# Patient Record
Sex: FEMALE | Race: WHITE | NOT HISPANIC OR LATINO
[De-identification: names, ages, dates, MRNs, and addresses within clinical notes are randomized per-mention and may not be internally consistent; named-entity substitution may affect disease eponyms.]

---

## 2017-08-16 ENCOUNTER — APPOINTMENT (OUTPATIENT)
Dept: GASTROENTEROLOGY | Facility: CLINIC | Age: 67
End: 2017-08-16
Payer: COMMERCIAL

## 2017-08-16 VITALS
BODY MASS INDEX: 37.92 KG/M2 | WEIGHT: 214 LBS | SYSTOLIC BLOOD PRESSURE: 120 MMHG | HEIGHT: 63 IN | TEMPERATURE: 98.2 F | DIASTOLIC BLOOD PRESSURE: 80 MMHG

## 2017-08-16 PROCEDURE — 99214 OFFICE O/P EST MOD 30 MIN: CPT

## 2017-10-03 ENCOUNTER — APPOINTMENT (OUTPATIENT)
Dept: GASTROENTEROLOGY | Facility: AMBULATORY MEDICAL SERVICES | Age: 67
End: 2017-10-03
Payer: COMMERCIAL

## 2017-10-03 PROCEDURE — G0121 COLON CA SCRN NOT HI RSK IND: CPT

## 2018-11-21 ENCOUNTER — RX RENEWAL (OUTPATIENT)
Age: 68
End: 2018-11-21

## 2019-08-14 ENCOUNTER — APPOINTMENT (OUTPATIENT)
Dept: GASTROENTEROLOGY | Facility: CLINIC | Age: 69
End: 2019-08-14
Payer: COMMERCIAL

## 2019-08-14 VITALS
TEMPERATURE: 97.2 F | HEIGHT: 63 IN | HEART RATE: 64 BPM | OXYGEN SATURATION: 99 % | SYSTOLIC BLOOD PRESSURE: 110 MMHG | BODY MASS INDEX: 32.96 KG/M2 | DIASTOLIC BLOOD PRESSURE: 80 MMHG | WEIGHT: 186 LBS

## 2019-08-14 PROCEDURE — 99214 OFFICE O/P EST MOD 30 MIN: CPT

## 2019-08-14 RX ORDER — L. ACIDOPHILUS/BIFID. ANIMALIS 31B CELL
CAPSULE ORAL
Qty: 60 | Refills: 4 | Status: COMPLETED | COMMUNITY
Start: 2018-11-21 | End: 2019-08-14

## 2019-08-14 RX ORDER — ELUXADOLINE 75 MG/1
75 TABLET, FILM COATED ORAL
Qty: 60 | Refills: 3 | Status: COMPLETED | COMMUNITY
Start: 2017-08-16 | End: 2019-08-14

## 2019-08-14 NOTE — PHYSICAL EXAM
[General Appearance - Alert] : alert [General Appearance - In No Acute Distress] : in no acute distress [Sclera] : the sclera and conjunctiva were normal [PERRL With Normal Accommodation] : pupils were equal in size, round, and reactive to light [Extraocular Movements] : extraocular movements were intact [Outer Ear] : the ears and nose were normal in appearance [Oropharynx] : the oropharynx was normal [Neck Appearance] : the appearance of the neck was normal [Neck Cervical Mass (___cm)] : no neck mass was observed [Jugular Venous Distention Increased] : there was no jugular-venous distention [Thyroid Diffuse Enlargement] : the thyroid was not enlarged [Thyroid Nodule] : there were no palpable thyroid nodules [Auscultation Breath Sounds / Voice Sounds] : lungs were clear to auscultation bilaterally [Heart Rate And Rhythm] : heart rate was normal and rhythm regular [Heart Sounds] : normal S1 and S2 [Heart Sounds Gallop] : no gallops [Murmurs] : no murmurs [Heart Sounds Pericardial Friction Rub] : no pericardial rub [Normal] : normal [Soft, Nontender] : the abdomen was soft and nontender [Epigastric] : in the epigastric area [No Mass] : no masses were palpated [No HSM] : no hepatosplenomegaly noted [Occult Blood Positive] : stool was negative for occult blood [No CVA Tenderness] : no ~M costovertebral angle tenderness [No Spinal Tenderness] : no spinal tenderness [Abnormal Walk] : normal gait [Nail Clubbing] : no clubbing  or cyanosis of the fingernails [Musculoskeletal - Swelling] : no joint swelling seen [Motor Tone] : muscle strength and tone were normal [Skin Color & Pigmentation] : normal skin color and pigmentation [Skin Turgor] : normal skin turgor [] : no rash [Oriented To Time, Place, And Person] : oriented to person, place, and time [Affect] : the affect was normal [Impaired Insight] : insight and judgment were intact

## 2019-08-14 NOTE — HISTORY OF PRESENT ILLNESS
[de-identified] : IBS - Colon 2017 \par \par  A low FODMAP diet was discussed with the patient at length. The patient had multiple questions all of which were answered. I recommended a nutritionist. Also recommended that the patient keep a food diary. We discussed  options such as Vegetables. Fresh fruits. Dairy that is lactose-free, and hard cheeses, or ripened/matured cheeses including... Beef, pork, chicken, fish, eggs. Avoid breadcrumbs, marinades, and sauces/gravies that may be high in FODMAPs. Soy products including tofu, tempeh. Grains.\par \par A low acid / reflux diet was discussed in great detail including  not smoking, not drinking alcohol, and not consuming foods that irritate the esophagus. It is helpful to eat small meals throughout the day instead of large meals. You should avoid eating before bedtime or lying down after you eat. It can be helpful to raise the head of your bed six inches. Additionally, you should maintain a healthy weight and good posture.. The patient was given written material to take home and review.\par

## 2019-11-13 ENCOUNTER — APPOINTMENT (OUTPATIENT)
Dept: SURGICAL ONCOLOGY | Facility: CLINIC | Age: 69
End: 2019-11-13
Payer: COMMERCIAL

## 2019-11-13 VITALS
WEIGHT: 182 LBS | HEIGHT: 63 IN | BODY MASS INDEX: 32.25 KG/M2 | DIASTOLIC BLOOD PRESSURE: 78 MMHG | HEART RATE: 71 BPM | SYSTOLIC BLOOD PRESSURE: 114 MMHG

## 2019-11-13 DIAGNOSIS — Z86.59 PERSONAL HISTORY OF OTHER MENTAL AND BEHAVIORAL DISORDERS: ICD-10-CM

## 2019-11-13 DIAGNOSIS — Z80.0 FAMILY HISTORY OF MALIGNANT NEOPLASM OF DIGESTIVE ORGANS: ICD-10-CM

## 2019-11-13 PROCEDURE — 99245 OFF/OP CONSLTJ NEW/EST HI 55: CPT

## 2019-11-13 RX ORDER — ESCITALOPRAM OXALATE 5 MG/1
5 TABLET, FILM COATED ORAL
Refills: 0 | Status: ACTIVE | COMMUNITY

## 2019-11-13 RX ORDER — PANCRELIPASE 36000; 180000; 114000 [USP'U]/1; [USP'U]/1; [USP'U]/1
36000-114000 CAPSULE, DELAYED RELEASE PELLETS ORAL
Qty: 270 | Refills: 2 | Status: DISCONTINUED | COMMUNITY
Start: 2019-09-12 | End: 2019-11-13

## 2019-11-13 RX ORDER — ALPRAZOLAM 0.25 MG/1
0.25 TABLET ORAL
Refills: 0 | Status: ACTIVE | COMMUNITY

## 2019-11-13 NOTE — CONSULT LETTER
[Dear  ___] : Dear  [unfilled], [Consult Letter:] : I had the pleasure of evaluating your patient, [unfilled]. [Consult Closing:] : Thank you very much for allowing me to participate in the care of this patient.  If you have any questions, please do not hesitate to contact me. [Sincerely,] : Sincerely, [DrChau  ___] : Dr. SIDHU [FreeTextEntry2] : Alexandria Diop MD [FreeTextEntry1] : 69 year-old female presents for an initial consultation.  She had seen Dr. Brunner for GI symptoms (felt secondary to be possibly related to IBS versus anxiety).  Apparently he asked her to see her PCP who performed bloodwork and was noted to have elevated Alk phos, AST and ALT in October 2019.  Total bilirubin was within normal range.\par \par She was referred for a non contrast CT of the abdomen and pelvis which demonstrated enlargement of the head of the pancreas with an approximately 3.3 cm hypodense area posteriorly within the pancreatic head which is suspicious for neoplasm.  There are enlarged retroperitoneal lymph nodes posterior to the head of the pancreas which are suspicious appearing as well.  There are few small nodular soft tissue densities lying deep to the right anterior abdominal wall suspicious for peritoneal implants, and soft tissue density material within the gallbladder which may reflect sludge. \par \par Subsequent MRCP revealed an irregularly enhancing  5.8 cm mass at the level of the confluence of the right and left hepatic biliary ducts.  This mass obstructs the common hepatic duct and main left intrahepatic bile duct causing dilation of the intrahepatic biliary ducts within the left hepatic lobe.  The mid to distal portion of the CBD are normal in caliber and appear uninvolved by the mass.  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  There are enlarged retroperitoneal lymph nodes, some of which demonstrate central necrosis lying posterior to the head of the pancreas and within the aortocaval region, suspicious for metastatic disease.  There is no evidence of primary pancreatic neoplasm.  There are small nodular densities within the right anterior abdomen suspicious for peritoneal metastatic foci ranging from 3-10 mm in size. \par \par HCV antibody was reactive, however, HCV RNA is not detected.  Patient has a history of HCV infection treated with Harvoni.\par \par Her past medical history includes IBS, GERD, anxiety + Past surgical history includes cataract surgery and tubal ligation.  She has a family history of gastric or colon cancer involving a maternal cousin.  She is a former smoker but quit 13 years ago and drinks alcohol on occasion.  She gets her colonoscopies with Dr. Brunner. \par \par She has been losing weight, however, she has been on Weight Watchers.  She reports abdominal discomfort, cramping and bloating for many years and this has been worked up by GI in the past with no significant findings. 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  Mass causes narrowing of the common hepatic duct and proximal CBD down to the level of CBD and cystic duct junction.  The distal CBD appears uninvolved.  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  Would initially recommend CT guided biopsy of peritoneal disease to establish diagnosis. I have reviewed the pertinent imaging, bloodwork and pathology. \par \par PCP: Dr. Alexandria Diop\par GI: Dr. Robert Brunner [FreeTextEntry3] : Aleksandr Adams MD, FACS, FASCRS\par , Department of Surgery\par Director of the [facility] [place]\par , Minimally Invasive/Robotic Cancer Surgery, Central & Eastern Divisions\par Division of Surgical Oncology

## 2019-11-13 NOTE — HISTORY OF PRESENT ILLNESS
[de-identified] : 69 year-old female presents for an initial consultation.  She had seen Dr. Brunner for GI symptoms (felt secondary to be possibly related to IBS versus anxiety).  Apparently he asked her to see her PCP who performed bloodwork and was noted to have elevated Alk phos, AST and ALT in October 2019.  Total bilirubin was within normal range.\par \par She was referred for a non contrast CT of the abdomen and pelvis which demonstrated enlargement of the head of the pancreas with an approximately 3.3 cm hypodense area posteriorly within the pancreatic head which is suspicious for neoplasm.  There are enlarged retroperitoneal lymph nodes posterior to the head of the pancreas which are suspicious appearing as well.  There are few small nodular soft tissue densities lying deep to the right anterior abdominal wall suspicious for peritoneal implants, and soft tissue density material within the gallbladder which may reflect sludge. \par \par Subsequent MRCP revealed an irregularly enhancing  5.8 cm mass at the level of the confluence of the right and left hepatic biliary ducts.  This mass obstructs the common hepatic duct and main left intrahepatic bile duct causing dilation of the intrahepatic biliary ducts within the left hepatic lobe.  The mid to distal portion of the CBD are normal in caliber and appear uninvolved by the mass.  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  There are enlarged retroperitoneal lymph nodes, some of which demonstrate central necrosis lying posterior to the head of the pancreas and within the aortocaval region, suspicious for metastatic disease.  There is no evidence of primary pancreatic neoplasm.  There are small nodular densities within the right anterior abdomen suspicious for peritoneal metastatic foci ranging from 3-10 mm in size. \par \par HCV antibody was reactive, however, HCV RNA is not detected.  Patient has a history of HCV infection treated with Harvoni.\par \par Her past medical history includes IBS, GERD, anxiety + Past surgical history includes cataract surgery and tubal ligation.  She has a family history of gastric or colon cancer involving a maternal cousin.  She is a former smoker but quit 13 years ago and drinks alcohol on occasion.  She gets her colonoscopies with Dr. Brunner. \par \par She has been losing weight, however, she has been on Weight Watchers.  She reports abdominal discomfort, cramping and bloating for many years and this has been worked up by GI in the past with no significant findings. \par \par (I also operated on her  Jose Brown in the past).\par \par PCP: Dr. Alexandria Diop\par GI: Dr. Robert Brunner

## 2019-11-13 NOTE — ASSESSMENT
[FreeTextEntry1] : 69 year-old female presents for an initial consultation.  She had seen Dr. Brunner for GI symptoms (felt secondary to be possibly related to IBS versus anxiety).  Apparently he asked her to see her PCP who performed bloodwork and was noted to have elevated Alk phos, AST and ALT in October 2019.  Total bilirubin was within normal range.\par \par She was referred for a non contrast CT of the abdomen and pelvis which demonstrated enlargement of the head of the pancreas with an approximately 3.3 cm hypodense area posteriorly within the pancreatic head which is suspicious for neoplasm.  There are enlarged retroperitoneal lymph nodes posterior to the head of the pancreas which are suspicious appearing as well.  There are few small nodular soft tissue densities lying deep to the right anterior abdominal wall suspicious for peritoneal implants, and soft tissue density material within the gallbladder which may reflect sludge. \par \par Subsequent MRCP revealed an irregularly enhancing  5.8 cm mass at the level of the confluence of the right and left hepatic biliary ducts.  This mass obstructs the common hepatic duct and main left intrahepatic bile duct causing dilation of the intrahepatic biliary ducts within the left hepatic lobe.  The mid to distal portion of the CBD are normal in caliber and appear uninvolved by the mass.  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  There are enlarged retroperitoneal lymph nodes, some of which demonstrate central necrosis lying posterior to the head of the pancreas and within the aortocaval region, suspicious for metastatic disease.  There is no evidence of primary pancreatic neoplasm.  There are small nodular densities within the right anterior abdomen suspicious for peritoneal metastatic foci ranging from 3-10 mm in size. \par \par HCV antibody was reactive, however, HCV RNA is not detected.  Patient has a history of HCV infection treated with Harvoni.\par \par Her past medical history includes IBS, GERD, anxiety + Past surgical history includes cataract surgery and tubal ligation.  She has a family history of gastric or colon cancer involving a maternal cousin.  She is a former smoker but quit 13 years ago and drinks alcohol on occasion.  She gets her colonoscopies with Dr. Brunner. \par \par She has been losing weight, however, she has been on Weight Watchers.  She reports abdominal discomfort, cramping and bloating for many years and this has been worked up by GI in the past with no significant findings. 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  Mass causes narrowing of the common hepatic duct and proximal CBD down to the level of CBD and cystic duct junction.  The distal CBD appears uninvolved.  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  Would initially recommend CT guided biopsy of peritoneal disease to establish diagnosis. I have reviewed the pertinent imaging, bloodwork and pathology.

## 2019-11-18 ENCOUNTER — OUTPATIENT (OUTPATIENT)
Dept: OUTPATIENT SERVICES | Facility: HOSPITAL | Age: 69
LOS: 1 days | Discharge: HOME | End: 2019-11-18
Payer: COMMERCIAL

## 2019-11-18 DIAGNOSIS — C23 MALIGNANT NEOPLASM OF GALLBLADDER: ICD-10-CM

## 2019-11-20 LAB
APTT BLD: 29.8 SEC
BASOPHILS # BLD AUTO: 0.03 K/UL
BASOPHILS NFR BLD AUTO: 0.5 %
EOSINOPHIL # BLD AUTO: 0.08 K/UL
EOSINOPHIL NFR BLD AUTO: 1.4 %
HCT VFR BLD CALC: 42.1 %
HGB BLD-MCNC: 13.5 G/DL
IMM GRANULOCYTES NFR BLD AUTO: 0.3 %
INR PPP: 1.05 RATIO
LYMPHOCYTES # BLD AUTO: 1.73 K/UL
LYMPHOCYTES NFR BLD AUTO: 29.6 %
MAN DIFF?: NORMAL
MCHC RBC-ENTMCNC: 31 PG
MCHC RBC-ENTMCNC: 32.1 GM/DL
MCV RBC AUTO: 96.8 FL
MONOCYTES # BLD AUTO: 0.4 K/UL
MONOCYTES NFR BLD AUTO: 6.8 %
NEUTROPHILS # BLD AUTO: 3.59 K/UL
NEUTROPHILS NFR BLD AUTO: 61.4 %
PLATELET # BLD AUTO: 226 K/UL
PT BLD: 12 SEC
RBC # BLD: 4.35 M/UL
RBC # FLD: 11.9 %
WBC # FLD AUTO: 5.85 K/UL

## 2019-11-22 ENCOUNTER — APPOINTMENT (OUTPATIENT)
Dept: HEMATOLOGY ONCOLOGY | Facility: CLINIC | Age: 69
End: 2019-11-22
Payer: COMMERCIAL

## 2019-11-22 ENCOUNTER — RESULT REVIEW (OUTPATIENT)
Age: 69
End: 2019-11-22

## 2019-11-22 VITALS
OXYGEN SATURATION: 100 % | HEART RATE: 78 BPM | DIASTOLIC BLOOD PRESSURE: 72 MMHG | SYSTOLIC BLOOD PRESSURE: 112 MMHG | RESPIRATION RATE: 18 BRPM | TEMPERATURE: 98.8 F | WEIGHT: 182.1 LBS | BODY MASS INDEX: 32.67 KG/M2 | HEIGHT: 62.76 IN

## 2019-11-22 LAB
BASOPHILS # BLD AUTO: 0.1 K/UL — SIGNIFICANT CHANGE UP (ref 0–0.2)
BASOPHILS NFR BLD AUTO: 0.9 % — SIGNIFICANT CHANGE UP (ref 0–2)
EOSINOPHIL # BLD AUTO: 0.1 K/UL — SIGNIFICANT CHANGE UP (ref 0–0.5)
EOSINOPHIL NFR BLD AUTO: 1.7 % — SIGNIFICANT CHANGE UP (ref 0–6)
HCT VFR BLD CALC: 43.6 % — SIGNIFICANT CHANGE UP (ref 34.5–45)
HGB BLD-MCNC: 14.6 G/DL — SIGNIFICANT CHANGE UP (ref 11.5–15.5)
LYMPHOCYTES # BLD AUTO: 2.1 K/UL — SIGNIFICANT CHANGE UP (ref 1–3.3)
LYMPHOCYTES # BLD AUTO: 29.5 % — SIGNIFICANT CHANGE UP (ref 13–44)
MCHC RBC-ENTMCNC: 32.2 PG — SIGNIFICANT CHANGE UP (ref 27–34)
MCHC RBC-ENTMCNC: 33.6 G/DL — SIGNIFICANT CHANGE UP (ref 32–36)
MCV RBC AUTO: 96.1 FL — SIGNIFICANT CHANGE UP (ref 80–100)
MONOCYTES # BLD AUTO: 0.5 K/UL — SIGNIFICANT CHANGE UP (ref 0–0.9)
MONOCYTES NFR BLD AUTO: 7.2 % — SIGNIFICANT CHANGE UP (ref 2–14)
NEUTROPHILS # BLD AUTO: 4.2 K/UL — SIGNIFICANT CHANGE UP (ref 1.8–7.4)
NEUTROPHILS NFR BLD AUTO: 60.7 % — SIGNIFICANT CHANGE UP (ref 43–77)
PLATELET # BLD AUTO: 257 K/UL — SIGNIFICANT CHANGE UP (ref 150–400)
RBC # BLD: 4.54 M/UL — SIGNIFICANT CHANGE UP (ref 3.8–5.2)
RBC # FLD: 11 % — SIGNIFICANT CHANGE UP (ref 10.3–14.5)
WBC # BLD: 7 K/UL — SIGNIFICANT CHANGE UP (ref 3.8–10.5)
WBC # FLD AUTO: 7 K/UL — SIGNIFICANT CHANGE UP (ref 3.8–10.5)

## 2019-11-22 PROCEDURE — 99205 OFFICE O/P NEW HI 60 MIN: CPT

## 2019-11-22 RX ORDER — PANCRELIPASE LIPASE, PANCRELIPASE PROTEASE, PANCRELIPASE AMYLASE 40000; 126000; 168000 [USP'U]/1; [USP'U]/1; [USP'U]/1
40000-126000 CAPSULE, DELAYED RELEASE ORAL
Qty: 300 | Refills: 5 | Status: DISCONTINUED | COMMUNITY
Start: 2019-08-26 | End: 2019-11-22

## 2019-11-25 LAB
ALBUMIN SERPL ELPH-MCNC: 4.5 G/DL
ALP BLD-CCNC: 175 U/L
ALT SERPL-CCNC: 21 U/L
ANION GAP SERPL CALC-SCNC: 12 MMOL/L
AST SERPL-CCNC: 27 U/L
BILIRUB SERPL-MCNC: 0.2 MG/DL
BUN SERPL-MCNC: 15 MG/DL
CALCIUM SERPL-MCNC: 9.6 MG/DL
CANCER AG19-9 SERPL-ACNC: 14 U/ML
CEA SERPL-MCNC: 2.7 NG/ML
CHLORIDE SERPL-SCNC: 100 MMOL/L
CO2 SERPL-SCNC: 29 MMOL/L
CREAT SERPL-MCNC: 0.79 MG/DL
GLUCOSE SERPL-MCNC: 225 MG/DL
HBV SURFACE AB SER QL: REACTIVE
HBV SURFACE AG SER QL: NONREACTIVE
HCV RNA FLD QL NAA+PROBE: NORMAL
HCV RNA SPEC QL PROBE+SIG AMP: NOT DETECTED
POTASSIUM SERPL-SCNC: 3.5 MMOL/L
PROT SERPL-MCNC: 7 G/DL
SODIUM SERPL-SCNC: 141 MMOL/L

## 2019-11-27 ENCOUNTER — APPOINTMENT (OUTPATIENT)
Dept: CT IMAGING | Facility: HOSPITAL | Age: 69
End: 2019-11-27

## 2019-12-01 NOTE — PHYSICAL EXAM
[Fully active, able to carry on all pre-disease performance without restriction] : Status 0 - Fully active, able to carry on all pre-disease performance without restriction [Normal] : affect appropriate [de-identified] : +mild epigastric tenderness to palpation

## 2019-12-01 NOTE — CONSULT LETTER
[Dear  ___] : Dear  [unfilled], [Consult Letter:] : I had the pleasure of evaluating your patient, [unfilled]. [Please see my note below.] : Please see my note below. [Consult Closing:] : Thank you very much for allowing me to participate in the care of this patient.  If you have any questions, please do not hesitate to contact me. [FreeTextEntry3] : Sridhar Castro MD, FACP \par  of Medicine \par Division of Hematology/Oncology\par [facility]\par [facility] \par [address]\[place] [address]\par Tel: [phone]\par Fax: [phone]\par \par \par  [Sincerely,] : Sincerely,

## 2019-12-01 NOTE — PHYSICAL EXAM
[Fully active, able to carry on all pre-disease performance without restriction] : Status 0 - Fully active, able to carry on all pre-disease performance without restriction [Normal] : affect appropriate [de-identified] : +mild epigastric tenderness to palpation

## 2019-12-01 NOTE — HISTORY OF PRESENT ILLNESS
[Disease: _____________________] : Disease: [unfilled] [de-identified] : 70 Y/O F HCV infection (treated with Harvoni), IBS, Anxiety, GERD who had some GI symptoms/elevated LFTs (wt loss but on wt watchers) October 2019 and consulted Dr Brunner GI and non contrast CT A/P showed enlargement of the head of the pancreas with an approximately 3.3 cm hypodense area posteriorly within the pancreatic head which is suspicious for neoplasm. There are enlarged retroperitoneal lymph nodes posterior to the head of the pancreas which are suspicious appearing as well. There are few small nodular soft tissue densities lying deep to the right anterior abdominal wall suspicious for peritoneal implants, and soft tissue density material within the gallbladder which may reflect sludge.  Patient was referred for MRCP which revealed an irregularly enhancing 5.8 cm mass at the level of the confluence of the right and left hepatic biliary ducts. This mass obstructs the common hepatic duct and main left intrahepatic bile duct causing dilation of the intrahepatic biliary ducts within the left hepatic lobe. The mid to distal portion of the CBD are normal in caliber and appear uninvolved by the mass. 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 There are enlarged retroperitoneal lymph nodes, some of which demonstrate central necrosis lying posterior to the head of the pancreas and within the aortocaval region, suspicious for metastatic disease. There is no evidence of primary pancreatic neoplasm. There are small nodular densities within the right anterior abdomen suspicious for peritoneal metastatic foci ranging from 3-10 mm in size.  She consulted Dr Adams on 11/13/19 and is planned for biopsy planned 11/27/19. \par \par \par PSH:  cataracts, tubal ligation \par Fhx:  gastric or colon cancer involving a maternal cousin at 60\par Social:  former smoker but quit 13 years ago and drinks alcohol on occasion. She gets her colonoscopies with Dr. Brunner. \par \par PCP: Dr. Alexandria Diop\par GI: Dr. Robert Brunner. \par \par Last Mammogram/pap smear - few years ago; last colonoscopy 2017 [de-identified] : Karlie feels anxious about diagnosis, continues to have epigastric abdominal pain, especially in the morning. \romaine Has been having nausea, was prescribed zofran by her PMD.  [FreeTextEntry1] : Newly diagnosed - no tissue diagnosis

## 2019-12-01 NOTE — CONSULT LETTER
[Dear  ___] : Dear  [unfilled], [Consult Letter:] : I had the pleasure of evaluating your patient, [unfilled]. [Please see my note below.] : Please see my note below. [Consult Closing:] : Thank you very much for allowing me to participate in the care of this patient.  If you have any questions, please do not hesitate to contact me. [Sincerely,] : Sincerely, [FreeTextEntry3] : Sridhar Castro MD, FACP \par  of Medicine \par Division of Hematology/Oncology\par [facility]\par [facility] \par [address]\[place] [address]\par Tel: [phone]\par Fax: [phone]\par \par \par

## 2019-12-01 NOTE — REVIEW OF SYSTEMS
[Recent Change In Weight] : ~T recent weight change [Abdominal Pain] : abdominal pain [Diarrhea] : diarrhea [Anxiety] : anxiety [Negative] : Allergic/Immunologic [Fever] : no fever [Chills] : no chills [Night Sweats] : no night sweats [Vomiting] : no vomiting [FreeTextEntry7] : nausea

## 2019-12-01 NOTE — HISTORY OF PRESENT ILLNESS
[Disease: _____________________] : Disease: [unfilled] [de-identified] : 70 Y/O F HCV infection (treated with Harvoni), IBS, Anxiety, GERD who had some GI symptoms/elevated LFTs (wt loss but on wt watchers) October 2019 and consulted Dr Brunner GI and non contrast CT A/P showed enlargement of the head of the pancreas with an approximately 3.3 cm hypodense area posteriorly within the pancreatic head which is suspicious for neoplasm. There are enlarged retroperitoneal lymph nodes posterior to the head of the pancreas which are suspicious appearing as well. There are few small nodular soft tissue densities lying deep to the right anterior abdominal wall suspicious for peritoneal implants, and soft tissue density material within the gallbladder which may reflect sludge.  Patient was referred for MRCP which revealed an irregularly enhancing 5.8 cm mass at the level of the confluence of the right and left hepatic biliary ducts. This mass obstructs the common hepatic duct and main left intrahepatic bile duct causing dilation of the intrahepatic biliary ducts within the left hepatic lobe. The mid to distal portion of the CBD are normal in caliber and appear uninvolved by the mass. 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 There are enlarged retroperitoneal lymph nodes, some of which demonstrate central necrosis lying posterior to the head of the pancreas and within the aortocaval region, suspicious for metastatic disease. There is no evidence of primary pancreatic neoplasm. There are small nodular densities within the right anterior abdomen suspicious for peritoneal metastatic foci ranging from 3-10 mm in size.  She consulted Dr Adams on 11/13/19 and is planned for biopsy planned 11/27/19. \par \par \par PSH:  cataracts, tubal ligation \par Fhx:  gastric or colon cancer involving a maternal cousin at 60\par Social:  former smoker but quit 13 years ago and drinks alcohol on occasion. She gets her colonoscopies with Dr. Brunner. \par \par PCP: Dr. Alexandria Diop\par GI: Dr. Robert Brunner. \par \par Last Mammogram/pap smear - few years ago; last colonoscopy 2017 [FreeTextEntry1] : Newly diagnosed - no tissue diagnosis  [de-identified] : Karlie feels anxious about diagnosis, continues to have epigastric abdominal pain, especially in the morning. \romaine Has been having nausea, was prescribed zofran by her PMD.

## 2019-12-01 NOTE — REVIEW OF SYSTEMS
[Recent Change In Weight] : ~T recent weight change [Abdominal Pain] : abdominal pain [Diarrhea] : diarrhea [Anxiety] : anxiety [Negative] : Allergic/Immunologic [Fever] : no fever [Night Sweats] : no night sweats [Chills] : no chills [FreeTextEntry7] : nausea  [Vomiting] : no vomiting

## 2019-12-05 ENCOUNTER — FORM ENCOUNTER (OUTPATIENT)
Age: 69
End: 2019-12-05

## 2019-12-06 ENCOUNTER — OUTPATIENT (OUTPATIENT)
Dept: OUTPATIENT SERVICES | Facility: HOSPITAL | Age: 69
LOS: 1 days | End: 2019-12-06
Payer: COMMERCIAL

## 2019-12-06 ENCOUNTER — APPOINTMENT (OUTPATIENT)
Dept: CT IMAGING | Facility: HOSPITAL | Age: 69
End: 2019-12-06

## 2019-12-06 ENCOUNTER — RESULT REVIEW (OUTPATIENT)
Age: 69
End: 2019-12-06

## 2019-12-06 DIAGNOSIS — Z00.00 ENCOUNTER FOR GENERAL ADULT MEDICAL EXAMINATION WITHOUT ABNORMAL FINDINGS: ICD-10-CM

## 2019-12-06 DIAGNOSIS — K83.9 DISEASE OF BILIARY TRACT, UNSPECIFIED: ICD-10-CM

## 2019-12-06 PROCEDURE — 77012 CT SCAN FOR NEEDLE BIOPSY: CPT | Mod: 26

## 2019-12-06 PROCEDURE — 49180 BIOPSY ABDOMINAL MASS: CPT

## 2019-12-06 PROCEDURE — 77012 CT SCAN FOR NEEDLE BIOPSY: CPT

## 2019-12-11 LAB — NON-GYNECOLOGICAL CYTOLOGY STUDY: SIGNIFICANT CHANGE UP

## 2019-12-13 ENCOUNTER — RESULT REVIEW (OUTPATIENT)
Age: 69
End: 2019-12-13

## 2019-12-13 ENCOUNTER — APPOINTMENT (OUTPATIENT)
Dept: HEMATOLOGY ONCOLOGY | Facility: CLINIC | Age: 69
End: 2019-12-13
Payer: COMMERCIAL

## 2019-12-13 VITALS
OXYGEN SATURATION: 99 % | BODY MASS INDEX: 33.06 KG/M2 | TEMPERATURE: 98.1 F | HEART RATE: 79 BPM | RESPIRATION RATE: 18 BRPM | HEIGHT: 63 IN | SYSTOLIC BLOOD PRESSURE: 120 MMHG | WEIGHT: 185.19 LBS | DIASTOLIC BLOOD PRESSURE: 74 MMHG

## 2019-12-13 LAB
BASOPHILS # BLD AUTO: 0 K/UL — SIGNIFICANT CHANGE UP (ref 0–0.2)
BASOPHILS NFR BLD AUTO: 0.8 % — SIGNIFICANT CHANGE UP (ref 0–2)
EOSINOPHIL # BLD AUTO: 0.1 K/UL — SIGNIFICANT CHANGE UP (ref 0–0.5)
EOSINOPHIL NFR BLD AUTO: 1.5 % — SIGNIFICANT CHANGE UP (ref 0–6)
HCT VFR BLD CALC: 41.7 % — SIGNIFICANT CHANGE UP (ref 34.5–45)
HGB BLD-MCNC: 14.1 G/DL — SIGNIFICANT CHANGE UP (ref 11.5–15.5)
LYMPHOCYTES # BLD AUTO: 1.8 K/UL — SIGNIFICANT CHANGE UP (ref 1–3.3)
LYMPHOCYTES # BLD AUTO: 27.4 % — SIGNIFICANT CHANGE UP (ref 13–44)
MCHC RBC-ENTMCNC: 32.4 PG — SIGNIFICANT CHANGE UP (ref 27–34)
MCHC RBC-ENTMCNC: 33.8 G/DL — SIGNIFICANT CHANGE UP (ref 32–36)
MCV RBC AUTO: 96 FL — SIGNIFICANT CHANGE UP (ref 80–100)
MONOCYTES # BLD AUTO: 0.5 K/UL — SIGNIFICANT CHANGE UP (ref 0–0.9)
MONOCYTES NFR BLD AUTO: 7.1 % — SIGNIFICANT CHANGE UP (ref 2–14)
NEUTROPHILS # BLD AUTO: 4.2 K/UL — SIGNIFICANT CHANGE UP (ref 1.8–7.4)
NEUTROPHILS NFR BLD AUTO: 63.2 % — SIGNIFICANT CHANGE UP (ref 43–77)
PLATELET # BLD AUTO: 187 K/UL — SIGNIFICANT CHANGE UP (ref 150–400)
RBC # BLD: 4.34 M/UL — SIGNIFICANT CHANGE UP (ref 3.8–5.2)
RBC # FLD: 11.2 % — SIGNIFICANT CHANGE UP (ref 10.3–14.5)
WBC # BLD: 6.6 K/UL — SIGNIFICANT CHANGE UP (ref 3.8–10.5)
WBC # FLD AUTO: 6.6 K/UL — SIGNIFICANT CHANGE UP (ref 3.8–10.5)

## 2019-12-13 PROCEDURE — 93010 ELECTROCARDIOGRAM REPORT: CPT

## 2019-12-13 PROCEDURE — 99215 OFFICE O/P EST HI 40 MIN: CPT

## 2019-12-13 RX ORDER — ONDANSETRON 8 MG/1
8 TABLET ORAL
Refills: 0 | Status: DISCONTINUED | COMMUNITY
End: 2019-12-13

## 2019-12-16 ENCOUNTER — MESSAGE (OUTPATIENT)
Age: 69
End: 2019-12-16

## 2019-12-16 ENCOUNTER — OUTPATIENT (OUTPATIENT)
Dept: OUTPATIENT SERVICES | Facility: HOSPITAL | Age: 69
LOS: 1 days | Discharge: HOME | End: 2019-12-16

## 2019-12-16 DIAGNOSIS — C23 MALIGNANT NEOPLASM OF GALLBLADDER: ICD-10-CM

## 2019-12-17 NOTE — HISTORY OF PRESENT ILLNESS
[Disease: _____________________] : Disease: [unfilled] [AJCC Stage: ____] : AJCC Stage: [unfilled] [de-identified] : 70 Y/O F HCV infection (treated with Harvoni), IBS, Anxiety, GERD who had some GI symptoms/elevated LFTs (wt loss but on wt watchers) October 2019 and consulted Dr Brunner GI and non contrast CT A/P showed enlargement of the head of the pancreas with an approximately 3.3 cm hypodense area posteriorly within the pancreatic head which is suspicious for neoplasm. There are enlarged retroperitoneal lymph nodes posterior to the head of the pancreas which are suspicious appearing as well. There are few small nodular soft tissue densities lying deep to the right anterior abdominal wall suspicious for peritoneal implants, and soft tissue density material within the gallbladder which may reflect sludge.  Patient was referred for MRCP which revealed an irregularly enhancing 5.8 cm mass at the level of the confluence of the right and left hepatic biliary ducts. This mass obstructs the common hepatic duct and main left intrahepatic bile duct causing dilation of the intrahepatic biliary ducts within the left hepatic lobe. The mid to distal portion of the CBD are normal in caliber and appear uninvolved by the mass. 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 There are enlarged retroperitoneal lymph nodes, some of which demonstrate central necrosis lying posterior to the head of the pancreas and within the aortocaval region, suspicious for metastatic disease. There is no evidence of primary pancreatic neoplasm. There are small nodular densities within the right anterior abdomen suspicious for peritoneal metastatic foci ranging from 3-10 mm in size.  She consulted Dr Adams on 11/13/19 and is planned for biopsy planned 11/27/19. \par \par \par PSH:  cataracts, tubal ligation \par Fhx:  gastric or colon cancer involving a maternal cousin at 60\par Social:  former smoker but quit 13 years ago and drinks alcohol on occasion. She gets her colonoscopies with Dr. Brunner. \par \par PCP: Dr. Alexandria Diop\par GI: Dr. Robert Brunner. \par \par Last Mammogram/pap smear - few years ago; last colonoscopy 2017 [de-identified] : Karlie comes in follow up and had biopsy of RUQ peritoneal nodule which confirmed metastatic cholangiocarcinoma.  She feels anxious related to diagnosis and results of biopsy. We discussed clinical trial (phase 2 study with Xermelo + [place]/Cis) \par \par \par \par

## 2019-12-17 NOTE — REASON FOR VISIT
[Spouse] : spouse [Family Member] : family member [Follow-Up Visit] : a follow-up [FreeTextEntry2] : Cholangiocarcinoma

## 2019-12-17 NOTE — PHYSICAL EXAM
[Fully active, able to carry on all pre-disease performance without restriction] : Status 0 - Fully active, able to carry on all pre-disease performance without restriction [Normal] : grossly intact [de-identified] : +mild epigastric tenderness to palpation

## 2019-12-17 NOTE — REVIEW OF SYSTEMS
[Abdominal Pain] : abdominal pain [Recent Change In Weight] : ~T recent weight change [Anxiety] : anxiety [Negative] : Allergic/Immunologic [Constipation] : constipation [Chills] : no chills [Fever] : no fever [Night Sweats] : no night sweats [Vomiting] : no vomiting [FreeTextEntry7] : nausea

## 2019-12-20 ENCOUNTER — RX RENEWAL (OUTPATIENT)
Age: 69
End: 2019-12-20

## 2019-12-20 ENCOUNTER — RESULT REVIEW (OUTPATIENT)
Age: 69
End: 2019-12-20

## 2019-12-20 ENCOUNTER — LABORATORY RESULT (OUTPATIENT)
Age: 69
End: 2019-12-20

## 2019-12-20 ENCOUNTER — APPOINTMENT (OUTPATIENT)
Dept: INFUSION THERAPY | Facility: HOSPITAL | Age: 69
End: 2019-12-20

## 2019-12-20 LAB
BASOPHILS # BLD AUTO: 0.1 K/UL — SIGNIFICANT CHANGE UP (ref 0–0.2)
BASOPHILS NFR BLD AUTO: 0.7 % — SIGNIFICANT CHANGE UP (ref 0–2)
EOSINOPHIL # BLD AUTO: 0.2 K/UL — SIGNIFICANT CHANGE UP (ref 0–0.5)
EOSINOPHIL NFR BLD AUTO: 2.7 % — SIGNIFICANT CHANGE UP (ref 0–6)
HCT VFR BLD CALC: 44.5 % — SIGNIFICANT CHANGE UP (ref 34.5–45)
HGB BLD-MCNC: 15 G/DL — SIGNIFICANT CHANGE UP (ref 11.5–15.5)
LYMPHOCYTES # BLD AUTO: 2.1 K/UL — SIGNIFICANT CHANGE UP (ref 1–3.3)
LYMPHOCYTES # BLD AUTO: 24.1 % — SIGNIFICANT CHANGE UP (ref 13–44)
MCHC RBC-ENTMCNC: 31.9 PG — SIGNIFICANT CHANGE UP (ref 27–34)
MCHC RBC-ENTMCNC: 33.7 G/DL — SIGNIFICANT CHANGE UP (ref 32–36)
MCV RBC AUTO: 94.5 FL — SIGNIFICANT CHANGE UP (ref 80–100)
MONOCYTES # BLD AUTO: 0.6 K/UL — SIGNIFICANT CHANGE UP (ref 0–0.9)
MONOCYTES NFR BLD AUTO: 6.7 % — SIGNIFICANT CHANGE UP (ref 2–14)
NEUTROPHILS # BLD AUTO: 5.7 K/UL — SIGNIFICANT CHANGE UP (ref 1.8–7.4)
NEUTROPHILS NFR BLD AUTO: 65.9 % — SIGNIFICANT CHANGE UP (ref 43–77)
PLATELET # BLD AUTO: 216 K/UL — SIGNIFICANT CHANGE UP (ref 150–400)
RBC # BLD: 4.71 M/UL — SIGNIFICANT CHANGE UP (ref 3.8–5.2)
RBC # FLD: 11.3 % — SIGNIFICANT CHANGE UP (ref 10.3–14.5)
WBC # BLD: 8.6 K/UL — SIGNIFICANT CHANGE UP (ref 3.8–10.5)
WBC # FLD AUTO: 8.6 K/UL — SIGNIFICANT CHANGE UP (ref 3.8–10.5)

## 2019-12-23 DIAGNOSIS — R11.2 NAUSEA WITH VOMITING, UNSPECIFIED: ICD-10-CM

## 2019-12-23 DIAGNOSIS — Z51.11 ENCOUNTER FOR ANTINEOPLASTIC CHEMOTHERAPY: ICD-10-CM

## 2019-12-27 ENCOUNTER — LABORATORY RESULT (OUTPATIENT)
Age: 69
End: 2019-12-27

## 2019-12-27 ENCOUNTER — RESULT REVIEW (OUTPATIENT)
Age: 69
End: 2019-12-27

## 2019-12-27 ENCOUNTER — APPOINTMENT (OUTPATIENT)
Dept: INFUSION THERAPY | Facility: HOSPITAL | Age: 69
End: 2019-12-27

## 2019-12-27 ENCOUNTER — APPOINTMENT (OUTPATIENT)
Dept: HEMATOLOGY ONCOLOGY | Facility: CLINIC | Age: 69
End: 2019-12-27
Payer: COMMERCIAL

## 2019-12-27 VITALS
OXYGEN SATURATION: 99 % | HEART RATE: 83 BPM | TEMPERATURE: 98.3 F | BODY MASS INDEX: 32.1 KG/M2 | SYSTOLIC BLOOD PRESSURE: 120 MMHG | DIASTOLIC BLOOD PRESSURE: 72 MMHG | WEIGHT: 181.22 LBS | RESPIRATION RATE: 18 BRPM

## 2019-12-27 LAB
BASOPHILS # BLD AUTO: 0 K/UL — SIGNIFICANT CHANGE UP (ref 0–0.2)
BASOPHILS NFR BLD AUTO: 0.8 % — SIGNIFICANT CHANGE UP (ref 0–2)
BUN SERPL-MCNC: 23 MG/DL — SIGNIFICANT CHANGE UP (ref 7–23)
CA-I BLDA-SCNC: 1.2 MMOL/L — SIGNIFICANT CHANGE UP (ref 1.12–1.3)
CHLORIDE SERPL-SCNC: 100 MMOL/L — SIGNIFICANT CHANGE UP (ref 96–108)
CO2 SERPL-SCNC: 30 MMOL/L — SIGNIFICANT CHANGE UP (ref 22–31)
CREAT SERPL-MCNC: 0.8 MG/DL — SIGNIFICANT CHANGE UP (ref 0.5–1.3)
EOSINOPHIL # BLD AUTO: 0.2 K/UL — SIGNIFICANT CHANGE UP (ref 0–0.5)
EOSINOPHIL NFR BLD AUTO: 4 % — SIGNIFICANT CHANGE UP (ref 0–6)
GLUCOSE SERPL-MCNC: 111 MG/DL — HIGH (ref 70–99)
HCT VFR BLD CALC: 41.4 % — SIGNIFICANT CHANGE UP (ref 34.5–45)
HGB BLD-MCNC: 13.9 G/DL — SIGNIFICANT CHANGE UP (ref 11.5–15.5)
LYMPHOCYTES # BLD AUTO: 1.8 K/UL — SIGNIFICANT CHANGE UP (ref 1–3.3)
LYMPHOCYTES # BLD AUTO: 30.2 % — SIGNIFICANT CHANGE UP (ref 13–44)
MCHC RBC-ENTMCNC: 31.8 PG — SIGNIFICANT CHANGE UP (ref 27–34)
MCHC RBC-ENTMCNC: 33.5 G/DL — SIGNIFICANT CHANGE UP (ref 32–36)
MCV RBC AUTO: 95 FL — SIGNIFICANT CHANGE UP (ref 80–100)
MONOCYTES # BLD AUTO: 0.5 K/UL — SIGNIFICANT CHANGE UP (ref 0–0.9)
MONOCYTES NFR BLD AUTO: 8.9 % — SIGNIFICANT CHANGE UP (ref 2–14)
NEUTROPHILS # BLD AUTO: 3.4 K/UL — SIGNIFICANT CHANGE UP (ref 1.8–7.4)
NEUTROPHILS NFR BLD AUTO: 56.1 % — SIGNIFICANT CHANGE UP (ref 43–77)
PLATELET # BLD AUTO: 198 K/UL — SIGNIFICANT CHANGE UP (ref 150–400)
POTASSIUM SERPL-MCNC: 4.2 MMOL/L — SIGNIFICANT CHANGE UP (ref 3.5–5.3)
POTASSIUM SERPL-SCNC: 4.2 MMOL/L — SIGNIFICANT CHANGE UP (ref 3.5–5.3)
RBC # BLD: 4.36 M/UL — SIGNIFICANT CHANGE UP (ref 3.8–5.2)
RBC # FLD: 10.9 % — SIGNIFICANT CHANGE UP (ref 10.3–14.5)
SODIUM SERPL-SCNC: 139 MMOL/L — SIGNIFICANT CHANGE UP (ref 135–145)
WBC # BLD: 6 K/UL — SIGNIFICANT CHANGE UP (ref 3.8–10.5)
WBC # FLD AUTO: 6 K/UL — SIGNIFICANT CHANGE UP (ref 3.8–10.5)

## 2019-12-27 PROCEDURE — 99214 OFFICE O/P EST MOD 30 MIN: CPT

## 2019-12-27 NOTE — REVIEW OF SYSTEMS
[Recent Change In Weight] : ~T recent weight change [Abdominal Pain] : abdominal pain [Anxiety] : anxiety [Negative] : Allergic/Immunologic [Fever] : no fever [Chills] : no chills [Night Sweats] : no night sweats [Fatigue] : no fatigue [Vomiting] : no vomiting [FreeTextEntry7] : nausea  [FreeTextEntry9] : back pain

## 2019-12-27 NOTE — HISTORY OF PRESENT ILLNESS
[Disease: _____________________] : Disease: [unfilled] [AJCC Stage: ____] : AJCC Stage: [unfilled] [M: ___] : M[unfilled] [de-identified] : 70 Y/O F HCV infection (treated with Harvoni), IBS, Anxiety, GERD who had some GI symptoms/elevated LFTs (wt loss but on wt watchers) October 2019 and consulted Dr Brunner GI and non contrast CT A/P showed enlargement of the head of the pancreas with an approximately 3.3 cm hypodense area posteriorly within the pancreatic head which is suspicious for neoplasm. There are enlarged retroperitoneal lymph nodes posterior to the head of the pancreas which are suspicious appearing as well. There are few small nodular soft tissue densities lying deep to the right anterior abdominal wall suspicious for peritoneal implants, and soft tissue density material within the gallbladder which may reflect sludge.  Patient was referred for MRCP which revealed an irregularly enhancing 5.8 cm mass at the level of the confluence of the right and left hepatic biliary ducts. This mass obstructs the common hepatic duct and main left intrahepatic bile duct causing dilation of the intrahepatic biliary ducts within the left hepatic lobe. The mid to distal portion of the CBD are normal in caliber and appear uninvolved by the mass. 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 There are enlarged retroperitoneal lymph nodes, some of which demonstrate central necrosis lying posterior to the head of the pancreas and within the aortocaval region, suspicious for metastatic disease. There is no evidence of primary pancreatic neoplasm. There are small nodular densities within the right anterior abdomen suspicious for peritoneal metastatic foci ranging from 3-10 mm in size.  She consulted Dr Adams on 11/13/19 and is planned for biopsy planned 11/27/19. \par \par \par PSH:  cataracts, tubal ligation \par Fhx:  gastric or colon cancer involving a maternal cousin at 60\par Social:  former smoker but quit 13 years ago and drinks alcohol on occasion. She gets her colonoscopies with Dr. Brunner. \par \par PCP: Dr. Alexandria Diop\par GI: Dr. Robert Brunner. \par \par Last Mammogram/pap smear - few years ago; last colonoscopy 2017\par \par She had biopsy of RUQ peritoneal nodule which confirmed metastatic cholangiocarcinoma.  \par She was started on Gemzar/Cisplatin 12/20/19  [FreeTextEntry1] : Gemcitabine/Cisplatin s/p W1 Day 1 [de-identified] : Karlie comes in follow up after her first dose of Gemcitabine/Cisplatin and states her back pain has been acting up and used to go to chiropractor (last went 1 month ago) and has been taking Tylenol/Motrin which don’t always help.  She also c/o a lot of gassy abdominal pain but denies any more constipation.  Reglan helped for her nausea.  She has an new itch rash under breasts and in groin.  She states she is using an OTC CBD oil.  \par \par \par

## 2019-12-27 NOTE — PHYSICAL EXAM
[Fully active, able to carry on all pre-disease performance without restriction] : Status 0 - Fully active, able to carry on all pre-disease performance without restriction [Normal] : grossly intact [de-identified] : +mild epigastric tenderness to palpation  [de-identified] : erythematous rash along bra line anterior and groin ,  [de-identified] : anxious

## 2019-12-30 DIAGNOSIS — C22.1 INTRAHEPATIC BILE DUCT CARCINOMA: ICD-10-CM

## 2020-01-10 ENCOUNTER — RESULT REVIEW (OUTPATIENT)
Age: 70
End: 2020-01-10

## 2020-01-10 ENCOUNTER — APPOINTMENT (OUTPATIENT)
Dept: INFUSION THERAPY | Facility: HOSPITAL | Age: 70
End: 2020-01-10

## 2020-01-10 ENCOUNTER — LABORATORY RESULT (OUTPATIENT)
Age: 70
End: 2020-01-10

## 2020-01-10 ENCOUNTER — APPOINTMENT (OUTPATIENT)
Dept: HEMATOLOGY ONCOLOGY | Facility: CLINIC | Age: 70
End: 2020-01-10
Payer: COMMERCIAL

## 2020-01-10 VITALS
WEIGHT: 182.98 LBS | BODY MASS INDEX: 32.41 KG/M2 | OXYGEN SATURATION: 99 % | SYSTOLIC BLOOD PRESSURE: 120 MMHG | TEMPERATURE: 98.2 F | DIASTOLIC BLOOD PRESSURE: 70 MMHG | RESPIRATION RATE: 18 BRPM | HEART RATE: 84 BPM

## 2020-01-10 LAB
BASOPHILS # BLD AUTO: 0 K/UL — SIGNIFICANT CHANGE UP (ref 0–0.2)
BASOPHILS NFR BLD AUTO: 0.7 % — SIGNIFICANT CHANGE UP (ref 0–2)
EOSINOPHIL # BLD AUTO: 0.1 K/UL — SIGNIFICANT CHANGE UP (ref 0–0.5)
EOSINOPHIL NFR BLD AUTO: 1.6 % — SIGNIFICANT CHANGE UP (ref 0–6)
HCT VFR BLD CALC: 39.6 % — SIGNIFICANT CHANGE UP (ref 34.5–45)
HGB BLD-MCNC: 13.6 G/DL — SIGNIFICANT CHANGE UP (ref 11.5–15.5)
LYMPHOCYTES # BLD AUTO: 1.6 K/UL — SIGNIFICANT CHANGE UP (ref 1–3.3)
LYMPHOCYTES # BLD AUTO: 26.1 % — SIGNIFICANT CHANGE UP (ref 13–44)
MCHC RBC-ENTMCNC: 32.4 PG — SIGNIFICANT CHANGE UP (ref 27–34)
MCHC RBC-ENTMCNC: 34.3 G/DL — SIGNIFICANT CHANGE UP (ref 32–36)
MCV RBC AUTO: 94.4 FL — SIGNIFICANT CHANGE UP (ref 80–100)
MONOCYTES # BLD AUTO: 0.4 K/UL — SIGNIFICANT CHANGE UP (ref 0–0.9)
MONOCYTES NFR BLD AUTO: 7.3 % — SIGNIFICANT CHANGE UP (ref 2–14)
NEUTROPHILS # BLD AUTO: 3.9 K/UL — SIGNIFICANT CHANGE UP (ref 1.8–7.4)
NEUTROPHILS NFR BLD AUTO: 64.2 % — SIGNIFICANT CHANGE UP (ref 43–77)
PLATELET # BLD AUTO: 271 K/UL — SIGNIFICANT CHANGE UP (ref 150–400)
RBC # BLD: 4.2 M/UL — SIGNIFICANT CHANGE UP (ref 3.8–5.2)
RBC # FLD: 11.6 % — SIGNIFICANT CHANGE UP (ref 10.3–14.5)
WBC # BLD: 6.1 K/UL — SIGNIFICANT CHANGE UP (ref 3.8–10.5)
WBC # FLD AUTO: 6.1 K/UL — SIGNIFICANT CHANGE UP (ref 3.8–10.5)

## 2020-01-10 PROCEDURE — 99214 OFFICE O/P EST MOD 30 MIN: CPT

## 2020-01-13 ENCOUNTER — APPOINTMENT (OUTPATIENT)
Dept: ENDOVASCULAR SURGERY | Facility: CLINIC | Age: 70
End: 2020-01-13
Payer: COMMERCIAL

## 2020-01-13 ENCOUNTER — OUTPATIENT (OUTPATIENT)
Dept: OUTPATIENT SERVICES | Facility: HOSPITAL | Age: 70
LOS: 1 days | Discharge: HOME | End: 2020-01-13

## 2020-01-13 VITALS
OXYGEN SATURATION: 98 % | HEART RATE: 76 BPM | RESPIRATION RATE: 15 BRPM | HEIGHT: 63 IN | BODY MASS INDEX: 32.25 KG/M2 | WEIGHT: 182 LBS | SYSTOLIC BLOOD PRESSURE: 149 MMHG | DIASTOLIC BLOOD PRESSURE: 82 MMHG | TEMPERATURE: 98.1 F

## 2020-01-13 DIAGNOSIS — C23 MALIGNANT NEOPLASM OF GALLBLADDER: ICD-10-CM

## 2020-01-13 DIAGNOSIS — E86.0 DEHYDRATION: ICD-10-CM

## 2020-01-13 PROCEDURE — 77001 FLUOROGUIDE FOR VEIN DEVICE: CPT

## 2020-01-13 PROCEDURE — 36561 INSERT TUNNELED CV CATH: CPT

## 2020-01-13 PROCEDURE — 76937 US GUIDE VASCULAR ACCESS: CPT

## 2020-01-13 NOTE — PHYSICAL EXAM
[Restricted in physically strenuous activity but ambulatory and able to carry out work of a light or sedentary nature] : Status 1- Restricted in physically strenuous activity but ambulatory and able to carry out work of a light or sedentary nature, e.g., light house work, office work [de-identified] : +mild epigastric tenderness to palpation  [de-identified] : rash resolved  [de-identified] : anxious

## 2020-01-13 NOTE — REVIEW OF SYSTEMS
[Fatigue] : fatigue [Fever] : no fever [Chills] : no chills [Night Sweats] : no night sweats [Vomiting] : no vomiting [FreeTextEntry7] : nausea  [FreeTextEntry9] : back pain

## 2020-01-13 NOTE — HISTORY OF PRESENT ILLNESS
[de-identified] : 68 Y/O F HCV infection (treated with Harvoni), IBS, Anxiety, GERD who had some GI symptoms/elevated LFTs (wt loss but on wt watchers) October 2019 and consulted Dr Brunner GI and non contrast CT A/P showed enlargement of the head of the pancreas with an approximately 3.3 cm hypodense area posteriorly within the pancreatic head which is suspicious for neoplasm. There are enlarged retroperitoneal lymph nodes posterior to the head of the pancreas which are suspicious appearing as well. There are few small nodular soft tissue densities lying deep to the right anterior abdominal wall suspicious for peritoneal implants, and soft tissue density material within the gallbladder which may reflect sludge.  Patient was referred for MRCP which revealed an irregularly enhancing 5.8 cm mass at the level of the confluence of the right and left hepatic biliary ducts. This mass obstructs the common hepatic duct and main left intrahepatic bile duct causing dilation of the intrahepatic biliary ducts within the left hepatic lobe. The mid to distal portion of the CBD are normal in caliber and appear uninvolved by the mass. 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 There are enlarged retroperitoneal lymph nodes, some of which demonstrate central necrosis lying posterior to the head of the pancreas and within the aortocaval region, suspicious for metastatic disease. There is no evidence of primary pancreatic neoplasm. There are small nodular densities within the right anterior abdomen suspicious for peritoneal metastatic foci ranging from 3-10 mm in size.  She consulted Dr Adams on 11/13/19 and is planned for biopsy planned 11/27/19. \par \par \par PSH:  cataracts, tubal ligation \par Fhx:  gastric or colon cancer involving a maternal cousin at 60\par Social:  former smoker but quit 13 years ago and drinks alcohol on occasion. She gets her colonoscopies with Dr. Brunner. \par \par PCP: Dr. Alexandria Diop\par GI: Dr. Robert Brunner. \par \par Last Mammogram/pap smear - few years ago; last colonoscopy 2017\par \par She had biopsy of RUQ peritoneal nodule which confirmed metastatic cholangiocarcinoma.  \par She was started on Gemzar/Cisplatin 12/20/19  [FreeTextEntry1] : Gemcitabine/Cisplatin D1/8 Q3 weeks s/p cycle 1 [de-identified] : Karlie comes in follow up after her first cycle of Gemcitabine/Cisplatin and feels very fatigued.  She has been having nausea/dry heaves and insomnia.  She states she takes Meclizine not Compazine or Reglan when is is nauseous.  She also notes post prandial diarrhea but admits to eating things like lamb chops.  She takes Motrin prn back pain and has not seen Chiropractor yet due to her fatigue.  Her rash (from under the breasts/groin has resolved).  \par \par \par \par

## 2020-01-17 ENCOUNTER — LABORATORY RESULT (OUTPATIENT)
Age: 70
End: 2020-01-17

## 2020-01-17 ENCOUNTER — RESULT REVIEW (OUTPATIENT)
Age: 70
End: 2020-01-17

## 2020-01-17 ENCOUNTER — APPOINTMENT (OUTPATIENT)
Dept: INFUSION THERAPY | Facility: HOSPITAL | Age: 70
End: 2020-01-17

## 2020-01-17 LAB
BASOPHILS # BLD AUTO: 0.1 K/UL — SIGNIFICANT CHANGE UP (ref 0–0.2)
BASOPHILS NFR BLD AUTO: 1 % — SIGNIFICANT CHANGE UP (ref 0–2)
EOSINOPHIL # BLD AUTO: 0 K/UL — SIGNIFICANT CHANGE UP (ref 0–0.5)
EOSINOPHIL NFR BLD AUTO: 0 % — SIGNIFICANT CHANGE UP (ref 0–6)
HCT VFR BLD CALC: 38 % — SIGNIFICANT CHANGE UP (ref 34.5–45)
HGB BLD-MCNC: 13.2 G/DL — SIGNIFICANT CHANGE UP (ref 11.5–15.5)
LYMPHOCYTES # BLD AUTO: 3.1 K/UL — SIGNIFICANT CHANGE UP (ref 1–3.3)
LYMPHOCYTES # BLD AUTO: 42.1 % — SIGNIFICANT CHANGE UP (ref 13–44)
MCHC RBC-ENTMCNC: 32.7 PG — SIGNIFICANT CHANGE UP (ref 27–34)
MCHC RBC-ENTMCNC: 34.8 G/DL — SIGNIFICANT CHANGE UP (ref 32–36)
MCV RBC AUTO: 94.2 FL — SIGNIFICANT CHANGE UP (ref 80–100)
MONOCYTES # BLD AUTO: 0.8 K/UL — SIGNIFICANT CHANGE UP (ref 0–0.9)
MONOCYTES NFR BLD AUTO: 11.4 % — SIGNIFICANT CHANGE UP (ref 2–14)
NEUTROPHILS # BLD AUTO: 3.3 K/UL — SIGNIFICANT CHANGE UP (ref 1.8–7.4)
NEUTROPHILS NFR BLD AUTO: 45.5 % — SIGNIFICANT CHANGE UP (ref 43–77)
PLATELET # BLD AUTO: 253 K/UL — SIGNIFICANT CHANGE UP (ref 150–400)
RBC # BLD: 4.03 M/UL — SIGNIFICANT CHANGE UP (ref 3.8–5.2)
RBC # FLD: 11.7 % — SIGNIFICANT CHANGE UP (ref 10.3–14.5)
WBC # BLD: 7.3 K/UL — SIGNIFICANT CHANGE UP (ref 3.8–10.5)
WBC # FLD AUTO: 7.3 K/UL — SIGNIFICANT CHANGE UP (ref 3.8–10.5)

## 2020-01-20 ENCOUNTER — INPATIENT (INPATIENT)
Facility: HOSPITAL | Age: 70
LOS: 1 days | Discharge: HOME | End: 2020-01-22
Attending: HOSPITALIST | Admitting: HOSPITALIST
Payer: COMMERCIAL

## 2020-01-20 VITALS
HEART RATE: 91 BPM | OXYGEN SATURATION: 99 % | TEMPERATURE: 98 F | RESPIRATION RATE: 18 BRPM | SYSTOLIC BLOOD PRESSURE: 152 MMHG | DIASTOLIC BLOOD PRESSURE: 82 MMHG

## 2020-01-20 DIAGNOSIS — I80.8 PHLEBITIS AND THROMBOPHLEBITIS OF OTHER SITES: ICD-10-CM

## 2020-01-20 DIAGNOSIS — L03.90 CELLULITIS, UNSPECIFIED: ICD-10-CM

## 2020-01-20 DIAGNOSIS — C22.1 INTRAHEPATIC BILE DUCT CARCINOMA: ICD-10-CM

## 2020-01-20 DIAGNOSIS — R09.89 OTHER SPECIFIED SYMPTOMS AND SIGNS INVOLVING THE CIRCULATORY AND RESPIRATORY SYSTEMS: ICD-10-CM

## 2020-01-20 DIAGNOSIS — Z02.9 ENCOUNTER FOR ADMINISTRATIVE EXAMINATIONS, UNSPECIFIED: ICD-10-CM

## 2020-01-20 DIAGNOSIS — L03.119 CELLULITIS OF UNSPECIFIED PART OF LIMB: ICD-10-CM

## 2020-01-20 DIAGNOSIS — F41.9 ANXIETY DISORDER, UNSPECIFIED: ICD-10-CM

## 2020-01-20 DIAGNOSIS — K58.9 IRRITABLE BOWEL SYNDROME WITHOUT DIARRHEA: ICD-10-CM

## 2020-01-20 LAB
ALBUMIN SERPL ELPH-MCNC: 4.1 G/DL — SIGNIFICANT CHANGE UP (ref 3.3–5)
ALP SERPL-CCNC: 92 U/L — SIGNIFICANT CHANGE UP (ref 40–120)
ALT FLD-CCNC: 52 U/L — HIGH (ref 4–33)
ANION GAP SERPL CALC-SCNC: 14 MMO/L — SIGNIFICANT CHANGE UP (ref 7–14)
APTT BLD: 27.2 SEC — LOW (ref 27.5–36.3)
AST SERPL-CCNC: 37 U/L — HIGH (ref 4–32)
BASOPHILS # BLD AUTO: 0.02 K/UL — SIGNIFICANT CHANGE UP (ref 0–0.2)
BASOPHILS NFR BLD AUTO: 0.2 % — SIGNIFICANT CHANGE UP (ref 0–2)
BILIRUB SERPL-MCNC: 0.6 MG/DL — SIGNIFICANT CHANGE UP (ref 0.2–1.2)
BUN SERPL-MCNC: 22 MG/DL — SIGNIFICANT CHANGE UP (ref 7–23)
CALCIUM SERPL-MCNC: 9.2 MG/DL — SIGNIFICANT CHANGE UP (ref 8.4–10.5)
CHLORIDE SERPL-SCNC: 96 MMOL/L — LOW (ref 98–107)
CO2 SERPL-SCNC: 25 MMOL/L — SIGNIFICANT CHANGE UP (ref 22–31)
CREAT SERPL-MCNC: 0.71 MG/DL — SIGNIFICANT CHANGE UP (ref 0.5–1.3)
EOSINOPHIL # BLD AUTO: 0.05 K/UL — SIGNIFICANT CHANGE UP (ref 0–0.5)
EOSINOPHIL NFR BLD AUTO: 0.6 % — SIGNIFICANT CHANGE UP (ref 0–6)
GLUCOSE SERPL-MCNC: 120 MG/DL — HIGH (ref 70–99)
HCT VFR BLD CALC: 36 % — SIGNIFICANT CHANGE UP (ref 34.5–45)
HGB BLD-MCNC: 12.1 G/DL — SIGNIFICANT CHANGE UP (ref 11.5–15.5)
IMM GRANULOCYTES NFR BLD AUTO: 0.7 % — SIGNIFICANT CHANGE UP (ref 0–1.5)
INR BLD: 1.08 — SIGNIFICANT CHANGE UP (ref 0.88–1.17)
LYMPHOCYTES # BLD AUTO: 2.46 K/UL — SIGNIFICANT CHANGE UP (ref 1–3.3)
LYMPHOCYTES # BLD AUTO: 28.9 % — SIGNIFICANT CHANGE UP (ref 13–44)
MCHC RBC-ENTMCNC: 31.4 PG — SIGNIFICANT CHANGE UP (ref 27–34)
MCHC RBC-ENTMCNC: 33.6 % — SIGNIFICANT CHANGE UP (ref 32–36)
MCV RBC AUTO: 93.5 FL — SIGNIFICANT CHANGE UP (ref 80–100)
MONOCYTES # BLD AUTO: 0.22 K/UL — SIGNIFICANT CHANGE UP (ref 0–0.9)
MONOCYTES NFR BLD AUTO: 2.6 % — SIGNIFICANT CHANGE UP (ref 2–14)
NEUTROPHILS # BLD AUTO: 5.7 K/UL — SIGNIFICANT CHANGE UP (ref 1.8–7.4)
NEUTROPHILS NFR BLD AUTO: 67 % — SIGNIFICANT CHANGE UP (ref 43–77)
NRBC # FLD: 0 K/UL — SIGNIFICANT CHANGE UP (ref 0–0)
PLATELET # BLD AUTO: 187 K/UL — SIGNIFICANT CHANGE UP (ref 150–400)
PMV BLD: 10.4 FL — SIGNIFICANT CHANGE UP (ref 7–13)
POTASSIUM SERPL-MCNC: 4.1 MMOL/L — SIGNIFICANT CHANGE UP (ref 3.5–5.3)
POTASSIUM SERPL-SCNC: 4.1 MMOL/L — SIGNIFICANT CHANGE UP (ref 3.5–5.3)
PROT SERPL-MCNC: 7.2 G/DL — SIGNIFICANT CHANGE UP (ref 6–8.3)
PROTHROM AB SERPL-ACNC: 12 SEC — SIGNIFICANT CHANGE UP (ref 9.8–13.1)
RBC # BLD: 3.85 M/UL — SIGNIFICANT CHANGE UP (ref 3.8–5.2)
RBC # FLD: 12.9 % — SIGNIFICANT CHANGE UP (ref 10.3–14.5)
SODIUM SERPL-SCNC: 135 MMOL/L — SIGNIFICANT CHANGE UP (ref 135–145)
WBC # BLD: 8.51 K/UL — SIGNIFICANT CHANGE UP (ref 3.8–10.5)
WBC # FLD AUTO: 8.51 K/UL — SIGNIFICANT CHANGE UP (ref 3.8–10.5)

## 2020-01-20 PROCEDURE — 93970 EXTREMITY STUDY: CPT | Mod: 26

## 2020-01-20 PROCEDURE — 99223 1ST HOSP IP/OBS HIGH 75: CPT | Mod: AI

## 2020-01-20 RX ORDER — ONDANSETRON 8 MG/1
4 TABLET, FILM COATED ORAL EVERY 6 HOURS
Refills: 0 | Status: DISCONTINUED | OUTPATIENT
Start: 2020-01-20 | End: 2020-01-22

## 2020-01-20 RX ORDER — ONDANSETRON 8 MG/1
4 TABLET, FILM COATED ORAL ONCE
Refills: 0 | Status: COMPLETED | OUTPATIENT
Start: 2020-01-20 | End: 2020-01-20

## 2020-01-20 RX ORDER — SODIUM CHLORIDE 9 MG/ML
1000 INJECTION INTRAMUSCULAR; INTRAVENOUS; SUBCUTANEOUS
Refills: 0 | Status: DISCONTINUED | OUTPATIENT
Start: 2020-01-20 | End: 2020-01-22

## 2020-01-20 RX ORDER — ESCITALOPRAM OXALATE 10 MG/1
5 TABLET, FILM COATED ORAL DAILY
Refills: 0 | Status: DISCONTINUED | OUTPATIENT
Start: 2020-01-20 | End: 2020-01-22

## 2020-01-20 RX ORDER — PANTOPRAZOLE SODIUM 20 MG/1
40 TABLET, DELAYED RELEASE ORAL
Refills: 0 | Status: DISCONTINUED | OUTPATIENT
Start: 2020-01-20 | End: 2020-01-22

## 2020-01-20 RX ORDER — KETOROLAC TROMETHAMINE 30 MG/ML
15 SYRINGE (ML) INJECTION EVERY 8 HOURS
Refills: 0 | Status: COMPLETED | OUTPATIENT
Start: 2020-01-20 | End: 2020-01-22

## 2020-01-20 RX ORDER — ALPRAZOLAM 0.25 MG
0.25 TABLET ORAL THREE TIMES A DAY
Refills: 0 | Status: DISCONTINUED | OUTPATIENT
Start: 2020-01-20 | End: 2020-01-22

## 2020-01-20 RX ADMIN — Medication 15 MILLIGRAM(S): at 21:55

## 2020-01-20 RX ADMIN — Medication 110 MILLIGRAM(S): at 16:16

## 2020-01-20 RX ADMIN — SODIUM CHLORIDE 75 MILLILITER(S): 9 INJECTION INTRAMUSCULAR; INTRAVENOUS; SUBCUTANEOUS at 22:30

## 2020-01-20 RX ADMIN — Medication 0.25 MILLIGRAM(S): at 21:13

## 2020-01-20 RX ADMIN — ONDANSETRON 4 MILLIGRAM(S): 8 TABLET, FILM COATED ORAL at 16:16

## 2020-01-20 RX ADMIN — Medication 15 MILLIGRAM(S): at 21:39

## 2020-01-20 RX ADMIN — Medication 100 MILLIGRAM(S): at 21:39

## 2020-01-20 RX ADMIN — ONDANSETRON 4 MILLIGRAM(S): 8 TABLET, FILM COATED ORAL at 14:25

## 2020-01-20 NOTE — H&P ADULT - NSHPPHYSICALEXAM_GEN_ALL_CORE
Vital Signs Last 24 Hrs  T(C): 37.1 (20 Jan 2020 17:45), Max: 37.2 (20 Jan 2020 15:02)  T(F): 98.7 (20 Jan 2020 17:45), Max: 98.9 (20 Jan 2020 15:02)  HR: 88 (20 Jan 2020 17:45) (88 - 96)  BP: 148/97 (20 Jan 2020 17:45) (132/87 - 173/94)  BP(mean): --  RR: 18 (20 Jan 2020 17:45) (16 - 18)  SpO2: 100% (20 Jan 2020 17:45) (98% - 100%)

## 2020-01-20 NOTE — H&P ADULT - GASTROINTESTINAL DETAILS
no masses palpable/bowel sounds normal/no distention/soft/nontender/no rigidity/no guarding/no bruit/no rebound tenderness

## 2020-01-20 NOTE — H&P ADULT - PROBLEM SELECTOR PLAN 3
currently on gemzar/cisplatin s/p 4 th dose 1 week ago  Mireille placed 1/13/20  Follows with Cole burgos

## 2020-01-20 NOTE — ED PROVIDER NOTE - CLINICAL SUMMARY MEDICAL DECISION MAKING FREE TEXT BOX
69F w/ RUE erythema/swelling s/p IV / chemo, concern for cellulitis vs dvt, thrombophlebitis; will check labs, US, reassess 69F w/ RUE erythema/swelling s/p IV / chemo, concern for cellulitis vs dvt, thrombophlebitis; will check labs, US, reassess.

## 2020-01-20 NOTE — H&P ADULT - PROBLEM SELECTOR PLAN 6
Transitions of Care Status:  1.  Name of PCP:  2.  PCP Contacted on Admission: [ ] Y    [ ] N    3.  PCP contacted at Discharge: [ ] Y    [ ] N    [ ] N/A  4.  Post-Discharge Appointment Date and Location:  5.  Summary of Handoff given to PCP: Transitions of Care Status:  1.  Name of PCP: [facility] loretta  2.  PCP Contacted on Admission: [ ] Y    [ x] N  office closed  3.  PCP contacted at Discharge: [ ] Y    [ ] N    [ ] N/A  4.  Post-Discharge Appointment Date and Location:  5.  Summary of Handoff given to PCP:

## 2020-01-20 NOTE — ED PROVIDER NOTE - ATTENDING CONTRIBUTION TO CARE
I performed a history and physical exam of the patient and discussed their management with the Advanced Care Practitioner. I reviewed the ACP's note and agree with the documented findings and plan of care.       Patient is a 68 yo F with history of cholangiocarcinoma/Klatskin tumor on chemo (gemzar/ cisplatin), GERD, hx of DVT many years ago while on OCP, here for evaluation of RUE erythema and LUE erythema. She reports she had an IV placed in area of her right forearm which is now swollen, painful and red. She also has an area of erythema on her left forearm which red, painful and warm but denies any prior IV placement. No fevers, chills. Denies chest pain, shortness of breath. No lower extremity swelling or calf pain. She has a chest port in right chest. No nausea, vomiting.    VS noted  Gen. no acute distress, Non toxic   HEENT: EOMI, mmm  Lungs: CTAB/L no C/ W /R   CVS: RRR   Abd; Soft non tender, non distended   Ext: right forearm: erythema, ttp, swelling, left forearm to elbow: redness, erythema, ttp in area that is ~ 3 inches  Skin: no rash  Neuro AAOx3 non focal clear speech  a/p: right forearm swelling - thrombophlebitis vs DVT vs cellulitis, left forearm swelling - concern for cellulitis. Plan for UE doppler, labs and reassess.   - Janeth DANIEL

## 2020-01-20 NOTE — ED PROVIDER NOTE - CAPILLARY REFILL
erythema noted to right proximal forearm, no streaking, no crepitus, compartments soft, non-tender. left mid forearm with similar erythema

## 2020-01-20 NOTE — ED ADULT TRIAGE NOTE - CHIEF COMPLAINT QUOTE
Pt complaining of swelling and redness to R arm. pt states she had an IV there on 1/10 and she received chemo. Pts last chemo was Friday.

## 2020-01-20 NOTE — ED ADULT NURSE REASSESSMENT NOTE - NS ED NURSE REASSESS COMMENT FT1
Pt resting comfortably at this time. Denies any c/o pain. IV antibiotics infusing well at this time via chest wall port. Report given off to RN on unit. no acute distress. Awaiting transport.

## 2020-01-20 NOTE — ED PROVIDER NOTE - OBJECTIVE STATEMENT
69F w/ pmh IBS, anxiety, GERD, cholangiocarcinoma / klatskin tumor (on chemo, gemzar/cisplatin 4th dose 3 days ago), distant h/o DVT (was on OCP) - p/w RUE erythema and swelling x 1 wk, persistent since got RUE IV 10 days ago for chemo infusion. Has new chemo port R chest. +myalgias. No fever, n/v/d/c, chest / abd pain, cough, sob, dizziness, dysuria/hematuria. No recent travel, medication change, illness, or hospitalization.     Prior smoker.    Onc: Sridhar Velez 69F w/ pmh IBS, anxiety, GERD, cholangiocarcinoma / klatskin tumor (on chemo, gemzar/cisplatin 4th dose 3 days ago via chemoport), distant h/o DVT (was on OCP) - p/w RUE erythema and swelling x 1 wk, persistent since got RUE IV 10 days ago for chemo infusion. Has new chemo port R chest. +myalgias. No fever, chills, n/v/d/c, chest / abd pain, cough, sob, dizziness, dysuria/hematuria, numbness/tingling, weakness. No recent travel, medication change, illness, or hospitalization.     Prior smoker.    Onc: Sridhar Velez

## 2020-01-20 NOTE — ED PROVIDER NOTE - PROGRESS NOTE DETAILS
Spoke w/ Dr. Obed Keys will read vascular US report As per Dr. Obed Keys Cardiologist, IT issues w/ results crossing. US negative for dvt or thrombophlebitis. pt admitted to medicine for IV abx for cellulitis. MAR paged SITA Parker- received call back from Dr. Keys stating pt does have a superficial venous thrombophlebitis in right arm where IV was previously placed. however, pt with left arm redness without iv is negative, will c/w plan for IV abx, pt would like to be admitted.

## 2020-01-20 NOTE — H&P ADULT - EXTREMITIES COMMENTS
Right midforearm erythema, tenderness, and swelling extending just past right elbow  3 inch left midforearm localized erythema , tenderness, and swelling

## 2020-01-20 NOTE — ED ADULT NURSE NOTE - OBJECTIVE STATEMENT
pt presents to ED with redness/swelling noted to right forearm as well as left upper forearm. Pt AxOx3, ambulatory. pt states she had chemo on 1/10 through an IV in the right arm and a few days after started having swelling, redness and pain at that site. pt states she also is noticing some redness on her left arm as well. pt denies all other complaints at this time. pt denies chest pain, lightheadedness and dizziness. pt denies shortness of breath. pt breathing even and unlabored. pt states her last chemo was on Friday. where they administered it through her right chest wall chemo port. R chest wall port accessed with a 20G 1in needle. Will continue to monitor.

## 2020-01-20 NOTE — ED PROVIDER NOTE - PHYSICAL EXAMINATION
*GEN:   comfortable, in no acute distress, AOx3  *EYES:   pupils equally round and reactive to light, extra-occular movements intact  *HEENT:   airway patent, moist mucosal membranes, full ROM neck  *CV:   regular rate and rhythm  *RESP:   clear to auscultation bilaterally, non-labored  *ABD:   soft, non-tender  *:   no cva/flank tenderness  *EXTREM:   RUE erythema / swelling, LUE non-swollen, non-erythematous  *SKIN:   dry, intact  *NEURO:   AOx3, no focal weakness or loss of sensation

## 2020-01-20 NOTE — H&P ADULT - ASSESSMENT
69 y.o. 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 Now patient p/w B/L arm thrombophlebitis c/b evolving cellulitis.

## 2020-01-20 NOTE — H&P ADULT - PROBLEM SELECTOR PLAN 1
Start Clindamycin since erythema and swelling has propagated over the course of a week in a patient who is immunosuppressed.   check Blood Cx

## 2020-01-20 NOTE — H&P ADULT - PROBLEM SELECTOR PLAN 2
check B/L Upper extremity US r/o DVT  Start Toradol 15mg IV q8hr x 2 days  Start Protonix for GI ppx  Start NS @ 75 ml/hr for kidney ppx  apply cold compresses to arms

## 2020-01-20 NOTE — H&P ADULT - HISTORY OF PRESENT ILLNESS
69 y.o. 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 During her chemo treatment nursing had difficulty getting an IV in both her right and left arms. She noticed erythema and swelling a day after treatment on her right forearm. She placed cold compresses on the arm but throughout the week the erythema and swelling spread up her right arm just past her elbow. Yesterday she noticed erythema and swelling now on her left forearm where another IV was placed 1 week ago so came to ED. She denies fever or chills but says her skin on her forearms burn. Patient also c/o Nausea, dry heaves, and chronic RUQ pain from her cancer which is has an appointment to see a pain management doctor for. She denies cp, SOB, chills, cough/dysuria/V/D.

## 2020-01-21 DIAGNOSIS — Z51.11 ENCOUNTER FOR ANTINEOPLASTIC CHEMOTHERAPY: ICD-10-CM

## 2020-01-21 DIAGNOSIS — R11.2 NAUSEA WITH VOMITING, UNSPECIFIED: ICD-10-CM

## 2020-01-21 LAB
ANION GAP SERPL CALC-SCNC: 11 MMO/L — SIGNIFICANT CHANGE UP (ref 7–14)
BASOPHILS # BLD AUTO: 0.04 K/UL — SIGNIFICANT CHANGE UP (ref 0–0.2)
BASOPHILS NFR BLD AUTO: 0.7 % — SIGNIFICANT CHANGE UP (ref 0–2)
BUN SERPL-MCNC: 27 MG/DL — HIGH (ref 7–23)
CALCIUM SERPL-MCNC: 9 MG/DL — SIGNIFICANT CHANGE UP (ref 8.4–10.5)
CHLORIDE SERPL-SCNC: 99 MMOL/L — SIGNIFICANT CHANGE UP (ref 98–107)
CO2 SERPL-SCNC: 23 MMOL/L — SIGNIFICANT CHANGE UP (ref 22–31)
CREAT SERPL-MCNC: 0.81 MG/DL — SIGNIFICANT CHANGE UP (ref 0.5–1.3)
EOSINOPHIL # BLD AUTO: 0.07 K/UL — SIGNIFICANT CHANGE UP (ref 0–0.5)
EOSINOPHIL NFR BLD AUTO: 1.3 % — SIGNIFICANT CHANGE UP (ref 0–6)
GLUCOSE SERPL-MCNC: 118 MG/DL — HIGH (ref 70–99)
HCT VFR BLD CALC: 33.5 % — LOW (ref 34.5–45)
HCV AB S/CO SERPL IA: 14.05 S/CO — HIGH (ref 0–0.99)
HCV AB SERPL-IMP: REACTIVE — SIGNIFICANT CHANGE UP
HGB BLD-MCNC: 11.3 G/DL — LOW (ref 11.5–15.5)
IMM GRANULOCYTES NFR BLD AUTO: 0.4 % — SIGNIFICANT CHANGE UP (ref 0–1.5)
LYMPHOCYTES # BLD AUTO: 1.9 K/UL — SIGNIFICANT CHANGE UP (ref 1–3.3)
LYMPHOCYTES # BLD AUTO: 35.4 % — SIGNIFICANT CHANGE UP (ref 13–44)
MCHC RBC-ENTMCNC: 32.2 PG — SIGNIFICANT CHANGE UP (ref 27–34)
MCHC RBC-ENTMCNC: 33.7 % — SIGNIFICANT CHANGE UP (ref 32–36)
MCV RBC AUTO: 95.4 FL — SIGNIFICANT CHANGE UP (ref 80–100)
MONOCYTES # BLD AUTO: 0.14 K/UL — SIGNIFICANT CHANGE UP (ref 0–0.9)
MONOCYTES NFR BLD AUTO: 2.6 % — SIGNIFICANT CHANGE UP (ref 2–14)
NEUTROPHILS # BLD AUTO: 3.19 K/UL — SIGNIFICANT CHANGE UP (ref 1.8–7.4)
NEUTROPHILS NFR BLD AUTO: 59.6 % — SIGNIFICANT CHANGE UP (ref 43–77)
NRBC # FLD: 0 K/UL — SIGNIFICANT CHANGE UP (ref 0–0)
PLATELET # BLD AUTO: 176 K/UL — SIGNIFICANT CHANGE UP (ref 150–400)
PMV BLD: 11 FL — SIGNIFICANT CHANGE UP (ref 7–13)
POTASSIUM SERPL-MCNC: 4.4 MMOL/L — SIGNIFICANT CHANGE UP (ref 3.5–5.3)
POTASSIUM SERPL-SCNC: 4.4 MMOL/L — SIGNIFICANT CHANGE UP (ref 3.5–5.3)
RBC # BLD: 3.51 M/UL — LOW (ref 3.8–5.2)
RBC # FLD: 12.7 % — SIGNIFICANT CHANGE UP (ref 10.3–14.5)
SODIUM SERPL-SCNC: 133 MMOL/L — LOW (ref 135–145)
SPECIMEN SOURCE: SIGNIFICANT CHANGE UP
SPECIMEN SOURCE: SIGNIFICANT CHANGE UP
WBC # BLD: 5.36 K/UL — SIGNIFICANT CHANGE UP (ref 3.8–10.5)
WBC # FLD AUTO: 5.36 K/UL — SIGNIFICANT CHANGE UP (ref 3.8–10.5)

## 2020-01-21 PROCEDURE — 99233 SBSQ HOSP IP/OBS HIGH 50: CPT

## 2020-01-21 RX ORDER — LACTOBACILLUS ACIDOPHILUS 100MM CELL
1 CAPSULE ORAL THREE TIMES A DAY
Refills: 0 | Status: DISCONTINUED | OUTPATIENT
Start: 2020-01-21 | End: 2020-01-22

## 2020-01-21 RX ORDER — MAGNESIUM HYDROXIDE 400 MG/1
30 TABLET, CHEWABLE ORAL DAILY
Refills: 0 | Status: DISCONTINUED | OUTPATIENT
Start: 2020-01-21 | End: 2020-01-22

## 2020-01-21 RX ORDER — CHLORHEXIDINE GLUCONATE 213 G/1000ML
1 SOLUTION TOPICAL
Refills: 0 | Status: DISCONTINUED | OUTPATIENT
Start: 2020-01-21 | End: 2020-01-22

## 2020-01-21 RX ORDER — POLYETHYLENE GLYCOL 3350 17 G/17G
17 POWDER, FOR SOLUTION ORAL DAILY
Refills: 0 | Status: DISCONTINUED | OUTPATIENT
Start: 2020-01-21 | End: 2020-01-22

## 2020-01-21 RX ORDER — SENNA PLUS 8.6 MG/1
2 TABLET ORAL AT BEDTIME
Refills: 0 | Status: DISCONTINUED | OUTPATIENT
Start: 2020-01-21 | End: 2020-01-22

## 2020-01-21 RX ADMIN — Medication 15 MILLIGRAM(S): at 13:09

## 2020-01-21 RX ADMIN — Medication 0.25 MILLIGRAM(S): at 21:32

## 2020-01-21 RX ADMIN — Medication 0.25 MILLIGRAM(S): at 05:38

## 2020-01-21 RX ADMIN — Medication 15 MILLIGRAM(S): at 13:04

## 2020-01-21 RX ADMIN — CHLORHEXIDINE GLUCONATE 1 APPLICATION(S): 213 SOLUTION TOPICAL at 10:24

## 2020-01-21 RX ADMIN — Medication 15 MILLIGRAM(S): at 05:38

## 2020-01-21 RX ADMIN — SENNA PLUS 2 TABLET(S): 8.6 TABLET ORAL at 21:32

## 2020-01-21 RX ADMIN — ONDANSETRON 4 MILLIGRAM(S): 8 TABLET, FILM COATED ORAL at 16:13

## 2020-01-21 RX ADMIN — Medication 100 MILLIGRAM(S): at 21:34

## 2020-01-21 RX ADMIN — SODIUM CHLORIDE 75 MILLILITER(S): 9 INJECTION INTRAMUSCULAR; INTRAVENOUS; SUBCUTANEOUS at 10:24

## 2020-01-21 RX ADMIN — Medication 15 MILLIGRAM(S): at 22:30

## 2020-01-21 RX ADMIN — Medication 0.25 MILLIGRAM(S): at 13:04

## 2020-01-21 RX ADMIN — ESCITALOPRAM OXALATE 5 MILLIGRAM(S): 10 TABLET, FILM COATED ORAL at 10:23

## 2020-01-21 RX ADMIN — POLYETHYLENE GLYCOL 3350 17 GRAM(S): 17 POWDER, FOR SOLUTION ORAL at 13:04

## 2020-01-21 RX ADMIN — PANTOPRAZOLE SODIUM 40 MILLIGRAM(S): 20 TABLET, DELAYED RELEASE ORAL at 05:38

## 2020-01-21 RX ADMIN — Medication 15 MILLIGRAM(S): at 21:33

## 2020-01-21 RX ADMIN — Medication 100 MILLIGRAM(S): at 05:38

## 2020-01-21 RX ADMIN — Medication 15 MILLIGRAM(S): at 05:53

## 2020-01-21 RX ADMIN — Medication 1 TABLET(S): at 21:33

## 2020-01-21 RX ADMIN — Medication 100 MILLIGRAM(S): at 13:03

## 2020-01-21 RX ADMIN — MAGNESIUM HYDROXIDE 30 MILLILITER(S): 400 TABLET, CHEWABLE ORAL at 14:57

## 2020-01-21 NOTE — PROGRESS NOTE ADULT - SUBJECTIVE AND OBJECTIVE BOX
Patient is a 69y old  Female who presents with a chief complaint of RUE and LUE erythema and swelling (20 Jan 2020 17:54)      SUBJECTIVE / OVERNIGHT EVENTS:  Patient seen and examined.  No acute events overnight.  No current complaints. Feels erythema is improving    MEDICATIONS  (STANDING):  ALPRAZolam 0.25 milliGRAM(s) Oral three times a day  chlorhexidine 4% Liquid 1 Application(s) Topical <User Schedule>  clindamycin IVPB 600 milliGRAM(s) IV Intermittent every 8 hours  escitalopram 5 milliGRAM(s) Oral daily  ketorolac   Injectable 15 milliGRAM(s) IV Push every 8 hours  lactobacillus acidophilus 1 Tablet(s) Oral three times a day  pantoprazole    Tablet 40 milliGRAM(s) Oral before breakfast  senna 2 Tablet(s) Oral at bedtime  sodium chloride 0.9%. 1000 milliLiter(s) (75 mL/Hr) IV Continuous <Continuous>    MEDICATIONS  (PRN):  magnesium hydroxide Suspension 30 milliLiter(s) Oral daily PRN Constipation  ondansetron Injectable 4 milliGRAM(s) IV Push every 6 hours PRN Nausea  polyethylene glycol 3350 17 Gram(s) Oral daily PRN Constipation      Vital Signs Last 24 Hrs  T(C): 36.7 (21 Jan 2020 14:43), Max: 37.2 (20 Jan 2020 21:39)  T(F): 98 (21 Jan 2020 14:43), Max: 99 (20 Jan 2020 21:39)  HR: 75 (21 Jan 2020 14:43) (75 - 88)  BP: 140/85 (21 Jan 2020 14:43) (140/83 - 148/97)  BP(mean): --  RR: 18 (21 Jan 2020 14:43) (17 - 18)  SpO2: 100% (21 Jan 2020 14:43) (100% - 100%)      PHYSICAL EXAM:  GENERAL: NAD, well-developed  HEAD:  Atraumatic, Normocephalic  EYES: EOMI, PERRLA, conjunctiva and sclera clear  NECK: Supple, No JVD  CHEST/LUNG: Clear to auscultation bilaterally; No wheeze  HEART: Regular rate and rhythm; No murmurs, rubs, or gallops  ABDOMEN: Soft, Nontender, Nondistended; Bowel sounds present  EXTREMITIES:  2+ Peripheral Pulses, No clubbing, cyanosis, or edema  PSYCH: AAOx3  NEUROLOGY: non-focal  SKIN: Rash on both arms, traced in marking pen. Improved on left. Similar on right    LABS:                        11.3   5.36  )-----------( 176      ( 21 Jan 2020 06:30 )             33.5     01-21    133<L>  |  99  |  27<H>  ----------------------------<  118<H>  4.4   |  23  |  0.81    Ca    9.0      21 Jan 2020 06:30    TPro  7.2  /  Alb  4.1  /  TBili  0.6  /  DBili  x   /  AST  37<H>  /  ALT  52<H>  /  AlkPhos  92  01-20    PT/INR - ( 20 Jan 2020 10:56 )   PT: 12.0 SEC;   INR: 1.08          PTT - ( 20 Jan 2020 10:56 )  PTT:27.2 SEC

## 2020-01-21 NOTE — PROGRESS NOTE ADULT - PROBLEM SELECTOR PLAN 6
Transitions of Care Status:  1.  Name of PCP: Coletheresa burgos  2.  PCP Contacted on Admission: [ ] Y    [ x] N  office closed  3.  PCP contacted at Discharge: [ ] Y    [ ] N    [ ] N/A  4.  Post-Discharge Appointment Date and Location:  5.  Summary of Handoff given to PCP:  Plan discussed with patient and family.   d/w ACP  Likely d/c in 1-2 days

## 2020-01-22 ENCOUNTER — TRANSCRIPTION ENCOUNTER (OUTPATIENT)
Age: 70
End: 2020-01-22

## 2020-01-22 VITALS
DIASTOLIC BLOOD PRESSURE: 82 MMHG | TEMPERATURE: 99 F | OXYGEN SATURATION: 100 % | RESPIRATION RATE: 17 BRPM | SYSTOLIC BLOOD PRESSURE: 144 MMHG | HEART RATE: 81 BPM

## 2020-01-22 LAB
ALBUMIN SERPL ELPH-MCNC: 3.5 G/DL — SIGNIFICANT CHANGE UP (ref 3.3–5)
ALP SERPL-CCNC: 88 U/L — SIGNIFICANT CHANGE UP (ref 40–120)
ALT FLD-CCNC: 52 U/L — HIGH (ref 4–33)
ANION GAP SERPL CALC-SCNC: 7 MMO/L — SIGNIFICANT CHANGE UP (ref 7–14)
ANION GAP SERPL CALC-SCNC: 9 MMO/L — SIGNIFICANT CHANGE UP (ref 7–14)
AST SERPL-CCNC: 40 U/L — HIGH (ref 4–32)
BILIRUB SERPL-MCNC: 0.2 MG/DL — SIGNIFICANT CHANGE UP (ref 0.2–1.2)
BLD GP AB SCN SERPL QL: NEGATIVE — SIGNIFICANT CHANGE UP
BUN SERPL-MCNC: 28 MG/DL — HIGH (ref 7–23)
BUN SERPL-MCNC: 32 MG/DL — HIGH (ref 7–23)
CALCIUM SERPL-MCNC: 8.8 MG/DL — SIGNIFICANT CHANGE UP (ref 8.4–10.5)
CALCIUM SERPL-MCNC: 8.9 MG/DL — SIGNIFICANT CHANGE UP (ref 8.4–10.5)
CHLORIDE SERPL-SCNC: 103 MMOL/L — SIGNIFICANT CHANGE UP (ref 98–107)
CHLORIDE SERPL-SCNC: 105 MMOL/L — SIGNIFICANT CHANGE UP (ref 98–107)
CO2 SERPL-SCNC: 23 MMOL/L — SIGNIFICANT CHANGE UP (ref 22–31)
CO2 SERPL-SCNC: 27 MMOL/L — SIGNIFICANT CHANGE UP (ref 22–31)
CREAT SERPL-MCNC: 0.91 MG/DL — SIGNIFICANT CHANGE UP (ref 0.5–1.3)
CREAT SERPL-MCNC: 0.92 MG/DL — SIGNIFICANT CHANGE UP (ref 0.5–1.3)
GLUCOSE SERPL-MCNC: 104 MG/DL — HIGH (ref 70–99)
GLUCOSE SERPL-MCNC: 99 MG/DL — SIGNIFICANT CHANGE UP (ref 70–99)
HCT VFR BLD CALC: 31.9 % — LOW (ref 34.5–45)
HCT VFR BLD CALC: 33.7 % — LOW (ref 34.5–45)
HGB BLD-MCNC: 10.5 G/DL — LOW (ref 11.5–15.5)
HGB BLD-MCNC: 11.1 G/DL — LOW (ref 11.5–15.5)
MAGNESIUM SERPL-MCNC: 2.2 MG/DL — SIGNIFICANT CHANGE UP (ref 1.6–2.6)
MAGNESIUM SERPL-MCNC: 2.3 MG/DL — SIGNIFICANT CHANGE UP (ref 1.6–2.6)
MCHC RBC-ENTMCNC: 31.5 PG — SIGNIFICANT CHANGE UP (ref 27–34)
MCHC RBC-ENTMCNC: 31.7 PG — SIGNIFICANT CHANGE UP (ref 27–34)
MCHC RBC-ENTMCNC: 32.9 % — SIGNIFICANT CHANGE UP (ref 32–36)
MCHC RBC-ENTMCNC: 32.9 % — SIGNIFICANT CHANGE UP (ref 32–36)
MCV RBC AUTO: 95.7 FL — SIGNIFICANT CHANGE UP (ref 80–100)
MCV RBC AUTO: 96.4 FL — SIGNIFICANT CHANGE UP (ref 80–100)
NRBC # FLD: 0 K/UL — SIGNIFICANT CHANGE UP (ref 0–0)
NRBC # FLD: 0 K/UL — SIGNIFICANT CHANGE UP (ref 0–0)
PHOSPHATE SERPL-MCNC: 3.3 MG/DL — SIGNIFICANT CHANGE UP (ref 2.5–4.5)
PHOSPHATE SERPL-MCNC: 3.9 MG/DL — SIGNIFICANT CHANGE UP (ref 2.5–4.5)
PLATELET # BLD AUTO: 101 K/UL — LOW (ref 150–400)
PLATELET # BLD AUTO: 166 K/UL — SIGNIFICANT CHANGE UP (ref 150–400)
PMV BLD: 10.8 FL — SIGNIFICANT CHANGE UP (ref 7–13)
PMV BLD: 11.1 FL — SIGNIFICANT CHANGE UP (ref 7–13)
POTASSIUM SERPL-MCNC: 5.1 MMOL/L — SIGNIFICANT CHANGE UP (ref 3.5–5.3)
POTASSIUM SERPL-MCNC: 5.7 MMOL/L — HIGH (ref 3.5–5.3)
POTASSIUM SERPL-SCNC: 5.1 MMOL/L — SIGNIFICANT CHANGE UP (ref 3.5–5.3)
POTASSIUM SERPL-SCNC: 5.7 MMOL/L — HIGH (ref 3.5–5.3)
PROT SERPL-MCNC: 6.4 G/DL — SIGNIFICANT CHANGE UP (ref 6–8.3)
RBC # BLD: 3.31 M/UL — LOW (ref 3.8–5.2)
RBC # BLD: 3.52 M/UL — LOW (ref 3.8–5.2)
RBC # FLD: 12.7 % — SIGNIFICANT CHANGE UP (ref 10.3–14.5)
RBC # FLD: 12.8 % — SIGNIFICANT CHANGE UP (ref 10.3–14.5)
RH IG SCN BLD-IMP: POSITIVE — SIGNIFICANT CHANGE UP
SODIUM SERPL-SCNC: 137 MMOL/L — SIGNIFICANT CHANGE UP (ref 135–145)
SODIUM SERPL-SCNC: 137 MMOL/L — SIGNIFICANT CHANGE UP (ref 135–145)
WBC # BLD: 5.61 K/UL — SIGNIFICANT CHANGE UP (ref 3.8–10.5)
WBC # BLD: 7.18 K/UL — SIGNIFICANT CHANGE UP (ref 3.8–10.5)
WBC # FLD AUTO: 5.61 K/UL — SIGNIFICANT CHANGE UP (ref 3.8–10.5)
WBC # FLD AUTO: 7.18 K/UL — SIGNIFICANT CHANGE UP (ref 3.8–10.5)

## 2020-01-22 PROCEDURE — 99238 HOSP IP/OBS DSCHRG MGMT 30/<: CPT

## 2020-01-22 PROCEDURE — 99223 1ST HOSP IP/OBS HIGH 75: CPT

## 2020-01-22 RX ORDER — LACTOBACILLUS ACIDOPHILUS 100MM CELL
1 CAPSULE ORAL
Qty: 18 | Refills: 0
Start: 2020-01-22 | End: 2020-01-27

## 2020-01-22 RX ORDER — ESCITALOPRAM OXALATE 10 MG/1
5 TABLET, FILM COATED ORAL
Qty: 0 | Refills: 0 | DISCHARGE

## 2020-01-22 RX ADMIN — Medication 0.25 MILLIGRAM(S): at 05:49

## 2020-01-22 RX ADMIN — Medication 100 MILLIGRAM(S): at 05:50

## 2020-01-22 RX ADMIN — Medication 1 TABLET(S): at 05:49

## 2020-01-22 RX ADMIN — CHLORHEXIDINE GLUCONATE 1 APPLICATION(S): 213 SOLUTION TOPICAL at 10:26

## 2020-01-22 RX ADMIN — PANTOPRAZOLE SODIUM 40 MILLIGRAM(S): 20 TABLET, DELAYED RELEASE ORAL at 05:49

## 2020-01-22 RX ADMIN — ESCITALOPRAM OXALATE 5 MILLIGRAM(S): 10 TABLET, FILM COATED ORAL at 11:14

## 2020-01-22 NOTE — PROGRESS NOTE ADULT - SUBJECTIVE AND OBJECTIVE BOX
Patient is a 69y old  Female who presents with a chief complaint of RUE and LUE erythema and swelling (22 Jan 2020 12:41)      SUBJECTIVE / OVERNIGHT EVENTS:  Patient seen and examined.  No acute events overnight.  No current complaints.  Feels that rash is much improved.      MEDICATIONS  (STANDING):  ALPRAZolam 0.25 milliGRAM(s) Oral three times a day  chlorhexidine 4% Liquid 1 Application(s) Topical <User Schedule>  clindamycin IVPB 600 milliGRAM(s) IV Intermittent every 8 hours  escitalopram 5 milliGRAM(s) Oral daily  ketorolac   Injectable 15 milliGRAM(s) IV Push every 8 hours  lactobacillus acidophilus 1 Tablet(s) Oral three times a day  pantoprazole    Tablet 40 milliGRAM(s) Oral before breakfast  senna 2 Tablet(s) Oral at bedtime  sodium chloride 0.9%. 1000 milliLiter(s) (75 mL/Hr) IV Continuous <Continuous>    MEDICATIONS  (PRN):  magnesium hydroxide Suspension 30 milliLiter(s) Oral daily PRN Constipation  ondansetron Injectable 4 milliGRAM(s) IV Push every 6 hours PRN Nausea  polyethylene glycol 3350 17 Gram(s) Oral daily PRN Constipation      Vital Signs Last 24 Hrs  T(C): 37 (22 Jan 2020 13:04), Max: 37 (22 Jan 2020 13:04)  T(F): 98.6 (22 Jan 2020 13:04), Max: 98.6 (22 Jan 2020 13:04)  HR: 81 (22 Jan 2020 13:04) (71 - 81)  BP: 144/82 (22 Jan 2020 13:04) (137/75 - 148/88)  BP(mean): --  RR: 17 (22 Jan 2020 13:04) (16 - 18)  SpO2: 100% (22 Jan 2020 13:04) (98% - 100%)  CAPILLARY BLOOD GLUCOSE        I&O's Summary    21 Jan 2020 07:01  -  22 Jan 2020 07:00  --------------------------------------------------------  IN: 750 mL / OUT: 0 mL / NET: 750 mL    22 Jan 2020 07:01  -  22 Jan 2020 15:48  --------------------------------------------------------  IN: 900 mL / OUT: 0 mL / NET: 900 mL        PHYSICAL EXAM:  GENERAL: NAD, well-developed  HEAD:  Atraumatic, Normocephalic  EYES: EOMI, PERRLA, conjunctiva and sclera clear  NECK: Supple, No JVD  CHEST/LUNG: Clear to auscultation bilaterally; No wheeze  HEART: Regular rate and rhythm; No murmurs, rubs, or gallops  ABDOMEN: Soft, Nontender, Nondistended; Bowel sounds present  EXTREMITIES:  2+ Peripheral Pulses, No clubbing, cyanosis, or edema  PSYCH: AAOx3  NEUROLOGY: non-focal  SKIN: Rash on both arms are smaller and less warm.     LABS:                        11.1   7.18  )-----------( 166      ( 22 Jan 2020 10:50 )             33.7     01-22    137  |  103  |  28<H>  ----------------------------<  99  5.1   |  27  |  0.91    Ca    8.8      22 Jan 2020 10:50  Phos  3.3     01-22  Mg     2.2     01-22    TPro  6.4  /  Alb  3.5  /  TBili  0.2  /  DBili  x   /  AST  40<H>  /  ALT  52<H>  /  AlkPhos  88  01-22

## 2020-01-22 NOTE — DISCHARGE NOTE NURSING/CASE MANAGEMENT/SOCIAL WORK - PATIENT PORTAL LINK FT
You can access the FollowMyHealth Patient Portal offered by [facility] by registering at the following website: [email or web address]. By joining [facility]’s FollowMyHealth portal, you will also be able to view your health information using other applications (apps) compatible with our system.

## 2020-01-22 NOTE — CONSULT NOTE ADULT - ASSESSMENT
69f with stage IV cholangiocarcinoma, recently started on chemotherapy with gemcitabine/cisplatin, s/p Right chest wall mediport 1/13/20, Cis was held, had gemcitabine 1 week ago when she had multiple IV placed in right and left arms. Now patient p/w B/L arm thrombophlebitis c/b evolving cellulitis.     No DVTs.     -Abx for cellulitis  -OK to discharge from oncology perspective, to complete abx course outpt  -  -Supportive care, pain control, Nutrition, PT, DVT ppx  -Outpatient oncology f/u    Will follow. Please do not hesitate to call back with questions.     Paty Beverly MD  Medical Oncology Attending  C: [phone] 69f with stage IV cholangiocarcinoma, recently started on chemotherapy with gemcitabine/cisplatin, s/p Right chest wall mediport 1/13/20, Cis was held, had gemcitabine 1 week ago when she had multiple IV placed in right and left arms. Now patient p/w B/L arm thrombophlebitis c/b evolving cellulitis.     No DVTs.     -Abx for cellulitis  -OK to discharge from oncology perspective, to complete abx course outpt  -Supportive care, pain control, Nutrition, PT, DVT ppx  -Outpatient oncology f/u    Will follow. Please do not hesitate to call back with questions.     Paty Beverly MD  Medical Oncology Attending  C: [phone]

## 2020-01-22 NOTE — PROGRESS NOTE ADULT - PROBLEM SELECTOR PLAN 6
Transitions of Care Status:  1.  Name of PCP: [facility] - d/w partner  2.  PCP Contacted on Admission: [ ] Y    [ x] N  office closed  3.  PCP contacted at Discharge: [ X] Y    [ ] N    [ ] N/A  4.  Post-Discharge Appointment Date and Location: F/u in place  5.  Summary of Handoff given to PCP: d/w partner  Plan discussed with patient and family.   d/w ACP  D/C time: 40 minutes

## 2020-01-22 NOTE — DISCHARGE NOTE PROVIDER - NSDCCPCAREPLAN_GEN_ALL_CORE_FT
PRINCIPAL DISCHARGE DIAGNOSIS  Diagnosis: Cellulitis and abscess of forearm  Assessment and Plan of Treatment: Continue with antibiotics as prescribed. Please take probiotics while taking antibiotics. Follow up with PCP within 1 week of discharge. Follow up with oncologist per routine.      SECONDARY DISCHARGE DIAGNOSES  Diagnosis: Irritable bowel syndrome, unspecified type  Assessment and Plan of Treatment: Continue with bowel regimen per routine.    Diagnosis: Cholangiocarcinoma  Assessment and Plan of Treatment: Follow up with oncologist per routine.    Diagnosis: Thrombophlebitis arm  Assessment and Plan of Treatment: Supportive care with warm compresses.    Diagnosis: Anxiety  Assessment and Plan of Treatment: Continue with medications as previously prescribed.

## 2020-01-22 NOTE — DISCHARGE NOTE PROVIDER - NSFOLLOWUPCLINICS_GEN_ALL_ED_FT
[facility]  Hematology/Oncology  [address] [id number]  Phone: [phone]  Fax:   Follow Up Time:

## 2020-01-22 NOTE — DISCHARGE NOTE PROVIDER - HOSPITAL COURSE
69 y.o. 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 Now patient p/w B/L arm thrombophlebitis c/b evolving cellulitis.         Hospital course:         Cellulitis and abscess of forearm.      -Started on IV Clindamycin with transition to PO upon discharge     -Probiotic while on antibiotics         Thrombophlebitis arm.      -Duplex with superficial DVTs, no deep venous thrombosis     -Supportive care        Cholangiocarcinoma.      -Currently on gemzar/cisplatin s/p 4 th dose 1 week ago    -Mediport placed 1/13/20    -Out patient follow up at [facility]         Anxiety.      -Continued with xanax and lexapro.         Irritable bowel syndrome, unspecified type.     -C/o constipation --> bowel regimen implemented          Pt medically stable for discharge as per discussion with medicine attg.         Dispo: home

## 2020-01-22 NOTE — CONSULT NOTE ADULT - SUBJECTIVE AND OBJECTIVE BOX
Patient is a 69y old  Female who presents with a chief complaint of RUE and LUE erythema and swelling (21 Jan 2020 17:32)      HPI:  69 y.o. 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 During her chemo treatment nursing had difficulty getting an IV in both her right and left arms. She noticed erythema and swelling a day after treatment on her right forearm. She placed cold compresses on the arm but throughout the week the erythema and swelling spread up her right arm just past her elbow. Yesterday she noticed erythema and swelling now on her left forearm where another IV was placed 1 week ago so came to ED. She denies fever or chills but says her skin on her forearms burn. Patient also c/o Nausea, dry heaves, and chronic RUQ pain from her cancer which is has an appointment to see a pain management doctor for. She denies cp, SOB, chills, cough/dysuria/V/D. (20 Jan 2020 17:54)      ROS: as above     PAST MEDICAL & SURGICAL HISTORY:  Cholangiocarcinoma  No significant past surgical history      SOCIAL HISTORY:    FAMILY HISTORY:  No pertinent family history in first degree relatives      MEDICATIONS  (STANDING):  ALPRAZolam 0.25 milliGRAM(s) Oral three times a day  chlorhexidine 4% Liquid 1 Application(s) Topical <User Schedule>  clindamycin IVPB 600 milliGRAM(s) IV Intermittent every 8 hours  escitalopram 5 milliGRAM(s) Oral daily  ketorolac   Injectable 15 milliGRAM(s) IV Push every 8 hours  lactobacillus acidophilus 1 Tablet(s) Oral three times a day  pantoprazole    Tablet 40 milliGRAM(s) Oral before breakfast  senna 2 Tablet(s) Oral at bedtime  sodium chloride 0.9%. 1000 milliLiter(s) (75 mL/Hr) IV Continuous <Continuous>    MEDICATIONS  (PRN):  magnesium hydroxide Suspension 30 milliLiter(s) Oral daily PRN Constipation  ondansetron Injectable 4 milliGRAM(s) IV Push every 6 hours PRN Nausea  polyethylene glycol 3350 17 Gram(s) Oral daily PRN Constipation      Allergies    codeine (Short breath)  contrast media (iodine-based) (Unknown)    Intolerances        Vital Signs Last 24 Hrs  T(C): 36.3 (22 Jan 2020 05:47), Max: 36.7 (21 Jan 2020 14:43)  T(F): 97.4 (22 Jan 2020 05:47), Max: 98.1 (21 Jan 2020 22:09)  HR: 73 (22 Jan 2020 05:47) (71 - 75)  BP: 148/88 (22 Jan 2020 05:47) (137/75 - 148/88)  BP(mean): --  RR: 16 (22 Jan 2020 05:47) (16 - 18)  SpO2: 100% (22 Jan 2020 05:47) (98% - 100%)    PHYSICAL EXAM  General: adult in NAD  HEENT: clear oropharynx, anicteric sclera, pink conjunctiva  Neck: supple  CV: normal S1/S2 with no murmur rubs or gallops  Lungs: positive air movement b/l ant lungs, clear to auscultation, no wheezes, no rales  Abdomen: soft non-tender non-distended, no hepatosplenomegaly  Ext: no clubbing cyanosis or edema  Skin: no rashes and no petechiae  Neuro: alert and oriented X 3, none focal    LABS:                          11.1   7.18  )-----------( 166      ( 22 Jan 2020 10:50 )             33.7         Mean Cell Volume : 95.7 fL  Mean Cell Hemoglobin : 31.5 pg  Mean Cell Hemoglobin Concentration : 32.9 %  Auto Neutrophil # : x  Auto Lymphocyte # : x  Auto Monocyte # : x  Auto Eosinophil # : x  Auto Basophil # : x  Auto Neutrophil % : x  Auto Lymphocyte % : x  Auto Monocyte % : x  Auto Eosinophil % : x  Auto Basophil % : x      Serial CBC's  01-22 @ 10:50  Hct-33.7 / Hgb-11.1 / Plat-166 / RBC-3.52 / WBC-7.18  Serial CBC's  01-22 @ 05:10  Hct-31.9 / Hgb-10.5 / Plat-101 / RBC-3.31 / WBC-5.61  Serial CBC's  01-21 @ 06:30  Hct-33.5 / Hgb-11.3 / Plat-176 / RBC-3.51 / WBC-5.36  Serial CBC's  01-20 @ 10:56  Hct-36.0 / Hgb-12.1 / Plat-187 / RBC-3.85 / WBC-8.51      01-22    137  |  103  |  28<H>  ----------------------------<  99  5.1   |  27  |  0.91    Ca    8.8      22 Jan 2020 10:50  Phos  3.3     01-22  Mg     2.2     01-22    TPro  6.4  /  Alb  3.5  /  TBili  0.2  /  DBili  x   /  AST  40<H>  /  ALT  52<H>  /  AlkPhos  88  01-22                      RADIOLOGY & ADDITIONAL STUDIES:

## 2020-01-22 NOTE — DISCHARGE NOTE PROVIDER - NSDCFUSCHEDAPPT_GEN_ALL_CORE_FT
ARDOLINO, STEFAN ; 01/31/2020 ; [facility]  ARDOLINO, STEFAN ; 02/07/2020 ; [facility]  ARDOLINO, STEFAN ; 02/10/2020 ; [facility]  ARDOLINO, STEFAN ; 02/21/2020 ; [facility]  ARDOLINO, STEFAN ; 02/28/2020 ; [facility]  ARDOLINO, STEFAN ; 03/13/2020 ; [facility]  ARDOLINO, STEFAN ; 03/20/2020 ; [facility]  ARDOLINO, STEFAN ; 04/03/2020 ; [facility]  ARDOLINO, STEFAN ; 04/10/2020 ; [facility]

## 2020-01-22 NOTE — DISCHARGE NOTE PROVIDER - NSDCMRMEDTOKEN_GEN_ALL_CORE_FT
clindamycin 300 mg oral capsule: 1 cap(s) orally 4 times a day   lactobacillus acidophilus oral capsule: 1 cap(s) orally 3 times a day   Lexapro: 5 milligram(s) orally once a day  metoclopramide 5 mg oral tablet: 1 tab(s) orally 4 times a day (before meals and at bedtime)  Motrin 300 mg oral tablet: 1 tab(s) orally 4 times a day, As Needed pain  Xanax 0.25 mg oral tablet: 1 tab(s) orally 3 times a day

## 2020-01-23 LAB
HCV RNA FLD QL NAA+PROBE: SIGNIFICANT CHANGE UP
HCV RNA SPEC QL PROBE+SIG AMP: NOT DETECTED — SIGNIFICANT CHANGE UP

## 2020-01-24 NOTE — PAST MEDICAL HISTORY
[Increasing age ( >40 years old)] : Increasing age ( >40 years old) [No therapy indicated for cases scheduled for less than one hour] : No therapy indicated for cases scheduled for less than one hour. [FreeTextEntry1] : Malignant Hyperthermia Screening Tool and Risk of Bleeding Assessment\par \par Ms. STEFAN TYLER denies family history of unexpected death following Anesthesia or Exercise.\par Denies Family history of Malignant Hyperthermia, Muscle or Neuromuscular disorder and High Temperature following exercise.\par \par Ms. STEFAN TYLER denies history of Muscle Spasm, Dark or Chocolate - Colored urine and Unanticipated fever immediately following anesthesia or serious exercise. \par Ms. TYLER also denies bleeding tendencies/ Risks of Bleeding.\par

## 2020-01-24 NOTE — HISTORY OF PRESENT ILLNESS
[FreeTextEntry1] : alert and oriented x 3 \par accompanied by  Jose [phone]\par no reported falls \par no medications this morning  [FreeTextEntry5] : yesterday at 6 pm [FreeTextEntry6] : Dr Zavala

## 2020-01-24 NOTE — PROCEDURE
[Resume diet] : resume diet [D/C IV on discharge] : D/C IV on discharge [Vital signs on admission the q 15 mins x2] : Vital signs on admission the q 15 mins x2 [Site check for bleeding/hematoma] : Site check for bleeding/hematoma [FreeTextEntry1] : right mediport insertion

## 2020-01-24 NOTE — ASSESSMENT
[FreeTextEntry1] : Cholangioca\par Needs port\par Informed consent on chart\par Plts 271\par All-contrast

## 2020-01-25 ENCOUNTER — INPATIENT (INPATIENT)
Facility: HOSPITAL | Age: 70
LOS: 1 days | Discharge: HOME | End: 2020-01-27
Attending: INTERNAL MEDICINE | Admitting: INTERNAL MEDICINE
Payer: COMMERCIAL

## 2020-01-25 VITALS
HEIGHT: 63 IN | OXYGEN SATURATION: 99 % | SYSTOLIC BLOOD PRESSURE: 151 MMHG | TEMPERATURE: 100 F | HEART RATE: 110 BPM | DIASTOLIC BLOOD PRESSURE: 90 MMHG | RESPIRATION RATE: 20 BRPM

## 2020-01-25 DIAGNOSIS — Z29.9 ENCOUNTER FOR PROPHYLACTIC MEASURES, UNSPECIFIED: ICD-10-CM

## 2020-01-25 DIAGNOSIS — R11.14 BILIOUS VOMITING: ICD-10-CM

## 2020-01-25 DIAGNOSIS — J10.1 INFLUENZA DUE TO OTHER IDENTIFIED INFLUENZA VIRUS WITH OTHER RESPIRATORY MANIFESTATIONS: ICD-10-CM

## 2020-01-25 DIAGNOSIS — C22.1 INTRAHEPATIC BILE DUCT CARCINOMA: ICD-10-CM

## 2020-01-25 DIAGNOSIS — R00.0 TACHYCARDIA, UNSPECIFIED: ICD-10-CM

## 2020-01-25 DIAGNOSIS — L03.113 CELLULITIS OF RIGHT UPPER LIMB: ICD-10-CM

## 2020-01-25 DIAGNOSIS — J06.9 ACUTE UPPER RESPIRATORY INFECTION, UNSPECIFIED: ICD-10-CM

## 2020-01-25 LAB
ALBUMIN SERPL ELPH-MCNC: 3.7 G/DL — SIGNIFICANT CHANGE UP (ref 3.3–5)
ALP SERPL-CCNC: 87 U/L — SIGNIFICANT CHANGE UP (ref 40–120)
ALT FLD-CCNC: 41 U/L — HIGH (ref 4–33)
ANION GAP SERPL CALC-SCNC: 13 MMO/L — SIGNIFICANT CHANGE UP (ref 7–14)
APPEARANCE UR: CLEAR — SIGNIFICANT CHANGE UP
APTT BLD: 30.1 SEC — SIGNIFICANT CHANGE UP (ref 27.5–36.3)
AST SERPL-CCNC: 39 U/L — HIGH (ref 4–32)
B PERT DNA SPEC QL NAA+PROBE: NOT DETECTED — SIGNIFICANT CHANGE UP
BACTERIA # UR AUTO: NEGATIVE — SIGNIFICANT CHANGE UP
BACTERIA BLD CULT: SIGNIFICANT CHANGE UP
BACTERIA BLD CULT: SIGNIFICANT CHANGE UP
BASE EXCESS BLDV CALC-SCNC: 2.7 MMOL/L — SIGNIFICANT CHANGE UP
BASOPHILS # BLD AUTO: 0.01 K/UL — SIGNIFICANT CHANGE UP (ref 0–0.2)
BASOPHILS NFR BLD AUTO: 0.2 % — SIGNIFICANT CHANGE UP (ref 0–2)
BILIRUB SERPL-MCNC: 0.2 MG/DL — SIGNIFICANT CHANGE UP (ref 0.2–1.2)
BILIRUB UR-MCNC: NEGATIVE — SIGNIFICANT CHANGE UP
BLOOD GAS VENOUS - CREATININE: 0.75 MG/DL — SIGNIFICANT CHANGE UP (ref 0.5–1.3)
BLOOD UR QL VISUAL: HIGH
BUN SERPL-MCNC: 15 MG/DL — SIGNIFICANT CHANGE UP (ref 7–23)
C PNEUM DNA SPEC QL NAA+PROBE: NOT DETECTED — SIGNIFICANT CHANGE UP
CALCIUM SERPL-MCNC: 9 MG/DL — SIGNIFICANT CHANGE UP (ref 8.4–10.5)
CHLORIDE BLDV-SCNC: 99 MMOL/L — SIGNIFICANT CHANGE UP (ref 96–108)
CHLORIDE SERPL-SCNC: 99 MMOL/L — SIGNIFICANT CHANGE UP (ref 98–107)
CO2 SERPL-SCNC: 21 MMOL/L — LOW (ref 22–31)
COLOR SPEC: YELLOW — SIGNIFICANT CHANGE UP
CREAT SERPL-MCNC: 0.65 MG/DL — SIGNIFICANT CHANGE UP (ref 0.5–1.3)
EOSINOPHIL # BLD AUTO: 0 K/UL — SIGNIFICANT CHANGE UP (ref 0–0.5)
EOSINOPHIL NFR BLD AUTO: 0 % — SIGNIFICANT CHANGE UP (ref 0–6)
FLUAV H1 2009 PAND RNA SPEC QL NAA+PROBE: NOT DETECTED — SIGNIFICANT CHANGE UP
FLUAV H1 RNA SPEC QL NAA+PROBE: NOT DETECTED — SIGNIFICANT CHANGE UP
FLUAV H3 RNA SPEC QL NAA+PROBE: DETECTED — HIGH
FLUBV RNA SPEC QL NAA+PROBE: NOT DETECTED — SIGNIFICANT CHANGE UP
GAS PNL BLDV: 132 MMOL/L — LOW (ref 136–146)
GLUCOSE BLDV-MCNC: 128 MG/DL — HIGH (ref 70–99)
GLUCOSE SERPL-MCNC: 131 MG/DL — HIGH (ref 70–99)
GLUCOSE UR-MCNC: NEGATIVE — SIGNIFICANT CHANGE UP
HADV DNA SPEC QL NAA+PROBE: NOT DETECTED — SIGNIFICANT CHANGE UP
HCO3 BLDV-SCNC: 25 MMOL/L — SIGNIFICANT CHANGE UP (ref 20–27)
HCOV PNL SPEC NAA+PROBE: SIGNIFICANT CHANGE UP
HCT VFR BLD CALC: 37.2 % — SIGNIFICANT CHANGE UP (ref 34.5–45)
HCT VFR BLDV CALC: 35.9 % — SIGNIFICANT CHANGE UP (ref 34.5–45)
HGB BLD-MCNC: 13.1 G/DL — SIGNIFICANT CHANGE UP (ref 11.5–15.5)
HGB BLDV-MCNC: 11.7 G/DL — SIGNIFICANT CHANGE UP (ref 11.5–15.5)
HMPV RNA SPEC QL NAA+PROBE: NOT DETECTED — SIGNIFICANT CHANGE UP
HPIV1 RNA SPEC QL NAA+PROBE: NOT DETECTED — SIGNIFICANT CHANGE UP
HPIV2 RNA SPEC QL NAA+PROBE: NOT DETECTED — SIGNIFICANT CHANGE UP
HPIV3 RNA SPEC QL NAA+PROBE: NOT DETECTED — SIGNIFICANT CHANGE UP
HPIV4 RNA SPEC QL NAA+PROBE: NOT DETECTED — SIGNIFICANT CHANGE UP
HYALINE CASTS # UR AUTO: HIGH
IMM GRANULOCYTES NFR BLD AUTO: 0.6 % — SIGNIFICANT CHANGE UP (ref 0–1.5)
INR BLD: 1.1 — SIGNIFICANT CHANGE UP (ref 0.88–1.17)
KETONES UR-MCNC: NEGATIVE — SIGNIFICANT CHANGE UP
LACTATE BLDV-MCNC: 1.6 MMOL/L — SIGNIFICANT CHANGE UP (ref 0.5–2)
LACTATE BLDV-MCNC: 2.1 MMOL/L — HIGH (ref 0.5–2)
LEUKOCYTE ESTERASE UR-ACNC: NEGATIVE — SIGNIFICANT CHANGE UP
LYMPHOCYTES # BLD AUTO: 0.5 K/UL — LOW (ref 1–3.3)
LYMPHOCYTES # BLD AUTO: 7.9 % — LOW (ref 13–44)
MCHC RBC-ENTMCNC: 32.3 PG — SIGNIFICANT CHANGE UP (ref 27–34)
MCHC RBC-ENTMCNC: 35.2 % — SIGNIFICANT CHANGE UP (ref 32–36)
MCV RBC AUTO: 91.9 FL — SIGNIFICANT CHANGE UP (ref 80–100)
MONOCYTES # BLD AUTO: 0.89 K/UL — SIGNIFICANT CHANGE UP (ref 0–0.9)
MONOCYTES NFR BLD AUTO: 14 % — SIGNIFICANT CHANGE UP (ref 2–14)
NEUTROPHILS # BLD AUTO: 4.92 K/UL — SIGNIFICANT CHANGE UP (ref 1.8–7.4)
NEUTROPHILS NFR BLD AUTO: 77.3 % — HIGH (ref 43–77)
NITRITE UR-MCNC: NEGATIVE — SIGNIFICANT CHANGE UP
NRBC # FLD: 0 K/UL — SIGNIFICANT CHANGE UP (ref 0–0)
PCO2 BLDV: 55 MMHG — HIGH (ref 41–51)
PH BLDV: 7.33 PH — SIGNIFICANT CHANGE UP (ref 7.32–7.43)
PH UR: 6 — SIGNIFICANT CHANGE UP (ref 5–8)
PLATELET # BLD AUTO: 119 K/UL — LOW (ref 150–400)
PMV BLD: 10.3 FL — SIGNIFICANT CHANGE UP (ref 7–13)
PO2 BLDV: 35 MMHG — SIGNIFICANT CHANGE UP (ref 35–40)
POTASSIUM BLDV-SCNC: 4.1 MMOL/L — SIGNIFICANT CHANGE UP (ref 3.4–4.5)
POTASSIUM SERPL-MCNC: 4.5 MMOL/L — SIGNIFICANT CHANGE UP (ref 3.5–5.3)
POTASSIUM SERPL-SCNC: 4.5 MMOL/L — SIGNIFICANT CHANGE UP (ref 3.5–5.3)
PROT SERPL-MCNC: 6.7 G/DL — SIGNIFICANT CHANGE UP (ref 6–8.3)
PROT UR-MCNC: 70 — SIGNIFICANT CHANGE UP
PROTHROM AB SERPL-ACNC: 12.2 SEC — SIGNIFICANT CHANGE UP (ref 9.8–13.1)
RBC # BLD: 4.05 M/UL — SIGNIFICANT CHANGE UP (ref 3.8–5.2)
RBC # FLD: 13.1 % — SIGNIFICANT CHANGE UP (ref 10.3–14.5)
RBC CASTS # UR COMP ASSIST: SIGNIFICANT CHANGE UP (ref 0–?)
RSV RNA SPEC QL NAA+PROBE: NOT DETECTED — SIGNIFICANT CHANGE UP
RV+EV RNA SPEC QL NAA+PROBE: NOT DETECTED — SIGNIFICANT CHANGE UP
SAO2 % BLDV: 58 % — LOW (ref 60–85)
SODIUM SERPL-SCNC: 133 MMOL/L — LOW (ref 135–145)
SP GR SPEC: 1.03 — SIGNIFICANT CHANGE UP (ref 1–1.04)
SQUAMOUS # UR AUTO: SIGNIFICANT CHANGE UP
UROBILINOGEN FLD QL: NORMAL — SIGNIFICANT CHANGE UP
WBC # BLD: 6.36 K/UL — SIGNIFICANT CHANGE UP (ref 3.8–10.5)
WBC # FLD AUTO: 6.36 K/UL — SIGNIFICANT CHANGE UP (ref 3.8–10.5)
WBC UR QL: SIGNIFICANT CHANGE UP (ref 0–?)

## 2020-01-25 PROCEDURE — 99223 1ST HOSP IP/OBS HIGH 75: CPT | Mod: GC

## 2020-01-25 PROCEDURE — 74176 CT ABD & PELVIS W/O CONTRAST: CPT | Mod: 26

## 2020-01-25 PROCEDURE — 71045 X-RAY EXAM CHEST 1 VIEW: CPT | Mod: 26

## 2020-01-25 RX ORDER — ACETAMINOPHEN 500 MG
650 TABLET ORAL ONCE
Refills: 0 | Status: DISCONTINUED | OUTPATIENT
Start: 2020-01-25 | End: 2020-01-25

## 2020-01-25 RX ORDER — ACETAMINOPHEN 500 MG
1000 TABLET ORAL ONCE
Refills: 0 | Status: COMPLETED | OUTPATIENT
Start: 2020-01-25 | End: 2020-01-25

## 2020-01-25 RX ORDER — ONDANSETRON 8 MG/1
4 TABLET, FILM COATED ORAL ONCE
Refills: 0 | Status: COMPLETED | OUTPATIENT
Start: 2020-01-25 | End: 2020-01-25

## 2020-01-25 RX ORDER — ALPRAZOLAM 0.25 MG
0.25 TABLET ORAL ONCE
Refills: 0 | Status: DISCONTINUED | OUTPATIENT
Start: 2020-01-25 | End: 2020-01-25

## 2020-01-25 RX ORDER — ALPRAZOLAM 0.25 MG
0.25 TABLET ORAL THREE TIMES A DAY
Refills: 0 | Status: DISCONTINUED | OUTPATIENT
Start: 2020-01-25 | End: 2020-01-27

## 2020-01-25 RX ORDER — ESCITALOPRAM OXALATE 10 MG/1
5 TABLET, FILM COATED ORAL DAILY
Refills: 0 | Status: DISCONTINUED | OUTPATIENT
Start: 2020-01-25 | End: 2020-01-27

## 2020-01-25 RX ORDER — ESCITALOPRAM OXALATE 10 MG/1
5 TABLET, FILM COATED ORAL
Qty: 0 | Refills: 0 | DISCHARGE

## 2020-01-25 RX ORDER — ACETAMINOPHEN 500 MG
650 TABLET ORAL EVERY 6 HOURS
Refills: 0 | Status: DISCONTINUED | OUTPATIENT
Start: 2020-01-25 | End: 2020-01-27

## 2020-01-25 RX ORDER — SODIUM CHLORIDE 9 MG/ML
2000 INJECTION, SOLUTION INTRAVENOUS ONCE
Refills: 0 | Status: COMPLETED | OUTPATIENT
Start: 2020-01-25 | End: 2020-01-25

## 2020-01-25 RX ORDER — ONDANSETRON 8 MG/1
4 TABLET, FILM COATED ORAL EVERY 6 HOURS
Refills: 0 | Status: DISCONTINUED | OUTPATIENT
Start: 2020-01-25 | End: 2020-01-27

## 2020-01-25 RX ORDER — ENOXAPARIN SODIUM 100 MG/ML
40 INJECTION SUBCUTANEOUS DAILY
Refills: 0 | Status: DISCONTINUED | OUTPATIENT
Start: 2020-01-25 | End: 2020-01-27

## 2020-01-25 RX ORDER — LACTOBACILLUS ACIDOPHILUS 100MM CELL
1 CAPSULE ORAL THREE TIMES A DAY
Refills: 0 | Status: DISCONTINUED | OUTPATIENT
Start: 2020-01-25 | End: 2020-01-27

## 2020-01-25 RX ADMIN — Medication 1 TABLET(S): at 14:17

## 2020-01-25 RX ADMIN — SODIUM CHLORIDE 2000 MILLILITER(S): 9 INJECTION, SOLUTION INTRAVENOUS at 07:45

## 2020-01-25 RX ADMIN — Medication 650 MILLIGRAM(S): at 15:00

## 2020-01-25 RX ADMIN — Medication 100 MILLIGRAM(S): at 18:00

## 2020-01-25 RX ADMIN — Medication 75 MILLIGRAM(S): at 12:47

## 2020-01-25 RX ADMIN — Medication 400 MILLIGRAM(S): at 07:44

## 2020-01-25 RX ADMIN — Medication 650 MILLIGRAM(S): at 14:00

## 2020-01-25 RX ADMIN — ONDANSETRON 4 MILLIGRAM(S): 8 TABLET, FILM COATED ORAL at 10:49

## 2020-01-25 RX ADMIN — Medication 100 MILLIGRAM(S): at 10:20

## 2020-01-25 RX ADMIN — ONDANSETRON 4 MILLIGRAM(S): 8 TABLET, FILM COATED ORAL at 18:00

## 2020-01-25 RX ADMIN — ESCITALOPRAM OXALATE 5 MILLIGRAM(S): 10 TABLET, FILM COATED ORAL at 11:05

## 2020-01-25 RX ADMIN — Medication 0.25 MILLIGRAM(S): at 20:03

## 2020-01-25 RX ADMIN — Medication 0.25 MILLIGRAM(S): at 10:49

## 2020-01-25 RX ADMIN — ONDANSETRON 4 MILLIGRAM(S): 8 TABLET, FILM COATED ORAL at 07:44

## 2020-01-25 RX ADMIN — Medication 1000 MILLIGRAM(S): at 08:44

## 2020-01-25 NOTE — H&P ADULT - ATTENDING COMMENTS
I have personally seen, examined, and participated in the care of this patient, I have reviewed all pertinent clinical information, including history, physical exam, plan, and the above note by Dr. Doran. The case and plan have been discussed with resident, above note edited where appropriate.  updated at bedside, all questions answered.   Yung Mccarty MD

## 2020-01-25 NOTE — H&P ADULT - PROBLEM SELECTOR PLAN 3
Patient sinus tachycardia, otherwise SIRS negative, now resolved  Likely 2/2 viral syndrome   NO lactate, however clinically appears euvolemic at this time  Plan as   F/u blood cx

## 2020-01-25 NOTE — H&P ADULT - PROBLEM SELECTOR PLAN 5
DVT PPX: (SCDS for now, pending weight and CMP would start lovenox)  Diet: Regular  DIspo: Pending assisted patient, currently on chemotherapy, next dosage planned for this Friday   Has R chest wall mediport recently placed, no signs of infection

## 2020-01-25 NOTE — H&P ADULT - PROBLEM SELECTOR PLAN 1
Patient with viral syndrome, RVP +Influenza A  Symptoms began within 48 hours, Tamiflu 75 mg PO BID x 5 days   CXRY no focal consolidation, SIRS negative at this time  Less likely superimposed bacterial pneumonia at this time, will monitor off abx  Plan as below

## 2020-01-25 NOTE — H&P ADULT - NSHPLABSRESULTS_GEN_ALL_CORE
13.1   6.36  )-----------( 119      ( 25 Jan 2020 07:40 )             37.2               Rapid Respiratory Viral Panel (01.25.20 @ 07:55)    Adenovirus (RapRVP): Not Detected    Influenza AH1 2009 (RapRVP): Detected    Influenza AH1 (RapRVP): Not Detected    Influenza AH3 (RapRVP): Not Detected    Influenza B (RapRVP): Not Detected    Parainfluenza 1 (RapRVP): Not Detected    Parainfluenza 2 (RapRVP): Not Detected    Parainfluenza 3 (RapRVP): Not Detected    Parainfluenza 4 (RapRVP): Not Detected    Resp Syncytial Virus (RapRVP): Not Detected    Chlamydia pneumoniae (RapRVP): Not Detected    Mycoplasma pneumoniae (RapRVP): Not Detected    Entero/Rhinovirus (RapRVP): Not Detected    hMPV (RapRVP): Not Detected    Coronavirus (229E,HKU1,NL63,OC43): Not Detected This Respiratory Panel uses polymerase chain reaction (PCR)  to detect for adenovirus; coronavirus (HKU1, NL63, 229E,  OC43); human metapneumovirus (hMPV); human  enterovirus/rhinovirus (Entero/RV); influenza A; influenza  A/H1: influenza A/H3; influenza A/H1-2009; influenza B;  parainfluenza viruses 1,2,3,4; respiratory syncytial virus;  Mycoplasma pneumoniae; and Chlamydophila pneumoniae.    < from: CT Abdomen and Pelvis No Cont (01.25.20 @ 08:45) >    FINDINGS:    LOWER CHEST: Mediport catheter. Coronary artery calcification.    LIVER: Atrophic segment 4 with decreased attenuation. BILE DUCTS: Normal caliber.  GALLBLADDER: Within normal limits.  SPLEEN: Within normal limits.  PANCREAS: Within normal limits.  ADRENALS: Within normal limits.  KIDNEYS/URETERS: Within normal limits.    BLADDER: Within normal limits.  REPRODUCTIVE ORGANS: Uterus and adnexa within normal limits.    BOWEL: No bowel obstruction. Appendix normal.  PERITONEUM: No ascites. Multiple peritoneal nodules measuring upto 15 mm similar to 12/06/2019. New 11 mm left mid abdominal implant (2:56).  VESSELS: Within normal limits.  RETROPERITONEUM/LYMPH NODES: No lymphadenopathy.    ABDOMINAL WALL: Within normal limits.  BONES: Within normal limits.    IMPRESSION:     Extensive peritoneal implants. New small implant left mid abdomen.      < end of copied text >    EKG reviewed: sinus tachycardia, , known RBBB, no HERNAN/D, TWI,

## 2020-01-25 NOTE — ED PROVIDER NOTE - CARE PLAN
Principal Discharge DX:	Fever and chills  Secondary Diagnosis:	Viral upper respiratory tract infection  Secondary Diagnosis:	Dehydration  Secondary Diagnosis:	Vomiting alone Principal Discharge DX:	Fever and chills  Secondary Diagnosis:	Viral upper respiratory tract infection  Secondary Diagnosis:	Dehydration  Secondary Diagnosis:	Vomiting alone  Secondary Diagnosis:	Flu

## 2020-01-25 NOTE — ED PROVIDER NOTE - OBJECTIVE STATEMENT
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 Pt states in hospital she was treated for cellulitis of her arms. She has been on clindamycin. Since last night she she says has had above symptoms. Multiple episodes of non-bloody emesis. Last BM this morning. Also has worsening of her abdominal pain, states generalized crampy. Feels like she needs to use bathroom. Unsure if it is similar to cancer pain. Fevers to 100.8F at home. No diarrhea.

## 2020-01-25 NOTE — ED ADULT NURSE NOTE - OBJECTIVE STATEMENT
Pt a&ox3, restless, c/o of n/v, cough and generalized body ache since yesterday. Pt last chemo 1/10 via right sided chest wall port. pt state to endorse fever and chills. Pt denies chest pain, sob, abdominal discomfort, blood in vomit. Respirations even/unlabored, nad noted, 20g iv to left forearm, labs drawn and sent. provider at bedside to eval. will continue to monitor

## 2020-01-25 NOTE — ED ADULT TRIAGE NOTE - CHIEF COMPLAINT QUOTE
Alert and oriented x 4 complaining of cough, fever nausea and vomiting this evening. Alert and oriented x 4 complaining of cough, fever nausea and vomiting this evening. Pt currently on Chemotherapy for cancer of tumor between bile ducts.

## 2020-01-25 NOTE — ED ADULT NURSE NOTE - CHIEF COMPLAINT QUOTE
Alert and oriented x 4 complaining of cough, fever nausea and vomiting this evening. Pt currently on Chemotherapy for cancer of tumor between bile ducts.

## 2020-01-25 NOTE — H&P ADULT - PROBLEM SELECTOR PLAN 2
Multiple episodes of bilious vomiting, likely 2/2 viral syndrome  EKG no signs for ACS  Abdominal exam benign  CTAP stable given history

## 2020-01-25 NOTE — ED PROVIDER NOTE - PHYSICAL EXAMINATION
General: Well developed, well nourished  HEENT: Normocephalic and atraumatic, Trachea midline.   Cardiac: Normal S1 and S2 w/ tachycardia. No MRG.  Pulmonary: CTA bilaterally. No increased WOB.   Abdominal: Soft, mild diffuse tenderness, worse in LLQ.   Neurologic: No focal sensory or motor deficits.  Musculoskeletal: No limited ROM.  Vascular: Warm and well perfused  Skin: Color appropriate for race.   Psychiatric: Appropriate mood and affect. No apparent risk to self or others.  [place] M.D. PGY-2

## 2020-01-25 NOTE — H&P ADULT - HISTORY OF PRESENT ILLNESS
69 y.o. 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 During her chemo treatment nursing had difficulty getting an IV in both her right and left arms. She noticed erythema and swelling a day after treatment on her right forearm. She placed cold compresses on the arm but throughout the week the erythema and swelling spread up her right arm just past her elbow. Yesterday she noticed erythema and swelling now on her left forearm where another IV was placed 1 week ago so came to ED. She denies fever or chills but says her skin on her forearms burn. Patient also c/o Nausea, dry heaves, and chronic RUQ pain from her cancer which is has an appointment to see a pain management doctor for. She denies cp, SOB, chills, cough/dysuria/V/D.     Cellulitis and abscess of forearm.    -Started on IV Clindamycin with transition to PO upon discharge   -Probiotic while on antibiotics     Thrombophlebitis arm.    -Duplex with superficial DVTs, no deep venous thrombosis   -Supportive care    Cholangiocarcinoma.    -Currently on gemzar/cisplatin s/p 4 th dose 1 week ago  -Mediport placed 1/13/20  -Out patient follow up at [facility]     Anxiety.    -Continued with xanax and lexapro.     Irritable bowel syndrome, unspecified type.   -C/o constipation --> bowel regimen implemented      Pt medically stable for discharge as per discussion with medicine attg.     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 Patient was recently hospitalized for 4 days for right and left arm cellulitis. She was started on clindamycin with lactobacillus for a goal treatment of 7 days. Duplex revealed superficial venous thrombosis, but no DVT. She was discharged and the day after had persistent nausea. Last night had intractable vomiting, bilious nonbloody. Patient also endorses decreased appetite unable to tolerate PO, fevers and chills. She reports nonproductive cough, and malaise. Denies nasal congestion. No known sick contacts or recent travel history. NO chest pain, palpitations. Last BM this AM, normal. Denies diarrhea. Did not receive her flu shot this year. Patient is scheduled for chemotherapy this Friday.       In ED: /90  RR 20 T99.6 (100.8 at home)  Patient received Clindamycin 600 mg IV x 1, Tamiflu 75 mg, LR 2 L IV x 1, Tylenol 1 g IV x 1, Xanax 0.25 mg PO x 1, Zofran 4 mg IV x 2

## 2020-01-25 NOTE — ED PROVIDER NOTE - ATTENDING CONTRIBUTION TO CARE
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 Pt states in hospital she was treated for cellulitis of her arms. She has been on clindamycin. Since last night she she says has had above symptoms. Multiple episodes of non-bloody emesis. Last BM this morning. Also has worsening of her abdominal pain, states generalized crampy. Feels like she needs to use bathroom. Unsure if it is similar to cancer pain. Fevers to 100.8F at home. No diarrhea.  EXAM: NAD, lungs ctab, abd soft non tender, heart Tachy but reg rhythm, arm without acute skin changes but has surgical pen outline, pulses equal and palpable x 4 ext.   MDM: Pt with known cholangiocarcinoma on chemo last was Jan 17 that presents with fever, N/V, supposed to be on PO Abx Clinda for arm cellulitis but unable to tolerate. WIll need to work up for infection in this immune compromised pt. Will need SIRs labs, gentle fluids, Abx and flu swab and most likely admit.

## 2020-01-25 NOTE — ED ADULT NURSE NOTE - NSIMPLEMENTINTERV_GEN_ALL_ED
Implemented All Universal Safety Interventions:  [place] to call system. Call bell, personal items and telephone within reach. Instruct patient to call for assistance. Room bathroom lighting operational. Non-slip footwear when patient is off stretcher. Physically safe environment: no spills, clutter or unnecessary equipment. Stretcher in lowest position, wheels locked, appropriate side rails in place.

## 2020-01-25 NOTE — H&P ADULT - PROBLEM SELECTOR PLAN 4
detention patient, currently on chemotherapy, next dosage planned for this Friday   Has R chest wall mediport recently placed, no signs of infection Patient on day 5/7 for RUE cellulitis  Currently no signs on physical exam  Will complete Clindamycin IV for 2 more days, goal to transition to PO once able to tolerate  Continue lactobacillus

## 2020-01-25 NOTE — H&P ADULT - NSHPPHYSICALEXAM_GEN_ALL_CORE
T(C): 37.6 (01-25-20 @ 05:56), Max: 37.6 (01-25-20 @ 05:56)  T(F): 99.6 (01-25-20 @ 05:56), Max: 99.6 (01-25-20 @ 05:56)  HR: 83 (01-25-20 @ 08:39) (83 - 110)  BP: 113/72 (01-25-20 @ 08:39) (113/72 - 151/90)  RR: 19 (01-25-20 @ 08:39) (19 - 20)  SpO2: 99% (01-25-20 @ 08:39) (99% - 99%)    · Constitutional       elderly female, Lying in bed, weak appearing   · HEENT	               PERRL; EOMI; conjunctiva clear  · Neck	               Supple; no JVD  · Back	                ROM intact  · Respiratory             good air movement; no rales; no rhonchi; no wheezes  · Cardiovascular       S1 & S2 with RRR; no murmurs, rales or JVD  · Gastrointestinal     soft; no distention; bowel sounds normal; no rigidity  · Extremities             no pedal edema  · Vascular	               Radial Pulse: right normal; left normal  · Neurological           alert and oriented x 3; responds to verbal commands; sensation intact; cranial nerves intact; strength normal  · Skin	               warm and dry  · Musculoskeletal    no calf tenderness; diminished strength  · Psychiatric              normal mood and affect T(C): 37.6 (01-25-20 @ 05:56), Max: 37.6 (01-25-20 @ 05:56)  T(F): 99.6 (01-25-20 @ 05:56), Max: 99.6 (01-25-20 @ 05:56)  HR: 83 (01-25-20 @ 08:39) (83 - 110)  BP: 113/72 (01-25-20 @ 08:39) (113/72 - 151/90)  RR: 19 (01-25-20 @ 08:39) (19 - 20)  SpO2: 99% (01-25-20 @ 08:39) (99% - 99%)    · Constitutional       elderly female, Lying in bed, weak appearing   · HEENT	               PERRL; EOMI; conjunctiva clear  · Neck	               Supple  · Respiratory             good air movement; no rales; no rhonchi; no wheezes  · Cardiovascular       S1 & S2 with RRR; no murmurs, rales or JVD  · Gastrointestinal     soft; no distention; bowel sounds normal; no rigidity  · Extremities             no pedal edema  · Vascular	               Radial Pulse: right normal; left normal  · Neurological           alert and oriented x 3, grossly nonfocal   · Skin	               warm and dry  · Musculoskeletal    no calf tenderness; diminished strength  · Psychiatric              normal mood and affect

## 2020-01-25 NOTE — ED PROVIDER NOTE - CLINICAL SUMMARY MEDICAL DECISION MAKING FREE TEXT BOX
69F cholangiocarc p/w abd pain, n/v, cough, runny nose, fevers on chemo. May be URI vs intraabdominal/urinary infection. Will send labs, fluids, tylenol. ABX based on source. Likely TBA.

## 2020-01-25 NOTE — ED PROVIDER NOTE - PROGRESS NOTE DETAILS
MD Arce:  patient supposed to be on clindamycin 300 tid for treatment of arm cellulitis, however, she is unable to tolerate this medication due to vomiting.  The patient needs to continue abx, and will need to be admitted for IV abx, rehydration, otherwise her cellulitis will likely recur.

## 2020-01-25 NOTE — PATIENT PROFILE ADULT - METHOD
telephone, spoke with service who will let on call Hematolgy Fellow know of admit left message with oncall service

## 2020-01-25 NOTE — CHART NOTE - NSCHARTNOTEFT_GEN_A_CORE
Patient Name:	Karlie Brown	YOB: 1950  Address:	[address] [address] [place], [place] [id number]	Sex:	Female  Rx Written	Rx Dispensed	Drug	Quantity	Days Supply	Prescriber Name  12/05/2019	12/12/2019	alprazolam 0.25 mg tablet	90	30	Mike Becerra Jr, MD  11/14/2019	11/18/2019	alprazolam 0.25 mg tablet	90	30	Mike Becerra Jr, MD  10/10/2019	10/11/2019	alprazolam 0.25 mg tablet	90	30	Mike Becerra Jr, MD  09/12/2019	09/13/2019	alprazolam 0.25 mg tablet	90	30	Mike Becerra Jr, MD  08/15/2019	08/21/2019	alprazolam 0.25 mg tablet	90	30	Mike Becerra Jr, MD  07/25/2019	07/26/2019	alprazolam 0.25 mg tablet	90	30	Mike Becerra Jr, MD  06/27/2019	07/01/2019	alprazolam 0.25 mg tablet	90	30	Mike Becerra Jr, MD  05/02/2019	05/03/2019	alprazolam 0.25 mg tablet	90	30	Mike Becerra Jr, MD  04/04/2019	04/05/2019	alprazolam 0.25 mg tablet	90	30	Mike Becerra Jr, MD  03/07/2019	03/07/2019	alprazolam 0.25 mg tablet	90	30	Mike Becerra Jr, MD  02/07/2019	02/07/2019	alprazolam 0.25 mg tablet	90	30	Mike Becerra Jr, MD  Patient Name:	Karlie Bradley	YOB: 1950  Address:	[address] [place], [place] [id number]	Sex:	Female  Rx Written	Rx Dispensed	Drug	Quantity	Days Supply	Prescriber Name  05/23/2019	05/23/2019	alprazolam 0.25 mg tablet	90	30	Mike Becerra Jr, MD, PGY3.

## 2020-01-26 ENCOUNTER — TRANSCRIPTION ENCOUNTER (OUTPATIENT)
Age: 70
End: 2020-01-26

## 2020-01-26 LAB
ALBUMIN SERPL ELPH-MCNC: 4 G/DL — SIGNIFICANT CHANGE UP (ref 3.3–5)
ALP SERPL-CCNC: 94 U/L — SIGNIFICANT CHANGE UP (ref 40–120)
ALT FLD-CCNC: 41 U/L — HIGH (ref 4–33)
ANION GAP SERPL CALC-SCNC: 14 MMO/L — SIGNIFICANT CHANGE UP (ref 7–14)
AST SERPL-CCNC: 35 U/L — HIGH (ref 4–32)
BACTERIA UR CULT: SIGNIFICANT CHANGE UP
BILIRUB SERPL-MCNC: 0.2 MG/DL — SIGNIFICANT CHANGE UP (ref 0.2–1.2)
BUN SERPL-MCNC: 14 MG/DL — SIGNIFICANT CHANGE UP (ref 7–23)
CALCIUM SERPL-MCNC: 9.3 MG/DL — SIGNIFICANT CHANGE UP (ref 8.4–10.5)
CHLORIDE SERPL-SCNC: 98 MMOL/L — SIGNIFICANT CHANGE UP (ref 98–107)
CO2 SERPL-SCNC: 22 MMOL/L — SIGNIFICANT CHANGE UP (ref 22–31)
CREAT SERPL-MCNC: 0.81 MG/DL — SIGNIFICANT CHANGE UP (ref 0.5–1.3)
GLUCOSE SERPL-MCNC: 115 MG/DL — HIGH (ref 70–99)
HCT VFR BLD CALC: 35.3 % — SIGNIFICANT CHANGE UP (ref 34.5–45)
HGB BLD-MCNC: 11.9 G/DL — SIGNIFICANT CHANGE UP (ref 11.5–15.5)
MAGNESIUM SERPL-MCNC: 1.9 MG/DL — SIGNIFICANT CHANGE UP (ref 1.6–2.6)
MCHC RBC-ENTMCNC: 31.7 PG — SIGNIFICANT CHANGE UP (ref 27–34)
MCHC RBC-ENTMCNC: 33.7 % — SIGNIFICANT CHANGE UP (ref 32–36)
MCV RBC AUTO: 94.1 FL — SIGNIFICANT CHANGE UP (ref 80–100)
NRBC # FLD: 0 K/UL — SIGNIFICANT CHANGE UP (ref 0–0)
PHOSPHATE SERPL-MCNC: 3.5 MG/DL — SIGNIFICANT CHANGE UP (ref 2.5–4.5)
PLATELET # BLD AUTO: 119 K/UL — LOW (ref 150–400)
PMV BLD: 10.5 FL — SIGNIFICANT CHANGE UP (ref 7–13)
POTASSIUM SERPL-MCNC: 4.2 MMOL/L — SIGNIFICANT CHANGE UP (ref 3.5–5.3)
POTASSIUM SERPL-SCNC: 4.2 MMOL/L — SIGNIFICANT CHANGE UP (ref 3.5–5.3)
PROT SERPL-MCNC: 7.1 G/DL — SIGNIFICANT CHANGE UP (ref 6–8.3)
RBC # BLD: 3.75 M/UL — LOW (ref 3.8–5.2)
RBC # FLD: 13.2 % — SIGNIFICANT CHANGE UP (ref 10.3–14.5)
SODIUM SERPL-SCNC: 134 MMOL/L — LOW (ref 135–145)
SPECIMEN SOURCE: SIGNIFICANT CHANGE UP
WBC # BLD: 2.65 K/UL — LOW (ref 3.8–10.5)
WBC # FLD AUTO: 2.65 K/UL — LOW (ref 3.8–10.5)

## 2020-01-26 PROCEDURE — 99222 1ST HOSP IP/OBS MODERATE 55: CPT | Mod: GC

## 2020-01-26 PROCEDURE — 99232 SBSQ HOSP IP/OBS MODERATE 35: CPT | Mod: GC

## 2020-01-26 PROCEDURE — 99232 SBSQ HOSP IP/OBS MODERATE 35: CPT

## 2020-01-26 RX ORDER — CHLORHEXIDINE GLUCONATE 213 G/1000ML
1 SOLUTION TOPICAL DAILY
Refills: 0 | Status: DISCONTINUED | OUTPATIENT
Start: 2020-01-26 | End: 2020-01-27

## 2020-01-26 RX ADMIN — Medication 100 MILLIGRAM(S): at 17:15

## 2020-01-26 RX ADMIN — Medication 0.25 MILLIGRAM(S): at 14:56

## 2020-01-26 RX ADMIN — Medication 1 TABLET(S): at 17:16

## 2020-01-26 RX ADMIN — Medication 650 MILLIGRAM(S): at 06:10

## 2020-01-26 RX ADMIN — Medication 200 MILLIGRAM(S): at 05:11

## 2020-01-26 RX ADMIN — ENOXAPARIN SODIUM 40 MILLIGRAM(S): 100 INJECTION SUBCUTANEOUS at 12:17

## 2020-01-26 RX ADMIN — Medication 0.25 MILLIGRAM(S): at 04:37

## 2020-01-26 RX ADMIN — ESCITALOPRAM OXALATE 5 MILLIGRAM(S): 10 TABLET, FILM COATED ORAL at 12:16

## 2020-01-26 RX ADMIN — ONDANSETRON 4 MILLIGRAM(S): 8 TABLET, FILM COATED ORAL at 17:15

## 2020-01-26 RX ADMIN — Medication 75 MILLIGRAM(S): at 05:11

## 2020-01-26 RX ADMIN — Medication 650 MILLIGRAM(S): at 21:46

## 2020-01-26 RX ADMIN — Medication 1 TABLET(S): at 05:11

## 2020-01-26 RX ADMIN — Medication 100 MILLIGRAM(S): at 01:48

## 2020-01-26 RX ADMIN — Medication 75 MILLIGRAM(S): at 17:16

## 2020-01-26 RX ADMIN — Medication 650 MILLIGRAM(S): at 20:46

## 2020-01-26 RX ADMIN — Medication 0.25 MILLIGRAM(S): at 21:51

## 2020-01-26 RX ADMIN — ONDANSETRON 4 MILLIGRAM(S): 8 TABLET, FILM COATED ORAL at 02:09

## 2020-01-26 RX ADMIN — Medication 1 TABLET(S): at 12:21

## 2020-01-26 RX ADMIN — Medication 100 MILLIGRAM(S): at 09:47

## 2020-01-26 RX ADMIN — Medication 650 MILLIGRAM(S): at 05:10

## 2020-01-26 RX ADMIN — CHLORHEXIDINE GLUCONATE 1 APPLICATION(S): 213 SOLUTION TOPICAL at 12:17

## 2020-01-26 NOTE — PROGRESS NOTE ADULT - SUBJECTIVE AND OBJECTIVE BOX
PROGRESS NOTE:     Patient is a 69y old  Female who presents with a chief complaint of nausea, vomiting, weakness, cough (2020 11:53)      SUBJECTIVE / OVERNIGHT EVENTS:  Patient seen and examined this morning. Reports that the fevers have subsided.     ADDITIONAL REVIEW OF SYSTEMS:  No shortness of breath     MEDICATIONS  (STANDING):  chlorhexidine 4% Liquid 1 Application(s) Topical daily  clindamycin IVPB 600 milliGRAM(s) IV Intermittent every 8 hours  enoxaparin Injectable 40 milliGRAM(s) SubCutaneous daily  escitalopram 5 milliGRAM(s) Oral daily  lactobacillus acidophilus 1 Tablet(s) Oral three times a day  oseltamivir 75 milliGRAM(s) Oral two times a day    MEDICATIONS  (PRN):  acetaminophen   Tablet .. 650 milliGRAM(s) Oral every 6 hours PRN Temp greater or equal to 38C (100.4F), Mild Pain (1 - 3)  ALPRAZolam 0.25 milliGRAM(s) Oral three times a day PRN anxiety  guaiFENesin   Syrup  (Sugar-Free) 200 milliGRAM(s) Oral every 6 hours PRN Cough  ondansetron Injectable 4 milliGRAM(s) IV Push every 6 hours PRN Nausea and/or Vomiting      CAPILLARY BLOOD GLUCOSE        I&O's Summary      PHYSICAL EXAM:  Vital Signs Last 24 Hrs  T(C): 36.7 (2020 06:00), Max: 38.3 (2020 14:14)  T(F): 98.1 (2020 06:00), Max: 100.9 (2020 14:14)  HR: 79 (2020 06:00) (74 - 88)  BP: 113/67 (2020 06:00) (113/67 - 137/73)  BP(mean): --  RR: 16 (2020 06:00) (16 - 18)  SpO2: 97% (2020 06:00) (95% - 100%)    CONSTITUTIONAL: NAD, well-developed  RESPIRATORY: Normal respiratory effort; lungs are clear to auscultation bilaterally  CARDIOVASCULAR: Regular rate and rhythm, normal S1 and S2, no murmur/rub/gallop; No lower extremity edema; Peripheral pulses are 2+ bilaterally  ABDOMEN: Nontender to palpation, normoactive bowel sounds, no rebound/guarding; No hepatosplenomegaly  MUSCLOSKELETAL: no clubbing or cyanosis of digits; no joint swelling or tenderness to palpation  PSYCH: A+O to person, place, and time; affect appropriate  SKIN: RUE cellulitis improving    LABS:                        11.9   2.65  )-----------( 119      ( 2020 05:15 )             35.3     -    134<L>  |  98  |  14  ----------------------------<  115<H>  4.2   |  22  |  0.81    Ca    9.3      2020 05:15  Phos  3.5       Mg     1.9         TPro  7.1  /  Alb  4.0  /  TBili  0.2  /  DBili  x   /  AST  35<H>  /  ALT  41<H>  /  AlkPhos  94  26    PT/INR - ( 2020 07:12 )   PT: 12.2 SEC;   INR: 1.10          PTT - ( 2020 07:12 )  PTT:30.1 SEC      Urinalysis Basic - ( 2020 08:34 )    Color: YELLOW / Appearance: CLEAR / S.035 / pH: 6.0  Gluc: NEGATIVE / Ketone: NEGATIVE  / Bili: NEGATIVE / Urobili: NORMAL   Blood: MODERATE / Protein: 70 / Nitrite: NEGATIVE   Leuk Esterase: NEGATIVE / RBC: 0-2 / WBC 3-5   Sq Epi: OCC / Non Sq Epi: x / Bacteria: NEGATIVE        Culture - Blood (collected 2020 07:47)  Source: Peripheral Site 2  Preliminary Report (2020 07:47):    NO ORGANISMS ISOLATED    NO ORGANISMS ISOLATED AT 24 HOURS    Culture - Blood (collected 2020 07:47)  Source: Peripheral Site 1  Preliminary Report (2020 07:47):    NO ORGANISMS ISOLATED    NO ORGANISMS ISOLATED AT 24 HOURS        RADIOLOGY & ADDITIONAL TESTS:  Results Reviewed:   Imaging Personally Reviewed:  Electrocardiogram Personally Reviewed:    COORDINATION OF CARE:  Care Discussed with Consultants/Other Providers [Y/N]:  Prior or Outpatient Records Reviewed [Y/N]: PROGRESS NOTE:     Patient is a 69y old  Female who presents with a chief complaint of nausea, vomiting, weakness, cough (2020 11:53)      SUBJECTIVE / OVERNIGHT EVENTS:  Patient seen and examined this morning. Reports that the fevers have subsided. Would like to be discharged tomorrow before noon so she can beat the traffic back home.     ADDITIONAL REVIEW OF SYSTEMS:  No shortness of breath     MEDICATIONS  (STANDING):  chlorhexidine 4% Liquid 1 Application(s) Topical daily  clindamycin IVPB 600 milliGRAM(s) IV Intermittent every 8 hours  enoxaparin Injectable 40 milliGRAM(s) SubCutaneous daily  escitalopram 5 milliGRAM(s) Oral daily  lactobacillus acidophilus 1 Tablet(s) Oral three times a day  oseltamivir 75 milliGRAM(s) Oral two times a day    MEDICATIONS  (PRN):  acetaminophen   Tablet .. 650 milliGRAM(s) Oral every 6 hours PRN Temp greater or equal to 38C (100.4F), Mild Pain (1 - 3)  ALPRAZolam 0.25 milliGRAM(s) Oral three times a day PRN anxiety  guaiFENesin   Syrup  (Sugar-Free) 200 milliGRAM(s) Oral every 6 hours PRN Cough  ondansetron Injectable 4 milliGRAM(s) IV Push every 6 hours PRN Nausea and/or Vomiting      CAPILLARY BLOOD GLUCOSE        I&O's Summary      PHYSICAL EXAM:  Vital Signs Last 24 Hrs  T(C): 36.7 (2020 06:00), Max: 38.3 (2020 14:14)  T(F): 98.1 (2020 06:00), Max: 100.9 (2020 14:14)  HR: 79 (2020 06:00) (74 - 88)  BP: 113/67 (2020 06:00) (113/67 - 137/73)  BP(mean): --  RR: 16 (2020 06:00) (16 - 18)  SpO2: 97% (2020 06:00) (95% - 100%)    CONSTITUTIONAL: NAD, well-developed  RESPIRATORY: Normal respiratory effort; lungs are clear to auscultation bilaterally  CARDIOVASCULAR: Regular rate and rhythm, normal S1 and S2, no murmur/rub/gallop; No lower extremity edema; Peripheral pulses are 2+ bilaterally  ABDOMEN: Nontender to palpation, normoactive bowel sounds, no rebound/guarding; No hepatosplenomegaly  MUSCLOSKELETAL: no clubbing or cyanosis of digits; no joint swelling or tenderness to palpation  PSYCH: A+O to person, place, and time; affect appropriate  SKIN: RUE cellulitis improving    LABS:                        11.9   2.65  )-----------( 119      ( 2020 05:15 )             35.3         134<L>  |  98  |  14  ----------------------------<  115<H>  4.2   |  22  |  0.81    Ca    9.3      2020 05:15  Phos  3.5       Mg     1.9         TPro  7.1  /  Alb  4.0  /  TBili  0.2  /  DBili  x   /  AST  35<H>  /  ALT  41<H>  /  AlkPhos  94      PT/INR - ( 2020 07:12 )   PT: 12.2 SEC;   INR: 1.10          PTT - ( 2020 07:12 )  PTT:30.1 SEC      Urinalysis Basic - ( 2020 08:34 )    Color: YELLOW / Appearance: CLEAR / S.035 / pH: 6.0  Gluc: NEGATIVE / Ketone: NEGATIVE  / Bili: NEGATIVE / Urobili: NORMAL   Blood: MODERATE / Protein: 70 / Nitrite: NEGATIVE   Leuk Esterase: NEGATIVE / RBC: 0-2 / WBC 3-5   Sq Epi: OCC / Non Sq Epi: x / Bacteria: NEGATIVE        Culture - Blood (collected 2020 07:47)  Source: Peripheral Site 2  Preliminary Report (2020 07:47):    NO ORGANISMS ISOLATED    NO ORGANISMS ISOLATED AT 24 HOURS    Culture - Blood (collected 2020 07:47)  Source: Peripheral Site 1  Preliminary Report (2020 07:47):    NO ORGANISMS ISOLATED    NO ORGANISMS ISOLATED AT 24 HOURS        RADIOLOGY & ADDITIONAL TESTS:  Results Reviewed:   Imaging Personally Reviewed:  Electrocardiogram Personally Reviewed:    COORDINATION OF CARE:  Care Discussed with Consultants/Other Providers [Y/N]:  Prior or Outpatient Records Reviewed [Y/N]:

## 2020-01-26 NOTE — CONSULT NOTE ADULT - SUBJECTIVE AND OBJECTIVE BOX
Hematology/Oncology Consult Note    HPI:  69 year old female with anxiety, recently diagnosed with cholangioCA on chemo (last on 2020) and recent discharge from the hospital for cellulitis 3 days prior presenting with nausea, vomiting, fevers, weakness x 2 days and was admitted for Flu.       PAST MEDICAL & SURGICAL HISTORY:  Cholangiocarcinoma  No significant past surgical history      FAMILY HISTORY:  FH: type 2 diabetes  Family history of early CAD      MEDICATIONS  (STANDING):  chlorhexidine 4% Liquid 1 Application(s) Topical daily  clindamycin IVPB 600 milliGRAM(s) IV Intermittent every 8 hours  enoxaparin Injectable 40 milliGRAM(s) SubCutaneous daily  escitalopram 5 milliGRAM(s) Oral daily  lactobacillus acidophilus 1 Tablet(s) Oral three times a day  oseltamivir 75 milliGRAM(s) Oral two times a day    MEDICATIONS  (PRN):  acetaminophen   Tablet .. 650 milliGRAM(s) Oral every 6 hours PRN Temp greater or equal to 38C (100.4F), Mild Pain (1 - 3)  ALPRAZolam 0.25 milliGRAM(s) Oral three times a day PRN anxiety  guaiFENesin   Syrup  (Sugar-Free) 200 milliGRAM(s) Oral every 6 hours PRN Cough  ondansetron Injectable 4 milliGRAM(s) IV Push every 6 hours PRN Nausea and/or Vomiting      Allergies    codeine (Short breath)  contrast media (iodine-based) (Anaphylaxis)    VITAL SIGNS:  Height (cm): 160 ( @ 15:38)  Weight (kg): 84 ( @ 15:38)  BMI (kg/m2): 32.8 ( @ 15:38)  BSA (m2): 1.87 ( @ 15:38)  T(F): 98.1 (20 @ 06:00), Max: 100.9 (20 @ 14:14)  HR: 79 (20 @ 06:00)  BP: 113/67 (20 @ 06:00)  RR: 16 (20 @ 06:00)  SpO2: 97% (20 @ 06:00)  Wt(kg): --    PHYSICAL EXAMINATION:  Constitutional: NAD   Eyes: PERRLA, EOMI, sclera non-icteric, conjunctiva non-anemia  Neck: supple, no masses, no elevated JVP  Mouth: No mucositis, no exudate, no ulcer.   Respiratory: CTA b/l  Cardiovascular: Normal rate regular rhythm. normal S1S2, no murmur  Gastrointestinal: soft and flat, no tenderness to palpation, no masses palpable, normoactive bowel sound, no HSM  Extremities:  No c/c/e  MSK: No joint swelling, erythema, or tenderness   Neurological: AOx3, Cranial nerves II-XII grossly intact  Skin: No rash  Psych: Normal affect    LABS:                        11.9   2.65  )-----------( 119      ( 2020 05:15 )             35.3         134<L>  |  98  |  14  ----------------------------<  115<H>  4.2   |  22  |  0.81    Ca    9.3      2020 05:15  Phos  3.5       Mg     1.9         TPro  7.1  /  Alb  4.0  /  TBili  0.2  /  DBili  x   /  AST  35<H>  /  ALT  41<H>  /  AlkPhos  94      PT/INR - ( 2020 07:12 )   PT: 12.2 SEC;   INR: 1.10          PTT - ( 2020 07:12 )  PTT:30.1 SEC Phosphorus Level, Serum: 3.5 mg/dL ( @ 05:15)  Magnesium, Serum: 1.9 mg/dL ( @ 05:15)    Urinalysis Basic - ( 2020 08:34 )    Color: YELLOW / Appearance: CLEAR / S.035 / pH: 6.0  Gluc: NEGATIVE / Ketone: NEGATIVE  / Bili: NEGATIVE / Urobili: NORMAL   Blood: MODERATE / Protein: 70 / Nitrite: NEGATIVE   Leuk Esterase: NEGATIVE / RBC: 0-2 / WBC 3-5   Sq Epi: OCC / Non Sq Epi: x / Bacteria: NEGATIVE        RADIOLOGY & ADDITIONAL TESTS: Hematology/Oncology Consult Note    HPI:  69 year old female with anxiety, recently diagnosed with cholangioCA on chemo (last on 2020) and recent discharge from the hospital for cellulitis 3 days prior presenting with nausea, vomiting, fevers, weakness x 2 days and was admitted for Flu.       PAST MEDICAL & SURGICAL HISTORY:  Cholangiocarcinoma  No significant past surgical history      FAMILY HISTORY:  FH: type 2 diabetes  Family history of early CAD      MEDICATIONS  (STANDING):  chlorhexidine 4% Liquid 1 Application(s) Topical daily  clindamycin IVPB 600 milliGRAM(s) IV Intermittent every 8 hours  enoxaparin Injectable 40 milliGRAM(s) SubCutaneous daily  escitalopram 5 milliGRAM(s) Oral daily  lactobacillus acidophilus 1 Tablet(s) Oral three times a day  oseltamivir 75 milliGRAM(s) Oral two times a day    MEDICATIONS  (PRN):  acetaminophen   Tablet .. 650 milliGRAM(s) Oral every 6 hours PRN Temp greater or equal to 38C (100.4F), Mild Pain (1 - 3)  ALPRAZolam 0.25 milliGRAM(s) Oral three times a day PRN anxiety  guaiFENesin   Syrup  (Sugar-Free) 200 milliGRAM(s) Oral every 6 hours PRN Cough  ondansetron Injectable 4 milliGRAM(s) IV Push every 6 hours PRN Nausea and/or Vomiting      Allergies    codeine (Short breath)  contrast media (iodine-based) (Anaphylaxis)    VITAL SIGNS:  Height (cm): 160 ( @ 15:38)  Weight (kg): 84 ( @ 15:38)  BMI (kg/m2): 32.8 ( @ 15:38)  BSA (m2): 1.87 ( @ 15:38)  T(F): 98.1 (20 @ 06:00), Max: 100.9 (20 @ 14:14)  HR: 79 (20 @ 06:00)  BP: 113/67 (20 @ 06:00)  RR: 16 (20 @ 06:00)  SpO2: 97% (20 @ 06:00)  Wt(kg): --    PHYSICAL EXAMINATION:  Constitutional: NAD   Mouth: No mucositis, no exudate, no ulcer.   Respiratory: CTA b/l  Cardiovascular: Normal rate regular rhythm no murmur  Gastrointestinal: soft and flat, no tenderness to palpation  Extremities:  No c/c/e  MSK: No joint swelling, erythema, or tenderness   Neurological: AOx3, Cranial nerves II-XII grossly intact  Skin: No rash  Psych: Normal affect    LABS:                        11.9   2.65  )-----------( 119      ( 2020 05:15 )             35.3         134<L>  |  98  |  14  ----------------------------<  115<H>  4.2   |  22  |  0.81    Ca    9.3      2020 05:15  Phos  3.5       Mg     1.9         TPro  7.1  /  Alb  4.0  /  TBili  0.2  /  DBili  x   /  AST  35<H>  /  ALT  41<H>  /  AlkPhos  94      PT/INR - ( 2020 07:12 )   PT: 12.2 SEC;   INR: 1.10          PTT - ( 2020 07:12 )  PTT:30.1 SEC Phosphorus Level, Serum: 3.5 mg/dL ( @ 05:15)  Magnesium, Serum: 1.9 mg/dL ( @ 05:15)    Urinalysis Basic - ( 2020 08:34 )    Color: YELLOW / Appearance: CLEAR / S.035 / pH: 6.0  Gluc: NEGATIVE / Ketone: NEGATIVE  / Bili: NEGATIVE / Urobili: NORMAL   Blood: MODERATE / Protein: 70 / Nitrite: NEGATIVE   Leuk Esterase: NEGATIVE / RBC: 0-2 / WBC 3-5   Sq Epi: OCC / Non Sq Epi: x / Bacteria: NEGATIVE        RADIOLOGY & ADDITIONAL TESTS:

## 2020-01-26 NOTE — CONSULT NOTE ADULT - ATTENDING COMMENTS
Pt seen examined and d/w fellow. Agree with A/p as above.  H/O recently dxd ,et cholangio ca s/p CDDP/[place] and more recently gem alone secondary to por CDDP tolerance. Recent admit for cellulitis and responded to Rx and was d/cd - admitted with fever, chills, myalgias, gen'd malaise and found to have influenza. Started on Tamiflu and reports feeling improved. PE remarkable for rare and scattered fine crackles. A/P Agree with above. She has influenza that is responding well to treatment. Regarding her met cholagio ca I do not expect she will become neutropenic from the chemo she was given at this time. No further chemo planned as an inpt - due to see Dr Castro this coming Thursday as outpt - should keep that appt if d/c'd

## 2020-01-26 NOTE — DISCHARGE NOTE PROVIDER - NSDCMRMEDTOKEN_GEN_ALL_CORE_FT
clindamycin 300 mg oral capsule: 1 cap(s) orally 4 times a day   lactobacillus acidophilus oral capsule: 1 cap(s) orally 3 times a day   Lexapro 5 mg oral tablet: 1 tab(s) orally once a day  metoclopramide 10 mg oral tablet: 1 tab(s) orally 3 times a day, As Needed  Motrin 300 mg oral tablet: 1 tab(s) orally 4 times a day, As Needed pain  Xanax 0.25 mg oral tablet: 1 tab(s) orally 3 times a day    ISTOP Reference #: [id number]  Quantity of 90 tablets for a 30-day supply dispensed 12/12/2019 clindamycin 300 mg oral capsule: 1 cap(s) orally 4 times a day   clindamycin 300 mg oral capsule: 1 cap(s) orally every 6 hours  Last day of ABX   guaiFENesin 100 mg/5 mL oral liquid: 10 milliliter(s) orally every 6 hours, As needed, Cough  lactobacillus acidophilus oral capsule: 1 cap(s) orally 3 times a day   Lexapro 5 mg oral tablet: 1 tab(s) orally once a day  Motrin 300 mg oral tablet: 1 tab(s) orally 4 times a day, As Needed pain  Xanax 0.25 mg oral tablet: 1 tab(s) orally 3 times a day    ISTOP Reference #: [id number]  Quantity of 90 tablets for a 30-day supply dispensed 12/12/2019 clindamycin 300 mg oral capsule: 1 cap(s) orally 4 times a day   guaiFENesin 100 mg/5 mL oral liquid: 10 milliliter(s) orally every 6 hours, As needed, Cough  lactobacillus acidophilus oral capsule: 1 cap(s) orally 3 times a day   Lexapro 5 mg oral tablet: 1 tab(s) orally once a day  Motrin 300 mg oral tablet: 1 tab(s) orally 4 times a day, As Needed pain  Xanax 0.25 mg oral tablet: 1 tab(s) orally 3 times a day    ISTOP Reference #: [id number]  Quantity of 90 tablets for a 30-day supply dispensed 12/12/2019 guaiFENesin 100 mg/5 mL oral liquid: 10 milliliter(s) orally every 6 hours, As needed, Cough  lactobacillus acidophilus oral capsule: 1 cap(s) orally 3 times a day   Lexapro 5 mg oral tablet: 1 tab(s) orally once a day  Motrin 300 mg oral tablet: 1 tab(s) orally 4 times a day, As Needed pain  oseltamivir 75 mg oral capsule: 1 cap(s) orally 2 times a day  Xanax 0.25 mg oral tablet: 1 tab(s) orally 3 times a day    ISTOP Reference #: [id number]  Quantity of 90 tablets for a 30-day supply dispensed 2019  Zofran ODT 4 mg oral tablet, disintegratin tab(s) orally every 8 hours, As Needed guaiFENesin 100 mg/5 mL oral liquid: 10 milliliter(s) orally every 6 hours, As needed, Cough  lactobacillus acidophilus oral capsule: 1 cap(s) orally 3 times a day   Lexapro 5 mg oral tablet: 1 tab(s) orally once a day  Motrin 300 mg oral tablet: 1 tab(s) orally 4 times a day, As Needed pain  oseltamivir 75 mg oral capsule: 1 cap(s) orally 2 times a day  Xanax 0.25 mg oral tablet: 1 tab(s) orally 3 times a day    ISTOP Reference #: [id number]  Quantity of 90 tablets for a 30-day supply dispensed 2019  Zofran ODT 4 mg oral tablet, disintegratin tab(s) orally every 8 hours, As Needed guaiFENesin 100 mg/5 mL oral liquid: 10 milliliter(s) orally every 6 hours, As needed, Cough  lactobacillus acidophilus oral capsule: 1 cap(s) orally 3 times a day   Lexapro 5 mg oral tablet: 1 tab(s) orally once a day  Motrin 300 mg oral tablet: 1 tab(s) orally 4 times a day, As Needed pain  oseltamivir 75 mg oral capsule: 1 cap(s) orally 2 times a day  Xanax 0.25 mg oral tablet: 1 tab(s) orally 3 times a day    ISTOP Reference #: [id number]  Quantity of 90 tablets for a 30-day supply dispensed 2019  Zofran ODT 4 mg oral tablet, disintegratin tab(s) orally every 8 hours, As Needed

## 2020-01-26 NOTE — CONSULT NOTE ADULT - ASSESSMENT
69 year old female with anxiety, recently diagnosed with stage IV cholangioCA on chemo (last on 1/17/2020) and recent discharge from the hospital for cellulitis 3 days prior presenting with nausea, vomiting, fevers, weakness x 2 days and was admitted for Flu.       # Metastatic cholangiocarcinoma  Originally diagnosed in 11/2019. Metastatic disease was confirmed by omentum biopsy.       She was started on Gemzar/Cisplatin 12/20/19.     Current Treatment Status:. Gemcitabine/Cisplatin D1/8 Q3 weeks s/p cycle 1.     Cis was held and [place] was given    -Hold off chemotherapy   -F/U with Dr. Trinh outpatient after discharge 69 year old female with anxiety, recently diagnosed with stage IV cholangioCA on chemo (last on 1/17/2020) and recent discharge from the hospital for cellulitis 3 days prior presenting with nausea, vomiting, fevers, weakness x 2 days and was admitted for Flu.       # Metastatic cholangiocarcinoma  Originally diagnosed in 11/2019. Metastatic disease was confirmed by omentum biopsy. She was started on Gemzar/Cisplatin 12/20/19. Ciplatin is on hold since cycle 2 due to severe fatigue in cycle 1. Last dose was only [place] on 1/17/2020 (C2D8).   -Hold off chemotherapy   -F/U with Dr. Trinh outpatient after discharge 69 year old female with anxiety, recently diagnosed with stage IV cholangioCA on chemo (last on 1/17/2020) and recent discharge from the hospital for cellulitis 3 days prior presenting with nausea, vomiting, fevers, weakness x 2 days and was admitted for Flu.       # Metastatic cholangiocarcinoma  Originally diagnosed in 11/2019. Metastatic disease was confirmed by omentum biopsy. She was started on Gemzar/Cisplatin 12/20/19. Ciplatin is on hold since cycle 2 due to severe fatigue in cycle 1. Last dose was only [place] on 1/17/2020 (C2D8).   -Hold off chemotherapy   -F/U with Dr. Trinh outpatient after discharge  -[facility] with DIFFs daily [place] [facility]    # Influenza  -Tamiflu by primary    # Recent cellulitis in right arm   Improving   -IV Clinda per jeannine 69 year old female with anxiety, recently diagnosed with stage IV cholangioCA on chemo (last on 1/17/2020) and recent discharge from the hospital for cellulitis 3 days prior presenting with nausea, vomiting, fevers, weakness x 2 days and was admitted for Flu.       # Metastatic cholangiocarcinoma  Originally diagnosed in 11/2019. Metastatic disease was confirmed by omentum biopsy. She was started on Gemzar/Cisplatin 12/20/19. Ciplatin is on hold since cycle 2 due to severe fatigue in cycle 1. Last dose was only [place] on 1/17/2020 (C2D8).   -Hold off chemotherapy   -F/U with Dr. Trinh outpatient after discharge  -CBc with DIFFs daily [facility]    # Influenza  -Tamiflu by primary    # Recent cellulitis in right arm   Improving   -IV Clinda per jeannine    The case was discussed with Dr. Stevenson Braxton MD, MPH  Hematology/Oncology Fellow  Pager: [phone]

## 2020-01-26 NOTE — DISCHARGE NOTE PROVIDER - HOSPITAL COURSE
69F with PMHx of anxiety, recently diagnosed with cholangioCA on chemo (last on 1/17/2020) and recent discharge from the hospital for cellulitis 3 days prior presenting with nausea, vomiting, fevers, weakness x 2 days, found to be Flu A+.        Pt started on Tamiflu for Influenza A. CXR without focal consolidation, urine culture negative and blood cultures negative to date. Supportive care for vomiting (zofran).        Pt continued on Clindamycin for prior diagnosis of cellulitis.         Oncology evaluated patient: [place] [facility] [place]         Pt medically stable for discharge on ____.        Dispo: home 69F with PMHx of anxiety, recently diagnosed with cholangioCA on chemo (last on 1/17/2020) and recent discharge from the hospital for cellulitis 3 days prior presenting with nausea, vomiting, fevers, weakness x 2 days, found to be Flu A+.        Pt started on Tamiflu for Influenza A. CXR without focal consolidation, urine culture negative and blood cultures negative to date. Supportive care for vomiting (zofran).        Pt continued on Clindamycin for prior diagnosis of cellulitis.         Oncology evaluated patient: [place] [facility] [place]         [place] medically stable for discharge on 1/27/19 .        Dispo: home

## 2020-01-26 NOTE — DISCHARGE NOTE PROVIDER - CARE PROVIDER_API CALL
PCP - Dr. Castro,   Phone: (   )    -  Fax: (   )    -  Follow Up Time: PCP - Dr. Castro,   Phone: (   )    -  Fax: (   )    -  Follow Up Time:     Sridhar Castro)  Hematology; Medical Oncology  [address] [place] [address]  Phone: [phone]  Fax: [phone]  Follow Up Time:     Karla Amaro)  [facility]  [address] [place] [address]  Phone: [phone]  Fax: [phone]  Follow Up Time:

## 2020-01-26 NOTE — PROGRESS NOTE ADULT - PROBLEM SELECTOR PLAN 1
Patient with viral syndrome, RVP +Influenza A  Symptoms began within 48 hours, Tamiflu 75 mg PO BID x 5 days   CXRY no focal consolidation, SIRS negative at this time  Less likely superimposed bacterial pneumonia at this time, will monitor off abx  Plan as below Patient with viral syndrome, RVP +Influenza A, viral sepsis on admission  Symptoms began within 48 hours, Tamiflu 75 mg PO BID x 5 days   CXRY no focal consolidation  Less likely superimposed bacterial pneumonia at this time, will monitor off abx  Plan as below

## 2020-01-26 NOTE — PROGRESS NOTE ADULT - PROBLEM SELECTOR PLAN 4
Patient on day 5/7 for RUE cellulitis  Currently no signs on physical exam  Will complete Clindamycin IV for 2 more days, goal to transition to PO once able to tolerate  Continue lactobacillus

## 2020-01-26 NOTE — DISCHARGE NOTE PROVIDER - NSDCCPCAREPLAN_GEN_ALL_CORE_FT
PRINCIPAL DISCHARGE DIAGNOSIS  Diagnosis: Flu  Assessment and Plan of Treatment: Please complete course of tamiflu as prescribed.      SECONDARY DISCHARGE DIAGNOSES  Diagnosis: Vomiting alone  Assessment and Plan of Treatment: Continue with Zofran ODT as prescribed. Remain well hydrated.    Diagnosis: Cholangiocarcinoma  Assessment and Plan of Treatment: Follow up with your oncologist per routine at [facility].    Diagnosis: Cellulitis of right upper extremity  Assessment and Plan of Treatment: Clindamycin through 1/27 as previously prescribed. Continue with supportive care (warm packs). Monitor site for continued resolution/improvement. PRINCIPAL DISCHARGE DIAGNOSIS  Diagnosis: Flu  Assessment and Plan of Treatment: Please complete course of tamiflu as prescribed.      SECONDARY DISCHARGE DIAGNOSES  Diagnosis: Cholangiocarcinoma  Assessment and Plan of Treatment: Follow up with your oncologist per routine at [facility].    Diagnosis: Cellulitis of right upper extremity  Assessment and Plan of Treatment: Clindamycin through 1/27 as previously prescribed. Continue with supportive care (warm packs). Monitor site for continued resolution/improvement.    Diagnosis: Vomiting alone  Assessment and Plan of Treatment: Continue with Zofran ODT as prescribed. Remain well hydrated. PRINCIPAL DISCHARGE DIAGNOSIS  Diagnosis: Flu  Assessment and Plan of Treatment: Please complete course of tamiflu as prescribed.      SECONDARY DISCHARGE DIAGNOSES  Diagnosis: Cholangiocarcinoma  Assessment and Plan of Treatment: Follow up with your oncologist per routine at [facility].    Diagnosis: Cellulitis of right upper extremity  Assessment and Plan of Treatment: Clindamycin through 1/27 as previously prescribed. Continue with supportive care (warm packs). Monitor site for continued resolution/improvement.    Diagnosis: Vomiting alone  Assessment and Plan of Treatment: Continue with Zofran ODT as prescribed. Remain well hydrated. PRINCIPAL DISCHARGE DIAGNOSIS  Diagnosis: Flu  Assessment and Plan of Treatment: Please complete course of tamiflu as prescribed.      SECONDARY DISCHARGE DIAGNOSES  Diagnosis: Cholangiocarcinoma  Assessment and Plan of Treatment: Follow up with your oncologist per routine at [facility].    Diagnosis: Cellulitis of right upper extremity  Assessment and Plan of Treatment: Clindamycin through 1/27 as previously prescribed. Continue with supportive care (warm packs). Monitor site for continued resolution/improvement.    Diagnosis: Vomiting alone  Assessment and Plan of Treatment: Continue with Zofran ODT as prescribed. Remain well hydrated.

## 2020-01-26 NOTE — DISCHARGE NOTE PROVIDER - NSFOLLOWUPCLINICS_GEN_ALL_ED_FT
[facility]  Hematology/Oncology  [address] [id number]  Phone: [phone]  Fax:   Follow Up Time:

## 2020-01-26 NOTE — DISCHARGE NOTE PROVIDER - NSDCFUSCHEDAPPT_GEN_ALL_CORE_FT
ARDOLINO, STEFAN ; 01/31/2020 ; [facility]  ARDOLINO, STEFAN ; 02/07/2020 ; [facility]  ARDOLINO, STEFAN ; 02/10/2020 ; [facility]  ARDOLINO, STEFAN ; 02/21/2020 ; [facility]  ARDOLINO, STEFAN ; 02/28/2020 ; [facility]  ARDOLINO, STEFAN ; 03/13/2020 ; [facility]  ARDOLINO, STEFAN ; 03/20/2020 ; [facility]  ARDOLINO, STEFAN ; 04/03/2020 ; [facility]  ARDOLINO, STEFAN ; 04/10/2020 ; [facility] ARDOLINO, STEFAN ; 01/31/2020 ; [facility]  ARDOLINO, STEFAN ; 02/07/2020 ; [facility]  ARDOLINO, STEFAN ; 02/10/2020 ; [facility]  ARDOLINO, STEFAN ; 02/21/2020 ; [facility]  ARDOLINO, STEFAN ; 02/28/2020 ; [facility]  ARDOLINO, STEFAN ; 03/13/2020 ; [facility]  ARDOLINO, STEFAN ; 03/20/2020 ; [facility]  ARDOLINO, STEFAN ; 04/03/2020 ; [facility]  ARDOLINO, STEFAN ; 04/10/2020 ; [facility]

## 2020-01-26 NOTE — DISCHARGE NOTE PROVIDER - CARE PROVIDERS DIRECT ADDRESSES
,DirectAddress_Unknown ,DirectAddress_Unknown,[email or web address]

## 2020-01-26 NOTE — DISCHARGE NOTE PROVIDER - PROVIDER TOKENS
FREE:[LAST:[PCP - Dr. Castro],PHONE:[(   )    -],FAX:[(   )    -]] FREE:[LAST:[PCP - Dr. Castro],PHONE:[(   )    -],FAX:[(   )    -]],PROVIDER:[TOKEN:[[id number]]],PROVIDER:[TOKEN:[[id number]]]

## 2020-01-26 NOTE — PROGRESS NOTE ADULT - ASSESSMENT
69F with PMHx of anxiety, recently diagnosed with cholangioCA on chemo (last on 1/17/2020) and recent discharge from the hospital for cellulitis 3 days prior presenting with nausea, vomiting, fevers, weakness x 2 days, found to be Flu A+, less likely superimposed bacterial pneumonia at this time

## 2020-01-27 ENCOUNTER — TRANSCRIPTION ENCOUNTER (OUTPATIENT)
Age: 70
End: 2020-01-27

## 2020-01-27 VITALS
RESPIRATION RATE: 20 BRPM | OXYGEN SATURATION: 100 % | HEART RATE: 80 BPM | DIASTOLIC BLOOD PRESSURE: 79 MMHG | TEMPERATURE: 97 F | SYSTOLIC BLOOD PRESSURE: 128 MMHG

## 2020-01-27 LAB
ALBUMIN SERPL ELPH-MCNC: 3.7 G/DL — SIGNIFICANT CHANGE UP (ref 3.3–5)
ALP SERPL-CCNC: 95 U/L — SIGNIFICANT CHANGE UP (ref 40–120)
ALT FLD-CCNC: 35 U/L — HIGH (ref 4–33)
ANION GAP SERPL CALC-SCNC: 13 MMO/L — SIGNIFICANT CHANGE UP (ref 7–14)
ANISOCYTOSIS BLD QL: SLIGHT — SIGNIFICANT CHANGE UP
AST SERPL-CCNC: 33 U/L — HIGH (ref 4–32)
BASOPHILS # BLD AUTO: 0.01 K/UL — SIGNIFICANT CHANGE UP (ref 0–0.2)
BASOPHILS NFR BLD AUTO: 0.4 % — SIGNIFICANT CHANGE UP (ref 0–2)
BASOPHILS NFR SPEC: 0 % — SIGNIFICANT CHANGE UP (ref 0–2)
BILIRUB SERPL-MCNC: 0.2 MG/DL — SIGNIFICANT CHANGE UP (ref 0.2–1.2)
BLASTS # FLD: 0 % — SIGNIFICANT CHANGE UP (ref 0–0)
BUN SERPL-MCNC: 18 MG/DL — SIGNIFICANT CHANGE UP (ref 7–23)
CALCIUM SERPL-MCNC: 9.1 MG/DL — SIGNIFICANT CHANGE UP (ref 8.4–10.5)
CHLORIDE SERPL-SCNC: 98 MMOL/L — SIGNIFICANT CHANGE UP (ref 98–107)
CO2 SERPL-SCNC: 23 MMOL/L — SIGNIFICANT CHANGE UP (ref 22–31)
CREAT SERPL-MCNC: 0.9 MG/DL — SIGNIFICANT CHANGE UP (ref 0.5–1.3)
EOSINOPHIL # BLD AUTO: 0.01 K/UL — SIGNIFICANT CHANGE UP (ref 0–0.5)
EOSINOPHIL NFR BLD AUTO: 0.4 % — SIGNIFICANT CHANGE UP (ref 0–6)
EOSINOPHIL NFR FLD: 0 % — SIGNIFICANT CHANGE UP (ref 0–6)
GLUCOSE SERPL-MCNC: 108 MG/DL — HIGH (ref 70–99)
HCT VFR BLD CALC: 35.6 % — SIGNIFICANT CHANGE UP (ref 34.5–45)
HGB BLD-MCNC: 12.2 G/DL — SIGNIFICANT CHANGE UP (ref 11.5–15.5)
IMM GRANULOCYTES NFR BLD AUTO: 0.7 % — SIGNIFICANT CHANGE UP (ref 0–1.5)
LYMPHOCYTES # BLD AUTO: 1.32 K/UL — SIGNIFICANT CHANGE UP (ref 1–3.3)
LYMPHOCYTES # BLD AUTO: 47.1 % — HIGH (ref 13–44)
LYMPHOCYTES NFR SPEC AUTO: 25 % — SIGNIFICANT CHANGE UP (ref 13–44)
MACROCYTES BLD QL: SIGNIFICANT CHANGE UP
MAGNESIUM SERPL-MCNC: 1.9 MG/DL — SIGNIFICANT CHANGE UP (ref 1.6–2.6)
MCHC RBC-ENTMCNC: 32.3 PG — SIGNIFICANT CHANGE UP (ref 27–34)
MCHC RBC-ENTMCNC: 34.3 % — SIGNIFICANT CHANGE UP (ref 32–36)
MCV RBC AUTO: 94.2 FL — SIGNIFICANT CHANGE UP (ref 80–100)
METAMYELOCYTES # FLD: 0 % — SIGNIFICANT CHANGE UP (ref 0–1)
MICROCYTES BLD QL: SLIGHT — SIGNIFICANT CHANGE UP
MONOCYTES # BLD AUTO: 0.56 K/UL — SIGNIFICANT CHANGE UP (ref 0–0.9)
MONOCYTES NFR BLD AUTO: 20 % — HIGH (ref 2–14)
MONOCYTES NFR BLD: 17 % — HIGH (ref 2–9)
MYELOCYTES NFR BLD: 0 % — SIGNIFICANT CHANGE UP (ref 0–0)
NEUTROPHIL AB SER-ACNC: 33 % — LOW (ref 43–77)
NEUTROPHILS # BLD AUTO: 0.88 K/UL — LOW (ref 1.8–7.4)
NEUTROPHILS NFR BLD AUTO: 31.4 % — LOW (ref 43–77)
NEUTS BAND # BLD: 2.7 % — SIGNIFICANT CHANGE UP (ref 0–6)
NRBC # BLD: 1 /100WBC — SIGNIFICANT CHANGE UP
NRBC # FLD: 0 K/UL — SIGNIFICANT CHANGE UP (ref 0–0)
OTHER - HEMATOLOGY %: 0 — SIGNIFICANT CHANGE UP
OVALOCYTES BLD QL SMEAR: SLIGHT — SIGNIFICANT CHANGE UP
PHOSPHATE SERPL-MCNC: 3.3 MG/DL — SIGNIFICANT CHANGE UP (ref 2.5–4.5)
PLATELET # BLD AUTO: 139 K/UL — LOW (ref 150–400)
PLATELET COUNT - ESTIMATE: SIGNIFICANT CHANGE UP
PMV BLD: 10.5 FL — SIGNIFICANT CHANGE UP (ref 7–13)
POIKILOCYTOSIS BLD QL AUTO: SLIGHT — SIGNIFICANT CHANGE UP
POTASSIUM SERPL-MCNC: 4.3 MMOL/L — SIGNIFICANT CHANGE UP (ref 3.5–5.3)
POTASSIUM SERPL-SCNC: 4.3 MMOL/L — SIGNIFICANT CHANGE UP (ref 3.5–5.3)
PROMYELOCYTES # FLD: 0 % — SIGNIFICANT CHANGE UP (ref 0–0)
PROT SERPL-MCNC: 7 G/DL — SIGNIFICANT CHANGE UP (ref 6–8.3)
RBC # BLD: 3.78 M/UL — LOW (ref 3.8–5.2)
RBC # FLD: 13.3 % — SIGNIFICANT CHANGE UP (ref 10.3–14.5)
SODIUM SERPL-SCNC: 134 MMOL/L — LOW (ref 135–145)
VARIANT LYMPHS # BLD: 22.3 % — SIGNIFICANT CHANGE UP
WBC # BLD: 2.8 K/UL — LOW (ref 3.8–10.5)
WBC # FLD AUTO: 2.8 K/UL — LOW (ref 3.8–10.5)

## 2020-01-27 PROCEDURE — 99239 HOSP IP/OBS DSCHRG MGMT >30: CPT

## 2020-01-27 RX ORDER — ONDANSETRON 8 MG/1
1 TABLET, FILM COATED ORAL
Qty: 21 | Refills: 0
Start: 2020-01-27 | End: 2020-02-02

## 2020-01-27 RX ORDER — METOCLOPRAMIDE HCL 10 MG
1 TABLET ORAL
Qty: 0 | Refills: 0 | DISCHARGE

## 2020-01-27 RX ADMIN — Medication 1 TABLET(S): at 06:02

## 2020-01-27 RX ADMIN — Medication 75 MILLIGRAM(S): at 06:02

## 2020-01-27 RX ADMIN — Medication 100 MILLIGRAM(S): at 02:10

## 2020-01-27 RX ADMIN — Medication 100 MILLIGRAM(S): at 09:05

## 2020-01-27 RX ADMIN — ONDANSETRON 4 MILLIGRAM(S): 8 TABLET, FILM COATED ORAL at 01:47

## 2020-01-27 RX ADMIN — Medication 0.25 MILLIGRAM(S): at 06:03

## 2020-01-27 RX ADMIN — ONDANSETRON 4 MILLIGRAM(S): 8 TABLET, FILM COATED ORAL at 07:51

## 2020-01-27 NOTE — PROGRESS NOTE ADULT - ATTENDING COMMENTS
69F with PMHx of anxiety, recently diagnosed with cholangioCA on chemo (last on 1/17/2020) and recent discharge from the hospital for cellulitis 3 days prior presenting with nausea, vomiting, fevers, weakness x 2 days. Patient was recently hospitalized for 4 days for right and left arm cellulitis. She was started on clindamycin with lactobacillus for a goal treatment of 7 days. Duplex revealed superficial venous thrombosis, but no DVT. She was discharged and the day after had persistent nausea. Last night had intractable vomiting, bilious nonbloody. Patient also endorses decreased appetite unable to tolerate PO, fevers and chills. She reports nonproductive cough, and malaise. Denies nasal congestion. No known sick contacts or recent travel history. NO chest pain, palpitations. Last BM this AM, normal. Denies diarrhea. Did not receive her flu shot this year. Patient is scheduled for chemotherapy this Friday. Patient is anxious to go home.  Seen with  at bedside- notes cellulitis resolved  Tamiflu and Zofran sent to [facility] .  f/u with Dr Velez on Friday 1/31/20- Oncology    35 min to coordinate care

## 2020-01-27 NOTE — DISCHARGE NOTE NURSING/CASE MANAGEMENT/SOCIAL WORK - NSDCPEWEB_GEN_ALL_CORE
[place] website --- [email or web address]/[facility] for Tobacco Control website --- [email or web address]

## 2020-01-27 NOTE — PROGRESS NOTE ADULT - ASSESSMENT
69F with PMHx of anxiety, recently diagnosed with cholangioCA on chemo with metastatic dz to peritoneal  (last on 1/17/2020) and recent discharge from the hospital for cellulitis 3 days prior presenting with nausea, vomiting, fevers, weakness x 2 days, found to be Flu A+.  Patient is on Tamifle  CXR clear  Completed antibiotics for cellulitis  Blood cultures-negatives x 48 hrs    Cytopathology - Non Gyn Report (12.06.19 @ 09:53)    Cytopathology - Non Gyn Report:   ACCESSION No:  [id number]    STEFAN TYLER                   3  Cytopathology Report  Final Diagnosis  PERITONEAL, RIGHT UPPER QUADRANT, CT GUIDED  CORE BIOPSY AND FNA  POSITIVE FOR MALIGNANT CELLS.  Metastatic adenocarcinoma, compatible with cholangiocarcinoma  (see note).  Note: Smears, cell block and biopsy show small aggregates, nests  and single atypical glandular cells with enlarged, hyperchromatic  round nuclei, with some prominent nucleoli and vacuolated  cytoplasm.  Mitotic figures are seen.  Biopsy shows the malignant  cells infiltrating into fibroadipose tissue.    The malignant cells cells strongly express CK7 and CK19.  Stains  for , CK20, CDX2, TTF1, ER, GCDFP, PAX8, WT1, Calretinin,  CK 5/6, Arginase and Hepar1 are all negative.    The clinical impression of cholangiocarcinoma is noted.  If further studies are needed, there is adequate material on core  biopsy.    Dr. Castro was informed of the diagnosis via encrypted email on  12/11/19 11:43. 69F with PMHx of anxiety, recently diagnosed with cholangioCA on chemo (last on 1/17/2020) and recent discharge from the hospital for cellulitis 3 days prior presenting with nausea, vomiting, fevers, weakness x 2 days. Patient was recently hospitalized for 4 days for right and left arm cellulitis. She was started on clindamycin with lactobacillus for a goal treatment of 7 days. Duplex revealed superficial venous thrombosis, but no DVT. She was discharged and the day after had persistent nausea. Last night had intractable vomiting, bilious nonbloody. Patient also endorses decreased appetite unable to tolerate PO, fevers and chills. She reports nonproductive cough, and malaise. Denies nasal congestion. No known sick contacts or recent travel history. NO chest pain, palpitations. Last BM this AM, normal. Denies diarrhea. Did not receive her flu shot this year. Patient is scheduled for chemotherapy this Friday.  h/o anxiety, recently diagnosed with cholangioCA on chemo with metastatic dz to peritoneal  (last on 1/17/2020) and recent discharge from the hospital for cellulitis 3 days prior presenting with nausea, vomiting, fevers, weakness x 2 days, found to be Flu A+.  Patient is on Tamifle  CXR clear  Completed antibiotics for cellulitis  Blood cultures-negatives x 48 hrs    Cytopathology - Non Gyn Report (12.06.19 @ 09:53)    Cytopathology - Non Gyn Report:   ACCESSION No:  [id number]    RADHAMESJOSHUALATRICIA HUMPHREYINE                   3  Cytopathology Report  Final Diagnosis  PERITONEAL, RIGHT UPPER QUADRANT, CT GUIDED  CORE BIOPSY AND FNA  POSITIVE FOR MALIGNANT CELLS.  Metastatic adenocarcinoma, compatible with cholangiocarcinoma  (see note).  Note: Smears, cell block and biopsy show small aggregates, nests  and single atypical glandular cells with enlarged, hyperchromatic  round nuclei, with some prominent nucleoli and vacuolated  cytoplasm.  Mitotic figures are seen.  Biopsy shows the malignant  cells infiltrating into fibroadipose tissue.    The malignant cells cells strongly express CK7 and CK19.  Stains  for , CK20, CDX2, TTF1, ER, GCDFP, PAX8, WT1, Calretinin,  CK 5/6, Arginase and Hepar1 are all negative.    The clinical impression of cholangiocarcinoma is noted.  If further studies are needed, there is adequate material on core  biopsy.    Dr. Castro was informed of the diagnosis via encrypted email on  12/11/19 11:43.

## 2020-01-27 NOTE — PROGRESS NOTE ADULT - PROBLEM SELECTOR PLAN 1
Patient with viral syndrome, RVP +Influenza A, viral sepsis on admission  Symptoms began within 48 hours, Tamiflu 75 mg PO BID x 5 days   CXR-no focal consolidation  Less likely superimposed bacterial pneumonia at this time, will monitor off abx  Plan as below

## 2020-01-27 NOTE — PROGRESS NOTE ADULT - SUBJECTIVE AND OBJECTIVE BOX
Patient is a 69y old  Female who presents with a chief complaint of nausea, vomiting, weakness, cough (26 Jan 2020 13:15)      SUBJECTIVE / OVERNIGHT EVENTS:    MEDICATIONS  (STANDING):  chlorhexidine 4% Liquid 1 Application(s) Topical daily  enoxaparin Injectable 40 milliGRAM(s) SubCutaneous daily  escitalopram 5 milliGRAM(s) Oral daily  lactobacillus acidophilus 1 Tablet(s) Oral three times a day  oseltamivir 75 milliGRAM(s) Oral two times a day    MEDICATIONS  (PRN):  acetaminophen   Tablet .. 650 milliGRAM(s) Oral every 6 hours PRN Temp greater or equal to 38C (100.4F), Mild Pain (1 - 3)  ALPRAZolam 0.25 milliGRAM(s) Oral three times a day PRN anxiety  guaiFENesin   Syrup  (Sugar-Free) 200 milliGRAM(s) Oral every 6 hours PRN Cough  ondansetron Injectable 4 milliGRAM(s) IV Push every 6 hours PRN Nausea and/or Vomiting    Vital Signs Last 24 Hrs  T(C): 36.3 (27 Jan 2020 06:12), Max: 37.1 (26 Jan 2020 21:45)  T(F): 97.3 (27 Jan 2020 06:12), Max: 98.8 (26 Jan 2020 21:45)  HR: 80 (27 Jan 2020 06:12) (73 - 80)  BP: 128/79 (27 Jan 2020 06:12) (128/79 - 146/82)  BP(mean): --  RR: 20 (27 Jan 2020 06:12) (18 - 20)  SpO2: 100% (27 Jan 2020 06:12) (100% - 100%)      PHYSICAL EXAM:  GENERAL: NAD, well-developed  HEAD:  Atraumatic, Normocephalic  EYES: EOMI, PERRLA, conjunctiva and sclera clear  NECK: Supple, No JVD  CHEST/LUNG: Clear to auscultation bilaterally; No wheeze  HEART: Regular rate and rhythm; No murmurs, rubs, or gallops  ABDOMEN: Soft, Nontender, Nondistended; Bowel sounds present  EXTREMITIES:  2+ Peripheral Pulses, No clubbing, cyanosis, or edema  PSYCH: AAOx3  NEUROLOGY: non-focal  SKIN: No rashes or lesions    LABS:                        12.2   2.80  )-----------( 139      ( 27 Jan 2020 06:04 )             35.6     01-27    134<L>  |  98  |  18  ----------------------------<  108<H>  4.3   |  23  |  0.90    Ca    9.1      27 Jan 2020 06:04  Phos  3.3     01-27  Mg     1.9     01-27    TPro  7.0  /  Alb  3.7  /  TBili  0.2  /  DBili  x   /  AST  33<H>  /  ALT  35<H>  /  AlkPhos  95  01-27            Care Discussed with Consultants/Other Providers:  Onc - Dr Agustin Cardoso- Dr Yohana Ribeiro Patient is a 69y old  Female who presents with a chief complaint of nausea, vomiting, weakness, cough (26 Jan 2020 13:15)      SUBJECTIVE / OVERNIGHT EVENTS:  Patient is anxious to go home.  Seen with  at bedside- notes cellulitis resolved    MEDICATIONS  (STANDING):  chlorhexidine 4% Liquid 1 Application(s) Topical daily  enoxaparin Injectable 40 milliGRAM(s) SubCutaneous daily  escitalopram 5 milliGRAM(s) Oral daily  lactobacillus acidophilus 1 Tablet(s) Oral three times a day  oseltamivir 75 milliGRAM(s) Oral two times a day    MEDICATIONS  (PRN):  acetaminophen   Tablet .. 650 milliGRAM(s) Oral every 6 hours PRN Temp greater or equal to 38C (100.4F), Mild Pain (1 - 3)  ALPRAZolam 0.25 milliGRAM(s) Oral three times a day PRN anxiety  guaiFENesin   Syrup  (Sugar-Free) 200 milliGRAM(s) Oral every 6 hours PRN Cough  ondansetron Injectable 4 milliGRAM(s) IV Push every 6 hours PRN Nausea and/or Vomiting    Vital Signs Last 24 Hrs  T(C): 36.3 (27 Jan 2020 06:12), Max: 37.1 (26 Jan 2020 21:45)  T(F): 97.3 (27 Jan 2020 06:12), Max: 98.8 (26 Jan 2020 21:45)  HR: 80 (27 Jan 2020 06:12) (73 - 80)  BP: 128/79 (27 Jan 2020 06:12) (128/79 - 146/82)  BP(mean): --  RR: 20 (27 Jan 2020 06:12) (18 - 20)  SpO2: 100% (27 Jan 2020 06:12) (100% - 100%)      PHYSICAL EXAM:  GENERAL: NAD, well-developed  HEAD:  Atraumatic, Normocephalic  EYES: EOMI, PERRLA, conjunctiva and sclera clear  NECK: Supple, No JVD  CHEST/LUNG: Clear to auscultation bilaterally; No wheeze  HEART: Regular rate and rhythm; No murmurs, rubs, or gallops  ABDOMEN: Soft, Nontender, Nondistended; Bowel sounds present  EXTREMITIES:  2+ Peripheral Pulses, No clubbing, cyanosis, or edema  PSYCH: AAOx3  NEUROLOGY: non-focal  SKIN: No rashes or lesions    LABS:                        12.2   2.80  )-----------( 139      ( 27 Jan 2020 06:04 )             35.6     01-27    134<L>  |  98  |  18  ----------------------------<  108<H>  4.3   |  23  |  0.90    Ca    9.1      27 Jan 2020 06:04  Phos  3.3     01-27  Mg     1.9     01-27    TPro  7.0  /  Alb  3.7  /  TBili  0.2  /  DBili  x   /  AST  33<H>  /  ALT  35<H>  /  AlkPhos  95  01-27            Care Discussed with Consultants/Other Providers:  Onc - Dr Agustin Cardoso- Dr Yohana Ribeiro

## 2020-01-27 NOTE — DISCHARGE NOTE NURSING/CASE MANAGEMENT/SOCIAL WORK - PATIENT PORTAL LINK FT
You can access the FollowMyHealth Patient Portal offered by [facility] by registering at the following website: [email or web address]. By joining [employer] [facility]’s FollowMyHealth portal, you will also be able to view your health information using other applications (apps) compatible with our system.

## 2020-01-27 NOTE — DISCHARGE NOTE NURSING/CASE MANAGEMENT/SOCIAL WORK - NSDCPEEMAIL_GEN_ALL_CORE
[facility] for Tobacco Control email [email or web address]

## 2020-01-30 LAB
BACTERIA BLD CULT: SIGNIFICANT CHANGE UP
BACTERIA BLD CULT: SIGNIFICANT CHANGE UP

## 2020-02-07 ENCOUNTER — LABORATORY RESULT (OUTPATIENT)
Age: 70
End: 2020-02-07

## 2020-02-07 ENCOUNTER — RESULT REVIEW (OUTPATIENT)
Age: 70
End: 2020-02-07

## 2020-02-07 ENCOUNTER — APPOINTMENT (OUTPATIENT)
Dept: INFUSION THERAPY | Facility: HOSPITAL | Age: 70
End: 2020-02-07

## 2020-02-07 ENCOUNTER — APPOINTMENT (OUTPATIENT)
Dept: HEMATOLOGY ONCOLOGY | Facility: CLINIC | Age: 70
End: 2020-02-07
Payer: COMMERCIAL

## 2020-02-07 VITALS
TEMPERATURE: 98.4 F | DIASTOLIC BLOOD PRESSURE: 83 MMHG | BODY MASS INDEX: 32.41 KG/M2 | OXYGEN SATURATION: 99 % | SYSTOLIC BLOOD PRESSURE: 143 MMHG | RESPIRATION RATE: 14 BRPM | HEART RATE: 81 BPM | WEIGHT: 182.98 LBS

## 2020-02-07 LAB
BASOPHILS # BLD AUTO: 0.1 K/UL — SIGNIFICANT CHANGE UP (ref 0–0.2)
BASOPHILS NFR BLD AUTO: 1.2 % — SIGNIFICANT CHANGE UP (ref 0–2)
EOSINOPHIL # BLD AUTO: 0.1 K/UL — SIGNIFICANT CHANGE UP (ref 0–0.5)
EOSINOPHIL NFR BLD AUTO: 1.4 % — SIGNIFICANT CHANGE UP (ref 0–6)
HCT VFR BLD CALC: 37.6 % — SIGNIFICANT CHANGE UP (ref 34.5–45)
HGB BLD-MCNC: 13.2 G/DL — SIGNIFICANT CHANGE UP (ref 11.5–15.5)
LYMPHOCYTES # BLD AUTO: 2.4 K/UL — SIGNIFICANT CHANGE UP (ref 1–3.3)
LYMPHOCYTES # BLD AUTO: 26.6 % — SIGNIFICANT CHANGE UP (ref 13–44)
MCHC RBC-ENTMCNC: 33.6 PG — SIGNIFICANT CHANGE UP (ref 27–34)
MCHC RBC-ENTMCNC: 35.1 G/DL — SIGNIFICANT CHANGE UP (ref 32–36)
MCV RBC AUTO: 95.6 FL — SIGNIFICANT CHANGE UP (ref 80–100)
MONOCYTES # BLD AUTO: 0.7 K/UL — SIGNIFICANT CHANGE UP (ref 0–0.9)
MONOCYTES NFR BLD AUTO: 8 % — SIGNIFICANT CHANGE UP (ref 2–14)
NEUTROPHILS # BLD AUTO: 5.7 K/UL — SIGNIFICANT CHANGE UP (ref 1.8–7.4)
NEUTROPHILS NFR BLD AUTO: 62.7 % — SIGNIFICANT CHANGE UP (ref 43–77)
PLATELET # BLD AUTO: 360 K/UL — SIGNIFICANT CHANGE UP (ref 150–400)
RBC # BLD: 3.94 M/UL — SIGNIFICANT CHANGE UP (ref 3.8–5.2)
RBC # FLD: 13 % — SIGNIFICANT CHANGE UP (ref 10.3–14.5)
WBC # BLD: 9.1 K/UL — SIGNIFICANT CHANGE UP (ref 3.8–10.5)
WBC # FLD AUTO: 9.1 K/UL — SIGNIFICANT CHANGE UP (ref 3.8–10.5)

## 2020-02-07 PROCEDURE — 99214 OFFICE O/P EST MOD 30 MIN: CPT

## 2020-02-11 ENCOUNTER — OUTPATIENT (OUTPATIENT)
Dept: OUTPATIENT SERVICES | Facility: HOSPITAL | Age: 70
LOS: 1 days | Discharge: HOME | End: 2020-02-11

## 2020-02-11 DIAGNOSIS — C23 MALIGNANT NEOPLASM OF GALLBLADDER: ICD-10-CM

## 2020-02-12 ENCOUNTER — APPOINTMENT (OUTPATIENT)
Dept: HEMATOLOGY ONCOLOGY | Facility: CLINIC | Age: 70
End: 2020-02-12
Payer: COMMERCIAL

## 2020-02-12 VITALS
BODY MASS INDEX: 32.41 KG/M2 | WEIGHT: 182.98 LBS | HEART RATE: 80 BPM | RESPIRATION RATE: 16 BRPM | OXYGEN SATURATION: 100 % | DIASTOLIC BLOOD PRESSURE: 82 MMHG | SYSTOLIC BLOOD PRESSURE: 125 MMHG | TEMPERATURE: 98.9 F

## 2020-02-12 PROCEDURE — 99205 OFFICE O/P NEW HI 60 MIN: CPT

## 2020-02-12 NOTE — REVIEW OF SYSTEMS
[Recent Change In Weight] : ~T recent weight change [Anxiety] : anxiety [Negative] : Allergic/Immunologic [Fever] : no fever [Chills] : no chills [Night Sweats] : no night sweats [Vomiting] : no vomiting [FreeTextEntry9] : back pain - controlled

## 2020-02-12 NOTE — HISTORY OF PRESENT ILLNESS
[Disease: _____________________] : Disease: [unfilled] [M: ___] : M[unfilled] [AJCC Stage: ____] : AJCC Stage: [unfilled] [de-identified] : 68 Y/O F HCV infection (treated with Harvoni), IBS, Anxiety, GERD who had some GI symptoms/elevated LFTs (wt loss but on wt watchers) October 2019 and consulted Dr Brunner GI and non contrast CT A/P showed enlargement of the head of the pancreas with an approximately 3.3 cm hypodense area posteriorly within the pancreatic head which is suspicious for neoplasm. There are enlarged retroperitoneal lymph nodes posterior to the head of the pancreas which are suspicious appearing as well. There are few small nodular soft tissue densities lying deep to the right anterior abdominal wall suspicious for peritoneal implants, and soft tissue density material within the gallbladder which may reflect sludge.  Patient was referred for MRCP which revealed an irregularly enhancing 5.8 cm mass at the level of the confluence of the right and left hepatic biliary ducts. This mass obstructs the common hepatic duct and main left intrahepatic bile duct causing dilation of the intrahepatic biliary ducts within the left hepatic lobe. The mid to distal portion of the CBD are normal in caliber and appear uninvolved by the mass. 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 There are enlarged retroperitoneal lymph nodes, some of which demonstrate central necrosis lying posterior to the head of the pancreas and within the aortocaval region, suspicious for metastatic disease. There is no evidence of primary pancreatic neoplasm. There are small nodular densities within the right anterior abdomen suspicious for peritoneal metastatic foci ranging from 3-10 mm in size.  She consulted Dr Adams on 11/13/19 and is planned for biopsy planned 11/27/19. \par \par \par PSH:  cataracts, tubal ligation \par Fhx:  gastric or colon cancer involving a maternal cousin at 60\par Social:  former smoker but quit 13 years ago and drinks alcohol on occasion. She gets her colonoscopies with Dr. Brunner. \par \par PCP: Dr. Alexandria Diop\par GI: Dr. Robert Brunner. \par \par Last Mammogram/pap smear - few years ago; last colonoscopy 2017\par \par She had biopsy of RUQ peritoneal nodule which confirmed metastatic cholangiocarcinoma.  \par She was started on Gemzar/Cisplatin 12/20/19  [FreeTextEntry1] : palliative Gemcitabine D1/8 Q3 weeks s/p cycle 2 [de-identified] : Karlie comes in follow up after her second cycle and in the interim, she had a port placed, was treated for a cellulitis and influenza and comes in for delayed cycle 3.  She states her fatigue and nausea are better and her back pain is controlled.  \par \par

## 2020-02-12 NOTE — PHYSICAL EXAM
[Restricted in physically strenuous activity but ambulatory and able to carry out work of a light or sedentary nature] : Status 1- Restricted in physically strenuous activity but ambulatory and able to carry out work of a light or sedentary nature, e.g., light house work, office work [Normal] : grossly intact [de-identified] : +mild epigastric tenderness to palpation  [de-identified] : rash resolved  [de-identified] : anxious

## 2020-02-14 ENCOUNTER — RESULT REVIEW (OUTPATIENT)
Age: 70
End: 2020-02-14

## 2020-02-14 ENCOUNTER — APPOINTMENT (OUTPATIENT)
Dept: INFUSION THERAPY | Facility: HOSPITAL | Age: 70
End: 2020-02-14

## 2020-02-14 ENCOUNTER — LABORATORY RESULT (OUTPATIENT)
Age: 70
End: 2020-02-14

## 2020-02-14 LAB
BASOPHILS # BLD AUTO: 0.1 K/UL — SIGNIFICANT CHANGE UP (ref 0–0.2)
BASOPHILS NFR BLD AUTO: 1.5 % — SIGNIFICANT CHANGE UP (ref 0–2)
EOSINOPHIL # BLD AUTO: 0 K/UL — SIGNIFICANT CHANGE UP (ref 0–0.5)
EOSINOPHIL NFR BLD AUTO: 0 % — SIGNIFICANT CHANGE UP (ref 0–6)
HCT VFR BLD CALC: 34.7 % — SIGNIFICANT CHANGE UP (ref 34.5–45)
HGB BLD-MCNC: 12 G/DL — SIGNIFICANT CHANGE UP (ref 11.5–15.5)
LYMPHOCYTES # BLD AUTO: 2.3 K/UL — SIGNIFICANT CHANGE UP (ref 1–3.3)
LYMPHOCYTES # BLD AUTO: 51 % — HIGH (ref 13–44)
MCHC RBC-ENTMCNC: 33.3 PG — SIGNIFICANT CHANGE UP (ref 27–34)
MCHC RBC-ENTMCNC: 34.7 G/DL — SIGNIFICANT CHANGE UP (ref 32–36)
MCV RBC AUTO: 96.1 FL — SIGNIFICANT CHANGE UP (ref 80–100)
MONOCYTES # BLD AUTO: 0.4 K/UL — SIGNIFICANT CHANGE UP (ref 0–0.9)
MONOCYTES NFR BLD AUTO: 9.9 % — SIGNIFICANT CHANGE UP (ref 2–14)
NEUTROPHILS # BLD AUTO: 1.7 K/UL — LOW (ref 1.8–7.4)
NEUTROPHILS NFR BLD AUTO: 37.6 % — LOW (ref 43–77)
PLATELET # BLD AUTO: 180 K/UL — SIGNIFICANT CHANGE UP (ref 150–400)
RBC # BLD: 3.61 M/UL — LOW (ref 3.8–5.2)
RBC # FLD: 12.7 % — SIGNIFICANT CHANGE UP (ref 10.3–14.5)
WBC # BLD: 4.4 K/UL — SIGNIFICANT CHANGE UP (ref 3.8–10.5)
WBC # FLD AUTO: 4.4 K/UL — SIGNIFICANT CHANGE UP (ref 3.8–10.5)

## 2020-02-18 DIAGNOSIS — R11.2 NAUSEA WITH VOMITING, UNSPECIFIED: ICD-10-CM

## 2020-02-18 DIAGNOSIS — Z51.11 ENCOUNTER FOR ANTINEOPLASTIC CHEMOTHERAPY: ICD-10-CM

## 2020-02-26 NOTE — DATA REVIEWED
[FreeTextEntry1] : CT A/P (1/2020) - \par LOWER CHEST: Mediport catheter. Coronary artery calcification.\par \par LIVER: Atrophic segment 4 with decreased attenuation. BILE DUCTS: Normal caliber.\par GALLBLADDER: Within normal limits.\par SPLEEN: Within normal limits.\par PANCREAS: Within normal limits.\par ADRENALS: Within normal limits.\par KIDNEYS/URETERS: Within normal limits.\par \par BLADDER: Within normal limits.\par REPRODUCTIVE ORGANS: Uterus and adnexa within normal limits.\par \par BOWEL: No bowel obstruction. Appendix normal.\par PERITONEUM: No ascites. Multiple peritoneal nodules measuring up to 15 mm similar to 12/06/2019. New 11 mm left mid abdominal implant (2:56).\par VESSELS: Within normal limits.\par RETROPERITONEUM/LYMPH NODES: No lymphadenopathy.  \par ABDOMINAL WALL: Within normal limits.\par BONES: Within normal limits.\par \par IMPRESSION: \par Extensive peritoneal implants. New small implant left mid abdomen.

## 2020-02-26 NOTE — HISTORY OF PRESENT ILLNESS
[FreeTextEntry1] : 69yoF with metastatic cholangiocarcinoma presents for initial palliative care visit, referred by Oncology team.  PMH significant for HCV (treated with Harvoni), IBS, anxiety, GERD, thrombophlebitis. \par \par Patient initially who had some GI symptoms/elevated LFTs, weight loss (but was on weight watchers) in October 2019.  She saw Dr. Brunner (GI)  whom ordered a CT A/P which demonstrated enlargement of the head of the pancreas with an approximately 3.3 cm hypodense area posteriorly within the pancreatic head which is suspicious for neoplasm. Patient was referred for MRCP which revealed an irregularly enhancing 5.8 cm mass at the level of the confluence of the right and left hepatic biliary ducts. 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 There was no evidence of primary pancreatic neoplasm. She had biopsy of RUQ peritoneal nodule which confirmed metastatic cholangiocarcinoma. She was started on Gemzar/Cisplatin 12/20/19.  Could not tolerate Cisplatin and this was discontinued, now on single agent Gemzar.  \par \par Main issue for which patient presents is abdominal pain. The pain is localized to the RUQ and lower abdominal, she endorses bloating.  She was previously prescribed Morphine 15mg which she does not use due to concerns about dependence. She took it twice and it made her very constipated.  She is using Tylenol 1000mg four to five times daily, Motrin 200mg PRN (doesn't use regularly). These OTC agents help mildly.  Pain goes as high as 5/10. Currently has no pain. \par \par ROS:\par +weight has been stable\par +appetite is good, eats small portions throughout the day\par +chronic anxiety - on Lexapro 5mg QD and Alprazolam 0.25mg TID. Describes herself as realist, doesn't want to be optimistic and get her hopes up. \par +nausea - uses Ondansetron ODT which usually helps\par +gas/bloating\par Denies constipation presently, has erratic BMs depending on what she eats. Tries to eat a bland diet. \par \par Patient is , lives with her . She has three children (ages 54, 50, 45) from a previous marriage. \par It is important to patient that she does not suffer. \par \par Psychiatrist: Dr. NGOC Becerra\par \par [employer]-Stop Ref# [id number]

## 2020-02-26 NOTE — PHYSICAL EXAM
[General Appearance - In No Acute Distress] : in no acute distress [General Appearance - Alert] : alert [Sclera] : the sclera and conjunctiva were normal [Neck Appearance] : the appearance of the neck was normal [Normal Oral Mucosa] : normal oral mucosa [Auscultation Breath Sounds / Voice Sounds] : lungs were clear to auscultation bilaterally [] : no respiratory distress [Heart Rate And Rhythm] : heart rate was normal and rhythm regular [Heart Sounds] : normal S1 and S2 [Edema] : there was no peripheral edema [Bowel Sounds] : normal bowel sounds [Abdomen Soft] : soft [Abnormal Walk] : normal gait [FreeTextEntry1] : +R chest wall Mediport [No Focal Deficits] : no focal deficits [Oriented To Time, Place, And Person] : oriented to person, place, and time [Affect] : the affect was normal

## 2020-02-26 NOTE — ASSESSMENT
[______] : HCP: [unfilled] [FreeTextEntry1] : 69yoF with:\par \par 1.  Cholangiocarcinoma - On Gemcitabine. \par \par 2. Neoplasm-related pain - Medical cannabis certification completed today. Provided cannabis education, overview of state program, and discussed adverse effects in detail. Counseled that vaporized cannabis is not the preferred route of administration due to the fact that both short-term and long-term risks associated with vaporizing oils are not yet fully understood. Recommend starting with 1:1 THC:CBD formulation at low dose of THC (~2mg/dose).\par \par Counseled patient that Morphine use in this setting is appropriate. Can c/w Tylenol 1000mg PRN but enforced the MDD of 4,000mg.  \par \par 3. Constipation ppx - Senna 2 caps QHS, prune juice, PRN Miralax\par \par 4. Anxiety - C/w regimen per Psychiatrist.\par \par 5. Encounter for palliative care/supportive care- HCP already on file. \par \par RTO 1 month, call sooner with questions or issues.

## 2020-02-28 ENCOUNTER — RESULT REVIEW (OUTPATIENT)
Age: 70
End: 2020-02-28

## 2020-02-28 ENCOUNTER — APPOINTMENT (OUTPATIENT)
Dept: INFUSION THERAPY | Facility: HOSPITAL | Age: 70
End: 2020-02-28

## 2020-02-28 ENCOUNTER — LABORATORY RESULT (OUTPATIENT)
Age: 70
End: 2020-02-28

## 2020-02-28 ENCOUNTER — APPOINTMENT (OUTPATIENT)
Dept: HEMATOLOGY ONCOLOGY | Facility: CLINIC | Age: 70
End: 2020-02-28
Payer: COMMERCIAL

## 2020-02-28 VITALS
SYSTOLIC BLOOD PRESSURE: 148 MMHG | HEART RATE: 75 BPM | DIASTOLIC BLOOD PRESSURE: 82 MMHG | OXYGEN SATURATION: 100 % | RESPIRATION RATE: 18 BRPM | TEMPERATURE: 98.5 F | WEIGHT: 186.73 LBS | BODY MASS INDEX: 33.08 KG/M2

## 2020-02-28 LAB
BASOPHILS # BLD AUTO: 0.1 K/UL — SIGNIFICANT CHANGE UP (ref 0–0.2)
BASOPHILS NFR BLD AUTO: 1.6 % — SIGNIFICANT CHANGE UP (ref 0–2)
EOSINOPHIL # BLD AUTO: 0.1 K/UL — SIGNIFICANT CHANGE UP (ref 0–0.5)
EOSINOPHIL NFR BLD AUTO: 1.9 % — SIGNIFICANT CHANGE UP (ref 0–6)
HCT VFR BLD CALC: 36.5 % — SIGNIFICANT CHANGE UP (ref 34.5–45)
HGB BLD-MCNC: 12.8 G/DL — SIGNIFICANT CHANGE UP (ref 11.5–15.5)
LYMPHOCYTES # BLD AUTO: 2.2 K/UL — SIGNIFICANT CHANGE UP (ref 1–3.3)
LYMPHOCYTES # BLD AUTO: 31.5 % — SIGNIFICANT CHANGE UP (ref 13–44)
MCHC RBC-ENTMCNC: 34.4 PG — HIGH (ref 27–34)
MCHC RBC-ENTMCNC: 35.1 G/DL — SIGNIFICANT CHANGE UP (ref 32–36)
MCV RBC AUTO: 98.1 FL — SIGNIFICANT CHANGE UP (ref 80–100)
MONOCYTES # BLD AUTO: 0.6 K/UL — SIGNIFICANT CHANGE UP (ref 0–0.9)
MONOCYTES NFR BLD AUTO: 7.8 % — SIGNIFICANT CHANGE UP (ref 2–14)
NEUTROPHILS # BLD AUTO: 4 K/UL — SIGNIFICANT CHANGE UP (ref 1.8–7.4)
NEUTROPHILS NFR BLD AUTO: 57.2 % — SIGNIFICANT CHANGE UP (ref 43–77)
PLATELET # BLD AUTO: 269 K/UL — SIGNIFICANT CHANGE UP (ref 150–400)
RBC # BLD: 3.72 M/UL — LOW (ref 3.8–5.2)
RBC # FLD: 14.4 % — SIGNIFICANT CHANGE UP (ref 10.3–14.5)
WBC # BLD: 7.1 K/UL — SIGNIFICANT CHANGE UP (ref 3.8–10.5)
WBC # FLD AUTO: 7.1 K/UL — SIGNIFICANT CHANGE UP (ref 3.8–10.5)

## 2020-02-28 PROCEDURE — 99214 OFFICE O/P EST MOD 30 MIN: CPT

## 2020-03-02 DIAGNOSIS — E86.0 DEHYDRATION: ICD-10-CM

## 2020-03-03 NOTE — ASSESSMENT
[______] : HCP: [unfilled] [FreeTextEntry1] : 69yoF with:\par \par 1.  Cholangiocarcinoma - On Gemcitabine. Follow up with Med Onc.\par \par 2. Neoplasm-related pain - She has obtained high THC:low CBD medical cannabis with hopes to help with nausea and pain.   Recommend continuing with medical cannabis 1:1 formulation ~2mg and slowly increasing frequency to TID as tolerated. Re-counseled patient that Morphine for moderate-severe pain is appropriate in this setting. Can c/w Tylenol 1000mg PRN but enforced the MDD of 4,000mg.  \par \par 3. Nausea - May benefit from medical cannabis as above.  C/w Ondansetron ODT PRN.\par \par 4. Constipation ppx - Senna 2 caps QHS, prune juice, PRN Miralax.\par \par 5. Anxiety - We discussed potential benefits of increasing escitalopram.   She will discuss with her psychiatrist.  \par \par 6. Encounter for palliative care/supportive care- HCP already on file. All questions answered.  Empathetic listening and emotional support provided. \par \par RTO 1-2 months, call sooner with questions or issues.

## 2020-03-03 NOTE — HISTORY OF PRESENT ILLNESS
[FreeTextEntry1] : 69yoF with metastatic cholangiocarcinoma presents for follow-up palliative care visit, referred by Oncology team.  PMH significant for HCV (treated with Harvoni), IBS, anxiety, GERD, thrombophlebitis. \par \par Patient initially who had some GI symptoms/elevated LFTs, weight loss (but was on weight watchers) in October 2019.  She saw Dr. Brunner (GI)  whom ordered a CT A/P which demonstrated enlargement of the head of the pancreas with an approximately 3.3 cm hypodense area posteriorly within the pancreatic head which is suspicious for neoplasm. Patient was referred for MRCP which revealed an irregularly enhancing 5.8 cm mass at the level of the confluence of the right and left hepatic biliary ducts. 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 There was no evidence of primary pancreatic neoplasm. She had biopsy of RUQ peritoneal nodule which confirmed metastatic cholangiocarcinoma. She was started on Gemzar/Cisplatin 12/20/19.  Could not tolerate Cisplatin and this was discontinued, now on single agent Gemzar.  \par \par Main issue for which patient initially presented was abdominal pain. The pain is localized to the RUQ and lower abdominal, she endorses bloating.  She was previously prescribed Morphine 15mg which she does not use due to concerns about dependence. She took it twice and it made her very constipated.  She is using Tylenol 1000mg four to five times daily, Motrin 200mg PRN (doesn't use regularly). These OTC agents help mildly.  Pain goes as high as 5/10. \par \par Interval History: Patient presents for follow-up.   She has only been using medicinal cannabis in 1:1 formulation QHS. Denies any side effects/adverse effects. Has obtained medical cannabis high THC:low CBD in hopes to help with pain and increase appetite.  Reports an increase in fatigue past few days.  Continues to have abdominal cramping; using PRN acetaminophen at times.  Has not been severe requiring opioid.  She does report OIC from prior MS IR use and states that she would be adverse to using MS IR unless pain was unbearable.  Nausea has been an issue but Ondansetron ODT helps.  Mouth sores have resolved for the most part.  Anxiety continues to be prominent symptom.  She sees her therapist regularly and reports a therapeutic relationship.  \par \par ROS:\par +weight has been stable\par +appetite is good, eats small portions throughout the day\par +chronic anxiety - on Lexapro 5mg QD and Alprazolam 0.25mg QID. Describes herself as realist, doesn't want to be optimistic and get her hopes up. \par +nausea - uses Ondansetron ODT which usually helps\par +gas/bloating\par Denies constipation presently, has erratic BMs depending on what she eats. Tries to eat a bland diet. \par \par Patient is , lives with her . Has supportive family.  She has three children (ages 54, 50, 45) from a previous marriage. It is important to patient that she does not suffer. \par \par Psychiatrist: Dr. NGOC Becerra\par \par I-Stop Ref# [id number]

## 2020-03-03 NOTE — PHYSICAL EXAM
[General Appearance - Alert] : alert [General Appearance - In No Acute Distress] : in no acute distress [Sclera] : the sclera and conjunctiva were normal [Normal Oral Mucosa] : normal oral mucosa [Neck Appearance] : the appearance of the neck was normal [] : no respiratory distress [Auscultation Breath Sounds / Voice Sounds] : lungs were clear to auscultation bilaterally [Heart Rate And Rhythm] : heart rate was normal and rhythm regular [Heart Sounds] : normal S1 and S2 [Edema] : there was no peripheral edema [Bowel Sounds] : normal bowel sounds [Abdomen Soft] : soft [Abnormal Walk] : normal gait [No Focal Deficits] : no focal deficits [Oriented To Time, Place, And Person] : oriented to person, place, and time [Affect] : the affect was normal [FreeTextEntry1] : mild TTP RUQ

## 2020-03-06 ENCOUNTER — RESULT REVIEW (OUTPATIENT)
Age: 70
End: 2020-03-06

## 2020-03-06 ENCOUNTER — LABORATORY RESULT (OUTPATIENT)
Age: 70
End: 2020-03-06

## 2020-03-06 ENCOUNTER — APPOINTMENT (OUTPATIENT)
Dept: HEMATOLOGY ONCOLOGY | Facility: CLINIC | Age: 70
End: 2020-03-06
Payer: COMMERCIAL

## 2020-03-06 ENCOUNTER — APPOINTMENT (OUTPATIENT)
Dept: INFUSION THERAPY | Facility: HOSPITAL | Age: 70
End: 2020-03-06

## 2020-03-06 VITALS
OXYGEN SATURATION: 98 % | RESPIRATION RATE: 18 BRPM | SYSTOLIC BLOOD PRESSURE: 132 MMHG | WEIGHT: 186.07 LBS | BODY MASS INDEX: 32.96 KG/M2 | HEART RATE: 71 BPM | TEMPERATURE: 97.5 F | DIASTOLIC BLOOD PRESSURE: 84 MMHG

## 2020-03-06 LAB
BASOPHILS # BLD AUTO: 0.02 K/UL — SIGNIFICANT CHANGE UP (ref 0–0.2)
BASOPHILS NFR BLD AUTO: 0.5 % — SIGNIFICANT CHANGE UP (ref 0–2)
EOSINOPHIL # BLD AUTO: 0.05 K/UL — SIGNIFICANT CHANGE UP (ref 0–0.5)
EOSINOPHIL NFR BLD AUTO: 1.3 % — SIGNIFICANT CHANGE UP (ref 0–6)
HCT VFR BLD CALC: 35 % — SIGNIFICANT CHANGE UP (ref 34.5–45)
HGB BLD-MCNC: 12.1 G/DL — SIGNIFICANT CHANGE UP (ref 11.5–15.5)
IMM GRANULOCYTES NFR BLD AUTO: 1.3 % — SIGNIFICANT CHANGE UP (ref 0–1.5)
LYMPHOCYTES # BLD AUTO: 1.84 K/UL — SIGNIFICANT CHANGE UP (ref 1–3.3)
LYMPHOCYTES # BLD AUTO: 48.4 % — HIGH (ref 13–44)
MCHC RBC-ENTMCNC: 33.6 PG — SIGNIFICANT CHANGE UP (ref 27–34)
MCHC RBC-ENTMCNC: 34.6 GM/DL — SIGNIFICANT CHANGE UP (ref 32–36)
MCV RBC AUTO: 97.2 FL — SIGNIFICANT CHANGE UP (ref 80–100)
MONOCYTES # BLD AUTO: 0.38 K/UL — SIGNIFICANT CHANGE UP (ref 0–0.9)
MONOCYTES NFR BLD AUTO: 10 % — SIGNIFICANT CHANGE UP (ref 2–14)
NEUTROPHILS # BLD AUTO: 1.46 K/UL — LOW (ref 1.8–7.4)
NEUTROPHILS NFR BLD AUTO: 38.5 % — LOW (ref 43–77)
NRBC # BLD: 0 /100 WBCS — SIGNIFICANT CHANGE UP (ref 0–0)
PLATELET # BLD AUTO: 243 K/UL — SIGNIFICANT CHANGE UP (ref 150–400)
RBC # BLD: 3.6 M/UL — LOW (ref 3.8–5.2)
RBC # FLD: 14.7 % — HIGH (ref 10.3–14.5)
WBC # BLD: 3.8 K/UL — SIGNIFICANT CHANGE UP (ref 3.8–10.5)
WBC # FLD AUTO: 3.8 K/UL — SIGNIFICANT CHANGE UP (ref 3.8–10.5)

## 2020-03-06 PROCEDURE — 99214 OFFICE O/P EST MOD 30 MIN: CPT

## 2020-03-09 NOTE — PHYSICAL EXAM
[Restricted in physically strenuous activity but ambulatory and able to carry out work of a light or sedentary nature] : Status 1- Restricted in physically strenuous activity but ambulatory and able to carry out work of a light or sedentary nature, e.g., light house work, office work [Normal] : grossly intact [de-identified] : +mild epigastric tenderness to palpation  [de-identified] : rash resolved  [de-identified] : anxious

## 2020-03-09 NOTE — HISTORY OF PRESENT ILLNESS
[Disease: _____________________] : Disease: [unfilled] [M: ___] : M[unfilled] [AJCC Stage: ____] : AJCC Stage: [unfilled] [de-identified] : 68 Y/O F HCV infection (treated with Harvoni), IBS, Anxiety, GERD who had some GI symptoms/elevated LFTs (wt loss but on wt watchers) October 2019 and consulted Dr Brunner GI and non contrast CT A/P showed enlargement of the head of the pancreas with an approximately 3.3 cm hypodense area posteriorly within the pancreatic head which is suspicious for neoplasm. There are enlarged retroperitoneal lymph nodes posterior to the head of the pancreas which are suspicious appearing as well. There are few small nodular soft tissue densities lying deep to the right anterior abdominal wall suspicious for peritoneal implants, and soft tissue density material within the gallbladder which may reflect sludge.  Patient was referred for MRCP which revealed an irregularly enhancing 5.8 cm mass at the level of the confluence of the right and left hepatic biliary ducts. This mass obstructs the common hepatic duct and main left intrahepatic bile duct causing dilation of the intrahepatic biliary ducts within the left hepatic lobe. The mid to distal portion of the CBD are normal in caliber and appear uninvolved by the mass. 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 There are enlarged retroperitoneal lymph nodes, some of which demonstrate central necrosis lying posterior to the head of the pancreas and within the aortocaval region, suspicious for metastatic disease. There is no evidence of primary pancreatic neoplasm. There are small nodular densities within the right anterior abdomen suspicious for peritoneal metastatic foci ranging from 3-10 mm in size.  She consulted Dr Adams on 11/13/19 and is planned for biopsy planned 11/27/19. \par \par \par PSH:  cataracts, tubal ligation \par Fhx:  gastric or colon cancer involving a maternal cousin at 60\par Social:  former smoker but quit 13 years ago and drinks alcohol on occasion. She gets her colonoscopies with Dr. Brunner. \par \par PCP: Dr. Alexandria Diop\par GI: Dr. Robert Brunner. \par \par Last Mammogram/pap smear - few years ago; last colonoscopy 2017\par \par She had biopsy of RUQ peritoneal nodule which confirmed metastatic cholangiocarcinoma.  \par She was started on Gemzar/Cisplatin 12/20/19 \par \par after her second cycle, she had a port placed, her Cisplatin was held due to SE and was treated for a cellulitis and influenza [FreeTextEntry1] : palliative Gemcitabine D1/8 Q3 weeks s/p cycle 4 day 1 [de-identified] : Karlie comes in follow up today due for cycle 4 Day 8 , she tolerates this fairly well, she has fatigue , continues to be very anxious \par \par \par

## 2020-03-16 ENCOUNTER — OUTPATIENT (OUTPATIENT)
Dept: OUTPATIENT SERVICES | Facility: HOSPITAL | Age: 70
LOS: 1 days | Discharge: HOME | End: 2020-03-16

## 2020-03-16 DIAGNOSIS — C23 MALIGNANT NEOPLASM OF GALLBLADDER: ICD-10-CM

## 2020-03-20 ENCOUNTER — LABORATORY RESULT (OUTPATIENT)
Age: 70
End: 2020-03-20

## 2020-03-20 ENCOUNTER — RESULT REVIEW (OUTPATIENT)
Age: 70
End: 2020-03-20

## 2020-03-20 ENCOUNTER — APPOINTMENT (OUTPATIENT)
Dept: INFUSION THERAPY | Facility: HOSPITAL | Age: 70
End: 2020-03-20

## 2020-03-20 ENCOUNTER — APPOINTMENT (OUTPATIENT)
Dept: HEMATOLOGY ONCOLOGY | Facility: CLINIC | Age: 70
End: 2020-03-20

## 2020-03-20 LAB
BASOPHILS # BLD AUTO: 0.04 K/UL — SIGNIFICANT CHANGE UP (ref 0–0.2)
BASOPHILS NFR BLD AUTO: 0.4 % — SIGNIFICANT CHANGE UP (ref 0–2)
EOSINOPHIL # BLD AUTO: 0.07 K/UL — SIGNIFICANT CHANGE UP (ref 0–0.5)
EOSINOPHIL NFR BLD AUTO: 0.8 % — SIGNIFICANT CHANGE UP (ref 0–6)
HCT VFR BLD CALC: 35.4 % — SIGNIFICANT CHANGE UP (ref 34.5–45)
HGB BLD-MCNC: 12.4 G/DL — SIGNIFICANT CHANGE UP (ref 11.5–15.5)
IMM GRANULOCYTES NFR BLD AUTO: 1.1 % — SIGNIFICANT CHANGE UP (ref 0–1.5)
LYMPHOCYTES # BLD AUTO: 2.39 K/UL — SIGNIFICANT CHANGE UP (ref 1–3.3)
LYMPHOCYTES # BLD AUTO: 26.7 % — SIGNIFICANT CHANGE UP (ref 13–44)
MCHC RBC-ENTMCNC: 34.3 PG — HIGH (ref 27–34)
MCHC RBC-ENTMCNC: 35 GM/DL — SIGNIFICANT CHANGE UP (ref 32–36)
MCV RBC AUTO: 98.1 FL — SIGNIFICANT CHANGE UP (ref 80–100)
MONOCYTES # BLD AUTO: 0.66 K/UL — SIGNIFICANT CHANGE UP (ref 0–0.9)
MONOCYTES NFR BLD AUTO: 7.4 % — SIGNIFICANT CHANGE UP (ref 2–14)
NEUTROPHILS # BLD AUTO: 5.69 K/UL — SIGNIFICANT CHANGE UP (ref 1.8–7.4)
NEUTROPHILS NFR BLD AUTO: 63.6 % — SIGNIFICANT CHANGE UP (ref 43–77)
NRBC # BLD: 0 /100 WBCS — SIGNIFICANT CHANGE UP (ref 0–0)
PLATELET # BLD AUTO: 209 K/UL — SIGNIFICANT CHANGE UP (ref 150–400)
RBC # BLD: 3.61 M/UL — LOW (ref 3.8–5.2)
RBC # FLD: 15.3 % — HIGH (ref 10.3–14.5)
WBC # BLD: 8.95 K/UL — SIGNIFICANT CHANGE UP (ref 3.8–10.5)
WBC # FLD AUTO: 8.95 K/UL — SIGNIFICANT CHANGE UP (ref 3.8–10.5)

## 2020-03-23 DIAGNOSIS — C22.1 INTRAHEPATIC BILE DUCT CARCINOMA: ICD-10-CM

## 2020-03-23 DIAGNOSIS — R11.2 NAUSEA WITH VOMITING, UNSPECIFIED: ICD-10-CM

## 2020-03-23 DIAGNOSIS — Z51.11 ENCOUNTER FOR ANTINEOPLASTIC CHEMOTHERAPY: ICD-10-CM

## 2020-04-03 ENCOUNTER — LABORATORY RESULT (OUTPATIENT)
Age: 70
End: 2020-04-03

## 2020-04-03 ENCOUNTER — APPOINTMENT (OUTPATIENT)
Dept: HEMATOLOGY ONCOLOGY | Facility: CLINIC | Age: 70
End: 2020-04-03
Payer: COMMERCIAL

## 2020-04-03 ENCOUNTER — RESULT REVIEW (OUTPATIENT)
Age: 70
End: 2020-04-03

## 2020-04-03 ENCOUNTER — APPOINTMENT (OUTPATIENT)
Dept: HEMATOLOGY ONCOLOGY | Facility: CLINIC | Age: 70
End: 2020-04-03

## 2020-04-03 ENCOUNTER — APPOINTMENT (OUTPATIENT)
Dept: INFUSION THERAPY | Facility: HOSPITAL | Age: 70
End: 2020-04-03

## 2020-04-03 DIAGNOSIS — Z71.89 OTHER SPECIFIED COUNSELING: ICD-10-CM

## 2020-04-03 LAB
BASOPHILS # BLD AUTO: 0.06 K/UL — SIGNIFICANT CHANGE UP (ref 0–0.2)
BASOPHILS NFR BLD AUTO: 0.8 % — SIGNIFICANT CHANGE UP (ref 0–2)
EOSINOPHIL # BLD AUTO: 0.09 K/UL — SIGNIFICANT CHANGE UP (ref 0–0.5)
EOSINOPHIL NFR BLD AUTO: 1.2 % — SIGNIFICANT CHANGE UP (ref 0–6)
HCT VFR BLD CALC: 38.8 % — SIGNIFICANT CHANGE UP (ref 34.5–45)
HGB BLD-MCNC: 13.3 G/DL — SIGNIFICANT CHANGE UP (ref 11.5–15.5)
IMM GRANULOCYTES NFR BLD AUTO: 0.7 % — SIGNIFICANT CHANGE UP (ref 0–1.5)
LYMPHOCYTES # BLD AUTO: 2.31 K/UL — SIGNIFICANT CHANGE UP (ref 1–3.3)
LYMPHOCYTES # BLD AUTO: 31.7 % — SIGNIFICANT CHANGE UP (ref 13–44)
MCHC RBC-ENTMCNC: 34.3 GM/DL — SIGNIFICANT CHANGE UP (ref 32–36)
MCHC RBC-ENTMCNC: 34.3 PG — HIGH (ref 27–34)
MCV RBC AUTO: 100 FL — SIGNIFICANT CHANGE UP (ref 80–100)
MONOCYTES # BLD AUTO: 0.65 K/UL — SIGNIFICANT CHANGE UP (ref 0–0.9)
MONOCYTES NFR BLD AUTO: 8.9 % — SIGNIFICANT CHANGE UP (ref 2–14)
NEUTROPHILS # BLD AUTO: 4.13 K/UL — SIGNIFICANT CHANGE UP (ref 1.8–7.4)
NEUTROPHILS NFR BLD AUTO: 56.7 % — SIGNIFICANT CHANGE UP (ref 43–77)
NRBC # BLD: 0 /100 WBCS — SIGNIFICANT CHANGE UP (ref 0–0)
PLATELET # BLD AUTO: 185 K/UL — SIGNIFICANT CHANGE UP (ref 150–400)
RBC # BLD: 3.88 M/UL — SIGNIFICANT CHANGE UP (ref 3.8–5.2)
RBC # FLD: 13.9 % — SIGNIFICANT CHANGE UP (ref 10.3–14.5)
WBC # BLD: 7.29 K/UL — SIGNIFICANT CHANGE UP (ref 3.8–10.5)
WBC # FLD AUTO: 7.29 K/UL — SIGNIFICANT CHANGE UP (ref 3.8–10.5)

## 2020-04-03 PROCEDURE — 99497 ADVNCD CARE PLAN 30 MIN: CPT

## 2020-04-03 PROCEDURE — 99215 OFFICE O/P EST HI 40 MIN: CPT

## 2020-04-06 NOTE — ASSESSMENT
[______] : HCP: [unfilled] [Completed Healthcare Proxy] : completed healthcare proxy [Completed Medical Orders for Life-Sustaining Treatment] : completed medical orders for life-sustaining treatment [DNR] : Code Status: DNR [Limited] : Treatment Guidelines: Limited [DNI] : Intubation: DNI [FreeTextEntry1] : 69yoF with:\par \par 1.  Cholangiocarcinoma - On Gemcitabine. Follow up with Med Onc.\par \par 2. Neoplasm-related pain - She has obtained high THC:low CBD medical cannabis with hopes to help with nausea and pain. C/w with current medical cannabis regimen QHS and PRN for nausea. Re-counseled patient that Morphine for moderate-severe pain is appropriate in this setting. Can c/w Tylenol 1000mg PRN but re-enforced the MDD of 4,000mg.  \par \par 3. Nausea - C/w medical cannabis as above and Ondansetron ODT PRN.\par \par 4. Constipation ppx - Re counseled on the importance of maintaining bowel regularity in light of opioid use. C/w senna, prune juice, PRN miralax. \par \par 5. Anxiety - We discussed potential benefits of increasing escitalopram.   She will discuss with her psychiatrist.  C/w talk therapy.\par \par 6. Encounter for Palliative Care/Advance Care Planning - New HCP updated today.   Patient expressed that she would like only her  to be listed as sole HCP, as she has three children and does not want to cause any any resentment amongst them.    Patient will discuss with her /HCP the need to include all three children in shared-decision making, if the need arises.  GOC discussed at length.  Conversation included details of life sustaining measures (CPR and intubation/mechanical ventilation) v. limited medical care v. comfort care.   MOLST form completed in office today delineating wishes of limited care: DNR/DNI. Patient has original copies of both HCP and MOLST.  Additional copies placed for EMR.  Greater than 16 minutes spent discussing ACP and completing ACP documents. \par  \par RTO 1-2 months, call sooner with questions or issues.

## 2020-04-06 NOTE — PHYSICAL EXAM
[General Appearance - Alert] : alert [General Appearance - In No Acute Distress] : in no acute distress [Sclera] : the sclera and conjunctiva were normal [Normal Oral Mucosa] : normal oral mucosa [Neck Appearance] : the appearance of the neck was normal [] : no respiratory distress [Auscultation Breath Sounds / Voice Sounds] : lungs were clear to auscultation bilaterally [Heart Rate And Rhythm] : heart rate was normal and rhythm regular [Heart Sounds] : normal S1 and S2 [Edema] : there was no peripheral edema [Bowel Sounds] : normal bowel sounds [Abdomen Soft] : soft [Abnormal Walk] : normal gait [No Focal Deficits] : no focal deficits [Oriented To Time, Place, And Person] : oriented to person, place, and time [Affect] : the affect was normal [FreeTextEntry1] : +R chest wall Mediport

## 2020-04-06 NOTE — HISTORY OF PRESENT ILLNESS
[FreeTextEntry1] : 69yoF with metastatic cholangiocarcinoma presents for follow-up palliative care visit, referred by Oncology team.  PMH significant for HCV (treated with Harvoni), IBS, anxiety, GERD, thrombophlebitis. \par \par Patient initially who had some GI symptoms/elevated LFTs, weight loss (but was on weight watchers) in October 2019.  She saw Dr. Brunner (GI)  whom ordered a CT A/P which demonstrated enlargement of the head of the pancreas with an approximately 3.3 cm hypodense area posteriorly within the pancreatic head which is suspicious for neoplasm. Patient was referred for MRCP which revealed an irregularly enhancing 5.8 cm mass at the level of the confluence of the right and left hepatic biliary ducts. 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 There was no evidence of primary pancreatic neoplasm. She had biopsy of RUQ peritoneal nodule which confirmed metastatic cholangiocarcinoma. She was started on Gemzar/Cisplatin 12/20/19.  Could not tolerate Cisplatin and this was discontinued, now on single agent Gemzar.  \par \par Main issue for which patient initially presented was abdominal pain. The pain is localized to the RUQ and lower abdominal, she endorses bloating.  She was previously prescribed Morphine 15mg which she does not use due to concerns about dependence. She took it twice and it made her very constipated.  She is using Tylenol 1000mg four to five times daily, Motrin 200mg PRN (used very infrequently). These OTC agents help mildly.  Pain goes as high as 5/10. \par \par Interval History: Patient presents for follow-up.  Seen in treatment room.  She continues to use medicinal cannabis in 1:1 formulation QHS and high THC:low CBD PRN for pain.  Continues to experience significant fatigue. Continues to have abdominal cramping; using PRN acetaminophen at times.  Has not been severe requiring opioid.  She does report OIC from prior MS IR use and states that she would be adverse to using MS IR unless pain was unbearable.   Anxiety continues to be prominent symptom, especially given COVID pandemic.  She sees her therapist regularly and reports a therapeutic relationship.  \par \par ROS:\par +weight has been stable\par +appetite is good, eats small portions throughout the day\par +chronic anxiety - on Lexapro 5mg QD and Alprazolam 0.25mg QID. Describes herself as realist, doesn't want to be optimistic and get her hopes up. \par +nausea - uses Ondansetron ODT which usually helps\par +gas/bloating\par Denies constipation presently, has erratic BMs depending on what she eats. \par All other ROS as outlined or noncontributory. \par \par Patient is , lives with her . Has supportive family.  She has three children (ages 54, 50, 45) from a previous marriage. It is important to patient that she does not suffer. \par \par Psychiatrist: Dr. NGOC Becerra\par \par I-Stop Ref#  [id number]\par \par Not painful, a few days ago.   Watery, yellow discharge in AM.

## 2020-04-13 ENCOUNTER — OUTPATIENT (OUTPATIENT)
Dept: OUTPATIENT SERVICES | Facility: HOSPITAL | Age: 70
LOS: 1 days | Discharge: HOME | End: 2020-04-13

## 2020-04-13 DIAGNOSIS — C23 MALIGNANT NEOPLASM OF GALLBLADDER: ICD-10-CM

## 2020-04-17 ENCOUNTER — LABORATORY RESULT (OUTPATIENT)
Age: 70
End: 2020-04-17

## 2020-04-17 ENCOUNTER — APPOINTMENT (OUTPATIENT)
Dept: INFUSION THERAPY | Facility: HOSPITAL | Age: 70
End: 2020-04-17

## 2020-04-17 ENCOUNTER — RESULT REVIEW (OUTPATIENT)
Age: 70
End: 2020-04-17

## 2020-04-17 ENCOUNTER — APPOINTMENT (OUTPATIENT)
Dept: HEMATOLOGY ONCOLOGY | Facility: CLINIC | Age: 70
End: 2020-04-17
Payer: COMMERCIAL

## 2020-04-17 LAB
BASOPHILS # BLD AUTO: 0.06 K/UL — SIGNIFICANT CHANGE UP (ref 0–0.2)
BASOPHILS NFR BLD AUTO: 0.8 % — SIGNIFICANT CHANGE UP (ref 0–2)
EOSINOPHIL # BLD AUTO: 0.1 K/UL — SIGNIFICANT CHANGE UP (ref 0–0.5)
EOSINOPHIL NFR BLD AUTO: 1.3 % — SIGNIFICANT CHANGE UP (ref 0–6)
HCT VFR BLD CALC: 38.8 % — SIGNIFICANT CHANGE UP (ref 34.5–45)
HGB BLD-MCNC: 13.3 G/DL — SIGNIFICANT CHANGE UP (ref 11.5–15.5)
IMM GRANULOCYTES NFR BLD AUTO: 0.4 % — SIGNIFICANT CHANGE UP (ref 0–1.5)
LYMPHOCYTES # BLD AUTO: 2.35 K/UL — SIGNIFICANT CHANGE UP (ref 1–3.3)
LYMPHOCYTES # BLD AUTO: 31.6 % — SIGNIFICANT CHANGE UP (ref 13–44)
MCHC RBC-ENTMCNC: 34.3 GM/DL — SIGNIFICANT CHANGE UP (ref 32–36)
MCHC RBC-ENTMCNC: 34.5 PG — HIGH (ref 27–34)
MCV RBC AUTO: 100.5 FL — HIGH (ref 80–100)
MONOCYTES # BLD AUTO: 0.59 K/UL — SIGNIFICANT CHANGE UP (ref 0–0.9)
MONOCYTES NFR BLD AUTO: 7.9 % — SIGNIFICANT CHANGE UP (ref 2–14)
NEUTROPHILS # BLD AUTO: 4.31 K/UL — SIGNIFICANT CHANGE UP (ref 1.8–7.4)
NEUTROPHILS NFR BLD AUTO: 58 % — SIGNIFICANT CHANGE UP (ref 43–77)
NRBC # BLD: 0 /100 WBCS — SIGNIFICANT CHANGE UP (ref 0–0)
PLATELET # BLD AUTO: 156 K/UL — SIGNIFICANT CHANGE UP (ref 150–400)
RBC # BLD: 3.86 M/UL — SIGNIFICANT CHANGE UP (ref 3.8–5.2)
RBC # FLD: 13.1 % — SIGNIFICANT CHANGE UP (ref 10.3–14.5)
WBC # BLD: 7.44 K/UL — SIGNIFICANT CHANGE UP (ref 3.8–10.5)
WBC # FLD AUTO: 7.44 K/UL — SIGNIFICANT CHANGE UP (ref 3.8–10.5)

## 2020-04-17 PROCEDURE — 99214 OFFICE O/P EST MOD 30 MIN: CPT

## 2020-04-17 NOTE — PHYSICAL EXAM
[Restricted in physically strenuous activity but ambulatory and able to carry out work of a light or sedentary nature] : Status 1- Restricted in physically strenuous activity but ambulatory and able to carry out work of a light or sedentary nature, e.g., light house work, office work [Normal] : grossly intact [de-identified] : neck supple  [de-identified] : no audible wheezing [de-identified] : anxious

## 2020-04-17 NOTE — HISTORY OF PRESENT ILLNESS
[Disease: _____________________] : Disease: [unfilled] [M: ___] : M[unfilled] [AJCC Stage: ____] : AJCC Stage: [unfilled] [de-identified] : 70 Y/O F HCV infection (treated with Harvoni), IBS, Anxiety, GERD who had some GI symptoms/elevated LFTs (wt loss but on wt watchers) October 2019 and consulted Dr Brunner GI and non contrast CT A/P showed enlargement of the head of the pancreas with an approximately 3.3 cm hypodense area posteriorly within the pancreatic head which is suspicious for neoplasm. There are enlarged retroperitoneal lymph nodes posterior to the head of the pancreas which are suspicious appearing as well. There are few small nodular soft tissue densities lying deep to the right anterior abdominal wall suspicious for peritoneal implants, and soft tissue density material within the gallbladder which may reflect sludge.  Patient was referred for MRCP which revealed an irregularly enhancing 5.8 cm mass at the level of the confluence of the right and left hepatic biliary ducts. This mass obstructs the common hepatic duct and main left intrahepatic bile duct causing dilation of the intrahepatic biliary ducts within the left hepatic lobe. The mid to distal portion of the CBD are normal in caliber and appear uninvolved by the mass. 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 There are enlarged retroperitoneal lymph nodes, some of which demonstrate central necrosis lying posterior to the head of the pancreas and within the aortocaval region, suspicious for metastatic disease. There is no evidence of primary pancreatic neoplasm. There are small nodular densities within the right anterior abdomen suspicious for peritoneal metastatic foci ranging from 3-10 mm in size.  She consulted Dr Adams on 11/13/19 and is planned for biopsy planned 11/27/19. \par \par \par PSH:  cataracts, tubal ligation \par Fhx:  gastric or colon cancer involving a maternal cousin at 60\par Social:  former smoker but quit 13 years ago and drinks alcohol on occasion. She gets her colonoscopies with Dr. Brunner. \par \par PCP: Dr. Alexandria Diop\par GI: Dr. Robert Brunner. \par \par Last Mammogram/pap smear - few years ago; last colonoscopy 2017\par \par She had biopsy of RUQ peritoneal nodule which confirmed metastatic cholangiocarcinoma.  \par She was started on Gemzar/Cisplatin 12/20/19 \par \par after her second cycle, she had a port placed, her Cisplatin was held due to SE and was treated for a cellulitis and influenza [FreeTextEntry1] : palliative Gemcitabine D1/8 Q3 weeks s/p cycle 5 [de-identified] : Karlie is seen for follow up while in treatment room here to start cycle 6 of palliative Gemzar.  She states she feel well overall, her nausea and fatigue is manageable and denies any mouth sores, CP, palpitations, GANNON, d/c, abdominal pain, LE edema, neuropathy, dizziness, weight/appetite changes and keeps active.  She uses medical marijuana and Zofran prn and endorses a great appetite.  She requested refills for an oil her ear doctor had prescribed her.  She also states she has noticed some floaters in her L eye where she has a history of cataracts surgery and has been communicating with her eye doctor. \par \par

## 2020-04-17 NOTE — REVIEW OF SYSTEMS
[Anxiety] : anxiety [Negative] : Allergic/Immunologic [Fatigue] : fatigue [Fever] : no fever [Chills] : no chills [Night Sweats] : no night sweats [Chest Pain] : no chest pain [Lower Ext Edema] : no lower extremity edema [Shortness Of Breath] : no shortness of breath [Cough] : no cough [SOB on Exertion] : no shortness of breath during exertion [Abdominal Pain] : no abdominal pain [Vomiting] : no vomiting [Constipation] : no constipation [Diarrhea] : no diarrhea [FreeTextEntry7] : nausea [FreeTextEntry9] : back pain - controlled

## 2020-04-20 DIAGNOSIS — R11.2 NAUSEA WITH VOMITING, UNSPECIFIED: ICD-10-CM

## 2020-04-20 DIAGNOSIS — Z51.11 ENCOUNTER FOR ANTINEOPLASTIC CHEMOTHERAPY: ICD-10-CM

## 2020-05-01 ENCOUNTER — RESULT REVIEW (OUTPATIENT)
Age: 70
End: 2020-05-01

## 2020-05-01 ENCOUNTER — LABORATORY RESULT (OUTPATIENT)
Age: 70
End: 2020-05-01

## 2020-05-01 ENCOUNTER — APPOINTMENT (OUTPATIENT)
Dept: INFUSION THERAPY | Facility: HOSPITAL | Age: 70
End: 2020-05-01

## 2020-05-01 ENCOUNTER — APPOINTMENT (OUTPATIENT)
Dept: HEMATOLOGY ONCOLOGY | Facility: CLINIC | Age: 70
End: 2020-05-01
Payer: COMMERCIAL

## 2020-05-01 LAB
BASOPHILS # BLD AUTO: 0.03 K/UL — SIGNIFICANT CHANGE UP (ref 0–0.2)
BASOPHILS NFR BLD AUTO: 0.4 % — SIGNIFICANT CHANGE UP (ref 0–2)
EOSINOPHIL # BLD AUTO: 0.06 K/UL — SIGNIFICANT CHANGE UP (ref 0–0.5)
EOSINOPHIL NFR BLD AUTO: 0.9 % — SIGNIFICANT CHANGE UP (ref 0–6)
HCT VFR BLD CALC: 40.4 % — SIGNIFICANT CHANGE UP (ref 34.5–45)
HGB BLD-MCNC: 13.7 G/DL — SIGNIFICANT CHANGE UP (ref 11.5–15.5)
IMM GRANULOCYTES NFR BLD AUTO: 0.7 % — SIGNIFICANT CHANGE UP (ref 0–1.5)
LYMPHOCYTES # BLD AUTO: 2.14 K/UL — SIGNIFICANT CHANGE UP (ref 1–3.3)
LYMPHOCYTES # BLD AUTO: 30.8 % — SIGNIFICANT CHANGE UP (ref 13–44)
MCHC RBC-ENTMCNC: 33.6 PG — SIGNIFICANT CHANGE UP (ref 27–34)
MCHC RBC-ENTMCNC: 33.9 GM/DL — SIGNIFICANT CHANGE UP (ref 32–36)
MCV RBC AUTO: 99 FL — SIGNIFICANT CHANGE UP (ref 80–100)
MONOCYTES # BLD AUTO: 0.58 K/UL — SIGNIFICANT CHANGE UP (ref 0–0.9)
MONOCYTES NFR BLD AUTO: 8.3 % — SIGNIFICANT CHANGE UP (ref 2–14)
NEUTROPHILS # BLD AUTO: 4.09 K/UL — SIGNIFICANT CHANGE UP (ref 1.8–7.4)
NEUTROPHILS NFR BLD AUTO: 58.9 % — SIGNIFICANT CHANGE UP (ref 43–77)
NRBC # BLD: 0 /100 WBCS — SIGNIFICANT CHANGE UP (ref 0–0)
PLATELET # BLD AUTO: 162 K/UL — SIGNIFICANT CHANGE UP (ref 150–400)
RBC # BLD: 4.08 M/UL — SIGNIFICANT CHANGE UP (ref 3.8–5.2)
RBC # FLD: 12.6 % — SIGNIFICANT CHANGE UP (ref 10.3–14.5)
WBC # BLD: 6.95 K/UL — SIGNIFICANT CHANGE UP (ref 3.8–10.5)
WBC # FLD AUTO: 6.95 K/UL — SIGNIFICANT CHANGE UP (ref 3.8–10.5)

## 2020-05-01 PROCEDURE — 99214 OFFICE O/P EST MOD 30 MIN: CPT

## 2020-05-04 DIAGNOSIS — E86.0 DEHYDRATION: ICD-10-CM

## 2020-05-04 NOTE — ASSESSMENT
[Completed Healthcare Proxy] : completed healthcare proxy [DNR] : Code Status: DNR [Completed Medical Orders for Life-Sustaining Treatment] : completed medical orders for life-sustaining treatment [Limited] : Treatment Guidelines: Limited [______] : HCP: [unfilled] [DNI] : Intubation: DNI [FreeTextEntry1] : 69yoF with:\par \par 1.  Cholangiocarcinoma - On Gemcitabine. Follow up with Med Onc.\par \par 2. Neoplasm-related pain -  C/w with current medical cannabis regimen QHS and PRN for nausea. Re-counseled patient that Morphine for moderate-severe pain is appropriate in this setting. Can c/w Tylenol 1000mg PRN but re-enforced the MDD of 4,000mg.  \par \par 3. Nausea - C/w medical cannabis as above and Ondansetron ODT PRN.\par \par 4. Anxiety - We discussed potential benefits of increasing escitalopram.   She will discuss with her psychiatrist.  C/w talk therapy.\par \par 5. Encounter for Palliative Care/Advance Care Planning - HCP on file.  MOLST form on file delineating wishes of limited care: DNR/DNI. \par \par Follow up in 1 month, call sooner with questions or issues.

## 2020-05-04 NOTE — PHYSICAL EXAM
[General Appearance - Alert] : alert [General Appearance - In No Acute Distress] : in no acute distress [Normal Oral Mucosa] : normal oral mucosa [Sclera] : the sclera and conjunctiva were normal [] : no respiratory distress [Neck Appearance] : the appearance of the neck was normal [Abnormal Walk] : normal gait [Affect] : the affect was normal [Oriented To Time, Place, And Person] : oriented to person, place, and time [No Focal Deficits] : no focal deficits [FreeTextEntry1] : +R chest wall Mediport

## 2020-05-04 NOTE — HISTORY OF PRESENT ILLNESS
[FreeTextEntry1] : 69yoF with metastatic cholangiocarcinoma presents for follow-up palliative care visit, referred by Oncology team.  PMH significant for HCV (treated with Harvoni), IBS, anxiety, GERD, thrombophlebitis. \par \par Patient initially who had some GI symptoms/elevated LFTs, weight loss (but was on weight watchers) in October 2019.  She saw Dr. Brunner (GI)  whom ordered a CT A/P which demonstrated enlargement of the head of the pancreas with an approximately 3.3 cm hypodense area posteriorly within the pancreatic head which is suspicious for neoplasm. Patient was referred for MRCP which revealed an irregularly enhancing 5.8 cm mass at the level of the confluence of the right and left hepatic biliary ducts. 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 There was no evidence of primary pancreatic neoplasm. She had biopsy of RUQ peritoneal nodule which confirmed metastatic cholangiocarcinoma. She was started on Gemzar/Cisplatin 12/20/19.  Could not tolerate Cisplatin and this was discontinued, now on single agent Gemzar.  \par \par Main issue for which patient initially presented was abdominal pain. The pain is localized to the RUQ and lower abdominal, she endorses bloating.  She was previously prescribed Morphine 15mg which she does not use due to concerns about dependence. She took it twice and it made her very constipated.  She is using Tylenol 1000mg four to five times daily, Motrin 200mg PRN (used very infrequently). These OTC agents help mildly.  Pain goes as high as 5/10. \par \par Interval History: Patient presents for follow-up.  Seen in treatment room.  She continues to use medicinal cannabis in 1:1 formulation QHS and high THC:low CBD PRN.  Denies pain but does report abdominal cramping; using PRN acetaminophen at times.  Has not been severe requiring opioid.   Anxiety continues to be prominent symptom, especially given COVID pandemic.  She had an unexpected death of a neighbor. She sees her therapist regularly and reports a therapeutic relationship.  \par \par ROS:\par +weight has been stable\par +appetite is good, eats small portions throughout the day\par +chronic anxiety - on Lexapro 5mg QD and Alprazolam 0.25mg QID. Describes herself as realist, doesn't want to be optimistic and get her hopes up. \par +nausea - uses Ondansetron ODT which usually helps\par +gas/bloating\par Denies constipation presently, has erratic BMs depending on what she eats. \par All other ROS as outlined or noncontributory. \par \par Patient is , lives with her . Has supportive family.  She has three children (ages 54, 50, 45) from a previous marriage. It is important to patient that she does not suffer. \par \par Psychiatrist: Dr. NGOC Becerra\par \par I-Stop Ref# [id number]

## 2020-05-12 ENCOUNTER — RX RENEWAL (OUTPATIENT)
Age: 70
End: 2020-05-12

## 2020-05-14 ENCOUNTER — OUTPATIENT (OUTPATIENT)
Dept: OUTPATIENT SERVICES | Facility: HOSPITAL | Age: 70
LOS: 1 days | Discharge: HOME | End: 2020-05-14

## 2020-05-14 DIAGNOSIS — C23 MALIGNANT NEOPLASM OF GALLBLADDER: ICD-10-CM

## 2020-05-15 ENCOUNTER — LABORATORY RESULT (OUTPATIENT)
Age: 70
End: 2020-05-15

## 2020-05-15 ENCOUNTER — APPOINTMENT (OUTPATIENT)
Dept: INFUSION THERAPY | Facility: HOSPITAL | Age: 70
End: 2020-05-15

## 2020-05-15 ENCOUNTER — RESULT REVIEW (OUTPATIENT)
Age: 70
End: 2020-05-15

## 2020-05-15 LAB
BASOPHILS # BLD AUTO: 0.04 K/UL — SIGNIFICANT CHANGE UP (ref 0–0.2)
BASOPHILS NFR BLD AUTO: 0.6 % — SIGNIFICANT CHANGE UP (ref 0–2)
EOSINOPHIL # BLD AUTO: 0.08 K/UL — SIGNIFICANT CHANGE UP (ref 0–0.5)
EOSINOPHIL NFR BLD AUTO: 1.2 % — SIGNIFICANT CHANGE UP (ref 0–6)
HCT VFR BLD CALC: 41.2 % — SIGNIFICANT CHANGE UP (ref 34.5–45)
HGB BLD-MCNC: 13.9 G/DL — SIGNIFICANT CHANGE UP (ref 11.5–15.5)
IMM GRANULOCYTES NFR BLD AUTO: 0.8 % — SIGNIFICANT CHANGE UP (ref 0–1.5)
LYMPHOCYTES # BLD AUTO: 2.03 K/UL — SIGNIFICANT CHANGE UP (ref 1–3.3)
LYMPHOCYTES # BLD AUTO: 31 % — SIGNIFICANT CHANGE UP (ref 13–44)
MCHC RBC-ENTMCNC: 33.3 PG — SIGNIFICANT CHANGE UP (ref 27–34)
MCHC RBC-ENTMCNC: 33.7 GM/DL — SIGNIFICANT CHANGE UP (ref 32–36)
MCV RBC AUTO: 98.8 FL — SIGNIFICANT CHANGE UP (ref 80–100)
MONOCYTES # BLD AUTO: 0.58 K/UL — SIGNIFICANT CHANGE UP (ref 0–0.9)
MONOCYTES NFR BLD AUTO: 8.9 % — SIGNIFICANT CHANGE UP (ref 2–14)
NEUTROPHILS # BLD AUTO: 3.76 K/UL — SIGNIFICANT CHANGE UP (ref 1.8–7.4)
NEUTROPHILS NFR BLD AUTO: 57.5 % — SIGNIFICANT CHANGE UP (ref 43–77)
NRBC # BLD: 0 /100 WBCS — SIGNIFICANT CHANGE UP (ref 0–0)
PLATELET # BLD AUTO: 175 K/UL — SIGNIFICANT CHANGE UP (ref 150–400)
RBC # BLD: 4.17 M/UL — SIGNIFICANT CHANGE UP (ref 3.8–5.2)
RBC # FLD: 12.8 % — SIGNIFICANT CHANGE UP (ref 10.3–14.5)
WBC # BLD: 6.54 K/UL — SIGNIFICANT CHANGE UP (ref 3.8–10.5)
WBC # FLD AUTO: 6.54 K/UL — SIGNIFICANT CHANGE UP (ref 3.8–10.5)

## 2020-05-18 DIAGNOSIS — R11.2 NAUSEA WITH VOMITING, UNSPECIFIED: ICD-10-CM

## 2020-05-18 DIAGNOSIS — Z51.11 ENCOUNTER FOR ANTINEOPLASTIC CHEMOTHERAPY: ICD-10-CM

## 2020-05-18 DIAGNOSIS — E86.0 DEHYDRATION: ICD-10-CM

## 2020-05-20 ENCOUNTER — APPOINTMENT (OUTPATIENT)
Dept: HEMATOLOGY ONCOLOGY | Facility: CLINIC | Age: 70
End: 2020-05-20
Payer: COMMERCIAL

## 2020-05-20 PROCEDURE — 99213 OFFICE O/P EST LOW 20 MIN: CPT | Mod: 95

## 2020-05-21 NOTE — PHYSICAL EXAM
[Restricted in physically strenuous activity but ambulatory and able to carry out work of a light or sedentary nature] : Status 1- Restricted in physically strenuous activity but ambulatory and able to carry out work of a light or sedentary nature, e.g., light house work, office work [Normal] : full range of motion and no deformities appreciated [de-identified] : anicteric  [de-identified] : no jvd [de-identified] : normal respiratory effort, no audible wheeze  [de-identified] : anxious

## 2020-05-21 NOTE — REVIEW OF SYSTEMS
[Fatigue] : fatigue [Anxiety] : anxiety [Negative] : Allergic/Immunologic [Fever] : no fever [Night Sweats] : no night sweats [Chills] : no chills [Lower Ext Edema] : no lower extremity edema [Chest Pain] : no chest pain [Shortness Of Breath] : no shortness of breath [SOB on Exertion] : no shortness of breath during exertion [Cough] : no cough [Abdominal Pain] : no abdominal pain [Constipation] : no constipation [Vomiting] : no vomiting [Diarrhea] : no diarrhea [FreeTextEntry7] : nausea

## 2020-05-21 NOTE — HISTORY OF PRESENT ILLNESS
[Disease: _____________________] : Disease: [unfilled] [M: ___] : M[unfilled] [AJCC Stage: ____] : AJCC Stage: [unfilled] [de-identified] : 70 Y/O F HCV infection (treated with Harvoni), IBS, Anxiety, GERD who had some GI symptoms/elevated LFTs (wt loss but on wt watchers) October 2019 and consulted Dr Brunner GI and non contrast CT A/P showed enlargement of the head of the pancreas with an approximately 3.3 cm hypodense area posteriorly within the pancreatic head which is suspicious for neoplasm. There are enlarged retroperitoneal lymph nodes posterior to the head of the pancreas which are suspicious appearing as well. There are few small nodular soft tissue densities lying deep to the right anterior abdominal wall suspicious for peritoneal implants, and soft tissue density material within the gallbladder which may reflect sludge.  Patient was referred for MRCP which revealed an irregularly enhancing 5.8 cm mass at the level of the confluence of the right and left hepatic biliary ducts. This mass obstructs the common hepatic duct and main left intrahepatic bile duct causing dilation of the intrahepatic biliary ducts within the left hepatic lobe. The mid to distal portion of the CBD are normal in caliber and appear uninvolved by the mass. 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 There are enlarged retroperitoneal lymph nodes, some of which demonstrate central necrosis lying posterior to the head of the pancreas and within the aortocaval region, suspicious for metastatic disease. There is no evidence of primary pancreatic neoplasm. There are small nodular densities within the right anterior abdomen suspicious for peritoneal metastatic foci ranging from 3-10 mm in size.  She consulted Dr Adams on 11/13/19 and is planned for biopsy planned 11/27/19. \par \par \par PSH:  cataracts, tubal ligation \par Fhx:  gastric or colon cancer involving a maternal cousin at 60\par Social:  former smoker but quit 13 years ago and drinks alcohol on occasion. She gets her colonoscopies with Dr. Brunner. \par \par PCP: Dr. Alexandria Diop\par GI: Dr. Robert Brunner. \par \par Last Mammogram/pap smear - few years ago; last colonoscopy 2017\par \par She had biopsy of RUQ peritoneal nodule which confirmed metastatic cholangiocarcinoma.  \par She was started on Gemzar/Cisplatin 12/20/19 \par \par after her second cycle, she had a port placed, her Cisplatin was held due to SE and was treated for a cellulitis and influenza [de-identified] : Microsatellite status MS-Stable / Tumor Mutational Sheridan 1 Muts/Mb §\par ERRFI1 H250fs*14 / IDH1 R132C \par TPS 20 [FreeTextEntry1] : palliative Gemcitabine q2 week  [de-identified] : Karlie is seen for follow up via telehealth while on palliative Gemzar.  she has no acute complaints, tolerates gemzar outside of some fatigue but no abdominal pain \par

## 2020-05-29 ENCOUNTER — RESULT REVIEW (OUTPATIENT)
Age: 70
End: 2020-05-29

## 2020-05-29 ENCOUNTER — LABORATORY RESULT (OUTPATIENT)
Age: 70
End: 2020-05-29

## 2020-05-29 ENCOUNTER — APPOINTMENT (OUTPATIENT)
Dept: INFUSION THERAPY | Facility: HOSPITAL | Age: 70
End: 2020-05-29

## 2020-05-29 ENCOUNTER — APPOINTMENT (OUTPATIENT)
Dept: HEMATOLOGY ONCOLOGY | Facility: CLINIC | Age: 70
End: 2020-05-29
Payer: COMMERCIAL

## 2020-05-29 LAB
BASOPHILS # BLD AUTO: 0.04 K/UL — SIGNIFICANT CHANGE UP (ref 0–0.2)
BASOPHILS NFR BLD AUTO: 0.5 % — SIGNIFICANT CHANGE UP (ref 0–2)
EOSINOPHIL # BLD AUTO: 0.07 K/UL — SIGNIFICANT CHANGE UP (ref 0–0.5)
EOSINOPHIL NFR BLD AUTO: 0.9 % — SIGNIFICANT CHANGE UP (ref 0–6)
HCT VFR BLD CALC: 39.7 % — SIGNIFICANT CHANGE UP (ref 34.5–45)
HGB BLD-MCNC: 14.1 G/DL — SIGNIFICANT CHANGE UP (ref 11.5–15.5)
IMM GRANULOCYTES NFR BLD AUTO: 0.5 % — SIGNIFICANT CHANGE UP (ref 0–1.5)
LYMPHOCYTES # BLD AUTO: 2.4 K/UL — SIGNIFICANT CHANGE UP (ref 1–3.3)
LYMPHOCYTES # BLD AUTO: 29.6 % — SIGNIFICANT CHANGE UP (ref 13–44)
MCHC RBC-ENTMCNC: 33.4 PG — SIGNIFICANT CHANGE UP (ref 27–34)
MCHC RBC-ENTMCNC: 35.5 GM/DL — SIGNIFICANT CHANGE UP (ref 32–36)
MCV RBC AUTO: 94.1 FL — SIGNIFICANT CHANGE UP (ref 80–100)
MONOCYTES # BLD AUTO: 0.77 K/UL — SIGNIFICANT CHANGE UP (ref 0–0.9)
MONOCYTES NFR BLD AUTO: 9.5 % — SIGNIFICANT CHANGE UP (ref 2–14)
NEUTROPHILS # BLD AUTO: 4.79 K/UL — SIGNIFICANT CHANGE UP (ref 1.8–7.4)
NEUTROPHILS NFR BLD AUTO: 59 % — SIGNIFICANT CHANGE UP (ref 43–77)
NRBC # BLD: 0 /100 WBCS — SIGNIFICANT CHANGE UP (ref 0–0)
PLATELET # BLD AUTO: 152 K/UL — SIGNIFICANT CHANGE UP (ref 150–400)
RBC # BLD: 4.22 M/UL — SIGNIFICANT CHANGE UP (ref 3.8–5.2)
RBC # FLD: 12.6 % — SIGNIFICANT CHANGE UP (ref 10.3–14.5)
WBC # BLD: 8.11 K/UL — SIGNIFICANT CHANGE UP (ref 3.8–10.5)
WBC # FLD AUTO: 8.11 K/UL — SIGNIFICANT CHANGE UP (ref 3.8–10.5)

## 2020-05-29 PROCEDURE — 99214 OFFICE O/P EST MOD 30 MIN: CPT

## 2020-06-01 NOTE — HISTORY OF PRESENT ILLNESS
[FreeTextEntry1] : 70yoF with metastatic cholangiocarcinoma presents for follow-up palliative care visit, referred by Oncology team.  PMH significant for HCV (treated with Harvoni), IBS, anxiety, GERD, thrombophlebitis. \par \par Patient initially who had some GI symptoms/elevated LFTs, weight loss (but was on weight watchers) in October 2019.  She saw Dr. Brunner (GI)  whom ordered a CT A/P which demonstrated enlargement of the head of the pancreas with an approximately 3.3 cm hypodense area posteriorly within the pancreatic head which is suspicious for neoplasm. Patient was referred for MRCP which revealed an irregularly enhancing 5.8 cm mass at the level of the confluence of the right and left hepatic biliary ducts. 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 There was no evidence of primary pancreatic neoplasm. She had biopsy of RUQ peritoneal nodule which confirmed metastatic cholangiocarcinoma. She was started on Gemzar/Cisplatin 12/20/19.  Did not tolerate Cisplatin and this was discontinued, now on single agent Gemzar.  \par \par Main issue for which patient initially presented was abdominal pain. The pain is localized to the RUQ and lower abdominal, she endorses bloating.  She was previously prescribed Morphine 15mg which she does not use due to concerns about dependence. She took it twice and it made her very constipated.  She is using Tylenol 1000mg four to five times daily, Motrin 200mg PRN (used very infrequently).\par \par Interval History: Patient presents for follow-up, seen in treatment room.  She continues to use medicinal cannabis in 1:1 formulation QHS and high THC:low CBD PRN.  She finds that it helps with nausea and anxiety.  Denies pain but does report abdominal cramping; using PRN acetaminophen at times.  Has not required opioid in months.  Endorses fatigue and some irritability.  Anxiety continues to be a prominent symptom, especially given COVID pandemic.  She sees her therapist regularly and reports a therapeutic relationship.  \par \par ROS:\par +weight has been stable \par +appetite is very good\par +chronic anxiety - on Lexapro 5mg QD and Alprazolam 0.25mg QID. Describes herself as realist, doesn't want to be optimistic and get her hopes up. \par +nausea - uses Ondansetron ODT which usually helps\par +gas/bloating\par + occasional diarrhea- controlled with PRN Imodium - has erratic BMs depending on what she eats. \par All other ROS as outlined or noncontributory. \par \par Patient is , lives with her . Has supportive family.  She has three children (ages 54, 50, 45) from a previous marriage. It is important to patient that she does not suffer. \par \par Psychiatrist: Dr. NGOC Becerra\par \par I-Stop Ref# [id number]

## 2020-06-01 NOTE — ASSESSMENT
[DNR] : Code Status: DNR [Completed Medical Orders for Life-Sustaining Treatment] : completed medical orders for life-sustaining treatment [Limited] : Treatment Guidelines: Limited [DNI] : Intubation: DNI [______] : HCP: [unfilled] [[facility]: _____[facility] [unfilled] [FreeTextEntry1] : 70yoF with:\par \par 1.  Cholangiocarcinoma - on Gemzar.  Scans pending. Follow up with Med Onc.\par \par 2. Neoplasm-related pain -  C/w with current medical cannabis regimen QHS and PRN for nausea.  Can c/w Tylenol 1000mg PRN but re-enforced the MDD of 4,000mg.  \par \par 3. Nausea - C/w medical cannabis as above and Ondansetron ODT PRN.  \par \par 4. Anxiety - We discussed potential benefits of increasing escitalopram.   She will discuss with her psychiatrist.  C/w talk therapy.\par \par 5. Encounter for Palliative Care/Advance Care Planning - HCP on file.  MOLST form on file, delineating wishes of limited care: DNR/DNI. Patient expressed that she "doesn’t want to suffer."\par \par Follow up in 1 month, call sooner with questions or issues.

## 2020-06-01 NOTE — PHYSICAL EXAM
[General Appearance - Alert] : alert [General Appearance - In No Acute Distress] : in no acute distress [Sclera] : the sclera and conjunctiva were normal [Normal Oral Mucosa] : normal oral mucosa [Neck Appearance] : the appearance of the neck was normal [] : no respiratory distress [Abnormal Walk] : normal gait [No Focal Deficits] : no focal deficits [Oriented To Time, Place, And Person] : oriented to person, place, and time [Affect] : the affect was normal [FreeTextEntry1] : +R chest wall Mediport

## 2020-06-10 ENCOUNTER — APPOINTMENT (OUTPATIENT)
Dept: DISASTER EMERGENCY | Facility: CLINIC | Age: 70
End: 2020-06-10

## 2020-06-11 LAB — SARS-COV-2 N GENE NPH QL NAA+PROBE: NOT DETECTED

## 2020-06-12 ENCOUNTER — RESULT REVIEW (OUTPATIENT)
Age: 70
End: 2020-06-12

## 2020-06-12 ENCOUNTER — LABORATORY RESULT (OUTPATIENT)
Age: 70
End: 2020-06-12

## 2020-06-12 ENCOUNTER — APPOINTMENT (OUTPATIENT)
Dept: HEMATOLOGY ONCOLOGY | Facility: CLINIC | Age: 70
End: 2020-06-12
Payer: COMMERCIAL

## 2020-06-12 ENCOUNTER — APPOINTMENT (OUTPATIENT)
Dept: INFUSION THERAPY | Facility: HOSPITAL | Age: 70
End: 2020-06-12

## 2020-06-12 LAB
BASOPHILS # BLD AUTO: 0.04 K/UL — SIGNIFICANT CHANGE UP (ref 0–0.2)
BASOPHILS NFR BLD AUTO: 0.6 % — SIGNIFICANT CHANGE UP (ref 0–2)
EOSINOPHIL # BLD AUTO: 0.09 K/UL — SIGNIFICANT CHANGE UP (ref 0–0.5)
EOSINOPHIL NFR BLD AUTO: 1.4 % — SIGNIFICANT CHANGE UP (ref 0–6)
HCT VFR BLD CALC: 38.4 % — SIGNIFICANT CHANGE UP (ref 34.5–45)
HGB BLD-MCNC: 13.4 G/DL — SIGNIFICANT CHANGE UP (ref 11.5–15.5)
IMM GRANULOCYTES NFR BLD AUTO: 0.5 % — SIGNIFICANT CHANGE UP (ref 0–1.5)
LYMPHOCYTES # BLD AUTO: 2.01 K/UL — SIGNIFICANT CHANGE UP (ref 1–3.3)
LYMPHOCYTES # BLD AUTO: 30.7 % — SIGNIFICANT CHANGE UP (ref 13–44)
MCHC RBC-ENTMCNC: 33.2 PG — SIGNIFICANT CHANGE UP (ref 27–34)
MCHC RBC-ENTMCNC: 34.9 GM/DL — SIGNIFICANT CHANGE UP (ref 32–36)
MCV RBC AUTO: 95 FL — SIGNIFICANT CHANGE UP (ref 80–100)
MONOCYTES # BLD AUTO: 0.65 K/UL — SIGNIFICANT CHANGE UP (ref 0–0.9)
MONOCYTES NFR BLD AUTO: 9.9 % — SIGNIFICANT CHANGE UP (ref 2–14)
NEUTROPHILS # BLD AUTO: 3.73 K/UL — SIGNIFICANT CHANGE UP (ref 1.8–7.4)
NEUTROPHILS NFR BLD AUTO: 56.9 % — SIGNIFICANT CHANGE UP (ref 43–77)
NRBC # BLD: 0 /100 WBCS — SIGNIFICANT CHANGE UP (ref 0–0)
PLATELET # BLD AUTO: 156 K/UL — SIGNIFICANT CHANGE UP (ref 150–400)
RBC # BLD: 4.04 M/UL — SIGNIFICANT CHANGE UP (ref 3.8–5.2)
RBC # FLD: 12.5 % — SIGNIFICANT CHANGE UP (ref 10.3–14.5)
WBC # BLD: 6.55 K/UL — SIGNIFICANT CHANGE UP (ref 3.8–10.5)
WBC # FLD AUTO: 6.55 K/UL — SIGNIFICANT CHANGE UP (ref 3.8–10.5)

## 2020-06-12 PROCEDURE — 99214 OFFICE O/P EST MOD 30 MIN: CPT

## 2020-06-12 NOTE — ASSESSMENT
[DNR] : Code Status: DNR [Limited] : Treatment Guidelines: Limited [[facility]: _____[facility]last [facility] date: [unfilled] [DNI] : Intubation: DNI [______] : HCP: [unfilled] [FreeTextEntry1] : 70yoF with:\par \par 1.  Cholangiocarcinoma - on Gemzar.  Scans pending. Follow up with Med Onc.\par \par 2. Neoplasm-related pain -  C/w with current medical cannabis regimen QHS and PRN for nausea.  Can c/w Tylenol 1000mg PRN but re-enforced the MDD of 4,000mg.  \par \par 3. Nausea - C/w medical cannabis as above and Ondansetron ODT PRN.  \par \par 4. Anxiety - We discussed potential benefits of increasing escitalopram.   She will discuss with her psychiatrist.  C/w talk therapy.\par \par 5. Fatigue - Has returned to baseline.  No indication for stimulant at this time.  \par \par 6. Encounter for Palliative Care/Advance Care Planning - HCP on file.  MOLST form on file, delineating wishes of limited care: DNR/DNI. Patient expressed that she "doesn’t want to suffer."  All questions answered.  Empathetic listening and emotional support provided. \par \par Follow up in 1 month, call sooner with questions or issues.

## 2020-06-12 NOTE — HISTORY OF PRESENT ILLNESS
[FreeTextEntry1] : 70yoF with metastatic cholangiocarcinoma presents for follow-up palliative care visit, referred by Oncology team.  PMH significant for HCV (treated with Harvoni), IBS, anxiety, GERD, thrombophlebitis. \par \par Patient initially who had some GI symptoms/elevated LFTs, weight loss (but was on weight watchers) in October 2019.  She saw Dr. Brunner (GI)  whom ordered a CT A/P which demonstrated enlargement of the head of the pancreas with an approximately 3.3 cm hypodense area posteriorly within the pancreatic head which is suspicious for neoplasm. Patient was referred for MRCP which revealed an irregularly enhancing 5.8 cm mass at the level of the confluence of the right and left hepatic biliary ducts. 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 There was no evidence of primary pancreatic neoplasm. She had biopsy of RUQ peritoneal nodule which confirmed metastatic cholangiocarcinoma. She was started on Gemzar/Cisplatin 12/20/19.  Did not tolerate Cisplatin and this was discontinued, now on single agent Gemzar.  \par \par Main issue for which patient initially presented was abdominal pain. The pain is localized to the RUQ and lower abdominal, she endorses bloating.  She was previously prescribed Morphine 15mg which she does not use due to concerns about dependence. She took it twice and it made her very constipated.  She is using Tylenol 1000mg four to five times daily, Motrin 200mg PRN (used very infrequently).\par \par Interval History: Patient presents for follow-up, seen in treatment room.  She called the office last week concerned that she was experiencing fatigue a week after treatment, as this was a change from her prior experiences.   If persistent, we discussed the potential for initiating a stimulant.  Patient reports that fatigue resolved the next day and she has returned to her baseline.  t She continues to use medicinal cannabis in 1:1 formulation QHS and high THC:low CBD PRN.  She finds that it helps with nausea and anxiety.  Denies pain but does report abdominal cramping; using PRN acetaminophen at times.  Has not required opioid in months.  Endorses fatigue and some irritability.  Anxiety continues to be a prominent symptom, especially given COVID pandemic and upcoming scans.  She sees her therapist regularly and reports a therapeutic relationship.  \par \par ROS:\par +weight has been stable \par +appetite is very good\par +chronic anxiety - on Lexapro 5mg QD and Alprazolam 0.25mg QID. Describes herself as realist, doesn't want to be optimistic and get her hopes up. \par +nausea - uses Ondansetron ODT which usually helps\par +gas/bloating\par + occasional diarrhea- controlled with PRN Imodium - has erratic BMs depending on what she eats. \par All other ROS as outlined or noncontributory. \par \par Patient is , lives with her . Has supportive family.  She has three children (ages 54, 50, 45) from a previous marriage. It is important to patient that she does not suffer. \par \par Psychiatrist: Dr. NGOC Becerra\par \par I-Stop Ref#  [id number]

## 2020-06-12 NOTE — PHYSICAL EXAM
[Sclera] : the sclera and conjunctiva were normal [General Appearance - In No Acute Distress] : in no acute distress [General Appearance - Alert] : alert [Normal Oral Mucosa] : normal oral mucosa [Neck Appearance] : the appearance of the neck was normal [] : no respiratory distress [Abnormal Walk] : normal gait [No Focal Deficits] : no focal deficits [Oriented To Time, Place, And Person] : oriented to person, place, and time [Affect] : the affect was normal [FreeTextEntry1] : +R chest wall Mediport

## 2020-06-21 ENCOUNTER — APPOINTMENT (OUTPATIENT)
Dept: MRI IMAGING | Facility: IMAGING CENTER | Age: 70
End: 2020-06-21
Payer: COMMERCIAL

## 2020-06-21 ENCOUNTER — APPOINTMENT (OUTPATIENT)
Dept: CT IMAGING | Facility: IMAGING CENTER | Age: 70
End: 2020-06-21
Payer: COMMERCIAL

## 2020-06-21 ENCOUNTER — RESULT REVIEW (OUTPATIENT)
Age: 70
End: 2020-06-21

## 2020-06-21 ENCOUNTER — OUTPATIENT (OUTPATIENT)
Dept: OUTPATIENT SERVICES | Facility: HOSPITAL | Age: 70
LOS: 1 days | End: 2020-06-21
Payer: COMMERCIAL

## 2020-06-21 DIAGNOSIS — C22.1 INTRAHEPATIC BILE DUCT CARCINOMA: ICD-10-CM

## 2020-06-21 PROCEDURE — 74183 MRI ABD W/O CNTR FLWD CNTR: CPT

## 2020-06-21 PROCEDURE — 72197 MRI PELVIS W/O & W/DYE: CPT | Mod: 26

## 2020-06-21 PROCEDURE — 71250 CT THORAX DX C-: CPT | Mod: 26

## 2020-06-21 PROCEDURE — 71250 CT THORAX DX C-: CPT

## 2020-06-21 PROCEDURE — 72197 MRI PELVIS W/O & W/DYE: CPT

## 2020-06-21 PROCEDURE — 74183 MRI ABD W/O CNTR FLWD CNTR: CPT | Mod: 26

## 2020-06-21 PROCEDURE — A9585: CPT

## 2020-06-22 ENCOUNTER — OUTPATIENT (OUTPATIENT)
Dept: OUTPATIENT SERVICES | Facility: HOSPITAL | Age: 70
LOS: 1 days | Discharge: HOME | End: 2020-06-22

## 2020-06-22 DIAGNOSIS — C23 MALIGNANT NEOPLASM OF GALLBLADDER: ICD-10-CM

## 2020-06-23 ENCOUNTER — APPOINTMENT (OUTPATIENT)
Dept: HEMATOLOGY ONCOLOGY | Facility: CLINIC | Age: 70
End: 2020-06-23
Payer: COMMERCIAL

## 2020-06-23 PROCEDURE — 99442: CPT

## 2020-06-26 ENCOUNTER — LABORATORY RESULT (OUTPATIENT)
Age: 70
End: 2020-06-26

## 2020-06-26 ENCOUNTER — RESULT REVIEW (OUTPATIENT)
Age: 70
End: 2020-06-26

## 2020-06-26 ENCOUNTER — APPOINTMENT (OUTPATIENT)
Dept: HEMATOLOGY ONCOLOGY | Facility: CLINIC | Age: 70
End: 2020-06-26
Payer: COMMERCIAL

## 2020-06-26 ENCOUNTER — APPOINTMENT (OUTPATIENT)
Dept: INFUSION THERAPY | Facility: HOSPITAL | Age: 70
End: 2020-06-26

## 2020-06-26 ENCOUNTER — APPOINTMENT (OUTPATIENT)
Dept: HEMATOLOGY ONCOLOGY | Facility: CLINIC | Age: 70
End: 2020-06-26

## 2020-06-26 LAB
BASOPHILS # BLD AUTO: 0.05 K/UL — SIGNIFICANT CHANGE UP (ref 0–0.2)
BASOPHILS NFR BLD AUTO: 0.7 % — SIGNIFICANT CHANGE UP (ref 0–2)
EOSINOPHIL # BLD AUTO: 0.11 K/UL — SIGNIFICANT CHANGE UP (ref 0–0.5)
EOSINOPHIL NFR BLD AUTO: 1.5 % — SIGNIFICANT CHANGE UP (ref 0–6)
HCT VFR BLD CALC: 40.2 % — SIGNIFICANT CHANGE UP (ref 34.5–45)
HGB BLD-MCNC: 13.6 G/DL — SIGNIFICANT CHANGE UP (ref 11.5–15.5)
IMM GRANULOCYTES NFR BLD AUTO: 0.5 % — SIGNIFICANT CHANGE UP (ref 0–1.5)
LYMPHOCYTES # BLD AUTO: 2.31 K/UL — SIGNIFICANT CHANGE UP (ref 1–3.3)
LYMPHOCYTES # BLD AUTO: 31.2 % — SIGNIFICANT CHANGE UP (ref 13–44)
MCHC RBC-ENTMCNC: 33 PG — SIGNIFICANT CHANGE UP (ref 27–34)
MCHC RBC-ENTMCNC: 33.8 GM/DL — SIGNIFICANT CHANGE UP (ref 32–36)
MCV RBC AUTO: 97.6 FL — SIGNIFICANT CHANGE UP (ref 80–100)
MONOCYTES # BLD AUTO: 1.01 K/UL — HIGH (ref 0–0.9)
MONOCYTES NFR BLD AUTO: 13.6 % — SIGNIFICANT CHANGE UP (ref 2–14)
NEUTROPHILS # BLD AUTO: 3.88 K/UL — SIGNIFICANT CHANGE UP (ref 1.8–7.4)
NEUTROPHILS NFR BLD AUTO: 52.5 % — SIGNIFICANT CHANGE UP (ref 43–77)
NRBC # BLD: 0 /100 WBCS — SIGNIFICANT CHANGE UP (ref 0–0)
PLATELET # BLD AUTO: 148 K/UL — LOW (ref 150–400)
RBC # BLD: 4.12 M/UL — SIGNIFICANT CHANGE UP (ref 3.8–5.2)
RBC # FLD: 12.8 % — SIGNIFICANT CHANGE UP (ref 10.3–14.5)
WBC # BLD: 7.4 K/UL — SIGNIFICANT CHANGE UP (ref 3.8–10.5)
WBC # FLD AUTO: 7.4 K/UL — SIGNIFICANT CHANGE UP (ref 3.8–10.5)

## 2020-06-26 PROCEDURE — 99213 OFFICE O/P EST LOW 20 MIN: CPT

## 2020-06-29 DIAGNOSIS — Z51.11 ENCOUNTER FOR ANTINEOPLASTIC CHEMOTHERAPY: ICD-10-CM

## 2020-06-29 DIAGNOSIS — R11.2 NAUSEA WITH VOMITING, UNSPECIFIED: ICD-10-CM

## 2020-06-29 NOTE — PHYSICAL EXAM
[General Appearance - Alert] : alert [Normal Oral Mucosa] : normal oral mucosa [General Appearance - In No Acute Distress] : in no acute distress [Sclera] : the sclera and conjunctiva were normal [Neck Appearance] : the appearance of the neck was normal [] : no respiratory distress [Abnormal Walk] : normal gait [No Focal Deficits] : no focal deficits [Oriented To Time, Place, And Person] : oriented to person, place, and time [Affect] : the affect was normal [FreeTextEntry1] : Physical examination deferred due to contact restrictions during COVID-19 pandemic.

## 2020-06-29 NOTE — HISTORY OF PRESENT ILLNESS
[FreeTextEntry1] : 70yoF with metastatic cholangiocarcinoma presents for follow-up palliative care visit, referred by Oncology team.  PMH significant for HCV (treated with Harvoni), IBS, anxiety, GERD, thrombophlebitis. \par \par Patient initially who had some GI symptoms/elevated LFTs, weight loss (but was on weight watchers) in October 2019.  She saw Dr. Brunner (GI)  whom ordered a CT A/P which demonstrated enlargement of the head of the pancreas with an approximately 3.3 cm hypodense area posteriorly within the pancreatic head which is suspicious for neoplasm. Patient was referred for MRCP which revealed an irregularly enhancing 5.8 cm mass at the level of the confluence of the right and left hepatic biliary ducts. 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 There was no evidence of primary pancreatic neoplasm. She had biopsy of RUQ peritoneal nodule which confirmed metastatic cholangiocarcinoma. She was started on Gemzar/Cisplatin 12/20/19.  Did not tolerate Cisplatin and this was discontinued, now on single agent Gemzar.  \par \par Main issue for which patient initially presented was abdominal pain. The pain is localized to the RUQ and lower abdominal, she endorses bloating.  She was previously prescribed Morphine 15mg which she does not use due to concerns about dependence. She took it twice and it made her very constipated.  She is using Tylenol 1000mg four to five times daily, Motrin 200mg PRN (used very infrequently).\par \par Interval History: Patient presents for follow-up, seen in treatment room. In the interim, patient had scans which showed mild improvement of upper retroperitoneal adenopathy and peritoneal disease.  Scans also revealed new pulmonary nodule.    She continues to use medicinal cannabis in 1:1 formulation QHS and high THC:low CBD PRN.  She finds that it helps with nausea and anxiety.  Denies pain but does report abdominal cramping; using PRN acetaminophen at times.  Has not required opioid in months.  Endorses fatigue and some irritability.  Anxiety continues to be a prominent symptom, especially given COVID pandemic.  She sees her therapist regularly and reports a therapeutic relationship. She recently became a great grandmother.\par \par ROS:\par +weight has been stable \par +appetite is very good\par +chronic anxiety - on Lexapro 5mg QD and Alprazolam 0.25mg QID. Describes herself as realist, doesn't want to be optimistic and get her hopes up. \par +nausea - uses Ondansetron ODT which usually helps\par +gas/bloating\par + occasional diarrhea- controlled with PRN Imodium - has erratic BMs depending on what she eats. \par All other ROS as outlined or noncontributory. \par \par Patient is , lives with her . Has supportive family.  She has three children (ages 54, 50, 45) from a previous marriage. It is important to patient that she does not suffer. \par \par Psychiatrist: Dr. NGOC Becerra\par \par I-Stop Ref#  [id number]

## 2020-06-29 NOTE — DATA REVIEWED
[FreeTextEntry1] : -CT CHEST FINDINGS  06/21/2020 \par \par Right-sided central line with the tip in the right atrial superior vena \par caval junction. \par \par A few bilateral small axillary lymph nodes are either unchanged or \par demonstrate interval decrease in size since November 26, 2019. For reference \par left axillary lymph node measures about 7 mm previously measured about 9 mm. \par No enlarged mediastinal or hilar lymph nodes. An asymmetric prominent right \par internal mammary chain lymph node measures about 6 mm has decreased in size, \par previously measuring about 9 mm. \par \par Heart size is normal. Minimal pericardial fluid. 2 subcentimeter lymph nodes \par within the right cardiophrenic angle are predominantly unchanged since \par January 25, 2020 abdominal CT. Subcentimeter lymph node adjacent to the \par lower esophagus or small hiatal hernia is also predominantly unchanged. No \par pleural effusions. \par \par Small omental nodules anterior to the proximal duodenum demonstrate interval \par decrease in size since January 25, 2020. For complete evaluation of the \par abdomen, please refer to the dedicated MRI scheduled to be performed on the \par same day. \par \par Evaluation of the lungs demonstrate 2 mm left upper lobe pulmonary nodule on \par image 24 of series 2 new since November 26, 2019. \par 2 mm left upper lobe pulmonary nodule on image 58 of series 2 is unchanged \par since November 26, 2019. \par \par A few right lower lobe peripheral 2 or 3 mm pulmonary nodules are unchanged \par since November 26, 2019 suggestive of foci of mucoid impacted distal \par airways. 2 mm right upper lobe calcified granuloma. \par \par No central endobronchial lesions. No pneumonia. \par \par Evaluation of the bones demonstrate degenerative changes of the spine. \par \par IMPRESSION: 2 mm left upper lobe pulmonary nodule is new since November 26, \par 2019, nonspecific finding. \par \par A few other bilateral 2 mm pulmonary nodules are unchanged since November \par 26, 2019 as described above. \par \par Small intrathoracic lymph nodes as described above slightly decreased in \par size since November 26, 2019. \par \par ----------------------------------------------------------------------\par \par \par -MRI A/P with and w/o contrast 06/21/2020 \par  \par LOWER CHEST: Within normal limits. \par \par LIVER/BILE DUCTS: Left hepatic lobe atrophy with poorly defined infiltrative \par disease centered in the medial segment left hepatic lobe and extending along \par the biliary tree to the hepatic hilum where there is a stricture of the left \par hepatic duct. Moderate biliary ductal dilatation in the left hepatic lobe \par without significant change. No right intrahepatic biliary ductal dilatation. \par Extrahepatic bile duct is normal in caliber. \par GALLBLADDER: Within normal limits. \par SPLEEN: Within normal limits. \par PANCREAS: Within normal limits. \par ADRENALS: Within normal limits. \par KIDNEYS/URETERS: Within normal limits. \par \par BLADDER: Within normal limits. \par REPRODUCTIVE ORGANS: Uterus and adnexa within normal limits. \par \par BOWEL: Colonic diverticulosis. No bowel obstruction. \par PERITONEUM: No ascites. Subcentimeter peritoneal nodularity in the right \par upper quadrant is stable to minimally improved from prior imaging. \par VESSELS: Marked narrowing of the left portal vein secondary to adjacent \par tumor as at prior imaging. \par RETROPERITONEUM/LYMPH NODES: Jonathan hepatis and portacaval adenopathy, \par improved from prior imaging with a reference portacaval node (30:31) 2.2 x \par 1.9 cm, previously 3.2 x 2.6 cm when measured in a similar fashion a prior \par MRI 11/7/2019. \par ABDOMINAL WALL: Within normal limits. \par BONES: Within normal limits. \par \par IMPRESSION: \par \par Ill-defined infiltrative tumor involving the left hepatic lobe medial \par segment and central left biliary tree, difficult to compare with prior \par imaging November 2019. \par \par Degree of left intrahepatic biliary ductal dilatation is unchanged. \par \par Upper retroperitoneal adenopathy and peritoneal disease is mildly improved.

## 2020-06-29 NOTE — ASSESSMENT
[Limited] : Treatment Guidelines: Limited [DNR] : Code Status: DNR [DNI] : Intubation: DNI [[facility] Date: _____] [facility]last verification date: [unfilled] [______] : HCP: [unfilled] [FreeTextEntry1] : 70yoF with:\par \par 1.  Cholangiocarcinoma - on Gemzar.  Follow up with Med Onc.\par \par 2. Neoplasm-related pain -  C/w with current medical cannabis regimen QHS and PRN for nausea.  Can c/w Tylenol 1000mg PRN but re-enforced the MDD of 4,000mg.  \par \par 3. Nausea - C/w medical cannabis as above and Ondansetron ODT PRN.  \par \par 4. Anxiety - We discussed potential benefits of increasing escitalopram.   Patient does not want to at this time.  C/w talk therapy.\par \par 5. Fatigue - Has returned to baseline.  No indication for stimulant at this time.  Will continue to monitor.  \par \par 6. Encounter for Palliative Care/Advance Care Planning - HCP on file.  MOLST form on file, delineating wishes of limited care: DNR/DNI. Patient expressed that she "doesn’t want to suffer."  All questions answered.  Empathetic listening and emotional support provided. \par \par Follow up in 1 month, call sooner with questions or issues.

## 2020-07-07 ENCOUNTER — APPOINTMENT (OUTPATIENT)
Dept: DISASTER EMERGENCY | Facility: CLINIC | Age: 70
End: 2020-07-07

## 2020-07-08 LAB — SARS-COV-2 N GENE NPH QL NAA+PROBE: NOT DETECTED

## 2020-07-10 ENCOUNTER — RESULT REVIEW (OUTPATIENT)
Age: 70
End: 2020-07-10

## 2020-07-10 ENCOUNTER — APPOINTMENT (OUTPATIENT)
Dept: INFUSION THERAPY | Facility: HOSPITAL | Age: 70
End: 2020-07-10

## 2020-07-10 ENCOUNTER — LABORATORY RESULT (OUTPATIENT)
Age: 70
End: 2020-07-10

## 2020-07-10 ENCOUNTER — APPOINTMENT (OUTPATIENT)
Dept: HEMATOLOGY ONCOLOGY | Facility: CLINIC | Age: 70
End: 2020-07-10
Payer: COMMERCIAL

## 2020-07-10 LAB
BASOPHILS # BLD AUTO: 0.04 K/UL — SIGNIFICANT CHANGE UP (ref 0–0.2)
BASOPHILS NFR BLD AUTO: 0.7 % — SIGNIFICANT CHANGE UP (ref 0–2)
EOSINOPHIL # BLD AUTO: 0.08 K/UL — SIGNIFICANT CHANGE UP (ref 0–0.5)
EOSINOPHIL NFR BLD AUTO: 1.3 % — SIGNIFICANT CHANGE UP (ref 0–6)
HCT VFR BLD CALC: 39 % — SIGNIFICANT CHANGE UP (ref 34.5–45)
HGB BLD-MCNC: 13.7 G/DL — SIGNIFICANT CHANGE UP (ref 11.5–15.5)
IMM GRANULOCYTES NFR BLD AUTO: 1.7 % — HIGH (ref 0–1.5)
LYMPHOCYTES # BLD AUTO: 2.1 K/UL — SIGNIFICANT CHANGE UP (ref 1–3.3)
LYMPHOCYTES # BLD AUTO: 34.9 % — SIGNIFICANT CHANGE UP (ref 13–44)
MCHC RBC-ENTMCNC: 33.8 PG — SIGNIFICANT CHANGE UP (ref 27–34)
MCHC RBC-ENTMCNC: 35.1 GM/DL — SIGNIFICANT CHANGE UP (ref 32–36)
MCV RBC AUTO: 96.3 FL — SIGNIFICANT CHANGE UP (ref 80–100)
MONOCYTES # BLD AUTO: 0.69 K/UL — SIGNIFICANT CHANGE UP (ref 0–0.9)
MONOCYTES NFR BLD AUTO: 11.5 % — SIGNIFICANT CHANGE UP (ref 2–14)
NEUTROPHILS # BLD AUTO: 3 K/UL — SIGNIFICANT CHANGE UP (ref 1.8–7.4)
NEUTROPHILS NFR BLD AUTO: 49.9 % — SIGNIFICANT CHANGE UP (ref 43–77)
NRBC # BLD: 0 /100 WBCS — SIGNIFICANT CHANGE UP (ref 0–0)
PLATELET # BLD AUTO: 156 K/UL — SIGNIFICANT CHANGE UP (ref 150–400)
RBC # BLD: 4.05 M/UL — SIGNIFICANT CHANGE UP (ref 3.8–5.2)
RBC # FLD: 13 % — SIGNIFICANT CHANGE UP (ref 10.3–14.5)
WBC # BLD: 6.01 K/UL — SIGNIFICANT CHANGE UP (ref 3.8–10.5)
WBC # FLD AUTO: 6.01 K/UL — SIGNIFICANT CHANGE UP (ref 3.8–10.5)

## 2020-07-10 PROCEDURE — 99214 OFFICE O/P EST MOD 30 MIN: CPT

## 2020-07-10 NOTE — ASSESSMENT
[DNR] : Code Status: DNR [Limited] : Treatment Guidelines: Limited [DNI] : Intubation: DNI [[facility] Date: _____[facility]last verification date: [unfilled] [______] : HCP: [unfilled] [FreeTextEntry1] : 70yoF with:\par \par 1.  Cholangiocarcinoma - on Gemzar.  Follow up with Med Onc.\par \par 2. Neoplasm-related pain \par - C/w with current medical cannabis regimen QHS and PRN for nausea.  Can c/w Tylenol 1000mg PRN but re-enforced the MDD of 4,000mg.  \par \par 3. Nausea \par - C/w medical cannabis as above and Ondansetron ODT PRN.  \par \par 4. Anxiety - Continue to discuss potential benefits of increasing escitalopram to 10mg.  Patient continues to decline due to fears of GI upset.  C/w talk therapy.\par \par 5. Fatigue\par - Has returned to baseline.  No indication for stimulant at this time.  Will continue to monitor.  \par \par 6. Encounter for Palliative Care/Advance Care Planning \par - HCP on file\par - MOLST form on file, delineating wishes of limited care: DNR/DNI. Patient expressed that she "doesn’t want to suffer."  All questions answered.  Empathetic listening and emotional support provided. \par \par Follow up in 1 month, call sooner with questions or issues.

## 2020-07-10 NOTE — PHYSICAL EXAM
[General Appearance - Alert] : alert [General Appearance - In No Acute Distress] : in no acute distress [Sclera] : the sclera and conjunctiva were normal [Normal Oral Mucosa] : normal oral mucosa [Neck Appearance] : the appearance of the neck was normal [] : no respiratory distress [Abnormal Walk] : normal gait [No Focal Deficits] : no focal deficits [Oriented To Time, Place, And Person] : oriented to person, place, and time [FreeTextEntry1] : Anxious, tearful at times

## 2020-07-10 NOTE — HISTORY OF PRESENT ILLNESS
[FreeTextEntry1] : 70yoF with metastatic cholangiocarcinoma presents for follow-up palliative care visit, referred by Oncology team.  PMH significant for HCV (treated with Harvoni), IBS, anxiety, GERD, thrombophlebitis. \par \par Patient initially who had some GI symptoms/elevated LFTs, weight loss (but was on weight watchers) in October 2019.  She saw Dr. Brunner (GI)  whom ordered a CT A/P which demonstrated enlargement of the head of the pancreas with an approximately 3.3 cm hypodense area posteriorly within the pancreatic head which is suspicious for neoplasm. Patient was referred for MRCP which revealed an irregularly enhancing 5.8 cm mass at the level of the confluence of the right and left hepatic biliary ducts. 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 There was no evidence of primary pancreatic neoplasm. She had biopsy of RUQ peritoneal nodule which confirmed metastatic cholangiocarcinoma. She was started on Gemzar/Cisplatin 12/20/19.  Did not tolerate Cisplatin and this was discontinued, now on single agent Gemzar.  \par \par Main issue for which patient initially presented was abdominal pain. The pain is localized to the RUQ and lower abdominal, she endorses bloating.  She was previously prescribed Morphine 15mg which she does not use due to concerns about dependence. She took it twice and it made her very constipated.  She is using Tylenol 1000mg four to five times daily, Motrin 200mg PRN (used very infrequently).\par \par Interval History: Patient presents for follow-up, seen in treatment room. She is very anxious, upset, and frustrated today due to difficulties with obtaining COVID testing.  Currently on escitalopram 5mg, has declined recommendation of increasing.   Uses PRN alprazolam.  She continues to use medicinal cannabis in 1:1 formulation QHS and high THC:low CBD PRN.  She finds that it helps with nausea and anxiety.  Denies pain but does report abdominal cramping; using PRN acetaminophen at times.  Has not required opioid in months.  Endorses fatigue and some irritability.  She sees her therapist regularly and reports a therapeutic relationship. \par \par ROS:\par +weight has been stable \par +appetite is very good\par +chronic anxiety - on Lexapro 5mg QD and Alprazolam 0.25mg QID. Describes herself as realist, doesn't want to be optimistic and get her hopes up. \par +nausea - uses Ondansetron ODT which usually helps\par +gas/bloating\par + occasional diarrhea- controlled with PRN Imodium - has erratic BMs depending on what she eats. \par All other ROS as outlined or noncontributory. \par \par Patient is , lives with her . Has supportive family.  She has three children (ages 54, 50, 45) from a previous marriage. It is important to patient that she does not suffer. \par \par Psychiatrist: Dr. NGOC Becerra\par \par I-Stop Ref#   [id number]

## 2020-07-11 ENCOUNTER — EMERGENCY (EMERGENCY)
Facility: HOSPITAL | Age: 70
LOS: 1 days | Discharge: HOME | End: 2020-07-11
Attending: EMERGENCY MEDICINE | Admitting: EMERGENCY MEDICINE
Payer: COMMERCIAL

## 2020-07-11 VITALS
HEART RATE: 103 BPM | TEMPERATURE: 98 F | SYSTOLIC BLOOD PRESSURE: 145 MMHG | RESPIRATION RATE: 18 BRPM | OXYGEN SATURATION: 100 % | DIASTOLIC BLOOD PRESSURE: 88 MMHG

## 2020-07-11 VITALS
DIASTOLIC BLOOD PRESSURE: 87 MMHG | RESPIRATION RATE: 16 BRPM | OXYGEN SATURATION: 98 % | SYSTOLIC BLOOD PRESSURE: 131 MMHG | HEART RATE: 94 BPM

## 2020-07-11 LAB
ALBUMIN SERPL ELPH-MCNC: 4.1 G/DL — SIGNIFICANT CHANGE UP (ref 3.3–5)
ALP SERPL-CCNC: 63 U/L — SIGNIFICANT CHANGE UP (ref 40–120)
ALT FLD-CCNC: 51 U/L — HIGH (ref 4–33)
ANION GAP SERPL CALC-SCNC: 16 MMO/L — HIGH (ref 7–14)
AST SERPL-CCNC: 50 U/L — HIGH (ref 4–32)
BASOPHILS # BLD AUTO: 0.01 K/UL — SIGNIFICANT CHANGE UP (ref 0–0.2)
BASOPHILS NFR BLD AUTO: 0.1 % — SIGNIFICANT CHANGE UP (ref 0–2)
BILIRUB SERPL-MCNC: 0.5 MG/DL — SIGNIFICANT CHANGE UP (ref 0.2–1.2)
BUN SERPL-MCNC: 29 MG/DL — HIGH (ref 7–23)
CALCIUM SERPL-MCNC: 9.2 MG/DL — SIGNIFICANT CHANGE UP (ref 8.4–10.5)
CHLORIDE SERPL-SCNC: 104 MMOL/L — SIGNIFICANT CHANGE UP (ref 98–107)
CO2 SERPL-SCNC: 20 MMOL/L — LOW (ref 22–31)
CREAT SERPL-MCNC: 0.82 MG/DL — SIGNIFICANT CHANGE UP (ref 0.5–1.3)
EOSINOPHIL # BLD AUTO: 0.02 K/UL — SIGNIFICANT CHANGE UP (ref 0–0.5)
EOSINOPHIL NFR BLD AUTO: 0.2 % — SIGNIFICANT CHANGE UP (ref 0–6)
GLUCOSE SERPL-MCNC: 139 MG/DL — HIGH (ref 70–99)
HCT VFR BLD CALC: 37.1 % — SIGNIFICANT CHANGE UP (ref 34.5–45)
HGB BLD-MCNC: 12.8 G/DL — SIGNIFICANT CHANGE UP (ref 11.5–15.5)
IMM GRANULOCYTES NFR BLD AUTO: 0.4 % — SIGNIFICANT CHANGE UP (ref 0–1.5)
LYMPHOCYTES # BLD AUTO: 0.73 K/UL — LOW (ref 1–3.3)
LYMPHOCYTES # BLD AUTO: 6.8 % — LOW (ref 13–44)
MCHC RBC-ENTMCNC: 32.5 PG — SIGNIFICANT CHANGE UP (ref 27–34)
MCHC RBC-ENTMCNC: 34.5 % — SIGNIFICANT CHANGE UP (ref 32–36)
MCV RBC AUTO: 94.2 FL — SIGNIFICANT CHANGE UP (ref 80–100)
MONOCYTES # BLD AUTO: 0.74 K/UL — SIGNIFICANT CHANGE UP (ref 0–0.9)
MONOCYTES NFR BLD AUTO: 6.9 % — SIGNIFICANT CHANGE UP (ref 2–14)
NEUTROPHILS # BLD AUTO: 9.17 K/UL — HIGH (ref 1.8–7.4)
NEUTROPHILS NFR BLD AUTO: 85.6 % — HIGH (ref 43–77)
NRBC # FLD: 0 K/UL — SIGNIFICANT CHANGE UP (ref 0–0)
PLATELET # BLD AUTO: 158 K/UL — SIGNIFICANT CHANGE UP (ref 150–400)
PMV BLD: 10.3 FL — SIGNIFICANT CHANGE UP (ref 7–13)
POTASSIUM SERPL-MCNC: 4 MMOL/L — SIGNIFICANT CHANGE UP (ref 3.5–5.3)
POTASSIUM SERPL-SCNC: 4 MMOL/L — SIGNIFICANT CHANGE UP (ref 3.5–5.3)
PROT SERPL-MCNC: 6.7 G/DL — SIGNIFICANT CHANGE UP (ref 6–8.3)
RBC # BLD: 3.94 M/UL — SIGNIFICANT CHANGE UP (ref 3.8–5.2)
RBC # FLD: 13.1 % — SIGNIFICANT CHANGE UP (ref 10.3–14.5)
SODIUM SERPL-SCNC: 140 MMOL/L — SIGNIFICANT CHANGE UP (ref 135–145)
WBC # BLD: 10.71 K/UL — HIGH (ref 3.8–10.5)
WBC # FLD AUTO: 10.71 K/UL — HIGH (ref 3.8–10.5)

## 2020-07-11 PROCEDURE — 99284 EMERGENCY DEPT VISIT MOD MDM: CPT

## 2020-07-11 RX ORDER — ONDANSETRON 8 MG/1
4 TABLET, FILM COATED ORAL ONCE
Refills: 0 | Status: COMPLETED | OUTPATIENT
Start: 2020-07-11 | End: 2020-07-11

## 2020-07-11 RX ORDER — SODIUM CHLORIDE 9 MG/ML
1000 INJECTION, SOLUTION INTRAVENOUS ONCE
Refills: 0 | Status: COMPLETED | OUTPATIENT
Start: 2020-07-11 | End: 2020-07-11

## 2020-07-11 RX ADMIN — SODIUM CHLORIDE 1000 MILLILITER(S): 9 INJECTION, SOLUTION INTRAVENOUS at 18:30

## 2020-07-11 RX ADMIN — SODIUM CHLORIDE 1000 MILLILITER(S): 9 INJECTION, SOLUTION INTRAVENOUS at 17:27

## 2020-07-11 RX ADMIN — ONDANSETRON 4 MILLIGRAM(S): 8 TABLET, FILM COATED ORAL at 17:27

## 2020-07-11 RX ADMIN — Medication 3 UNIT(S): at 20:00

## 2020-07-11 NOTE — ED PROVIDER NOTE - PATIENT PORTAL LINK FT
You can access the FollowMyHealth Patient Portal offered by [facility] by registering at the following website: [email or web address]. By joining [employer]’s FollowMyHealth portal, you will also be able to view your health information using other applications (apps) compatible with our system.

## 2020-07-11 NOTE — ED PROVIDER NOTE - FAMILY HISTORY
FH: type 2 diabetes     Father  Still living? Unknown  Family history of early CAD, Age at diagnosis: Age Unknown

## 2020-07-11 NOTE — ED ADULT NURSE NOTE - NSIMPLEMENTINTERV_GEN_ALL_ED
Implemented All Universal Safety Interventions:  [place] to call system. Call bell, personal items and telephone within reach. Instruct patient to call for assistance. Room bathroom lighting operational. Non-slip footwear when patient is off stretcher. Physically safe environment: no spills, clutter or unnecessary equipment. Stretcher in lowest position, wheels locked, appropriate side rails in place.

## 2020-07-11 NOTE — ED PROVIDER NOTE - NSFOLLOWUPINSTRUCTIONS_ED_ALL_ED_FT
Continue your anti-emetic medicine for vomiting as prescribed.  Follow up at [facility] with Dr. Velez.  Return for any worsening pain, fever, vomiting, weakness, difficulty breathing, or any signs of distress.

## 2020-07-11 NOTE — ED ADULT NURSE NOTE - OBJECTIVE STATEMENT
patient alert ox4 came in c/o severe vomiting all day. on chemo for cancer. denies any pain. NAD. breathing even and unlabored. labs done as ordered. meds given as ordered. awaiting results and re eval.

## 2020-07-11 NOTE — ED PROVIDER NOTE - ATTENDING CONTRIBUTION TO CARE
Attending Statement: I have personally seen and examined this patient. I have fully participated in the care of this patient. 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 Multiple episodes of NBNB vomit today. no chest pain or abdominal pain. no fever/chills. Took zofran and reglan, with min relief. Had a BM pta, "helped"   Vital signs noted. sitting up, no distress. mmm. non icteric. no juandice.  soft nontender abdomen. no  rebound. no guarding. no sign of trauma. no CVAT   plan labs, VIF, anti emetic, re assess

## 2020-07-11 NOTE — ED PROVIDER NOTE - OBJECTIVE STATEMENT
This is a 71 yo female with a history of bile duct cancer complaining of nausea and vomiting  for the past 8 hours. She states that she woke up this morning, ate breakfast, which she tolerated well, and later became nauseated and began vomiting. She received her regular chemotherapy yesterday. She reports 6 episodes of bilious, nonbloody vomiting and denies any abdominal pain, chest pain, fevers, chills, shortness of breath, cough or other complaints at this time.

## 2020-07-11 NOTE — ED ADULT NURSE REASSESSMENT NOTE - NS ED NURSE REASSESS COMMENT FT1
Received report from Lesli CHRISTOPHER as break coverage - Pt awake, alert and oriented x 4 with VSS.    Mediport accessed with KVO IVF infusing.   Pt ambulating with steady gait to restroom to void.   Denies chest pain or shortness of breath.

## 2020-07-11 NOTE — ED PROVIDER NOTE - CARDIAC, MLM
Normal rate, regular rhythm.  Heart sounds S1, S2.  No murmurs, rubs or gallops. Chest port present in right sub clavicular region

## 2020-07-22 ENCOUNTER — APPOINTMENT (OUTPATIENT)
Dept: HEMATOLOGY ONCOLOGY | Facility: CLINIC | Age: 70
End: 2020-07-22
Payer: COMMERCIAL

## 2020-07-22 PROCEDURE — 99213 OFFICE O/P EST LOW 20 MIN: CPT | Mod: 95

## 2020-07-22 RX ORDER — PROCHLORPERAZINE MALEATE 10 MG/1
10 TABLET ORAL EVERY 8 HOURS
Qty: 30 | Refills: 2 | Status: DISCONTINUED | COMMUNITY
Start: 2019-12-13 | End: 2020-07-22

## 2020-07-22 RX ORDER — METOCLOPRAMIDE HYDROCHLORIDE 10 MG/1
10 TABLET, ORALLY DISINTEGRATING ORAL EVERY 8 HOURS
Qty: 30 | Refills: 1 | Status: DISCONTINUED | COMMUNITY
Start: 2019-12-23 | End: 2020-07-22

## 2020-07-22 NOTE — REVIEW OF SYSTEMS
[Anxiety] : anxiety [Negative] : Allergic/Immunologic [Fever] : no fever [Chills] : no chills [Night Sweats] : no night sweats [Chest Pain] : no chest pain [Lower Ext Edema] : no lower extremity edema [Cough] : no cough [SOB on Exertion] : no shortness of breath during exertion [Shortness Of Breath] : no shortness of breath [Abdominal Pain] : no abdominal pain [Vomiting] : no vomiting [Constipation] : no constipation [Diarrhea] : no diarrhea

## 2020-07-22 NOTE — HISTORY OF PRESENT ILLNESS
[Disease: _____________________] : Disease: [unfilled] [M: ___] : M[unfilled] [AJCC Stage: ____] : AJCC Stage: [unfilled] [Home] : at home, [unfilled] , at the time of the visit. [Verbal consent obtained from patient] : the patient, [unfilled] [Medical Office: ([facility])___] : at the medical office located in  [de-identified] : 68 Y/O F HCV infection (treated with Harvoni), IBS, Anxiety, GERD who had some GI symptoms/elevated LFTs (wt loss but on wt watchers) October 2019 and consulted Dr Brunner GI and non contrast CT A/P showed enlargement of the head of the pancreas with an approximately 3.3 cm hypodense area posteriorly within the pancreatic head which is suspicious for neoplasm. There are enlarged retroperitoneal lymph nodes posterior to the head of the pancreas which are suspicious appearing as well. There are few small nodular soft tissue densities lying deep to the right anterior abdominal wall suspicious for peritoneal implants, and soft tissue density material within the gallbladder which may reflect sludge.  Patient was referred for MRCP which revealed an irregularly enhancing 5.8 cm mass at the level of the confluence of the right and left hepatic biliary ducts. This mass obstructs the common hepatic duct and main left intrahepatic bile duct causing dilation of the intrahepatic biliary ducts within the left hepatic lobe. The mid to distal portion of the CBD are normal in caliber and appear uninvolved by the mass. 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 There are enlarged retroperitoneal lymph nodes, some of which demonstrate central necrosis lying posterior to the head of the pancreas and within the aortocaval region, suspicious for metastatic disease. There is no evidence of primary pancreatic neoplasm. There are small nodular densities within the right anterior abdomen suspicious for peritoneal metastatic foci ranging from 3-10 mm in size.  She consulted Dr Adams on 11/13/19 and is planned for biopsy planned 11/27/19. \par \par \par PSH:  cataracts, tubal ligation \par Fhx:  gastric or colon cancer involving a maternal cousin at 60\par Social:  former smoker but quit 13 years ago and drinks alcohol on occasion. She gets her colonoscopies with Dr. Brunner. \par \par PCP: Dr. Alexandria Diop\par GI: Dr. Robert Brunner. \par \par Last Mammogram/pap smear - few years ago; last colonoscopy 2017\par \par She had biopsy of RUQ peritoneal nodule which confirmed metastatic cholangiocarcinoma.  \par She was started on Gemzar/Cisplatin 12/20/19 \par \par after her second cycle, she had a port placed, her Cisplatin was held due to SE and was treated for a cellulitis and influenza\par \par CT chest on 6/21 (2 mm left upper lobe pulmonary nodule is new since November 26, 2019, nonspecific finding. A few other bilateral 2 mm pulmonary nodules are unchanged since November 26, 2019 as described above.) and MRI abd (Ill-defined infiltrative tumor involving the left hepatic lobe medial \par segment and central left biliary tree, difficult to compare with prior imaging November 2019. Degree of left intrahepatic biliary ductal dilatation is unchanged. Upper retroperitoneal adenopathy and peritoneal disease is mildly improved).  [de-identified] : Microsatellite status MS-Stable / Tumor Mutational [place] 1 Muts/Mb §\par ERRFI1 H250fs*14 / IDH1 R132C \par TPS 20 [FreeTextEntry1] : palliative Gemcitabine q2 week  [de-identified] : Karlie is seen for follow up while on Gemcitabine and overall feels well and offers no acute complaints.\par About 2 weeks ago patient had been in the ER after having abdominal pain that was related to either food or constipation but was not admitted and discharged after couple of hours in the ER.  She offers no acute complaints otherwise and is tolerating treatment well.\par \par

## 2020-07-22 NOTE — PHYSICAL EXAM
[Fully active, able to carry on all pre-disease performance without restriction] : Status 0 - Fully active, able to carry on all pre-disease performance without restriction [Normal] : grossly intact [de-identified] : no jvd [de-identified] : anicteric  [de-identified] : normal respiratory effort, no audible wheeze  [de-identified] : anxious

## 2020-07-23 ENCOUNTER — OUTPATIENT (OUTPATIENT)
Dept: OUTPATIENT SERVICES | Facility: HOSPITAL | Age: 70
LOS: 1 days | Discharge: HOME | End: 2020-07-23

## 2020-07-23 DIAGNOSIS — C23 MALIGNANT NEOPLASM OF GALLBLADDER: ICD-10-CM

## 2020-07-24 ENCOUNTER — LABORATORY RESULT (OUTPATIENT)
Age: 70
End: 2020-07-24

## 2020-07-24 ENCOUNTER — APPOINTMENT (OUTPATIENT)
Dept: INFUSION THERAPY | Facility: HOSPITAL | Age: 70
End: 2020-07-24

## 2020-07-24 ENCOUNTER — RESULT REVIEW (OUTPATIENT)
Age: 70
End: 2020-07-24

## 2020-07-24 LAB
BASOPHILS # BLD AUTO: 0.05 K/UL — SIGNIFICANT CHANGE UP (ref 0–0.2)
BASOPHILS NFR BLD AUTO: 0.7 % — SIGNIFICANT CHANGE UP (ref 0–2)
EOSINOPHIL # BLD AUTO: 0.08 K/UL — SIGNIFICANT CHANGE UP (ref 0–0.5)
EOSINOPHIL NFR BLD AUTO: 1.1 % — SIGNIFICANT CHANGE UP (ref 0–6)
HCT VFR BLD CALC: 40.3 % — SIGNIFICANT CHANGE UP (ref 34.5–45)
HGB BLD-MCNC: 14 G/DL — SIGNIFICANT CHANGE UP (ref 11.5–15.5)
IMM GRANULOCYTES NFR BLD AUTO: 0.3 % — SIGNIFICANT CHANGE UP (ref 0–1.5)
LYMPHOCYTES # BLD AUTO: 2.42 K/UL — SIGNIFICANT CHANGE UP (ref 1–3.3)
LYMPHOCYTES # BLD AUTO: 34.7 % — SIGNIFICANT CHANGE UP (ref 13–44)
MCHC RBC-ENTMCNC: 33.4 PG — SIGNIFICANT CHANGE UP (ref 27–34)
MCHC RBC-ENTMCNC: 34.7 GM/DL — SIGNIFICANT CHANGE UP (ref 32–36)
MCV RBC AUTO: 96.2 FL — SIGNIFICANT CHANGE UP (ref 80–100)
MONOCYTES # BLD AUTO: 0.69 K/UL — SIGNIFICANT CHANGE UP (ref 0–0.9)
MONOCYTES NFR BLD AUTO: 9.9 % — SIGNIFICANT CHANGE UP (ref 2–14)
NEUTROPHILS # BLD AUTO: 3.72 K/UL — SIGNIFICANT CHANGE UP (ref 1.8–7.4)
NEUTROPHILS NFR BLD AUTO: 53.3 % — SIGNIFICANT CHANGE UP (ref 43–77)
NRBC # BLD: 0 /100 WBCS — SIGNIFICANT CHANGE UP (ref 0–0)
PLATELET # BLD AUTO: 161 K/UL — SIGNIFICANT CHANGE UP (ref 150–400)
RBC # BLD: 4.19 M/UL — SIGNIFICANT CHANGE UP (ref 3.8–5.2)
RBC # FLD: 13.4 % — SIGNIFICANT CHANGE UP (ref 10.3–14.5)
WBC # BLD: 6.98 K/UL — SIGNIFICANT CHANGE UP (ref 3.8–10.5)
WBC # FLD AUTO: 6.98 K/UL — SIGNIFICANT CHANGE UP (ref 3.8–10.5)

## 2020-07-24 RX ORDER — IBUPROFEN 200 MG
1 TABLET ORAL
Qty: 0 | Refills: 0 | DISCHARGE

## 2020-08-04 ENCOUNTER — LABORATORY RESULT (OUTPATIENT)
Age: 70
End: 2020-08-04

## 2020-08-04 ENCOUNTER — APPOINTMENT (OUTPATIENT)
Dept: INFUSION THERAPY | Facility: HOSPITAL | Age: 70
End: 2020-08-04

## 2020-08-07 ENCOUNTER — APPOINTMENT (OUTPATIENT)
Dept: HEMATOLOGY ONCOLOGY | Facility: CLINIC | Age: 70
End: 2020-08-07

## 2020-08-07 ENCOUNTER — LABORATORY RESULT (OUTPATIENT)
Age: 70
End: 2020-08-07

## 2020-08-08 ENCOUNTER — RESULT REVIEW (OUTPATIENT)
Age: 70
End: 2020-08-08

## 2020-08-08 ENCOUNTER — APPOINTMENT (OUTPATIENT)
Dept: INFUSION THERAPY | Facility: HOSPITAL | Age: 70
End: 2020-08-08

## 2020-08-08 ENCOUNTER — LABORATORY RESULT (OUTPATIENT)
Age: 70
End: 2020-08-08

## 2020-08-08 LAB
BASOPHILS # BLD AUTO: 0.02 K/UL — SIGNIFICANT CHANGE UP (ref 0–0.2)
BASOPHILS NFR BLD AUTO: 0.3 % — SIGNIFICANT CHANGE UP (ref 0–2)
EOSINOPHIL # BLD AUTO: 0.08 K/UL — SIGNIFICANT CHANGE UP (ref 0–0.5)
EOSINOPHIL NFR BLD AUTO: 1.2 % — SIGNIFICANT CHANGE UP (ref 0–6)
HCT VFR BLD CALC: 38.3 % — SIGNIFICANT CHANGE UP (ref 34.5–45)
HGB BLD-MCNC: 13.2 G/DL — SIGNIFICANT CHANGE UP (ref 11.5–15.5)
IMM GRANULOCYTES NFR BLD AUTO: 0.3 % — SIGNIFICANT CHANGE UP (ref 0–1.5)
LYMPHOCYTES # BLD AUTO: 2.31 K/UL — SIGNIFICANT CHANGE UP (ref 1–3.3)
LYMPHOCYTES # BLD AUTO: 34.9 % — SIGNIFICANT CHANGE UP (ref 13–44)
MCHC RBC-ENTMCNC: 32.7 PG — SIGNIFICANT CHANGE UP (ref 27–34)
MCHC RBC-ENTMCNC: 34.5 GM/DL — SIGNIFICANT CHANGE UP (ref 32–36)
MCV RBC AUTO: 94.8 FL — SIGNIFICANT CHANGE UP (ref 80–100)
MONOCYTES # BLD AUTO: 0.91 K/UL — HIGH (ref 0–0.9)
MONOCYTES NFR BLD AUTO: 13.8 % — SIGNIFICANT CHANGE UP (ref 2–14)
NEUTROPHILS # BLD AUTO: 3.27 K/UL — SIGNIFICANT CHANGE UP (ref 1.8–7.4)
NEUTROPHILS NFR BLD AUTO: 49.5 % — SIGNIFICANT CHANGE UP (ref 43–77)
NRBC # BLD: 0 /100 WBCS — SIGNIFICANT CHANGE UP (ref 0–0)
PLATELET # BLD AUTO: 159 K/UL — SIGNIFICANT CHANGE UP (ref 150–400)
RBC # BLD: 4.04 M/UL — SIGNIFICANT CHANGE UP (ref 3.8–5.2)
RBC # FLD: 13.2 % — SIGNIFICANT CHANGE UP (ref 10.3–14.5)
WBC # BLD: 6.61 K/UL — SIGNIFICANT CHANGE UP (ref 3.8–10.5)
WBC # FLD AUTO: 6.61 K/UL — SIGNIFICANT CHANGE UP (ref 3.8–10.5)

## 2020-08-10 DIAGNOSIS — C22.1 INTRAHEPATIC BILE DUCT CARCINOMA: ICD-10-CM

## 2020-08-14 DIAGNOSIS — Z51.11 ENCOUNTER FOR ANTINEOPLASTIC CHEMOTHERAPY: ICD-10-CM

## 2020-08-14 DIAGNOSIS — R11.2 NAUSEA WITH VOMITING, UNSPECIFIED: ICD-10-CM

## 2020-08-17 ENCOUNTER — APPOINTMENT (OUTPATIENT)
Dept: HEMATOLOGY ONCOLOGY | Facility: CLINIC | Age: 70
End: 2020-08-17
Payer: COMMERCIAL

## 2020-08-17 PROCEDURE — 99212 OFFICE O/P EST SF 10 MIN: CPT | Mod: 95

## 2020-08-17 NOTE — REASON FOR VISIT
[Follow-Up] : a follow-up visit [Home] : at home, [unfilled] , at the time of the visit. [Medical Office: ([facility])___] : at the medical office located in  [Verbal consent obtained from patient] : the patient, [unfilled]

## 2020-08-18 NOTE — ASSESSMENT
[DNR] : Code Status: DNR [Limited] : Treatment Guidelines: Limited [DNI] : Intubation: DNI [[facility] Ver[facility] Date: _____] [facility] Completion/last verification date: [unfilled] [______] : HCP: [unfilled] [FreeTextEntry1] : 70yoF with:\par \par 1.  Cholangiocarcinoma - on Gemzar.  Follow up with Med Onc.\par \par 2. Abdominal pain / discomfort\par - C/w with current medical cannabis regimen PRN.  \par - Can c/w tylenol 500mg PRN.\par \par 3. Nausea \par - C/w medical cannabis and Ondansetron ODT PRN.  \par \par 4. Anxiety - Continue to discuss potential benefits of increasing escitalopram to 10mg.  Patient continues to decline due to fears of GI upset.  C/w talk therapy.\par \par 5. Fatigue\par - Has returned to baseline.  No indication for stimulant at this time.  Will continue to monitor.  \par \par 6. Encounter for Palliative Care/Advance Care Planning \par - HCP on file\par - MOLST form on file, delineating wishes of limited care: DNR/DNI. Patient expressed that she "doesn’t want to suffer."  All questions answered.  Empathetic listening and emotional support provided. \par \par Follow up in 1-2 months, call sooner with questions or issues.

## 2020-08-19 ENCOUNTER — APPOINTMENT (OUTPATIENT)
Dept: HEMATOLOGY ONCOLOGY | Facility: CLINIC | Age: 70
End: 2020-08-19

## 2020-08-21 ENCOUNTER — APPOINTMENT (OUTPATIENT)
Dept: HEMATOLOGY ONCOLOGY | Facility: CLINIC | Age: 70
End: 2020-08-21
Payer: COMMERCIAL

## 2020-08-21 ENCOUNTER — RESULT REVIEW (OUTPATIENT)
Age: 70
End: 2020-08-21

## 2020-08-21 ENCOUNTER — LABORATORY RESULT (OUTPATIENT)
Age: 70
End: 2020-08-21

## 2020-08-21 ENCOUNTER — APPOINTMENT (OUTPATIENT)
Dept: INFUSION THERAPY | Facility: HOSPITAL | Age: 70
End: 2020-08-21

## 2020-08-21 LAB
BASOPHILS # BLD AUTO: 0.04 K/UL — SIGNIFICANT CHANGE UP (ref 0–0.2)
BASOPHILS NFR BLD AUTO: 0.6 % — SIGNIFICANT CHANGE UP (ref 0–2)
EOSINOPHIL # BLD AUTO: 0.1 K/UL — SIGNIFICANT CHANGE UP (ref 0–0.5)
EOSINOPHIL NFR BLD AUTO: 1.5 % — SIGNIFICANT CHANGE UP (ref 0–6)
HCT VFR BLD CALC: 40.6 % — SIGNIFICANT CHANGE UP (ref 34.5–45)
HGB BLD-MCNC: 13.9 G/DL — SIGNIFICANT CHANGE UP (ref 11.5–15.5)
IMM GRANULOCYTES NFR BLD AUTO: 0.9 % — SIGNIFICANT CHANGE UP (ref 0–1.5)
LYMPHOCYTES # BLD AUTO: 2.23 K/UL — SIGNIFICANT CHANGE UP (ref 1–3.3)
LYMPHOCYTES # BLD AUTO: 33.3 % — SIGNIFICANT CHANGE UP (ref 13–44)
MCHC RBC-ENTMCNC: 33.1 PG — SIGNIFICANT CHANGE UP (ref 27–34)
MCHC RBC-ENTMCNC: 34.2 GM/DL — SIGNIFICANT CHANGE UP (ref 32–36)
MCV RBC AUTO: 96.7 FL — SIGNIFICANT CHANGE UP (ref 80–100)
MONOCYTES # BLD AUTO: 0.74 K/UL — SIGNIFICANT CHANGE UP (ref 0–0.9)
MONOCYTES NFR BLD AUTO: 11.1 % — SIGNIFICANT CHANGE UP (ref 2–14)
NEUTROPHILS # BLD AUTO: 3.52 K/UL — SIGNIFICANT CHANGE UP (ref 1.8–7.4)
NEUTROPHILS NFR BLD AUTO: 52.6 % — SIGNIFICANT CHANGE UP (ref 43–77)
NRBC # BLD: 0 /100 WBCS — SIGNIFICANT CHANGE UP (ref 0–0)
PLATELET # BLD AUTO: 156 K/UL — SIGNIFICANT CHANGE UP (ref 150–400)
RBC # BLD: 4.2 M/UL — SIGNIFICANT CHANGE UP (ref 3.8–5.2)
RBC # FLD: 13.2 % — SIGNIFICANT CHANGE UP (ref 10.3–14.5)
WBC # BLD: 6.69 K/UL — SIGNIFICANT CHANGE UP (ref 3.8–10.5)
WBC # FLD AUTO: 6.69 K/UL — SIGNIFICANT CHANGE UP (ref 3.8–10.5)

## 2020-08-21 PROCEDURE — 99214 OFFICE O/P EST MOD 30 MIN: CPT

## 2020-08-21 RX ORDER — FAMOTIDINE 20 MG/1
20 TABLET, FILM COATED ORAL
Qty: 30 | Refills: 2 | Status: ACTIVE | COMMUNITY
Start: 2019-12-20 | End: 1900-01-01

## 2020-08-23 NOTE — HISTORY OF PRESENT ILLNESS
[Disease: _____________________] : Disease: [unfilled] [M: ___] : M[unfilled] [AJCC Stage: ____] : AJCC Stage: [unfilled] [de-identified] : 68 Y/O F HCV infection (treated with Harvoni), IBS, Anxiety, GERD who had some GI symptoms/elevated LFTs (wt loss but on wt watchers) October 2019 and consulted Dr Brunner GI and non contrast CT A/P showed enlargement of the head of the pancreas with an approximately 3.3 cm hypodense area posteriorly within the pancreatic head which is suspicious for neoplasm. There are enlarged retroperitoneal lymph nodes posterior to the head of the pancreas which are suspicious appearing as well. There are few small nodular soft tissue densities lying deep to the right anterior abdominal wall suspicious for peritoneal implants, and soft tissue density material within the gallbladder which may reflect sludge.  Patient was referred for MRCP which revealed an irregularly enhancing 5.8 cm mass at the level of the confluence of the right and left hepatic biliary ducts. This mass obstructs the common hepatic duct and main left intrahepatic bile duct causing dilation of the intrahepatic biliary ducts within the left hepatic lobe. The mid to distal portion of the CBD are normal in caliber and appear uninvolved by the mass. 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 There are enlarged retroperitoneal lymph nodes, some of which demonstrate central necrosis lying posterior to the head of the pancreas and within the aortocaval region, suspicious for metastatic disease. There is no evidence of primary pancreatic neoplasm. There are small nodular densities within the right anterior abdomen suspicious for peritoneal metastatic foci ranging from 3-10 mm in size.  She consulted Dr Adams on 11/13/19 and is planned for biopsy planned 11/27/19. \par \par \par PSH:  cataracts, tubal ligation \par Fhx:  gastric or colon cancer involving a maternal cousin at 60\par Social:  former smoker but quit 13 years ago and drinks alcohol on occasion. She gets her colonoscopies with Dr. Brunner. \par \par PCP: Dr. Alexandria Diop\par GI: Dr. Robert Brunner. \par \par Last Mammogram/pap smear - few years ago; last colonoscopy 2017\par \par She had biopsy of RUQ peritoneal nodule which confirmed metastatic cholangiocarcinoma.  \par She was started on Gemzar/Cisplatin 12/20/19 \par \par after her second cycle, she had a port placed, her Cisplatin was held due to SE and was treated for a cellulitis and influenza\par \par CT chest on 6/21 (2 mm left upper lobe pulmonary nodule is new since November 26, 2019, nonspecific finding. A few other bilateral 2 mm pulmonary nodules are unchanged since November 26, 2019 as described above.) and MRI abd (Ill-defined infiltrative tumor involving the left hepatic lobe medial \par segment and central left biliary tree, difficult to compare with prior imaging November 2019. Degree of left intrahepatic biliary ductal dilatation is unchanged. Upper retroperitoneal adenopathy and peritoneal disease is mildly improved).  [de-identified] : Microsatellite status MS-Stable / Tumor Mutational [place] 1 Muts/Mb §\par ERRFI1 H250fs*14 / IDH1 R132C \par TPS 20 [FreeTextEntry1] : palliative Gemcitabine q2 week  [de-identified] : Karlie is seen for follow up while on Gemcitabine and has gained 26 pounds since March 2020 which she endorses to a good appetite.  She continues to have issues with chronic fecal urgency otherwise is tolerating treatment well.  \par \par \par

## 2020-08-23 NOTE — REVIEW OF SYSTEMS
[Anxiety] : anxiety [Negative] : Heme/Lymph [Recent Change In Weight] : ~T recent weight change [Diarrhea] : diarrhea [Fever] : no fever [Chills] : no chills [Night Sweats] : no night sweats [Fatigue] : no fatigue [Chest Pain] : no chest pain [Lower Ext Edema] : no lower extremity edema [Shortness Of Breath] : no shortness of breath [Cough] : no cough [SOB on Exertion] : no shortness of breath during exertion [Abdominal Pain] : no abdominal pain [Vomiting] : no vomiting [Constipation] : no constipation [FreeTextEntry7] : good appetite

## 2020-08-23 NOTE — PHYSICAL EXAM
[Fully active, able to carry on all pre-disease performance without restriction] : Status 0 - Fully active, able to carry on all pre-disease performance without restriction [Normal] : normoactive bowel sounds, soft and nontender, no hepatosplenomegaly or masses appreciated [de-identified] : obese [de-identified] : anxious

## 2020-08-28 ENCOUNTER — OUTPATIENT (OUTPATIENT)
Dept: OUTPATIENT SERVICES | Facility: HOSPITAL | Age: 70
LOS: 1 days | Discharge: HOME | End: 2020-08-28

## 2020-08-28 DIAGNOSIS — C22.1 INTRAHEPATIC BILE DUCT CARCINOMA: ICD-10-CM

## 2020-09-04 ENCOUNTER — APPOINTMENT (OUTPATIENT)
Dept: INFUSION THERAPY | Facility: HOSPITAL | Age: 70
End: 2020-09-04

## 2020-09-04 ENCOUNTER — RESULT REVIEW (OUTPATIENT)
Age: 70
End: 2020-09-04

## 2020-09-04 ENCOUNTER — LABORATORY RESULT (OUTPATIENT)
Age: 70
End: 2020-09-04

## 2020-09-04 ENCOUNTER — APPOINTMENT (OUTPATIENT)
Dept: HEMATOLOGY ONCOLOGY | Facility: CLINIC | Age: 70
End: 2020-09-04
Payer: COMMERCIAL

## 2020-09-04 LAB
BASOPHILS # BLD AUTO: 0.03 K/UL — SIGNIFICANT CHANGE UP (ref 0–0.2)
BASOPHILS NFR BLD AUTO: 0.5 % — SIGNIFICANT CHANGE UP (ref 0–2)
EOSINOPHIL # BLD AUTO: 0.06 K/UL — SIGNIFICANT CHANGE UP (ref 0–0.5)
EOSINOPHIL NFR BLD AUTO: 0.9 % — SIGNIFICANT CHANGE UP (ref 0–6)
HCT VFR BLD CALC: 40 % — SIGNIFICANT CHANGE UP (ref 34.5–45)
HGB BLD-MCNC: 13.8 G/DL — SIGNIFICANT CHANGE UP (ref 11.5–15.5)
IMM GRANULOCYTES NFR BLD AUTO: 0.9 % — SIGNIFICANT CHANGE UP (ref 0–1.5)
LYMPHOCYTES # BLD AUTO: 2.13 K/UL — SIGNIFICANT CHANGE UP (ref 1–3.3)
LYMPHOCYTES # BLD AUTO: 33 % — SIGNIFICANT CHANGE UP (ref 13–44)
MCHC RBC-ENTMCNC: 33.4 PG — SIGNIFICANT CHANGE UP (ref 27–34)
MCHC RBC-ENTMCNC: 34.5 GM/DL — SIGNIFICANT CHANGE UP (ref 32–36)
MCV RBC AUTO: 96.9 FL — SIGNIFICANT CHANGE UP (ref 80–100)
MONOCYTES # BLD AUTO: 0.62 K/UL — SIGNIFICANT CHANGE UP (ref 0–0.9)
MONOCYTES NFR BLD AUTO: 9.6 % — SIGNIFICANT CHANGE UP (ref 2–14)
NEUTROPHILS # BLD AUTO: 3.55 K/UL — SIGNIFICANT CHANGE UP (ref 1.8–7.4)
NEUTROPHILS NFR BLD AUTO: 55.1 % — SIGNIFICANT CHANGE UP (ref 43–77)
NRBC # BLD: 0 /100 WBCS — SIGNIFICANT CHANGE UP (ref 0–0)
PLATELET # BLD AUTO: 178 K/UL — SIGNIFICANT CHANGE UP (ref 150–400)
RBC # BLD: 4.13 M/UL — SIGNIFICANT CHANGE UP (ref 3.8–5.2)
RBC # FLD: 13.1 % — SIGNIFICANT CHANGE UP (ref 10.3–14.5)
WBC # BLD: 6.45 K/UL — SIGNIFICANT CHANGE UP (ref 3.8–10.5)
WBC # FLD AUTO: 6.45 K/UL — SIGNIFICANT CHANGE UP (ref 3.8–10.5)

## 2020-09-04 PROCEDURE — 99441: CPT

## 2020-09-08 DIAGNOSIS — R11.2 NAUSEA WITH VOMITING, UNSPECIFIED: ICD-10-CM

## 2020-09-08 DIAGNOSIS — Z51.11 ENCOUNTER FOR ANTINEOPLASTIC CHEMOTHERAPY: ICD-10-CM

## 2020-09-18 ENCOUNTER — APPOINTMENT (OUTPATIENT)
Dept: INFUSION THERAPY | Facility: HOSPITAL | Age: 70
End: 2020-09-18

## 2020-09-18 ENCOUNTER — RESULT REVIEW (OUTPATIENT)
Age: 70
End: 2020-09-18

## 2020-09-18 ENCOUNTER — LABORATORY RESULT (OUTPATIENT)
Age: 70
End: 2020-09-18

## 2020-09-18 ENCOUNTER — APPOINTMENT (OUTPATIENT)
Dept: HEMATOLOGY ONCOLOGY | Facility: CLINIC | Age: 70
End: 2020-09-18
Payer: COMMERCIAL

## 2020-09-18 DIAGNOSIS — R19.7 DIARRHEA, UNSPECIFIED: ICD-10-CM

## 2020-09-18 LAB
BASOPHILS # BLD AUTO: 0.04 K/UL — SIGNIFICANT CHANGE UP (ref 0–0.2)
BASOPHILS NFR BLD AUTO: 0.6 % — SIGNIFICANT CHANGE UP (ref 0–2)
EOSINOPHIL # BLD AUTO: 0.05 K/UL — SIGNIFICANT CHANGE UP (ref 0–0.5)
EOSINOPHIL NFR BLD AUTO: 0.7 % — SIGNIFICANT CHANGE UP (ref 0–6)
HCT VFR BLD CALC: 40.8 % — SIGNIFICANT CHANGE UP (ref 34.5–45)
HGB BLD-MCNC: 14 G/DL — SIGNIFICANT CHANGE UP (ref 11.5–15.5)
IMM GRANULOCYTES NFR BLD AUTO: 0.4 % — SIGNIFICANT CHANGE UP (ref 0–1.5)
LYMPHOCYTES # BLD AUTO: 2.63 K/UL — SIGNIFICANT CHANGE UP (ref 1–3.3)
LYMPHOCYTES # BLD AUTO: 36.9 % — SIGNIFICANT CHANGE UP (ref 13–44)
MCHC RBC-ENTMCNC: 33.6 PG — SIGNIFICANT CHANGE UP (ref 27–34)
MCHC RBC-ENTMCNC: 34.3 G/DL — SIGNIFICANT CHANGE UP (ref 32–36)
MCV RBC AUTO: 97.8 FL — SIGNIFICANT CHANGE UP (ref 80–100)
MONOCYTES # BLD AUTO: 0.71 K/UL — SIGNIFICANT CHANGE UP (ref 0–0.9)
MONOCYTES NFR BLD AUTO: 10 % — SIGNIFICANT CHANGE UP (ref 2–14)
NEUTROPHILS # BLD AUTO: 3.66 K/UL — SIGNIFICANT CHANGE UP (ref 1.8–7.4)
NEUTROPHILS NFR BLD AUTO: 51.4 % — SIGNIFICANT CHANGE UP (ref 43–77)
NRBC # BLD: 0 /100 WBCS — SIGNIFICANT CHANGE UP (ref 0–0)
PLATELET # BLD AUTO: 151 K/UL — SIGNIFICANT CHANGE UP (ref 150–400)
RBC # BLD: 4.17 M/UL — SIGNIFICANT CHANGE UP (ref 3.8–5.2)
RBC # FLD: 12.8 % — SIGNIFICANT CHANGE UP (ref 10.3–14.5)
WBC # BLD: 7.12 K/UL — SIGNIFICANT CHANGE UP (ref 3.8–10.5)
WBC # FLD AUTO: 7.12 K/UL — SIGNIFICANT CHANGE UP (ref 3.8–10.5)

## 2020-09-18 PROCEDURE — 99213 OFFICE O/P EST LOW 20 MIN: CPT

## 2020-09-18 NOTE — HISTORY OF PRESENT ILLNESS
[FreeTextEntry1] : 70yoF with metastatic cholangiocarcinoma presents for follow-up palliative care visit, referred by Oncology team.  PMH significant for HCV (treated with Harvoni), IBS, anxiety, GERD, thrombophlebitis. \par \par Patient initially who had some GI symptoms/elevated LFTs, weight loss (but was on weight watchers) in October 2019.  She saw Dr. Brunner (GI)  whom ordered a CT A/P which demonstrated enlargement of the head of the pancreas with an approximately 3.3 cm hypodense area posteriorly within the pancreatic head which is suspicious for neoplasm. Patient was referred for MRCP which revealed an irregularly enhancing 5.8 cm mass at the level of the confluence of the right and left hepatic biliary ducts. 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 There was no evidence of primary pancreatic neoplasm. She had biopsy of RUQ peritoneal nodule which confirmed metastatic cholangiocarcinoma. She was started on Gemzar/Cisplatin 12/20/19.  Did not tolerate Cisplatin and this was discontinued, now on single agent Gemzar.  \par \par Main issue for which patient initially presented was abdominal pain. The pain is localized to the RUQ and lower abdominal, she endorses bloating.  She was previously prescribed Morphine 15mg which she does not use due to concerns about dependence. She took it twice and it made her very constipated.  She is using PRN Tylenol 500mg, Motrin 200mg PRN (used rarely).\par \par Interval History: Patient presents for follow-up, seen in treatment room.  Reports continued fatigue and anxiety.  She continues to take escitalopram 5mg once daily and PRN alprazolam QID.  She has declined to increase escitalopram as recommended.  She continues to use medicinal cannabis in 1:1 formulation QHS and high THC:low CBD PRN. She finds that it helps with nausea and anxiety. Denies pain but does report abdominal cramping; using PRN acetaminophen sparingly. She follows with psych and she sees her therapist regularly and reports a therapeutic relationship.\par \par ROS:\par + appetite is very good\par + ~ 20 lb weight gain in 6 months\par + chronic anxiety - on Lexapro 5mg QD and Alprazolam 0.25mg QID\par + nausea - uses Ondansetron ODT which usually helps\par + gas/bloating\par + occasional diarrhea- controlled with PRN Imodium - has erratic BMs depending on what she eats. \par All other ROS as outlined or noncontributory. \par \par Patient is , lives with her . Has supportive family.  She has three children (ages 54, 50, 45) from a previous marriage. It is important to patient that she does not suffer. \par \par Psychiatrist: Dr. NGOC Becerra\par \par I-Stop Ref#  [id number]

## 2020-09-18 NOTE — PHYSICAL EXAM
[General Appearance - Alert] : alert [General Appearance - In No Acute Distress] : in no acute distress [Sclera] : the sclera and conjunctiva were normal [Normal Oral Mucosa] : normal oral mucosa [Neck Appearance] : the appearance of the neck was normal [] : no respiratory distress [Auscultation Breath Sounds / Voice Sounds] : lungs were clear to auscultation bilaterally [Edema] : there was no peripheral edema [Abnormal Walk] : normal gait [Skin Color & Pigmentation] : normal skin color and pigmentation [No Focal Deficits] : no focal deficits [Oriented To Time, Place, And Person] : oriented to person, place, and time [Affect] : the affect was normal [Mood] : the mood was normal [FreeTextEntry1] : obsese

## 2020-09-18 NOTE — ASSESSMENT
[DNR] : Code Status: DNR [Limited] : Treatment Guidelines: Limited [DNI] : Intubation: DNI [Last Ver[facility] Date: _____] [facility] Com[facility]/last verification date: [unfilled] [______] : HCP: [unfilled] [FreeTextEntry1] : 70yoF with:\par \par 1.  Cholangiocarcinoma - on Gemzar.  Follow up with Med Onc.\par \par 2. Abdominal pain / discomfort\par - C/w with current medical cannabis regimen PRN.  \par - Can c/w tylenol 500mg PRN.\par \par 3. Nausea \par - C/w medical cannabis and Ondansetron ODT PRN.  \par \par 4. Anxiety - Continue to discuss potential benefits of increasing escitalopram to 10mg.  Patient continues to decline due to fears of GI upset.  C/w talk therapy.\par \par 5. Encounter for Palliative Care/Advance Care Planning \par - HCP on file\par - MOLST form on file, delineating wishes of limited care: DNR/DNI. Patient expressed that she "doesn’t want to suffer."  All questions answered.  Empathetic listening and emotional support provided. \par \par Follow up in 1-2 months, call sooner with questions or issues.

## 2020-09-25 ENCOUNTER — OUTPATIENT (OUTPATIENT)
Dept: OUTPATIENT SERVICES | Facility: HOSPITAL | Age: 70
LOS: 1 days | Discharge: HOME | End: 2020-09-25

## 2020-09-25 DIAGNOSIS — C22.1 INTRAHEPATIC BILE DUCT CARCINOMA: ICD-10-CM

## 2020-10-02 ENCOUNTER — APPOINTMENT (OUTPATIENT)
Dept: INFUSION THERAPY | Facility: HOSPITAL | Age: 70
End: 2020-10-02

## 2020-10-02 ENCOUNTER — LABORATORY RESULT (OUTPATIENT)
Age: 70
End: 2020-10-02

## 2020-10-02 ENCOUNTER — RESULT REVIEW (OUTPATIENT)
Age: 70
End: 2020-10-02

## 2020-10-02 LAB
BASOPHILS # BLD AUTO: 0.03 K/UL — SIGNIFICANT CHANGE UP (ref 0–0.2)
BASOPHILS NFR BLD AUTO: 0.5 % — SIGNIFICANT CHANGE UP (ref 0–2)
EOSINOPHIL # BLD AUTO: 0.08 K/UL — SIGNIFICANT CHANGE UP (ref 0–0.5)
EOSINOPHIL NFR BLD AUTO: 1.2 % — SIGNIFICANT CHANGE UP (ref 0–6)
HCT VFR BLD CALC: 40 % — SIGNIFICANT CHANGE UP (ref 34.5–45)
HGB BLD-MCNC: 13.6 G/DL — SIGNIFICANT CHANGE UP (ref 11.5–15.5)
IMM GRANULOCYTES NFR BLD AUTO: 0.5 % — SIGNIFICANT CHANGE UP (ref 0–1.5)
LYMPHOCYTES # BLD AUTO: 2.28 K/UL — SIGNIFICANT CHANGE UP (ref 1–3.3)
LYMPHOCYTES # BLD AUTO: 35.5 % — SIGNIFICANT CHANGE UP (ref 13–44)
MCHC RBC-ENTMCNC: 33.1 PG — SIGNIFICANT CHANGE UP (ref 27–34)
MCHC RBC-ENTMCNC: 34 G/DL — SIGNIFICANT CHANGE UP (ref 32–36)
MCV RBC AUTO: 97.3 FL — SIGNIFICANT CHANGE UP (ref 80–100)
MONOCYTES # BLD AUTO: 0.65 K/UL — SIGNIFICANT CHANGE UP (ref 0–0.9)
MONOCYTES NFR BLD AUTO: 10.1 % — SIGNIFICANT CHANGE UP (ref 2–14)
NEUTROPHILS # BLD AUTO: 3.35 K/UL — SIGNIFICANT CHANGE UP (ref 1.8–7.4)
NEUTROPHILS NFR BLD AUTO: 52.2 % — SIGNIFICANT CHANGE UP (ref 43–77)
NRBC # BLD: 0 /100 WBCS — SIGNIFICANT CHANGE UP (ref 0–0)
PLATELET # BLD AUTO: 159 K/UL — SIGNIFICANT CHANGE UP (ref 150–400)
RBC # BLD: 4.11 M/UL — SIGNIFICANT CHANGE UP (ref 3.8–5.2)
RBC # FLD: 13 % — SIGNIFICANT CHANGE UP (ref 10.3–14.5)
WBC # BLD: 6.42 K/UL — SIGNIFICANT CHANGE UP (ref 3.8–10.5)
WBC # FLD AUTO: 6.42 K/UL — SIGNIFICANT CHANGE UP (ref 3.8–10.5)

## 2020-10-05 DIAGNOSIS — Z51.11 ENCOUNTER FOR ANTINEOPLASTIC CHEMOTHERAPY: ICD-10-CM

## 2020-10-05 DIAGNOSIS — R11.2 NAUSEA WITH VOMITING, UNSPECIFIED: ICD-10-CM

## 2020-10-09 ENCOUNTER — RESULT REVIEW (OUTPATIENT)
Age: 70
End: 2020-10-09

## 2020-10-09 ENCOUNTER — OUTPATIENT (OUTPATIENT)
Dept: OUTPATIENT SERVICES | Facility: HOSPITAL | Age: 70
LOS: 1 days | End: 2020-10-09
Payer: COMMERCIAL

## 2020-10-09 ENCOUNTER — APPOINTMENT (OUTPATIENT)
Dept: CT IMAGING | Facility: IMAGING CENTER | Age: 70
End: 2020-10-09
Payer: COMMERCIAL

## 2020-10-09 ENCOUNTER — APPOINTMENT (OUTPATIENT)
Dept: MRI IMAGING | Facility: IMAGING CENTER | Age: 70
End: 2020-10-09
Payer: COMMERCIAL

## 2020-10-09 DIAGNOSIS — Z00.8 ENCOUNTER FOR OTHER GENERAL EXAMINATION: ICD-10-CM

## 2020-10-09 PROCEDURE — 74183 MRI ABD W/O CNTR FLWD CNTR: CPT | Mod: 26

## 2020-10-09 PROCEDURE — 71250 CT THORAX DX C-: CPT

## 2020-10-09 PROCEDURE — 72197 MRI PELVIS W/O & W/DYE: CPT | Mod: 26

## 2020-10-09 PROCEDURE — 71250 CT THORAX DX C-: CPT | Mod: 26

## 2020-10-09 PROCEDURE — 72197 MRI PELVIS W/O & W/DYE: CPT

## 2020-10-09 PROCEDURE — A9585: CPT

## 2020-10-09 PROCEDURE — 74183 MRI ABD W/O CNTR FLWD CNTR: CPT

## 2020-10-14 ENCOUNTER — APPOINTMENT (OUTPATIENT)
Dept: HEMATOLOGY ONCOLOGY | Facility: CLINIC | Age: 70
End: 2020-10-14
Payer: COMMERCIAL

## 2020-10-14 VITALS
OXYGEN SATURATION: 97 % | RESPIRATION RATE: 16 BRPM | SYSTOLIC BLOOD PRESSURE: 143 MMHG | HEART RATE: 87 BPM | WEIGHT: 218.24 LBS | BODY MASS INDEX: 38.66 KG/M2 | TEMPERATURE: 97.5 F | DIASTOLIC BLOOD PRESSURE: 84 MMHG

## 2020-10-14 DIAGNOSIS — K83.9 DISEASE OF BILIARY TRACT, UNSPECIFIED: ICD-10-CM

## 2020-10-14 DIAGNOSIS — B36.9 SUPERFICIAL MYCOSIS, UNSPECIFIED: ICD-10-CM

## 2020-10-14 DIAGNOSIS — K12.30 ORAL MUCOSITIS (ULCERATIVE), UNSPECIFIED: ICD-10-CM

## 2020-10-14 DIAGNOSIS — Z01.818 ENCOUNTER FOR OTHER PREPROCEDURAL EXAMINATION: ICD-10-CM

## 2020-10-14 DIAGNOSIS — B19.20 UNSPECIFIED VIRAL HEPATITIS C W/OUT HEPATIC COMA: ICD-10-CM

## 2020-10-14 DIAGNOSIS — Z87.19 PERSONAL HISTORY OF OTHER DISEASES OF THE DIGESTIVE SYSTEM: ICD-10-CM

## 2020-10-14 PROCEDURE — 99215 OFFICE O/P EST HI 40 MIN: CPT

## 2020-10-14 NOTE — RESULTS/DATA
[FreeTextEntry1] : EXAM: CT CHEST \par \par \par PROCEDURE DATE: 10/09/2020 \par \par \par \par INTERPRETATION: CLINICAL INDICATION: Metastatic cholangiocarcinoma. \par \par Axial CT images of the chest are obtained without intravenous administration of contrast. \par \par Comparison is made with a prior chest CT of June 21, 2020. \par \par Right-sided central line with the tip in the right atrium. \par \par No enlarged axillary, mediastinal or hilar lymph nodes. Previously described small left axillary lymph node is unchanged. Previously noted 6 mm right internal mammary chain lymph node is unchanged. 9 mm right cardiophrenic angle lymph node with minimal if any interval increase in size since June 21, 2020. \par \par Heart size is normal. Trace pericardial fluid as on the prior study. Coronary artery calcifications. Atherosclerotic disease of the aorta. \par \par For complete evaluation of the abdomen, please refer to the dedicated MRI performed on the same day. \par \par Evaluation of the lungs demonstrate a few bilateral 2 or 3 mm pulmonary nodule predominantly unchanged since June 21, 2020. \par \par No pneumonia. 2 mm right upper lobe calcified granuloma. No central endobronchial lesions. \par \par Degenerative changes of the spine. \par \par IMPRESSION: Tiny bilateral pulmonary nodules are unchanged since June 21, 2020. \par \par 9 mm right cardiophrenic angle lymph node with minimal if any interval increase in size since June 20,020. \par \par For evaluation of the abdomen, please refer to the dedicated MRI performed on the same day. \par \par \par \par \par \par \par REECE MARTINEZ M.D., ATTENDING RADIOLOGIST \par This document has been electronically signed. Oct 12 2020 2:31PM \par \par \par \par \par ------------------------------------------------------------------------------------------------------------------------------\par \par EXAM: MR PELVIS WAW IC \par \par EXAM: MR ABDOMEN WAW IC \par \par \par PROCEDURE DATE: 10/09/2020 \par \par \par \par INTERPRETATION: CLINICAL INFORMATION: Metastatic Klatskin's tumor on chemotherapy. Assess for response. \par \par COMPARISON: MRI abdomen/pelvis 6/21/2020. \par \par PROCEDURE: \par MRI of the abdomen and pelvis was performed with and without intravenous contrast. \par IV Contrast: Gadavist. 10 cc administered, 0 cc discarded. \par Levsin 0.25 mg administered sublingual. \par \par FINDINGS: \par LOWER CHEST: Within normal limits. \par \par LIVER/BILE DUCTS: Left hepatic lobe atrophy with poorly defined infiltrative disease involving the left lobe extending to the hilum, essentially unchanged. Left hepatic duct stricture at the hilum with upstream moderate left sided intrahepatic biliary ductal dilatation, unchanged. \par GALLBLADDER: Within normal limits. \par SPLEEN: Within normal limits. \par PANCREAS: Within normal limits. \par ADRENALS: Within normal limits. \par KIDNEYS/URETERS: Within normal limits. \par \par BLADDER: Within normal limits. \par REPRODUCTIVE ORGANS: Uterus and adnexa within normal limits. \par \par BOWEL: No bowel obstruction. Sigmoid diverticulosis. \par PERITONEUM: No ascites. Peritoneal carcinomatosis with interval increase in size of some of the nodular implants. For reference, a nodule anterior to the descending colon measures 1.5 cm (1702, 41), previously 0.9 cm. \par VESSELS: Marked narrowing of the left portal vein, similar to prior exam. \par RETROPERITONEUM/LYMPH NODES: Stable karel hepatis and portacaval adenopathy. Reference node (1702, 32) measures 2.1 x 1.8 cm, previously 2.2 x 1.9 cm. \par ABDOMINAL WALL: Within normal limits. \par BONES: Within normal limits. \par \par IMPRESSION: \par Moderate left intrahepatic biliary ductal dilatation and ill-defined infiltrative disease involving the left lobe, essentially unchanged. \par \par Interval increase in size of some of the peritoneal implants. \par \par \par \par \par \par Ricky Husain M.D., RADIOLOGY FELLOW \par This document has been electronically signed. \par JAY GONCALVES MD; Attending Radiologist \par This document has been electronically signed. Oct 9 2020 2:31PM

## 2020-10-14 NOTE — HISTORY OF PRESENT ILLNESS
[Disease: _____________________] : Disease: [unfilled] [M: ___] : M[unfilled] [AJCC Stage: ____] : AJCC Stage: [unfilled] [de-identified] : 70 Y/O F HCV infection (treated with Harvoni), IBS, Anxiety, GERD who had some GI symptoms/elevated LFTs (wt loss but on wt watchers) October 2019 and consulted Dr Brunner GI and non contrast CT A/P showed enlargement of the head of the pancreas with an approximately 3.3 cm hypodense area posteriorly within the pancreatic head which is suspicious for neoplasm. There are enlarged retroperitoneal lymph nodes posterior to the head of the pancreas which are suspicious appearing as well. There are few small nodular soft tissue densities lying deep to the right anterior abdominal wall suspicious for peritoneal implants, and soft tissue density material within the gallbladder which may reflect sludge.  Patient was referred for MRCP which revealed an irregularly enhancing 5.8 cm mass at the level of the confluence of the right and left hepatic biliary ducts. This mass obstructs the common hepatic duct and main left intrahepatic bile duct causing dilation of the intrahepatic biliary ducts within the left hepatic lobe. The mid to distal portion of the CBD are normal in caliber and appear uninvolved by the mass. 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 There are enlarged retroperitoneal lymph nodes, some of which demonstrate central necrosis lying posterior to the head of the pancreas and within the aortocaval region, suspicious for metastatic disease. There is no evidence of primary pancreatic neoplasm. There are small nodular densities within the right anterior abdomen suspicious for peritoneal metastatic foci ranging from 3-10 mm in size.  She consulted Dr Adams on 11/13/19 and is planned for biopsy planned 11/27/19. \par \par \par PSH:  cataracts, tubal ligation \par Fhx:  gastric or colon cancer involving a maternal cousin at 60\par Social:  former smoker but quit 13 years ago and drinks alcohol on occasion. She gets her colonoscopies with Dr. Brunner. \par \par PCP: Dr. Alexandria Diop\par GI: Dr. Robert Brunner. \par \par Last Mammogram/pap smear - few years ago; last colonoscopy 2017\par \par She had biopsy of RUQ peritoneal nodule which confirmed metastatic cholangiocarcinoma.  \par She was started on Gemzar/Cisplatin 12/20/19 \par \par after her second cycle, she had a port placed, her Cisplatin was held due to SE and was treated for a cellulitis and influenza\par \par CT chest on 6/21 (2 mm left upper lobe pulmonary nodule is new since November 26, 2019, nonspecific finding. A few other bilateral 2 mm pulmonary nodules are unchanged since November 26, 2019 as described above.) and MRI abd (Ill-defined infiltrative tumor involving the left hepatic lobe medial \par segment and central left biliary tree, difficult to compare with prior imaging November 2019. Degree of left intrahepatic biliary ductal dilatation is unchanged. Upper retroperitoneal adenopathy and peritoneal disease is mildly improved). \par \par  [de-identified] : Microsatellite status MS-Stable / Tumor Mutational [place] 1 Muts/Mb §\par ERRFI1 H250fs*14 / \par TPS 20 [de-identified] : IDH1 R132C  mutation  [FreeTextEntry1] : palliative Gemcitabine q2 week  [de-identified] : Karlie is seen for follow up while on Gemcitabine and had scans done earlier this month\par She is worried that she has gained weight and is eating well - "shouldn't people with cancer be skinny"\par she is anxious about results\par she has abdominal discomfort at times but not severe to require pain medicines

## 2020-10-14 NOTE — REVIEW OF SYSTEMS
[Recent Change In Weight] : ~T recent weight change [Diarrhea] : diarrhea [Anxiety] : anxiety [Negative] : Allergic/Immunologic [Fever] : no fever [Night Sweats] : no night sweats [Chills] : no chills [Fatigue] : no fatigue [Chest Pain] : no chest pain [Shortness Of Breath] : no shortness of breath [Lower Ext Edema] : no lower extremity edema [SOB on Exertion] : no shortness of breath during exertion [Abdominal Pain] : no abdominal pain [Cough] : no cough [Vomiting] : no vomiting [Constipation] : no constipation [FreeTextEntry7] : good appetite

## 2020-10-14 NOTE — REASON FOR VISIT
[Follow-Up Visit] : a follow-up [Medical Office: ([facility])___] : at the medical office located in  [FreeTextEntry2] : Cholangiocarcinoma  no

## 2020-10-16 ENCOUNTER — RESULT REVIEW (OUTPATIENT)
Age: 70
End: 2020-10-16

## 2020-10-16 ENCOUNTER — APPOINTMENT (OUTPATIENT)
Dept: HEMATOLOGY ONCOLOGY | Facility: CLINIC | Age: 70
End: 2020-10-16
Payer: COMMERCIAL

## 2020-10-16 ENCOUNTER — APPOINTMENT (OUTPATIENT)
Dept: INFUSION THERAPY | Facility: HOSPITAL | Age: 70
End: 2020-10-16

## 2020-10-16 ENCOUNTER — LABORATORY RESULT (OUTPATIENT)
Age: 70
End: 2020-10-16

## 2020-10-16 LAB
BASOPHILS # BLD AUTO: 0.03 K/UL — SIGNIFICANT CHANGE UP (ref 0–0.2)
BASOPHILS NFR BLD AUTO: 0.5 % — SIGNIFICANT CHANGE UP (ref 0–2)
EOSINOPHIL # BLD AUTO: 0.08 K/UL — SIGNIFICANT CHANGE UP (ref 0–0.5)
EOSINOPHIL NFR BLD AUTO: 1.2 % — SIGNIFICANT CHANGE UP (ref 0–6)
HCT VFR BLD CALC: 40.5 % — SIGNIFICANT CHANGE UP (ref 34.5–45)
HGB BLD-MCNC: 13.9 G/DL — SIGNIFICANT CHANGE UP (ref 11.5–15.5)
IMM GRANULOCYTES NFR BLD AUTO: 0.5 % — SIGNIFICANT CHANGE UP (ref 0–1.5)
LYMPHOCYTES # BLD AUTO: 2.18 K/UL — SIGNIFICANT CHANGE UP (ref 1–3.3)
LYMPHOCYTES # BLD AUTO: 33.5 % — SIGNIFICANT CHANGE UP (ref 13–44)
MCHC RBC-ENTMCNC: 32.8 PG — SIGNIFICANT CHANGE UP (ref 27–34)
MCHC RBC-ENTMCNC: 34.3 G/DL — SIGNIFICANT CHANGE UP (ref 32–36)
MCV RBC AUTO: 95.5 FL — SIGNIFICANT CHANGE UP (ref 80–100)
MONOCYTES # BLD AUTO: 0.74 K/UL — SIGNIFICANT CHANGE UP (ref 0–0.9)
MONOCYTES NFR BLD AUTO: 11.4 % — SIGNIFICANT CHANGE UP (ref 2–14)
NEUTROPHILS # BLD AUTO: 3.45 K/UL — SIGNIFICANT CHANGE UP (ref 1.8–7.4)
NEUTROPHILS NFR BLD AUTO: 52.9 % — SIGNIFICANT CHANGE UP (ref 43–77)
NRBC # BLD: 0 /100 WBCS — SIGNIFICANT CHANGE UP (ref 0–0)
PLATELET # BLD AUTO: 158 K/UL — SIGNIFICANT CHANGE UP (ref 150–400)
RBC # BLD: 4.24 M/UL — SIGNIFICANT CHANGE UP (ref 3.8–5.2)
RBC # FLD: 13 % — SIGNIFICANT CHANGE UP (ref 10.3–14.5)
WBC # BLD: 6.51 K/UL — SIGNIFICANT CHANGE UP (ref 3.8–10.5)
WBC # FLD AUTO: 6.51 K/UL — SIGNIFICANT CHANGE UP (ref 3.8–10.5)

## 2020-10-16 PROCEDURE — 99212 OFFICE O/P EST SF 10 MIN: CPT

## 2020-10-20 ENCOUNTER — APPOINTMENT (OUTPATIENT)
Dept: HEMATOLOGY ONCOLOGY | Facility: CLINIC | Age: 70
End: 2020-10-20
Payer: COMMERCIAL

## 2020-10-20 ENCOUNTER — NON-APPOINTMENT (OUTPATIENT)
Age: 70
End: 2020-10-20

## 2020-10-20 PROCEDURE — 99441: CPT

## 2020-10-20 NOTE — HISTORY OF PRESENT ILLNESS
[FreeTextEntry1] : 70yoF with metastatic cholangiocarcinoma presents for follow-up palliative care visit, referred by Oncology team.  PMH significant for HCV (treated with Harvoni), IBS, anxiety, GERD, thrombophlebitis. \par \par Patient initially who had some GI symptoms/elevated LFTs, weight loss (but was on weight watchers) in October 2019.  She saw Dr. Brunner (GI)  whom ordered a CT A/P which demonstrated enlargement of the head of the pancreas with an approximately 3.3 cm hypodense area posteriorly within the pancreatic head which is suspicious for neoplasm. Patient was referred for MRCP which revealed an irregularly enhancing 5.8 cm mass at the level of the confluence of the right and left hepatic biliary ducts. 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 There was no evidence of primary pancreatic neoplasm. She had biopsy of RUQ peritoneal nodule which confirmed metastatic cholangiocarcinoma. She was started on Gemzar/Cisplatin 12/20/19.  Did not tolerate Cisplatin and this was discontinued, now on single agent Gemzar.  \par \par Main issue for which patient initially presented was abdominal pain. The pain is localized to the RUQ and lower abdominal, she endorses bloating.  She was previously prescribed Morphine 15mg which she does not use due to concerns about dependence. She took it twice and it made her very constipated.  She is using PRN Tylenol 500mg, Motrin 200mg PRN (used rarely).\par \par Interval History: Patient presents for follow-up, seen in treatment room.  Recent scans show stable disease overal, mild growth of abdominal implants.  She is asymptomatic and will continue with gemcitabine.  Reports continued fatigue and anxiety.  She continues to take escitalopram 5mg once daily and PRN alprazolam QID.  She has declined to increase escitalopram as recommended.  She continues to use medicinal cannabis in 1:1 formulation QHS and high THC:low CBD PRN. She finds that it helps with nausea and anxiety. Denies pain but does report abdominal cramping; using PRN acetaminophen sparingly. She follows with psych and she sees her therapist regularly and reports a therapeutic relationship.\par \par ROS:\par + appetite is very good\par + ~ 20 lb weight gain in 6 months\par + chronic anxiety - on Lexapro 5mg QD and Alprazolam 0.25mg QID\par + nausea - uses Ondansetron ODT which usually helps\par + gas/bloating\par + occasional diarrhea- controlled with PRN Imodium - has erratic BMs depending on what she eats. \par All other ROS as outlined or noncontributory. \par \par Patient is , lives with her . Has supportive family.  She has three children (ages 54, 50, 45) from a previous marriage. It is important to patient that she does not suffer. \par \par Psychiatrist: Dr. NGOC Becerra\par \par I-Stop Ref#  [id number]

## 2020-10-20 NOTE — PHYSICAL EXAM
[General Appearance - Alert] : alert [General Appearance - In No Acute Distress] : in no acute distress [Sclera] : the sclera and conjunctiva were normal [Normal Oral Mucosa] : normal oral mucosa [Neck Appearance] : the appearance of the neck was normal [Auscultation Breath Sounds / Voice Sounds] : lungs were clear to auscultation bilaterally [] : no respiratory distress [Edema] : there was no peripheral edema [Abnormal Walk] : normal gait [Skin Color & Pigmentation] : normal skin color and pigmentation [No Focal Deficits] : no focal deficits [Oriented To Time, Place, And Person] : oriented to person, place, and time [Affect] : the affect was normal [Mood] : the mood was normal [FreeTextEntry1] : obese

## 2020-10-20 NOTE — ASSESSMENT
[DNR] : Code Status: DNR [Limited] : Treatment Guidelines: Limited [[facility]: [unfilled] [DNI] : Intubation: DNI [______] : HCP: [unfilled] [FreeTextEntry1] : 70yoF with:\par \par 1.  Cholangiocarcinoma - on Gemzar.  Follow up with Med Onc.\par \par 2. Abdominal pain / discomfort\par - C/w with current medical cannabis regimen PRN.  \par - Can c/w tylenol 500mg PRN.\par \par 3. Nausea \par - C/w medical cannabis and Ondansetron ODT PRN.  \par \par 4. Anxiety - Long discussion had re: benefits for increasing escitalopram to 10mg.  She will discuss with psychiatrist. C/w talk therapy.\par \par 5. Encounter for Palliative Care/Advance Care Planning \par - HCP on file\par - MOLST form on file, delineating wishes of limited care: DNR/DNI. Patient expressed that she "doesn’t want to suffer."  All questions answered.  Empathetic listening and emotional support provided. \par \par Follow up in 1-2 months, call sooner with questions or issues.

## 2020-10-26 ENCOUNTER — OUTPATIENT (OUTPATIENT)
Dept: OUTPATIENT SERVICES | Facility: HOSPITAL | Age: 70
LOS: 1 days | Discharge: HOME | End: 2020-10-26

## 2020-10-26 DIAGNOSIS — C22.1 INTRAHEPATIC BILE DUCT CARCINOMA: ICD-10-CM

## 2020-10-30 ENCOUNTER — RESULT REVIEW (OUTPATIENT)
Age: 70
End: 2020-10-30

## 2020-10-30 ENCOUNTER — LABORATORY RESULT (OUTPATIENT)
Age: 70
End: 2020-10-30

## 2020-10-30 ENCOUNTER — APPOINTMENT (OUTPATIENT)
Dept: INFUSION THERAPY | Facility: HOSPITAL | Age: 70
End: 2020-10-30

## 2020-10-30 LAB
BASOPHILS # BLD AUTO: 0.03 K/UL — SIGNIFICANT CHANGE UP (ref 0–0.2)
BASOPHILS NFR BLD AUTO: 0.5 % — SIGNIFICANT CHANGE UP (ref 0–2)
EOSINOPHIL # BLD AUTO: 0.04 K/UL — SIGNIFICANT CHANGE UP (ref 0–0.5)
EOSINOPHIL NFR BLD AUTO: 0.6 % — SIGNIFICANT CHANGE UP (ref 0–6)
HCT VFR BLD CALC: 39.1 % — SIGNIFICANT CHANGE UP (ref 34.5–45)
HGB BLD-MCNC: 13.6 G/DL — SIGNIFICANT CHANGE UP (ref 11.5–15.5)
IMM GRANULOCYTES NFR BLD AUTO: 0.6 % — SIGNIFICANT CHANGE UP (ref 0–1.5)
LYMPHOCYTES # BLD AUTO: 2.08 K/UL — SIGNIFICANT CHANGE UP (ref 1–3.3)
LYMPHOCYTES # BLD AUTO: 33.1 % — SIGNIFICANT CHANGE UP (ref 13–44)
MCHC RBC-ENTMCNC: 33.3 PG — SIGNIFICANT CHANGE UP (ref 27–34)
MCHC RBC-ENTMCNC: 34.8 G/DL — SIGNIFICANT CHANGE UP (ref 32–36)
MCV RBC AUTO: 95.8 FL — SIGNIFICANT CHANGE UP (ref 80–100)
MONOCYTES # BLD AUTO: 0.59 K/UL — SIGNIFICANT CHANGE UP (ref 0–0.9)
MONOCYTES NFR BLD AUTO: 9.4 % — SIGNIFICANT CHANGE UP (ref 2–14)
NEUTROPHILS # BLD AUTO: 3.51 K/UL — SIGNIFICANT CHANGE UP (ref 1.8–7.4)
NEUTROPHILS NFR BLD AUTO: 55.8 % — SIGNIFICANT CHANGE UP (ref 43–77)
NRBC # BLD: 0 /100 WBCS — SIGNIFICANT CHANGE UP (ref 0–0)
PLATELET # BLD AUTO: 146 K/UL — LOW (ref 150–400)
RBC # BLD: 4.08 M/UL — SIGNIFICANT CHANGE UP (ref 3.8–5.2)
RBC # FLD: 13.1 % — SIGNIFICANT CHANGE UP (ref 10.3–14.5)
WBC # BLD: 6.29 K/UL — SIGNIFICANT CHANGE UP (ref 3.8–10.5)
WBC # FLD AUTO: 6.29 K/UL — SIGNIFICANT CHANGE UP (ref 3.8–10.5)

## 2020-11-02 DIAGNOSIS — R11.2 NAUSEA WITH VOMITING, UNSPECIFIED: ICD-10-CM

## 2020-11-02 DIAGNOSIS — Z51.11 ENCOUNTER FOR ANTINEOPLASTIC CHEMOTHERAPY: ICD-10-CM

## 2020-11-13 ENCOUNTER — LABORATORY RESULT (OUTPATIENT)
Age: 70
End: 2020-11-13

## 2020-11-13 ENCOUNTER — APPOINTMENT (OUTPATIENT)
Dept: INFUSION THERAPY | Facility: HOSPITAL | Age: 70
End: 2020-11-13

## 2020-11-13 ENCOUNTER — APPOINTMENT (OUTPATIENT)
Dept: HEMATOLOGY ONCOLOGY | Facility: CLINIC | Age: 70
End: 2020-11-13
Payer: COMMERCIAL

## 2020-11-13 ENCOUNTER — RESULT REVIEW (OUTPATIENT)
Age: 70
End: 2020-11-13

## 2020-11-13 VITALS
BODY MASS INDEX: 39.7 KG/M2 | RESPIRATION RATE: 16 BRPM | TEMPERATURE: 97.2 F | DIASTOLIC BLOOD PRESSURE: 85 MMHG | HEIGHT: 62.4 IN | OXYGEN SATURATION: 100 % | WEIGHT: 218.48 LBS | SYSTOLIC BLOOD PRESSURE: 128 MMHG | HEART RATE: 81 BPM

## 2020-11-13 VITALS
HEIGHT: 62.4 IN | DIASTOLIC BLOOD PRESSURE: 85 MMHG | BODY MASS INDEX: 39.7 KG/M2 | WEIGHT: 218.48 LBS | TEMPERATURE: 97.2 F | RESPIRATION RATE: 16 BRPM | SYSTOLIC BLOOD PRESSURE: 128 MMHG | HEART RATE: 81 BPM | OXYGEN SATURATION: 100 %

## 2020-11-13 LAB
BASOPHILS # BLD AUTO: 0.04 K/UL — SIGNIFICANT CHANGE UP (ref 0–0.2)
BASOPHILS NFR BLD AUTO: 0.6 % — SIGNIFICANT CHANGE UP (ref 0–2)
EOSINOPHIL # BLD AUTO: 0.06 K/UL — SIGNIFICANT CHANGE UP (ref 0–0.5)
EOSINOPHIL NFR BLD AUTO: 0.8 % — SIGNIFICANT CHANGE UP (ref 0–6)
HCT VFR BLD CALC: 41.2 % — SIGNIFICANT CHANGE UP (ref 34.5–45)
HGB BLD-MCNC: 14.4 G/DL — SIGNIFICANT CHANGE UP (ref 11.5–15.5)
IMM GRANULOCYTES NFR BLD AUTO: 0.4 % — SIGNIFICANT CHANGE UP (ref 0–1.5)
LYMPHOCYTES # BLD AUTO: 2.36 K/UL — SIGNIFICANT CHANGE UP (ref 1–3.3)
LYMPHOCYTES # BLD AUTO: 33.3 % — SIGNIFICANT CHANGE UP (ref 13–44)
MCHC RBC-ENTMCNC: 33 PG — SIGNIFICANT CHANGE UP (ref 27–34)
MCHC RBC-ENTMCNC: 35 G/DL — SIGNIFICANT CHANGE UP (ref 32–36)
MCV RBC AUTO: 94.5 FL — SIGNIFICANT CHANGE UP (ref 80–100)
MONOCYTES # BLD AUTO: 0.74 K/UL — SIGNIFICANT CHANGE UP (ref 0–0.9)
MONOCYTES NFR BLD AUTO: 10.4 % — SIGNIFICANT CHANGE UP (ref 2–14)
NEUTROPHILS # BLD AUTO: 3.86 K/UL — SIGNIFICANT CHANGE UP (ref 1.8–7.4)
NEUTROPHILS NFR BLD AUTO: 54.5 % — SIGNIFICANT CHANGE UP (ref 43–77)
NRBC # BLD: 0 /100 WBCS — SIGNIFICANT CHANGE UP (ref 0–0)
PLATELET # BLD AUTO: 181 K/UL — SIGNIFICANT CHANGE UP (ref 150–400)
RBC # BLD: 4.36 M/UL — SIGNIFICANT CHANGE UP (ref 3.8–5.2)
RBC # FLD: 13 % — SIGNIFICANT CHANGE UP (ref 10.3–14.5)
WBC # BLD: 7.09 K/UL — SIGNIFICANT CHANGE UP (ref 3.8–10.5)
WBC # FLD AUTO: 7.09 K/UL — SIGNIFICANT CHANGE UP (ref 3.8–10.5)

## 2020-11-13 PROCEDURE — 99213 OFFICE O/P EST LOW 20 MIN: CPT

## 2020-11-13 PROCEDURE — 99072 ADDL SUPL MATRL&STAF TM PHE: CPT

## 2020-11-13 NOTE — REVIEW OF SYSTEMS
[Anxiety] : anxiety [Negative] : Allergic/Immunologic [Fever] : no fever [Chills] : no chills [Night Sweats] : no night sweats [Fatigue] : no fatigue [Chest Pain] : no chest pain [Lower Ext Edema] : no lower extremity edema [Shortness Of Breath] : no shortness of breath [Cough] : no cough [SOB on Exertion] : no shortness of breath during exertion [Abdominal Pain] : no abdominal pain [Vomiting] : no vomiting [Constipation] : no constipation [FreeTextEntry7] : good appetite

## 2020-11-13 NOTE — PHYSICAL EXAM
[Fully active, able to carry on all pre-disease performance without restriction] : Status 0 - Fully active, able to carry on all pre-disease performance without restriction [Obese] : obese [Normal] : grossly intact [de-identified] : obese [de-identified] : anxious

## 2020-11-13 NOTE — HISTORY OF PRESENT ILLNESS
[Disease: _____________________] : Disease: [unfilled] [M: ___] : M[unfilled] [AJCC Stage: ____] : AJCC Stage: [unfilled] [de-identified] : 70 Y/O F HCV infection (treated with Harvoni), IBS, Anxiety, GERD who had some GI symptoms/elevated LFTs (wt loss but on wt watchers) October 2019 and consulted Dr Brunner GI and non contrast CT A/P showed enlargement of the head of the pancreas with an approximately 3.3 cm hypodense area posteriorly within the pancreatic head which is suspicious for neoplasm. There are enlarged retroperitoneal lymph nodes posterior to the head of the pancreas which are suspicious appearing as well. There are few small nodular soft tissue densities lying deep to the right anterior abdominal wall suspicious for peritoneal implants, and soft tissue density material within the gallbladder which may reflect sludge.  Patient was referred for MRCP which revealed an irregularly enhancing 5.8 cm mass at the level of the confluence of the right and left hepatic biliary ducts. This mass obstructs the common hepatic duct and main left intrahepatic bile duct causing dilation of the intrahepatic biliary ducts within the left hepatic lobe. The mid to distal portion of the CBD are normal in caliber and appear uninvolved by the mass. 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 There are enlarged retroperitoneal lymph nodes, some of which demonstrate central necrosis lying posterior to the head of the pancreas and within the aortocaval region, suspicious for metastatic disease. There is no evidence of primary pancreatic neoplasm. There are small nodular densities within the right anterior abdomen suspicious for peritoneal metastatic foci ranging from 3-10 mm in size.  She consulted Dr Adams on 11/13/19 and is planned for biopsy planned 11/27/19. \par \par \par PSH:  cataracts, tubal ligation \par Fhx:  gastric or colon cancer involving a maternal cousin at 60\par Social:  former smoker but quit 13 years ago and drinks alcohol on occasion. She gets her colonoscopies with Dr. Brunner. \par \par PCP: Dr. Alexandria Diop\par GI: Dr. Robert Brunner. \par \par Last Mammogram/pap smear - few years ago; last colonoscopy 2017\par \par She had biopsy of RUQ peritoneal nodule which confirmed metastatic cholangiocarcinoma.  \par She was started on Gemzar/Cisplatin 12/20/19 \par \par after her second cycle, she had a port placed, her Cisplatin was held due to SE and was treated for a cellulitis and influenza\par \par CT chest on 6/21 (2 mm left upper lobe pulmonary nodule is new since November 26, 2019, nonspecific finding. A few other bilateral 2 mm pulmonary nodules are unchanged since November 26, 2019 as described above.) and MRI abd (Ill-defined infiltrative tumor involving the left hepatic lobe medial \par segment and central left biliary tree, difficult to compare with prior imaging November 2019. Degree of left intrahepatic biliary ductal dilatation is unchanged. Upper retroperitoneal adenopathy and peritoneal disease is mildly improved). \par \par  [de-identified] : IDH1 R132C  mutation  [de-identified] : Microsatellite status MS-Stable / Tumor Mutational [place] 1 Muts/Mb §\par ERRFI1 H250fs*14 / IDH1 - R132C\par TPS 20 [FreeTextEntry1] : palliative Gemcitabine q2 week  [de-identified] : Karlie is seen for follow up while on Gemcitabine and her tumor markers remain normal.\par \par She had abdominal discomfort last week after starting a statin but better with morphine and hasn't recurred after stopping statin  \par \par no acute complaints today

## 2020-11-16 ENCOUNTER — NON-APPOINTMENT (OUTPATIENT)
Age: 70
End: 2020-11-16

## 2020-11-16 NOTE — HISTORY OF PRESENT ILLNESS
[FreeTextEntry1] : 70yoF with metastatic cholangiocarcinoma presents for follow-up palliative care visit, referred by Oncology team.  PMH significant for HCV (treated with Harvoni), IBS, anxiety, GERD, thrombophlebitis. \par \par Patient initially who had some GI symptoms/elevated LFTs, weight loss (but was on weight watchers) in October 2019.  She saw Dr. Brunner (GI)  whom ordered a CT A/P which demonstrated enlargement of the head of the pancreas with an approximately 3.3 cm hypodense area posteriorly within the pancreatic head which is suspicious for neoplasm. Patient was referred for MRCP which revealed an irregularly enhancing 5.8 cm mass at the level of the confluence of the right and left hepatic biliary ducts. 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 There was no evidence of primary pancreatic neoplasm. She had biopsy of RUQ peritoneal nodule which confirmed metastatic cholangiocarcinoma. She was started on Gemzar/Cisplatin 12/20/19.  Did not tolerate Cisplatin and this was discontinued, now on single agent Gemzar.  \par \par Main issue for which patient initially presented was abdominal pain. The pain is localized to the RUQ and lower abdominal, she endorses bloating.  She was previously prescribed Morphine 15mg which she does not use due to concerns about dependence. She took it twice and it made her very constipated.  She is using PRN Tylenol 500mg, Motrin 200mg PRN (used rarely).\par \par Interval History: Patient presents for follow-up prior to gemcitabine.   She has abdominal discomfort late last week after starting a statin.   She used MS IR 15mg with complete relief.  \par \par Patient continues to take escitalopram 5mg once daily and PRN alprazolam QID.  She has declined to increase escitalopram as recommended.  She continues to use medicinal cannabis in 1:1 formulation QHS and high THC:low CBD PRN. She finds that it helps with nausea and anxiety. She follows with her therapist weekly.\par \par ROS:\par + appetite is very good\par + ~ 20 lb weight gain in 6 months\par + chronic anxiety - on Lexapro 5mg QD and Alprazolam 0.25mg QID\par + nausea - uses Ondansetron ODT and medical cannabis which usually helps\par + gas/bloating\par + occasional diarrhea- controlled with PRN Imodium - has erratic BMs depending on what she eats. \par All other ROS as outlined or noncontributory. \par \par Patient is , lives with her . Has supportive family.  She has three children (ages 54, 50, 45) from a previous marriage. It is important to patient that she does not suffer. \par \par Psychiatrist: Dr. NGOC Becerra\par \par I-Stop Ref#  [id number]

## 2020-11-16 NOTE — PHYSICAL EXAM
[General Appearance - Alert] : alert [General Appearance - In No Acute Distress] : in no acute distress [Sclera] : the sclera and conjunctiva were normal [Normal Oral Mucosa] : normal oral mucosa [Neck Appearance] : the appearance of the neck was normal [] : no respiratory distress [Auscultation Breath Sounds / Voice Sounds] : lungs were clear to auscultation bilaterally [Edema] : there was no peripheral edema [Abnormal Walk] : normal gait [Skin Color & Pigmentation] : normal skin color and pigmentation [No Focal Deficits] : no focal deficits [Oriented To Time, Place, And Person] : oriented to person, place, and time [Affect] : the affect was normal [Mood] : the mood was normal [FreeTextEntry1] : obese

## 2020-11-16 NOTE — ASSESSMENT
[DNR] : Code Status: DNR [Limited] : Treatment Guidelines: Limited [DNI] : Intubation: DNI [[facility]: _____[facility]last [facility] date: [unfilled] [______] : HCP: [unfilled] [FreeTextEntry1] : 70yoF with:\par \par 1.  Cholangiocarcinoma - on Gemzar.  Follow up with Med Onc.\par \par 2. Abdominal pain / discomfort\par - C/w MS IR 15mg PRN for moderate to severe pain \par - C/w with current medical cannabis regimen PRN.  \par \par 3. Nausea \par - C/w medical cannabis and Ondansetron ODT PRN.  \par \par 4. Anxiety - Long discussion had re: benefits for increasing escitalopram to 10mg.  She will discuss with psychiatrist. C/w talk therapy.\par \par 5. Encounter for Palliative Care/Advance Care Planning \par - HCP on file\par - MOLST form on file, delineating wishes of limited care: DNR/DNI. Patient expressed that she "doesn’t want to suffer."  All questions answered.  Empathetic listening and emotional support provided. \par \par Follow up in 1-2 months, call sooner with questions or issues.

## 2020-11-27 ENCOUNTER — OUTPATIENT (OUTPATIENT)
Dept: OUTPATIENT SERVICES | Facility: HOSPITAL | Age: 70
LOS: 1 days | Discharge: HOME | End: 2020-11-27

## 2020-11-27 DIAGNOSIS — C22.1 INTRAHEPATIC BILE DUCT CARCINOMA: ICD-10-CM

## 2020-12-01 ENCOUNTER — APPOINTMENT (OUTPATIENT)
Dept: INFUSION THERAPY | Facility: HOSPITAL | Age: 70
End: 2020-12-01

## 2020-12-02 LAB — SARS-COV-2 N GENE NPH QL NAA+PROBE: NOT DETECTED

## 2020-12-03 ENCOUNTER — NON-APPOINTMENT (OUTPATIENT)
Age: 70
End: 2020-12-03

## 2020-12-04 ENCOUNTER — LABORATORY RESULT (OUTPATIENT)
Age: 70
End: 2020-12-04

## 2020-12-04 ENCOUNTER — RESULT REVIEW (OUTPATIENT)
Age: 70
End: 2020-12-04

## 2020-12-04 ENCOUNTER — APPOINTMENT (OUTPATIENT)
Dept: HEMATOLOGY ONCOLOGY | Facility: CLINIC | Age: 70
End: 2020-12-04
Payer: COMMERCIAL

## 2020-12-04 ENCOUNTER — APPOINTMENT (OUTPATIENT)
Dept: INFUSION THERAPY | Facility: HOSPITAL | Age: 70
End: 2020-12-04

## 2020-12-04 VITALS
OXYGEN SATURATION: 99 % | WEIGHT: 218.26 LBS | BODY MASS INDEX: 39.41 KG/M2 | RESPIRATION RATE: 14 BRPM | TEMPERATURE: 98 F | SYSTOLIC BLOOD PRESSURE: 131 MMHG | DIASTOLIC BLOOD PRESSURE: 83 MMHG | HEART RATE: 66 BPM

## 2020-12-04 LAB
BASOPHILS # BLD AUTO: 0.03 K/UL — SIGNIFICANT CHANGE UP (ref 0–0.2)
BASOPHILS NFR BLD AUTO: 0.4 % — SIGNIFICANT CHANGE UP (ref 0–2)
EOSINOPHIL # BLD AUTO: 0.07 K/UL — SIGNIFICANT CHANGE UP (ref 0–0.5)
EOSINOPHIL NFR BLD AUTO: 1 % — SIGNIFICANT CHANGE UP (ref 0–6)
HCT VFR BLD CALC: 42.1 % — SIGNIFICANT CHANGE UP (ref 34.5–45)
HGB BLD-MCNC: 14.2 G/DL — SIGNIFICANT CHANGE UP (ref 11.5–15.5)
IMM GRANULOCYTES NFR BLD AUTO: 0.7 % — SIGNIFICANT CHANGE UP (ref 0–1.5)
LYMPHOCYTES # BLD AUTO: 2.33 K/UL — SIGNIFICANT CHANGE UP (ref 1–3.3)
LYMPHOCYTES # BLD AUTO: 34.9 % — SIGNIFICANT CHANGE UP (ref 13–44)
MCHC RBC-ENTMCNC: 32.4 PG — SIGNIFICANT CHANGE UP (ref 27–34)
MCHC RBC-ENTMCNC: 33.7 G/DL — SIGNIFICANT CHANGE UP (ref 32–36)
MCV RBC AUTO: 96.1 FL — SIGNIFICANT CHANGE UP (ref 80–100)
MONOCYTES # BLD AUTO: 0.62 K/UL — SIGNIFICANT CHANGE UP (ref 0–0.9)
MONOCYTES NFR BLD AUTO: 9.3 % — SIGNIFICANT CHANGE UP (ref 2–14)
NEUTROPHILS # BLD AUTO: 3.57 K/UL — SIGNIFICANT CHANGE UP (ref 1.8–7.4)
NEUTROPHILS NFR BLD AUTO: 53.7 % — SIGNIFICANT CHANGE UP (ref 43–77)
NRBC # BLD: 0 /100 WBCS — SIGNIFICANT CHANGE UP (ref 0–0)
PLATELET # BLD AUTO: 249 K/UL — SIGNIFICANT CHANGE UP (ref 150–400)
RBC # BLD: 4.38 M/UL — SIGNIFICANT CHANGE UP (ref 3.8–5.2)
RBC # FLD: 13 % — SIGNIFICANT CHANGE UP (ref 10.3–14.5)
WBC # BLD: 6.67 K/UL — SIGNIFICANT CHANGE UP (ref 3.8–10.5)
WBC # FLD AUTO: 6.67 K/UL — SIGNIFICANT CHANGE UP (ref 3.8–10.5)

## 2020-12-04 PROCEDURE — 99072 ADDL SUPL MATRL&STAF TM PHE: CPT

## 2020-12-04 PROCEDURE — 99214 OFFICE O/P EST MOD 30 MIN: CPT

## 2020-12-04 RX ORDER — EZETIMIBE 10 MG/1
10 TABLET ORAL
Refills: 0 | Status: DISCONTINUED | COMMUNITY
Start: 2020-10-28 | End: 2020-12-04

## 2020-12-04 NOTE — REVIEW OF SYSTEMS
[Anxiety] : anxiety [Negative] : Allergic/Immunologic [Constipation] : constipation [Diarrhea] : diarrhea [Fever] : no fever [Chills] : no chills [Night Sweats] : no night sweats [Fatigue] : no fatigue [Recent Change In Weight] : ~T no recent weight change [Chest Pain] : no chest pain [Lower Ext Edema] : no lower extremity edema [Shortness Of Breath] : no shortness of breath [Cough] : no cough [SOB on Exertion] : no shortness of breath during exertion [Abdominal Pain] : no abdominal pain [Vomiting] : no vomiting [FreeTextEntry7] : good appetite

## 2020-12-04 NOTE — PHYSICAL EXAM
[Fully active, able to carry on all pre-disease performance without restriction] : Status 0 - Fully active, able to carry on all pre-disease performance without restriction [Obese] : obese [Normal] : grossly intact [de-identified] : obese [de-identified] : anxious

## 2020-12-04 NOTE — HISTORY OF PRESENT ILLNESS
[Disease: _____________________] : Disease: [unfilled] [M: ___] : M[unfilled] [AJCC Stage: ____] : AJCC Stage: [unfilled] [de-identified] : 70 Y/O F HCV infection (treated with Harvoni), IBS, Anxiety, GERD who had some GI symptoms/elevated LFTs (wt loss but on wt watchers) October 2019 and consulted Dr Brunner GI and non contrast CT A/P showed enlargement of the head of the pancreas with an approximately 3.3 cm hypodense area posteriorly within the pancreatic head which is suspicious for neoplasm. There are enlarged retroperitoneal lymph nodes posterior to the head of the pancreas which are suspicious appearing as well. There are few small nodular soft tissue densities lying deep to the right anterior abdominal wall suspicious for peritoneal implants, and soft tissue density material within the gallbladder which may reflect sludge.  Patient was referred for MRCP which revealed an irregularly enhancing 5.8 cm mass at the level of the confluence of the right and left hepatic biliary ducts. This mass obstructs the common hepatic duct and main left intrahepatic bile duct causing dilation of the intrahepatic biliary ducts within the left hepatic lobe. The mid to distal portion of the CBD are normal in caliber and appear uninvolved by the mass. 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 There are enlarged retroperitoneal lymph nodes, some of which demonstrate central necrosis lying posterior to the head of the pancreas and within the aortocaval region, suspicious for metastatic disease. There is no evidence of primary pancreatic neoplasm. There are small nodular densities within the right anterior abdomen suspicious for peritoneal metastatic foci ranging from 3-10 mm in size.  She consulted Dr Adams on 11/13/19 and is planned for biopsy planned 11/27/19. \par \par \par PSH:  cataracts, tubal ligation \par Fhx:  gastric or colon cancer involving a maternal cousin at 60\par Social:  former smoker but quit 13 years ago and drinks alcohol on occasion. She gets her colonoscopies with Dr. Brunner. \par \par PCP: Dr. Alexandria Diop\par GI: Dr. Robert Brunner. \par \par Last Mammogram/pap smear - few years ago; last colonoscopy 2017\par \par She had biopsy of RUQ peritoneal nodule which confirmed metastatic cholangiocarcinoma.  \par She was started on Gemzar/Cisplatin 12/20/19 \par \par after her second cycle, she had a port placed, her Cisplatin was held due to SE and was treated for a cellulitis and influenza\par \par CT chest on 6/21 (2 mm left upper lobe pulmonary nodule is new since November 26, 2019, nonspecific finding. A few other bilateral 2 mm pulmonary nodules are unchanged since November 26, 2019 as described above.) and MRI abd (Ill-defined infiltrative tumor involving the left hepatic lobe medial \par segment and central left biliary tree, difficult to compare with prior imaging November 2019. Degree of left intrahepatic biliary ductal dilatation is unchanged. Upper retroperitoneal adenopathy and peritoneal disease is mildly improved). \par \par  [de-identified] : IDH1 R132C  mutation  [de-identified] : Microsatellite status MS-Stable / Tumor Mutational [place] 1 Muts/Mb §\par ERRFI1 H250fs*14 / IDH1 - R132C\par TPS 20 [FreeTextEntry1] : palliative Gemcitabine q2 week  [de-identified] : Karlie is seen for follow up while on Gemcitabine and her tumor markers remain normal.  She feels well overall and continues to have chronic issues with her bowels.  She states she has been trying to diet and lose weight but her weight is stable.  She noted a R sided abdominla "twinging" last week and Tylenol helped.  She states she attributes it to statins and self stopeed them.  She notes to be forgetful, tries to stay active at home and notes shifting joint pains.   She was constipated this morning which she attributes to her lamb chop dinner.  She was advised to get MRI done mid December but does not like to travel due to her bowel issues right after treatment so wants to change it to end of December.  \par \par

## 2020-12-07 DIAGNOSIS — Z51.11 ENCOUNTER FOR ANTINEOPLASTIC CHEMOTHERAPY: ICD-10-CM

## 2020-12-07 DIAGNOSIS — R11.2 NAUSEA WITH VOMITING, UNSPECIFIED: ICD-10-CM

## 2020-12-14 ENCOUNTER — APPOINTMENT (OUTPATIENT)
Dept: HEMATOLOGY ONCOLOGY | Facility: CLINIC | Age: 70
End: 2020-12-14
Payer: COMMERCIAL

## 2020-12-14 PROCEDURE — 99214 OFFICE O/P EST MOD 30 MIN: CPT | Mod: 95

## 2020-12-14 RX ORDER — DIPHENHYDRAMINE HYDROCHLORIDE AND LIDOCAINE HYDROCHLORIDE AND ALUMINUM HYDROXIDE AND MAGNESIUM HYDRO
KIT EVERY 6 HOURS
Qty: 1 | Refills: 0 | Status: DISCONTINUED | COMMUNITY
Start: 2020-01-29 | End: 2020-12-14

## 2020-12-14 RX ORDER — KETOCONAZOLE 20 MG/G
2 CREAM TOPICAL TWICE DAILY
Qty: 60 | Refills: 0 | Status: DISCONTINUED | COMMUNITY
Start: 2019-12-27 | End: 2020-12-14

## 2020-12-14 NOTE — PHYSICAL EXAM
[Oriented To Time, Place, And Person] : oriented to person, place, and time [Mood] : the mood was normal [General Appearance - Alert] : alert [Affect] : the affect was normal [General Appearance - In No Acute Distress] : in no acute distress

## 2020-12-14 NOTE — REASON FOR VISIT
[Follow-Up] : a follow-up visit [Home] : at home, [unfilled] , at the time of the visit. [Medical Office: ([facility])___] : at the medical office located in  [Verbal consent obtained from patient] : the patient, [unfilled]

## 2020-12-15 NOTE — ASSESSMENT
[DNR] : Code Status: DNR [Limited] : Treatment Guidelines: Limited [DNI] : Intubation: DNI [[facility]: _____[facility]/last verification date: [unfilled] [______] : HCP: [unfilled] [FreeTextEntry1] : 70yoF with:\par \par 1.  Cholangiocarcinoma - on Gemzar.  Follow up with Med Onc.\par \par 2. Abdominal pain / discomfort likely due to neoplasm\par - C/w MS IR 15mg PRN for moderate to severe pain \par - C/w with current medical cannabis regimen PRN.  \par \par 3. Nausea \par - C/w medical cannabis and Ondansetron ODT PRN.  \par \par 4. Anxiety - C/w escitalopram 5mg daily, PRN alprazolam and talk therapy.  Emotional support provided. \par \par 5. Encounter for Palliative Care/Advance Care Planning \par - HCP on file\par - MOLST form on file, delineating wishes of limited care: DNR/DNI. Patient expressed that she "doesn’t want to suffer."  All questions answered.  Empathetic listening and emotional support provided. \par \par Follow up in 1-2 months, call sooner with questions or issues.

## 2020-12-15 NOTE — HISTORY OF PRESENT ILLNESS
[Opioids for treatment of cancer not in remission, and/or  hospice, palliative care] : Opioids for treatment of cancer not in remission, and/or  hospice, palliative care [FreeTextEntry1] : 70yoF with metastatic cholangiocarcinoma presents for follow-up palliative care visit, referred by Oncology team.  PMH significant for HCV (treated with Harvoni), IBS, anxiety, GERD, thrombophlebitis. \par \par Patient initially who had some GI symptoms/elevated LFTs, weight loss (but was on weight watchers) in October 2019.  She saw Dr. Brunner (GI)  whom ordered a CT A/P which demonstrated enlargement of the head of the pancreas with an approximately 3.3 cm hypodense area posteriorly within the pancreatic head which is suspicious for neoplasm. Patient was referred for MRCP which revealed an irregularly enhancing 5.8 cm mass at the level of the confluence of the right and left hepatic biliary ducts. 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 There was no evidence of primary pancreatic neoplasm. She had biopsy of RUQ peritoneal nodule which confirmed metastatic cholangiocarcinoma. She was started on Gemzar/Cisplatin 12/20/19.  Did not tolerate Cisplatin and this was discontinued, now on single agent Gemzar.  \par \par Main issue for which patient initially presented was abdominal pain. The pain is localized to the RUQ and lower abdominal, she endorses bloating.  She was previously prescribed Morphine 15mg which she does not use due to concerns about dependence. She took it twice and it made her very constipated.  She is using PRN Tylenol 500mg, Motrin 200mg PRN (used rarely).\par \par Interval History: Patient presents for follow-up via Telemedicine.  Overall, she feels well.  She continues to report infrequent abdominal pain which has necessitated PRN MS IR 15mg sparingly.   She is upset that her scans are scheduled for [facility].\par \par Patient continues to take escitalopram 5mg once daily and PRN alprazolam QID.  She discussed increase of escitalopram with her psychiatrist but he declined, as it may cause increased ?fatigue. She continues to use medicinal cannabis in 1:1 formulation QHS and high THC:low CBD PRN. She finds that it helps with nausea and anxiety. She follows with her therapist weekly.\par \par ROS:\par + appetite is very good\par + ~ 20 lb weight gain in 6 months\par + chronic anxiety - on Lexapro 5mg QD and Alprazolam 0.25mg QID\par + nausea - uses Ondansetron ODT and medical cannabis which usually helps\par + gas/bloating\par + occasional diarrhea- controlled with PRN Imodium - has erratic BMs depending on what she eats. \par All other ROS as outlined or noncontributory. \par \par Patient is , lives with her . Has supportive family.  She has three children (ages 54, 50, 45) from a previous marriage. It is important to patient that she does not suffer. \par \par Psychiatrist: Dr. NGOC Becerra\par \par I-Stop Ref# [id number]

## 2020-12-18 ENCOUNTER — APPOINTMENT (OUTPATIENT)
Dept: INFUSION THERAPY | Facility: HOSPITAL | Age: 70
End: 2020-12-18

## 2020-12-18 ENCOUNTER — RESULT REVIEW (OUTPATIENT)
Age: 70
End: 2020-12-18

## 2020-12-18 ENCOUNTER — LABORATORY RESULT (OUTPATIENT)
Age: 70
End: 2020-12-18

## 2020-12-18 LAB
BASOPHILS # BLD AUTO: 0.03 K/UL — SIGNIFICANT CHANGE UP (ref 0–0.2)
BASOPHILS NFR BLD AUTO: 0.4 % — SIGNIFICANT CHANGE UP (ref 0–2)
EOSINOPHIL # BLD AUTO: 0.07 K/UL — SIGNIFICANT CHANGE UP (ref 0–0.5)
EOSINOPHIL NFR BLD AUTO: 1 % — SIGNIFICANT CHANGE UP (ref 0–6)
HCT VFR BLD CALC: 41.1 % — SIGNIFICANT CHANGE UP (ref 34.5–45)
HGB BLD-MCNC: 14.1 G/DL — SIGNIFICANT CHANGE UP (ref 11.5–15.5)
IMM GRANULOCYTES NFR BLD AUTO: 0.7 % — SIGNIFICANT CHANGE UP (ref 0–1.5)
LYMPHOCYTES # BLD AUTO: 2.41 K/UL — SIGNIFICANT CHANGE UP (ref 1–3.3)
LYMPHOCYTES # BLD AUTO: 33.1 % — SIGNIFICANT CHANGE UP (ref 13–44)
MCHC RBC-ENTMCNC: 32.9 PG — SIGNIFICANT CHANGE UP (ref 27–34)
MCHC RBC-ENTMCNC: 34.3 G/DL — SIGNIFICANT CHANGE UP (ref 32–36)
MCV RBC AUTO: 96 FL — SIGNIFICANT CHANGE UP (ref 80–100)
MONOCYTES # BLD AUTO: 0.73 K/UL — SIGNIFICANT CHANGE UP (ref 0–0.9)
MONOCYTES NFR BLD AUTO: 10 % — SIGNIFICANT CHANGE UP (ref 2–14)
NEUTROPHILS # BLD AUTO: 3.98 K/UL — SIGNIFICANT CHANGE UP (ref 1.8–7.4)
NEUTROPHILS NFR BLD AUTO: 54.8 % — SIGNIFICANT CHANGE UP (ref 43–77)
NRBC # BLD: 0 /100 WBCS — SIGNIFICANT CHANGE UP (ref 0–0)
PLATELET # BLD AUTO: 162 K/UL — SIGNIFICANT CHANGE UP (ref 150–400)
RBC # BLD: 4.28 M/UL — SIGNIFICANT CHANGE UP (ref 3.8–5.2)
RBC # FLD: 12.7 % — SIGNIFICANT CHANGE UP (ref 10.3–14.5)
WBC # BLD: 7.27 K/UL — SIGNIFICANT CHANGE UP (ref 3.8–10.5)
WBC # FLD AUTO: 7.27 K/UL — SIGNIFICANT CHANGE UP (ref 3.8–10.5)

## 2020-12-21 ENCOUNTER — NON-APPOINTMENT (OUTPATIENT)
Age: 70
End: 2020-12-21

## 2020-12-23 ENCOUNTER — OUTPATIENT (OUTPATIENT)
Dept: OUTPATIENT SERVICES | Facility: HOSPITAL | Age: 70
LOS: 1 days | Discharge: HOME | End: 2020-12-23

## 2020-12-23 DIAGNOSIS — C22.1 INTRAHEPATIC BILE DUCT CARCINOMA: ICD-10-CM

## 2020-12-24 ENCOUNTER — APPOINTMENT (OUTPATIENT)
Dept: MRI IMAGING | Facility: IMAGING CENTER | Age: 70
End: 2020-12-24
Payer: COMMERCIAL

## 2020-12-24 ENCOUNTER — OUTPATIENT (OUTPATIENT)
Dept: OUTPATIENT SERVICES | Facility: HOSPITAL | Age: 70
LOS: 1 days | End: 2020-12-24
Payer: COMMERCIAL

## 2020-12-24 ENCOUNTER — RESULT REVIEW (OUTPATIENT)
Age: 70
End: 2020-12-24

## 2020-12-24 DIAGNOSIS — Z00.8 ENCOUNTER FOR OTHER GENERAL EXAMINATION: ICD-10-CM

## 2020-12-24 DIAGNOSIS — C22.1 INTRAHEPATIC BILE DUCT CARCINOMA: ICD-10-CM

## 2020-12-24 PROCEDURE — 74183 MRI ABD W/O CNTR FLWD CNTR: CPT | Mod: 26

## 2020-12-24 PROCEDURE — 74183 MRI ABD W/O CNTR FLWD CNTR: CPT

## 2020-12-24 PROCEDURE — 72197 MRI PELVIS W/O & W/DYE: CPT

## 2020-12-24 PROCEDURE — A9585: CPT

## 2020-12-24 PROCEDURE — 72197 MRI PELVIS W/O & W/DYE: CPT | Mod: 26

## 2020-12-29 ENCOUNTER — NON-APPOINTMENT (OUTPATIENT)
Age: 70
End: 2020-12-29

## 2020-12-29 ENCOUNTER — APPOINTMENT (OUTPATIENT)
Dept: HEMATOLOGY ONCOLOGY | Facility: CLINIC | Age: 70
End: 2020-12-29
Payer: COMMERCIAL

## 2020-12-29 PROCEDURE — 99442: CPT

## 2020-12-30 ENCOUNTER — APPOINTMENT (OUTPATIENT)
Dept: HEMATOLOGY ONCOLOGY | Facility: CLINIC | Age: 70
End: 2020-12-30

## 2020-12-30 ENCOUNTER — APPOINTMENT (OUTPATIENT)
Dept: INFUSION THERAPY | Facility: HOSPITAL | Age: 70
End: 2020-12-30

## 2020-12-30 ENCOUNTER — LABORATORY RESULT (OUTPATIENT)
Age: 70
End: 2020-12-30

## 2020-12-31 ENCOUNTER — RESULT REVIEW (OUTPATIENT)
Age: 70
End: 2020-12-31

## 2020-12-31 ENCOUNTER — LABORATORY RESULT (OUTPATIENT)
Age: 70
End: 2020-12-31

## 2020-12-31 ENCOUNTER — APPOINTMENT (OUTPATIENT)
Dept: INFUSION THERAPY | Facility: HOSPITAL | Age: 70
End: 2020-12-31

## 2020-12-31 LAB
BASOPHILS # BLD AUTO: 0.03 K/UL — SIGNIFICANT CHANGE UP (ref 0–0.2)
BASOPHILS NFR BLD AUTO: 0.5 % — SIGNIFICANT CHANGE UP (ref 0–2)
EOSINOPHIL # BLD AUTO: 0.07 K/UL — SIGNIFICANT CHANGE UP (ref 0–0.5)
EOSINOPHIL NFR BLD AUTO: 1.1 % — SIGNIFICANT CHANGE UP (ref 0–6)
HCT VFR BLD CALC: 39.7 % — SIGNIFICANT CHANGE UP (ref 34.5–45)
HGB BLD-MCNC: 13.8 G/DL — SIGNIFICANT CHANGE UP (ref 11.5–15.5)
IMM GRANULOCYTES NFR BLD AUTO: 0.3 % — SIGNIFICANT CHANGE UP (ref 0–1.5)
LYMPHOCYTES # BLD AUTO: 2.41 K/UL — SIGNIFICANT CHANGE UP (ref 1–3.3)
LYMPHOCYTES # BLD AUTO: 36.3 % — SIGNIFICANT CHANGE UP (ref 13–44)
MCHC RBC-ENTMCNC: 33 PG — SIGNIFICANT CHANGE UP (ref 27–34)
MCHC RBC-ENTMCNC: 34.8 G/DL — SIGNIFICANT CHANGE UP (ref 32–36)
MCV RBC AUTO: 95 FL — SIGNIFICANT CHANGE UP (ref 80–100)
MONOCYTES # BLD AUTO: 0.61 K/UL — SIGNIFICANT CHANGE UP (ref 0–0.9)
MONOCYTES NFR BLD AUTO: 9.2 % — SIGNIFICANT CHANGE UP (ref 2–14)
NEUTROPHILS # BLD AUTO: 3.49 K/UL — SIGNIFICANT CHANGE UP (ref 1.8–7.4)
NEUTROPHILS NFR BLD AUTO: 52.6 % — SIGNIFICANT CHANGE UP (ref 43–77)
NRBC # BLD: 0 /100 WBCS — SIGNIFICANT CHANGE UP (ref 0–0)
PLATELET # BLD AUTO: 166 K/UL — SIGNIFICANT CHANGE UP (ref 150–400)
RBC # BLD: 4.18 M/UL — SIGNIFICANT CHANGE UP (ref 3.8–5.2)
RBC # FLD: 13.2 % — SIGNIFICANT CHANGE UP (ref 10.3–14.5)
WBC # BLD: 6.63 K/UL — SIGNIFICANT CHANGE UP (ref 3.8–10.5)
WBC # FLD AUTO: 6.63 K/UL — SIGNIFICANT CHANGE UP (ref 3.8–10.5)

## 2021-01-03 DIAGNOSIS — R11.2 NAUSEA WITH VOMITING, UNSPECIFIED: ICD-10-CM

## 2021-01-03 DIAGNOSIS — Z51.11 ENCOUNTER FOR ANTINEOPLASTIC CHEMOTHERAPY: ICD-10-CM

## 2021-01-04 ENCOUNTER — NON-APPOINTMENT (OUTPATIENT)
Age: 71
End: 2021-01-04

## 2021-01-08 NOTE — REASON FOR VISIT
[Follow-Up] : a follow-up visit [Home] : at home, [unfilled] , at the time of the visit. [Medical Office: ([facility])___] : at the medical office located in  [Verbal consent obtained from patient] : the patient, [unfilled]

## 2021-01-11 ENCOUNTER — APPOINTMENT (OUTPATIENT)
Dept: HEMATOLOGY ONCOLOGY | Facility: CLINIC | Age: 71
End: 2021-01-11
Payer: COMMERCIAL

## 2021-01-11 PROCEDURE — 99214 OFFICE O/P EST MOD 30 MIN: CPT | Mod: 95

## 2021-01-11 RX ORDER — SUCRALFATE 1 G/10ML
1 SUSPENSION ORAL
Qty: 100 | Refills: 0 | Status: DISCONTINUED | COMMUNITY
Start: 2021-01-11 | End: 2021-01-11

## 2021-01-11 RX ORDER — LIDOCAINE AND PRILOCAINE 25; 25 MG/G; MG/G
2.5-2.5 CREAM TOPICAL
Qty: 30 | Refills: 2 | Status: ACTIVE | COMMUNITY
Start: 2020-01-14 | End: 1900-01-01

## 2021-01-13 NOTE — ASSESSMENT
[DNR] : Code Status: DNR [Limited] : Treatment Guidelines: Limited [DNI] : Intubation: DNI [[facility]: _____[facility]/last verification date: [unfilled] [______] : HCP: [unfilled] [FreeTextEntry1] : 70yoF with:\par \par 1.  Cholangiocarcinoma - on Gemzar.  Follow up with Med Onc.\par \par 2. Abdominal pain / discomfort likely due to neoplasm\par - C/w MS IR 15mg PRN for moderate to severe pain \par - C/w with current medical cannabis regimen PRN.  \par \par 3. Reflux - C/w famotidine\par - May use carafate PRN for infrequent severe reflux unrelieved by antacids\par \par 4. Nausea \par - C/w medical cannabis and Ondansetron ODT PRN.  \par \par 5. Anxiety - C/w escitalopram 5mg daily, PRN alprazolam and talk therapy.  Emotional support provided. \par \par 6. Encounter for Palliative Care/Advance Care Planning \par - HCP on file\par - MOLST form on file, delineating wishes of limited care: DNR/DNI. Patient expressed that she "doesn’t want to suffer."  All questions answered.  Empathetic listening and emotional support provided. \par \par Follow up in 1-2 months, call sooner with questions or issues.

## 2021-01-13 NOTE — HISTORY OF PRESENT ILLNESS
[Opioids for treatment of cancer not in remission, and/or  hospice, palliative care] : Opioids for treatment of cancer not in remission, and/or  hospice, palliative care [FreeTextEntry1] : 70yoF with metastatic cholangiocarcinoma presents for follow-up palliative care visit, referred by Oncology team.  PMH significant for HCV (treated with Harvoni), IBS, anxiety, GERD, thrombophlebitis. \par \par Patient initially who had some GI symptoms/elevated LFTs, weight loss (but was on weight watchers) in October 2019.  She saw Dr. Brunner (GI)  whom ordered a CT A/P which demonstrated enlargement of the head of the pancreas with an approximately 3.3 cm hypodense area posteriorly within the pancreatic head which is suspicious for neoplasm. Patient was referred for MRCP which revealed an irregularly enhancing 5.8 cm mass at the level of the confluence of the right and left hepatic biliary ducts. 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 There was no evidence of primary pancreatic neoplasm. She had biopsy of RUQ peritoneal nodule which confirmed metastatic cholangiocarcinoma. She was started on Gemzar/Cisplatin 12/20/19.  Did not tolerate Cisplatin and this was discontinued, now on single agent Gemzar.  \par \par Main issue for which patient initially presented was abdominal pain. The pain is localized to the RUQ and lower abdominal, she endorses bloating.  She was previously prescribed Morphine IR 15mg which she does not use due to concerns about dependence. She took it twice and it made her very constipated.  She is using PRN Tylenol 500mg, Motrin 200mg PRN (used rarely).\par \par Interval History: Patient presents for follow-up via Telemedicine.  Overall, she feels well.  She experienced one episode of severe reflux unrelieved by antacids or medical cannabis. She has not experienced abdominal pain necessitating PRN MS IR 15mg in ~ 2 months.  \par \par Patient continues to take escitalopram 5mg once daily and PRN alprazolam QID.  She discussed increase of escitalopram with her psychiatrist but he declined, as it may cause increased ?fatigue. She continues to use medicinal cannabis in 1:1 formulation QHS and high THC:low CBD PRN. She finds that it helps with nausea and anxiety. She follows with her therapist weekly.\par \par ROS:\par + appetite is very good\par + ~ 20 lb weight gain in 6 months\par + chronic anxiety - on Lexapro 5mg QD and Alprazolam 0.25mg QID\par + nausea - uses Ondansetron ODT and medical cannabis which usually helps\par + gas/bloating\par + occasional diarrhea- controlled with PRN Imodium - has erratic BMs depending on what she eats. \par All other ROS as outlined or noncontributory. \par \par Patient is , lives with her . Has supportive family.  She has three children (ages 54, 50, 45) from a previous marriage. It is important to patient that she does not suffer. \par \par Psychiatrist: Dr. NGOC Becerra\par \par I-Stop Ref# [id number]

## 2021-01-15 ENCOUNTER — RESULT REVIEW (OUTPATIENT)
Age: 71
End: 2021-01-15

## 2021-01-15 ENCOUNTER — APPOINTMENT (OUTPATIENT)
Dept: INFUSION THERAPY | Facility: HOSPITAL | Age: 71
End: 2021-01-15

## 2021-01-15 ENCOUNTER — LABORATORY RESULT (OUTPATIENT)
Age: 71
End: 2021-01-15

## 2021-01-15 LAB
BASOPHILS # BLD AUTO: 0.04 K/UL — SIGNIFICANT CHANGE UP (ref 0–0.2)
BASOPHILS NFR BLD AUTO: 0.5 % — SIGNIFICANT CHANGE UP (ref 0–2)
EOSINOPHIL # BLD AUTO: 0.05 K/UL — SIGNIFICANT CHANGE UP (ref 0–0.5)
EOSINOPHIL NFR BLD AUTO: 0.6 % — SIGNIFICANT CHANGE UP (ref 0–6)
HCT VFR BLD CALC: 39.9 % — SIGNIFICANT CHANGE UP (ref 34.5–45)
HGB BLD-MCNC: 14.1 G/DL — SIGNIFICANT CHANGE UP (ref 11.5–15.5)
IMM GRANULOCYTES NFR BLD AUTO: 0.8 % — SIGNIFICANT CHANGE UP (ref 0–1.5)
LYMPHOCYTES # BLD AUTO: 2.35 K/UL — SIGNIFICANT CHANGE UP (ref 1–3.3)
LYMPHOCYTES # BLD AUTO: 30.2 % — SIGNIFICANT CHANGE UP (ref 13–44)
MCHC RBC-ENTMCNC: 33.2 PG — SIGNIFICANT CHANGE UP (ref 27–34)
MCHC RBC-ENTMCNC: 35.3 G/DL — SIGNIFICANT CHANGE UP (ref 32–36)
MCV RBC AUTO: 93.9 FL — SIGNIFICANT CHANGE UP (ref 80–100)
MONOCYTES # BLD AUTO: 0.73 K/UL — SIGNIFICANT CHANGE UP (ref 0–0.9)
MONOCYTES NFR BLD AUTO: 9.4 % — SIGNIFICANT CHANGE UP (ref 2–14)
NEUTROPHILS # BLD AUTO: 4.56 K/UL — SIGNIFICANT CHANGE UP (ref 1.8–7.4)
NEUTROPHILS NFR BLD AUTO: 58.5 % — SIGNIFICANT CHANGE UP (ref 43–77)
NRBC # BLD: 0 /100 WBCS — SIGNIFICANT CHANGE UP (ref 0–0)
PLATELET # BLD AUTO: 188 K/UL — SIGNIFICANT CHANGE UP (ref 150–400)
RBC # BLD: 4.25 M/UL — SIGNIFICANT CHANGE UP (ref 3.8–5.2)
RBC # FLD: 13.1 % — SIGNIFICANT CHANGE UP (ref 10.3–14.5)
WBC # BLD: 7.79 K/UL — SIGNIFICANT CHANGE UP (ref 3.8–10.5)
WBC # FLD AUTO: 7.79 K/UL — SIGNIFICANT CHANGE UP (ref 3.8–10.5)

## 2021-01-25 ENCOUNTER — OUTPATIENT (OUTPATIENT)
Dept: OUTPATIENT SERVICES | Facility: HOSPITAL | Age: 71
LOS: 1 days | Discharge: HOME | End: 2021-01-25

## 2021-01-25 DIAGNOSIS — C22.1 INTRAHEPATIC BILE DUCT CARCINOMA: ICD-10-CM

## 2021-01-28 LAB — SARS-COV-2 N GENE NPH QL NAA+PROBE: NOT DETECTED

## 2021-01-29 ENCOUNTER — LABORATORY RESULT (OUTPATIENT)
Age: 71
End: 2021-01-29

## 2021-01-29 ENCOUNTER — APPOINTMENT (OUTPATIENT)
Dept: INFUSION THERAPY | Facility: HOSPITAL | Age: 71
End: 2021-01-29

## 2021-01-29 ENCOUNTER — RESULT REVIEW (OUTPATIENT)
Age: 71
End: 2021-01-29

## 2021-01-29 ENCOUNTER — APPOINTMENT (OUTPATIENT)
Dept: HEMATOLOGY ONCOLOGY | Facility: CLINIC | Age: 71
End: 2021-01-29
Payer: COMMERCIAL

## 2021-01-29 VITALS
DIASTOLIC BLOOD PRESSURE: 72 MMHG | OXYGEN SATURATION: 99 % | HEART RATE: 77 BPM | RESPIRATION RATE: 14 BRPM | SYSTOLIC BLOOD PRESSURE: 134 MMHG | TEMPERATURE: 98 F | WEIGHT: 224.87 LBS

## 2021-01-29 LAB
BASOPHILS # BLD AUTO: 0.04 K/UL — SIGNIFICANT CHANGE UP (ref 0–0.2)
BASOPHILS NFR BLD AUTO: 0.5 % — SIGNIFICANT CHANGE UP (ref 0–2)
EOSINOPHIL # BLD AUTO: 0.08 K/UL — SIGNIFICANT CHANGE UP (ref 0–0.5)
EOSINOPHIL NFR BLD AUTO: 1 % — SIGNIFICANT CHANGE UP (ref 0–6)
HCT VFR BLD CALC: 40.2 % — SIGNIFICANT CHANGE UP (ref 34.5–45)
HGB BLD-MCNC: 14 G/DL — SIGNIFICANT CHANGE UP (ref 11.5–15.5)
IMM GRANULOCYTES NFR BLD AUTO: 0.5 % — SIGNIFICANT CHANGE UP (ref 0–1.5)
LYMPHOCYTES # BLD AUTO: 2.43 K/UL — SIGNIFICANT CHANGE UP (ref 1–3.3)
LYMPHOCYTES # BLD AUTO: 30.7 % — SIGNIFICANT CHANGE UP (ref 13–44)
MCHC RBC-ENTMCNC: 33 PG — SIGNIFICANT CHANGE UP (ref 27–34)
MCHC RBC-ENTMCNC: 34.8 G/DL — SIGNIFICANT CHANGE UP (ref 32–36)
MCV RBC AUTO: 94.8 FL — SIGNIFICANT CHANGE UP (ref 80–100)
MONOCYTES # BLD AUTO: 0.6 K/UL — SIGNIFICANT CHANGE UP (ref 0–0.9)
MONOCYTES NFR BLD AUTO: 7.6 % — SIGNIFICANT CHANGE UP (ref 2–14)
NEUTROPHILS # BLD AUTO: 4.72 K/UL — SIGNIFICANT CHANGE UP (ref 1.8–7.4)
NEUTROPHILS NFR BLD AUTO: 59.7 % — SIGNIFICANT CHANGE UP (ref 43–77)
NRBC # BLD: 0 /100 WBCS — SIGNIFICANT CHANGE UP (ref 0–0)
PLATELET # BLD AUTO: 174 K/UL — SIGNIFICANT CHANGE UP (ref 150–400)
RBC # BLD: 4.24 M/UL — SIGNIFICANT CHANGE UP (ref 3.8–5.2)
RBC # FLD: 13.1 % — SIGNIFICANT CHANGE UP (ref 10.3–14.5)
WBC # BLD: 7.91 K/UL — SIGNIFICANT CHANGE UP (ref 3.8–10.5)
WBC # FLD AUTO: 7.91 K/UL — SIGNIFICANT CHANGE UP (ref 3.8–10.5)

## 2021-01-29 PROCEDURE — 99072 ADDL SUPL MATRL&STAF TM PHE: CPT

## 2021-01-29 PROCEDURE — 99214 OFFICE O/P EST MOD 30 MIN: CPT

## 2021-01-29 NOTE — PHYSICAL EXAM
[Fully active, able to carry on all pre-disease performance without restriction] : Status 0 - Fully active, able to carry on all pre-disease performance without restriction [Obese] : obese [Normal] : grossly intact [de-identified] : obese [de-identified] : anxious

## 2021-01-29 NOTE — REVIEW OF SYSTEMS
[Diarrhea] : diarrhea [Anxiety] : anxiety [Negative] : Allergic/Immunologic [Fever] : no fever [Chills] : no chills [Night Sweats] : no night sweats [Fatigue] : no fatigue [Recent Change In Weight] : ~T no recent weight change [Chest Pain] : no chest pain [Lower Ext Edema] : no lower extremity edema [Shortness Of Breath] : no shortness of breath [Cough] : no cough [SOB on Exertion] : no shortness of breath during exertion [Abdominal Pain] : no abdominal pain [Vomiting] : no vomiting [FreeTextEntry7] : good appetite

## 2021-01-29 NOTE — HISTORY OF PRESENT ILLNESS
[Disease: _____________________] : Disease: [unfilled] [M: ___] : M[unfilled] [AJCC Stage: ____] : AJCC Stage: [unfilled] [de-identified] : 70 Y/O F HCV infection (treated with Harvoni), IBS, Anxiety, GERD who had some GI symptoms/elevated LFTs (wt loss but on wt watchers) October 2019 and consulted Dr Brunner GI and non contrast CT A/P showed enlargement of the head of the pancreas with an approximately 3.3 cm hypodense area posteriorly within the pancreatic head which is suspicious for neoplasm. There are enlarged retroperitoneal lymph nodes posterior to the head of the pancreas which are suspicious appearing as well. There are few small nodular soft tissue densities lying deep to the right anterior abdominal wall suspicious for peritoneal implants, and soft tissue density material within the gallbladder which may reflect sludge.  Patient was referred for MRCP which revealed an irregularly enhancing 5.8 cm mass at the level of the confluence of the right and left hepatic biliary ducts. This mass obstructs the common hepatic duct and main left intrahepatic bile duct causing dilation of the intrahepatic biliary ducts within the left hepatic lobe. The mid to distal portion of the CBD are normal in caliber and appear uninvolved by the mass. 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 There are enlarged retroperitoneal lymph nodes, some of which demonstrate central necrosis lying posterior to the head of the pancreas and within the aortocaval region, suspicious for metastatic disease. There is no evidence of primary pancreatic neoplasm. There are small nodular densities within the right anterior abdomen suspicious for peritoneal metastatic foci ranging from 3-10 mm in size.  She consulted Dr Adams on 11/13/19 and is planned for biopsy planned 11/27/19. \par \par \par PSH:  cataracts, tubal ligation \par Fhx:  gastric or colon cancer involving a maternal cousin at 60\par Social:  former smoker but quit 13 years ago and drinks alcohol on occasion. She gets her colonoscopies with Dr. Brunner. \par \par PCP: Dr. Alexandria Diop\par GI: Dr. Robert Brunner. \par \par Last Mammogram/pap smear - few years ago; last colonoscopy 2017\par \par She had biopsy of RUQ peritoneal nodule which confirmed metastatic cholangiocarcinoma.  \par She was started on Gemzar/Cisplatin 12/20/19  but unable to tolerate cisplatin due to fatigue and patient wanted to stop cisplatin \par \par after her second cycle, she had a port placed, her Cisplatin was held due to SE and was treated for a cellulitis and influenza\par \par CT chest on 6/21 (2 mm left upper lobe pulmonary nodule is new since November 26, 2019, nonspecific finding. A few other bilateral 2 mm pulmonary nodules are unchanged since November 26, 2019 as described above.) and MRI abd (Ill-defined infiltrative tumor involving the left hepatic lobe medial \par segment and central left biliary tree, difficult to compare with prior imaging November 2019. Degree of left intrahepatic biliary ductal dilatation is unchanged. Upper retroperitoneal adenopathy and peritoneal disease is mildly improved). \par \par  [de-identified] : Microsatellite status MS-Stable / Tumor Mutational [place] 1 Muts/Mb §\par ERRFI1 H250fs*14 / IDH1 - R132C\par TPS 20 [de-identified] : IDH1 R132C  mutation  [FreeTextEntry1] : palliative Gemcitabine q2 week  [de-identified] : Karlie is seen for follow up while on Gemcitabine and her tumor markers remain normal and recent scans were again reviewed with patient\par + wt gain but minimal exercise but has modified diet\par +being seen by pall care for anxiety \par Issues with diarrhea persist however has not seen GI for further management of her IBS.

## 2021-02-01 DIAGNOSIS — R11.2 NAUSEA WITH VOMITING, UNSPECIFIED: ICD-10-CM

## 2021-02-01 DIAGNOSIS — Z51.11 ENCOUNTER FOR ANTINEOPLASTIC CHEMOTHERAPY: ICD-10-CM

## 2021-02-02 ENCOUNTER — NON-APPOINTMENT (OUTPATIENT)
Age: 71
End: 2021-02-02

## 2021-02-05 RX ORDER — SUCRALFATE 1 G/1
1 TABLET ORAL
Qty: 50 | Refills: 0 | Status: ACTIVE | COMMUNITY
Start: 2021-01-11 | End: 1900-01-01

## 2021-02-12 ENCOUNTER — RESULT REVIEW (OUTPATIENT)
Age: 71
End: 2021-02-12

## 2021-02-12 ENCOUNTER — APPOINTMENT (OUTPATIENT)
Dept: INFUSION THERAPY | Facility: HOSPITAL | Age: 71
End: 2021-02-12

## 2021-02-12 ENCOUNTER — APPOINTMENT (OUTPATIENT)
Dept: HEMATOLOGY ONCOLOGY | Facility: CLINIC | Age: 71
End: 2021-02-12

## 2021-02-12 ENCOUNTER — LABORATORY RESULT (OUTPATIENT)
Age: 71
End: 2021-02-12

## 2021-02-12 LAB
BASOPHILS # BLD AUTO: 0.03 K/UL — SIGNIFICANT CHANGE UP (ref 0–0.2)
BASOPHILS NFR BLD AUTO: 0.4 % — SIGNIFICANT CHANGE UP (ref 0–2)
EOSINOPHIL # BLD AUTO: 0.07 K/UL — SIGNIFICANT CHANGE UP (ref 0–0.5)
EOSINOPHIL NFR BLD AUTO: 0.9 % — SIGNIFICANT CHANGE UP (ref 0–6)
HCT VFR BLD CALC: 41.4 % — SIGNIFICANT CHANGE UP (ref 34.5–45)
HGB BLD-MCNC: 14.6 G/DL — SIGNIFICANT CHANGE UP (ref 11.5–15.5)
IMM GRANULOCYTES NFR BLD AUTO: 0.5 % — SIGNIFICANT CHANGE UP (ref 0–1.5)
LYMPHOCYTES # BLD AUTO: 2.55 K/UL — SIGNIFICANT CHANGE UP (ref 1–3.3)
LYMPHOCYTES # BLD AUTO: 32.4 % — SIGNIFICANT CHANGE UP (ref 13–44)
MCHC RBC-ENTMCNC: 33.1 PG — SIGNIFICANT CHANGE UP (ref 27–34)
MCHC RBC-ENTMCNC: 35.3 G/DL — SIGNIFICANT CHANGE UP (ref 32–36)
MCV RBC AUTO: 93.9 FL — SIGNIFICANT CHANGE UP (ref 80–100)
MONOCYTES # BLD AUTO: 0.78 K/UL — SIGNIFICANT CHANGE UP (ref 0–0.9)
MONOCYTES NFR BLD AUTO: 9.9 % — SIGNIFICANT CHANGE UP (ref 2–14)
NEUTROPHILS # BLD AUTO: 4.4 K/UL — SIGNIFICANT CHANGE UP (ref 1.8–7.4)
NEUTROPHILS NFR BLD AUTO: 55.9 % — SIGNIFICANT CHANGE UP (ref 43–77)
NRBC # BLD: 0 /100 WBCS — SIGNIFICANT CHANGE UP (ref 0–0)
PLATELET # BLD AUTO: 176 K/UL — SIGNIFICANT CHANGE UP (ref 150–400)
RBC # BLD: 4.41 M/UL — SIGNIFICANT CHANGE UP (ref 3.8–5.2)
RBC # FLD: 13.1 % — SIGNIFICANT CHANGE UP (ref 10.3–14.5)
WBC # BLD: 7.87 K/UL — SIGNIFICANT CHANGE UP (ref 3.8–10.5)
WBC # FLD AUTO: 7.87 K/UL — SIGNIFICANT CHANGE UP (ref 3.8–10.5)

## 2021-02-17 ENCOUNTER — APPOINTMENT (OUTPATIENT)
Dept: HEMATOLOGY ONCOLOGY | Facility: CLINIC | Age: 71
End: 2021-02-17

## 2021-02-22 NOTE — REASON FOR VISIT
[Follow-Up] : a follow-up visit [Home] : at home, [unfilled] , at the time of the visit. [Medical Office: ([facility])___] : at the medical office located in  [Verbal consent obtained from patient] : the patient, [unfilled]

## 2021-02-23 ENCOUNTER — APPOINTMENT (OUTPATIENT)
Dept: HEMATOLOGY ONCOLOGY | Facility: CLINIC | Age: 71
End: 2021-02-23
Payer: COMMERCIAL

## 2021-02-23 PROCEDURE — 99214 OFFICE O/P EST MOD 30 MIN: CPT | Mod: 95

## 2021-02-23 RX ORDER — TRIAMCINOLONE ACETONIDE 1 MG/G
0.1 PASTE DENTAL 4 TIMES DAILY
Qty: 1 | Refills: 1 | Status: DISCONTINUED | COMMUNITY
Start: 2020-01-31 | End: 2021-02-23

## 2021-02-24 ENCOUNTER — OUTPATIENT (OUTPATIENT)
Dept: OUTPATIENT SERVICES | Facility: HOSPITAL | Age: 71
LOS: 1 days | Discharge: HOME | End: 2021-02-24

## 2021-02-24 DIAGNOSIS — C22.1 INTRAHEPATIC BILE DUCT CARCINOMA: ICD-10-CM

## 2021-02-26 ENCOUNTER — LABORATORY RESULT (OUTPATIENT)
Age: 71
End: 2021-02-26

## 2021-02-26 ENCOUNTER — RESULT REVIEW (OUTPATIENT)
Age: 71
End: 2021-02-26

## 2021-02-26 ENCOUNTER — APPOINTMENT (OUTPATIENT)
Dept: HEMATOLOGY ONCOLOGY | Facility: CLINIC | Age: 71
End: 2021-02-26
Payer: COMMERCIAL

## 2021-02-26 ENCOUNTER — APPOINTMENT (OUTPATIENT)
Dept: INFUSION THERAPY | Facility: HOSPITAL | Age: 71
End: 2021-02-26

## 2021-02-26 VITALS
OXYGEN SATURATION: 97 % | RESPIRATION RATE: 17 BRPM | WEIGHT: 223.53 LBS | TEMPERATURE: 97.4 F | SYSTOLIC BLOOD PRESSURE: 128 MMHG | HEIGHT: 62.6 IN | HEART RATE: 86 BPM | DIASTOLIC BLOOD PRESSURE: 85 MMHG | BODY MASS INDEX: 40.11 KG/M2

## 2021-02-26 LAB
BASOPHILS # BLD AUTO: 0.03 K/UL — SIGNIFICANT CHANGE UP (ref 0–0.2)
BASOPHILS NFR BLD AUTO: 0.5 % — SIGNIFICANT CHANGE UP (ref 0–2)
EOSINOPHIL # BLD AUTO: 0.08 K/UL — SIGNIFICANT CHANGE UP (ref 0–0.5)
EOSINOPHIL NFR BLD AUTO: 1.2 % — SIGNIFICANT CHANGE UP (ref 0–6)
HCT VFR BLD CALC: 40.9 % — SIGNIFICANT CHANGE UP (ref 34.5–45)
HGB BLD-MCNC: 14.1 G/DL — SIGNIFICANT CHANGE UP (ref 11.5–15.5)
IMM GRANULOCYTES NFR BLD AUTO: 0.5 % — SIGNIFICANT CHANGE UP (ref 0–1.5)
LYMPHOCYTES # BLD AUTO: 1.92 K/UL — SIGNIFICANT CHANGE UP (ref 1–3.3)
LYMPHOCYTES # BLD AUTO: 29.2 % — SIGNIFICANT CHANGE UP (ref 13–44)
MCHC RBC-ENTMCNC: 32.9 PG — SIGNIFICANT CHANGE UP (ref 27–34)
MCHC RBC-ENTMCNC: 34.5 G/DL — SIGNIFICANT CHANGE UP (ref 32–36)
MCV RBC AUTO: 95.6 FL — SIGNIFICANT CHANGE UP (ref 80–100)
MONOCYTES # BLD AUTO: 0.59 K/UL — SIGNIFICANT CHANGE UP (ref 0–0.9)
MONOCYTES NFR BLD AUTO: 9 % — SIGNIFICANT CHANGE UP (ref 2–14)
NEUTROPHILS # BLD AUTO: 3.93 K/UL — SIGNIFICANT CHANGE UP (ref 1.8–7.4)
NEUTROPHILS NFR BLD AUTO: 59.6 % — SIGNIFICANT CHANGE UP (ref 43–77)
NRBC # BLD: 0 /100 WBCS — SIGNIFICANT CHANGE UP (ref 0–0)
PLATELET # BLD AUTO: 165 K/UL — SIGNIFICANT CHANGE UP (ref 150–400)
RBC # BLD: 4.28 M/UL — SIGNIFICANT CHANGE UP (ref 3.8–5.2)
RBC # FLD: 13.5 % — SIGNIFICANT CHANGE UP (ref 10.3–14.5)
WBC # BLD: 6.58 K/UL — SIGNIFICANT CHANGE UP (ref 3.8–10.5)
WBC # FLD AUTO: 6.58 K/UL — SIGNIFICANT CHANGE UP (ref 3.8–10.5)

## 2021-02-26 PROCEDURE — 99214 OFFICE O/P EST MOD 30 MIN: CPT

## 2021-02-26 PROCEDURE — 99072 ADDL SUPL MATRL&STAF TM PHE: CPT

## 2021-02-26 NOTE — REVIEW OF SYSTEMS
[Diarrhea] : diarrhea [Anxiety] : anxiety [Negative] : Allergic/Immunologic [Recent Change In Weight] : ~T recent weight change [Fever] : no fever [Chills] : no chills [Night Sweats] : no night sweats [Fatigue] : no fatigue [Chest Pain] : no chest pain [Lower Ext Edema] : no lower extremity edema [Shortness Of Breath] : no shortness of breath [Cough] : no cough [SOB on Exertion] : no shortness of breath during exertion [Abdominal Pain] : no abdominal pain [Vomiting] : no vomiting

## 2021-02-26 NOTE — PHYSICAL EXAM
[Fully active, able to carry on all pre-disease performance without restriction] : Status 0 - Fully active, able to carry on all pre-disease performance without restriction [Obese] : obese [Normal] : grossly intact [de-identified] : obese [de-identified] : anxious

## 2021-02-26 NOTE — HISTORY OF PRESENT ILLNESS
[Disease: _____________________] : Disease: [unfilled] [M: ___] : M[unfilled] [AJCC Stage: ____] : AJCC Stage: [unfilled] [de-identified] : 70 Y/O F HCV infection (treated with Harvoni), IBS, Anxiety, GERD who had some GI symptoms/elevated LFTs (wt loss but on wt watchers) October 2019 and consulted Dr Brunner GI and non contrast CT A/P showed enlargement of the head of the pancreas with an approximately 3.3 cm hypodense area posteriorly within the pancreatic head which is suspicious for neoplasm. There are enlarged retroperitoneal lymph nodes posterior to the head of the pancreas which are suspicious appearing as well. There are few small nodular soft tissue densities lying deep to the right anterior abdominal wall suspicious for peritoneal implants, and soft tissue density material within the gallbladder which may reflect sludge.  Patient was referred for MRCP which revealed an irregularly enhancing 5.8 cm mass at the level of the confluence of the right and left hepatic biliary ducts. This mass obstructs the common hepatic duct and main left intrahepatic bile duct causing dilation of the intrahepatic biliary ducts within the left hepatic lobe. The mid to distal portion of the CBD are normal in caliber and appear uninvolved by the mass. 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 There are enlarged retroperitoneal lymph nodes, some of which demonstrate central necrosis lying posterior to the head of the pancreas and within the aortocaval region, suspicious for metastatic disease. There is no evidence of primary pancreatic neoplasm. There are small nodular densities within the right anterior abdomen suspicious for peritoneal metastatic foci ranging from 3-10 mm in size.  She consulted Dr Adams on 11/13/19 and is planned for biopsy planned 11/27/19. \par \par \par PSH:  cataracts, tubal ligation \par Fhx:  gastric or colon cancer involving a maternal cousin at 60\par Social:  former smoker but quit 13 years ago and drinks alcohol on occasion. She gets her colonoscopies with Dr. Brunner. \par \par PCP: Dr. Alexandria Diop\par GI: Dr. Robert Brunner. \par \par Last Mammogram/pap smear - few years ago; last colonoscopy 2017\par \par She had biopsy of RUQ peritoneal nodule which confirmed metastatic cholangiocarcinoma.  \par She was started on Gemzar/Cisplatin 12/20/19  but unable to tolerate cisplatin due to fatigue and patient wanted to stop cisplatin \par \par after her second cycle, she had a port placed, her Cisplatin was held due to SE and was treated for a cellulitis and influenza\par \par CT chest on 6/21 (2 mm left upper lobe pulmonary nodule is new since November 26, 2019, nonspecific finding. A few other bilateral 2 mm pulmonary nodules are unchanged since November 26, 2019 as described above.) and MRI abd (Ill-defined infiltrative tumor involving the left hepatic lobe medial \par segment and central left biliary tree, difficult to compare with prior imaging November 2019. Degree of left intrahepatic biliary ductal dilatation is unchanged. Upper retroperitoneal adenopathy and peritoneal disease is mildly improved). \par \par  [de-identified] : IDH1 R132C  mutation  [de-identified] : Microsatellite status MS-Stable / Tumor Mutational [place] 1 Muts/Mb §\par ERRFI1 H250fs*14 / IDH1 - R132C\par TPS 20 [FreeTextEntry1] : palliative Gemcitabine q2 week  [de-identified] : Karlie is seen for follow up while on Gemcitabine, her tumor markers remain normal but slow disease progression on imaging.  She has had a decreased in her appetite and has lost a pound since last visit.  She continues to have issues with her bowel and is scheduled to see Dr Brunner on 4/7/2021.  She is scheduled to have routine dental cleaning on 3/9/2021.  Her fatigue is stable.  \par

## 2021-03-01 ENCOUNTER — NON-APPOINTMENT (OUTPATIENT)
Age: 71
End: 2021-03-01

## 2021-03-01 DIAGNOSIS — R11.2 NAUSEA WITH VOMITING, UNSPECIFIED: ICD-10-CM

## 2021-03-01 DIAGNOSIS — Z51.11 ENCOUNTER FOR ANTINEOPLASTIC CHEMOTHERAPY: ICD-10-CM

## 2021-03-01 NOTE — DATA REVIEWED
[FreeTextEntry1] : MR A/P 12/29/2020 IMPRESSION:\par Unchanged left hepatic lobe atrophy with infiltrative disease in the left hepatic lobe extending into the hilum. Mildly increased peritoneal carcinomatosis and periportal lymph nodes.

## 2021-03-01 NOTE — ASSESSMENT
[DNR] : Code Status: DNR [Limited] : Treatment Guidelines: Limited [DNI] : Intubation: DNI [Last Verification Date: _____] [facility] Completion/last verification date: [unfilled] [______] : HCP: [unfilled] [FreeTextEntry1] : 70yoF with:\par \par 1.  Cholangiocarcinoma - on Gemzar [id number].  Follow up with Med Onc.\par \par 2. Abdominal pain / discomfort likely due to neoplasm\par - C/w MS IR 15-30mg PRN for moderate to severe pain \par - C/w with current medical cannabis regimen PRN.  \par \par 3. Reflux - C/w famotidine and PRN carafate.  \par \par 4. IBS - Strongly encouraged follow-up with GI.  She will reach out to Dr. Brunner's office today. \par \par 5. Nausea - C/w medical cannabis and Ondansetron ODT PRN.  \par \par 6. Anxiety - C/w escitalopram 5mg daily, PRN alprazolam and talk therapy.  Emotional support provided. \par \par 7. Fatigue - Advised patient to stay active as much as tolerated, take short, scheduled naps to restore energy. \par - Discussed addition of methylphenidate. She defers currently. \par \par 8. Encounter for Palliative Care/Advance Care Planning \par - HCP on file\par - MOLST form on file, delineating wishes of limited care: DNR/DNI. Patient expressed that she "doesn’t want to suffer."  All questions answered.  Empathetic listening and emotional support provided. \par \par Follow up in 1 month, call sooner with questions or issues.

## 2021-03-01 NOTE — HISTORY OF PRESENT ILLNESS
[Opioids for treatment of cancer not in remission, and/or  hospice, palliative care] : Opioids for treatment of cancer not in remission, and/or  hospice, palliative care [FreeTextEntry1] : 70yoF with metastatic cholangiocarcinoma presents for follow-up palliative care visit, referred by Oncology team.  PMH significant for HCV (treated with Harvoni), IBS, anxiety, GERD, thrombophlebitis. \par \par Patient initially who had some GI symptoms/elevated LFTs, weight loss (but was on weight watchers) in October 2019.  She saw Dr. Brunner (GI)  whom ordered a CT A/P which demonstrated enlargement of the head of the pancreas with an approximately 3.3 cm hypodense area posteriorly within the pancreatic head which is suspicious for neoplasm. Patient was referred for MRCP which revealed an irregularly enhancing 5.8 cm mass at the level of the confluence of the right and left hepatic biliary ducts. 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 There was no evidence of primary pancreatic neoplasm. She had biopsy of RUQ peritoneal nodule which confirmed metastatic cholangiocarcinoma. She was started on Gemzar/Cisplatin 12/20/19.  Did not tolerate Cisplatin and this was discontinued, now on single agent Gemzar.  \par \par Main issue for which patient initially presented was abdominal pain. The pain is localized to the RUQ and lower abdominal, she endorses bloating.  She was previously prescribed Morphine IR 15mg which she does not use due to concerns about dependence. She took it twice and it made her very constipated.  She is using PRN Tylenol 500mg, Motrin 200mg PRN (used rarely).\par \par Interval History: Patient presents for follow-up via Telemedicine. Remains on gemcitabine with slow disease progression of disease on imaging.  Reports increased rise in abdominal pain in pain last week that was no longer relieved by PRN MS IR 15mg. This sudden rise in pain caused increased anxiety.   She continues to have issues with her bowels.  She continues to use medicinal cannabis in 1:1 formulation QHS and high THC:low CBD PRN. She finds that it helps with nausea and anxiety. She follows with her therapist weekly.\par \par ROS:\par + good appetite\par + ~ 20 lb weight gain in past 6 months\par + chronic anxiety - on Lexapro 5mg QD and Alprazolam 0.25mg QID\par + nausea - uses Ondansetron ODT and medical cannabis which usually helps\par + gas/bloating\par + occasional diarrhea- controlled with PRN Imodium - has erratic BMs depending on what she eats. \par All other ROS as outlined or noncontributory. \par \par Patient is , lives with her . Has supportive family.  She has three children (ages 54, 50, 45) from a previous marriage. It is important to patient that she does not suffer. \par \par Psychiatrist: Dr. NGOC Becerra\par \par I-Stop Ref# [id number]

## 2021-03-10 ENCOUNTER — OUTPATIENT (OUTPATIENT)
Dept: OUTPATIENT SERVICES | Facility: HOSPITAL | Age: 71
LOS: 1 days | Discharge: HOME | End: 2021-03-10

## 2021-03-10 DIAGNOSIS — C22.1 INTRAHEPATIC BILE DUCT CARCINOMA: ICD-10-CM

## 2021-03-12 ENCOUNTER — LABORATORY RESULT (OUTPATIENT)
Age: 71
End: 2021-03-12

## 2021-03-12 ENCOUNTER — RESULT REVIEW (OUTPATIENT)
Age: 71
End: 2021-03-12

## 2021-03-12 ENCOUNTER — APPOINTMENT (OUTPATIENT)
Dept: INFUSION THERAPY | Facility: HOSPITAL | Age: 71
End: 2021-03-12

## 2021-03-12 LAB
BASOPHILS # BLD AUTO: 0.04 K/UL — SIGNIFICANT CHANGE UP (ref 0–0.2)
BASOPHILS NFR BLD AUTO: 0.6 % — SIGNIFICANT CHANGE UP (ref 0–2)
EOSINOPHIL # BLD AUTO: 0.07 K/UL — SIGNIFICANT CHANGE UP (ref 0–0.5)
EOSINOPHIL NFR BLD AUTO: 1.1 % — SIGNIFICANT CHANGE UP (ref 0–6)
HCT VFR BLD CALC: 41.1 % — SIGNIFICANT CHANGE UP (ref 34.5–45)
HGB BLD-MCNC: 14.2 G/DL — SIGNIFICANT CHANGE UP (ref 11.5–15.5)
IMM GRANULOCYTES NFR BLD AUTO: 0.5 % — SIGNIFICANT CHANGE UP (ref 0–1.5)
LYMPHOCYTES # BLD AUTO: 2.01 K/UL — SIGNIFICANT CHANGE UP (ref 1–3.3)
LYMPHOCYTES # BLD AUTO: 32.1 % — SIGNIFICANT CHANGE UP (ref 13–44)
MCHC RBC-ENTMCNC: 32.8 PG — SIGNIFICANT CHANGE UP (ref 27–34)
MCHC RBC-ENTMCNC: 34.5 G/DL — SIGNIFICANT CHANGE UP (ref 32–36)
MCV RBC AUTO: 94.9 FL — SIGNIFICANT CHANGE UP (ref 80–100)
MONOCYTES # BLD AUTO: 0.62 K/UL — SIGNIFICANT CHANGE UP (ref 0–0.9)
MONOCYTES NFR BLD AUTO: 9.9 % — SIGNIFICANT CHANGE UP (ref 2–14)
NEUTROPHILS # BLD AUTO: 3.5 K/UL — SIGNIFICANT CHANGE UP (ref 1.8–7.4)
NEUTROPHILS NFR BLD AUTO: 55.8 % — SIGNIFICANT CHANGE UP (ref 43–77)
NRBC # BLD: 0 /100 WBCS — SIGNIFICANT CHANGE UP (ref 0–0)
PLATELET # BLD AUTO: 170 K/UL — SIGNIFICANT CHANGE UP (ref 150–400)
RBC # BLD: 4.33 M/UL — SIGNIFICANT CHANGE UP (ref 3.8–5.2)
RBC # FLD: 13.3 % — SIGNIFICANT CHANGE UP (ref 10.3–14.5)
WBC # BLD: 6.27 K/UL — SIGNIFICANT CHANGE UP (ref 3.8–10.5)
WBC # FLD AUTO: 6.27 K/UL — SIGNIFICANT CHANGE UP (ref 3.8–10.5)

## 2021-03-15 DIAGNOSIS — R11.2 NAUSEA WITH VOMITING, UNSPECIFIED: ICD-10-CM

## 2021-03-15 DIAGNOSIS — Z51.11 ENCOUNTER FOR ANTINEOPLASTIC CHEMOTHERAPY: ICD-10-CM

## 2021-03-24 ENCOUNTER — APPOINTMENT (OUTPATIENT)
Dept: HEMATOLOGY ONCOLOGY | Facility: CLINIC | Age: 71
End: 2021-03-24
Payer: COMMERCIAL

## 2021-03-24 LAB — SARS-COV-2 N GENE NPH QL NAA+PROBE: NOT DETECTED

## 2021-03-24 PROCEDURE — 99215 OFFICE O/P EST HI 40 MIN: CPT | Mod: 95

## 2021-03-25 NOTE — ASSESSMENT
[DNR] : Code Status: DNR [Limited] : Treatment Guidelines: Limited [DNI] : Intubation: DNI [[facility] Date: _____] [facility]last [facility]: [unfilled] [______] : HCP: [unfilled] [FreeTextEntry1] : 70yoF with:\par \par 1.  Cholangiocarcinoma - on Gemzar [id number].  Follow up with Med Onc.\par \par 2. Abdominal pain / discomfort likely due to neoplasm\par - C/w MS IR 30mg PRN for moderate to severe pain \par - C/w with current medical cannabis regimen PRN.  \par \par 3. Reflux - C/w famotidine and PRN carafate.  \par \par 4. Intermittent constipation - c/w PRN senna, prunes\par \par 5. Nausea - C/w medical cannabis and Ondansetron ODT PRN.  \par \par 6. Anxiety - C/w escitalopram 5mg daily, PRN alprazolam and talk therapy.  Emotional support provided. \par \par 7. Fatigue - Advised patient to stay active as much as tolerated, take short, scheduled naps to restore energy. \par She will discuss with Dr. Castro later this week the option of taking a break from chemotherapy. \par \par 8. Encounter for Palliative Care/Advance Care Planning \par - HCP on file\par - MOLST form on file, delineating wishes of limited care: DNR/DNI. Patient expressed that she "doesn’t want to suffer."  All questions answered.  Empathetic listening and emotional support provided. \par \par Follow up in 1 month, call sooner with questions or issues.

## 2021-03-25 NOTE — HISTORY OF PRESENT ILLNESS
[Opioids for treatment of cancer not in remission, and/or  hospice, palliative care] : Opioids for treatment of cancer not in remission, and/or  hospice, palliative care [FreeTextEntry1] : 70yoF with metastatic cholangiocarcinoma presents for follow-up palliative care visit, referred by Oncology team.  PMH significant for HCV (treated with Harvoni), IBS, anxiety, GERD, thrombophlebitis. \par \par Patient initially who had some GI symptoms/elevated LFTs, weight loss (but was on weight watchers) in October 2019.  She saw Dr. Brunner (GI)  whom ordered a CT A/P which demonstrated enlargement of the head of the pancreas with an approximately 3.3 cm hypodense area posteriorly within the pancreatic head which is suspicious for neoplasm. Patient was referred for MRCP which revealed an irregularly enhancing 5.8 cm mass at the level of the confluence of the right and left hepatic biliary ducts. 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 There was no evidence of primary pancreatic neoplasm. She had biopsy of RUQ peritoneal nodule which confirmed metastatic cholangiocarcinoma. She was started on Gemzar/Cisplatin 12/20/19.  Did not tolerate Cisplatin and this was discontinued, now on single agent Gemzar.  \par \par Main issue for which patient initially presented was abdominal pain. The pain is localized to the RUQ and lower abdominal, she endorses bloating.  She was previously prescribed Morphine IR 15mg which she does not use due to concerns about dependence. She took it twice and it made her very constipated.  She is using PRN Tylenol 500mg. \par \par Remains on gemcitabine with slow disease progression of disease on imaging.  Reports increased rise in abdominal pain in pain last week that was no longer relieved by PRN MS IR 15mg. This sudden rise in pain caused increased anxiety.   She continues to have issues with her bowels.  She continues to use medicinal cannabis in 1:1 formulation QHS and high THC:low CBD PRN. She finds that it helps with nausea and anxiety. She follows with her therapist weekly.\par \par Interval History: Patient presents for follow-up via Telemedicine. \par Patient reports feeling very fatigued, does not feel as if she recovered well since her last Gemzar infusion on 3/12. She is very concerned about what she'll feel like if she goes through her scheduled Gemzar treatment this Friday 3/26. \par \par She is experiencing pain in the epigastrium/LUQ, accompanied by bloating. Sometimes the pain travels around to her back. The MS IR 15mg has not been helping like before, she was instructed to increase to 30mg yesterday, she has taken it once so far. \par Has been taking ODT ondansetron for her nausea, this helps. \par She has a "little" energy in the morning and then after noon she becomes fatigued. She tends to nap 2-3 hours in the afternoon. \par Continues to use 1:1 THC:CBD and high THC:low CBD \par \par ROS:\par + good appetite\par + ~ 20 lb weight gain in past 6 months\par + chronic anxiety - on Lexapro 5mg QD and Alprazolam 0.25mg QID\par + nausea - uses Ondansetron ODT and medical cannabis which usually helps\par + gas/bloating\par + occasional diarrhea- controlled with PRN Imodium - has erratic BMs depending on what she eats. Eats fruits, prune juice to try and maintain regularity. Also uses PRN senna. \par All other ROS as outlined or noncontributory. \par \par Patient is , lives with her . Has supportive family.  She has three children (ages 54, 50, 45) from a previous marriage. It is important to patient that she does not suffer. \par \par Psychiatrist: Dr. NGOC Becerra ([facility])\par Therapist: Nataliya Bartholomew\par \par I-Stop Ref# [id number]

## 2021-03-26 ENCOUNTER — RESULT REVIEW (OUTPATIENT)
Age: 71
End: 2021-03-26

## 2021-03-26 ENCOUNTER — LABORATORY RESULT (OUTPATIENT)
Age: 71
End: 2021-03-26

## 2021-03-26 ENCOUNTER — APPOINTMENT (OUTPATIENT)
Dept: HEMATOLOGY ONCOLOGY | Facility: CLINIC | Age: 71
End: 2021-03-26
Payer: COMMERCIAL

## 2021-03-26 ENCOUNTER — APPOINTMENT (OUTPATIENT)
Dept: INFUSION THERAPY | Facility: HOSPITAL | Age: 71
End: 2021-03-26

## 2021-03-26 VITALS
SYSTOLIC BLOOD PRESSURE: 130 MMHG | TEMPERATURE: 98 F | OXYGEN SATURATION: 98 % | DIASTOLIC BLOOD PRESSURE: 80 MMHG | RESPIRATION RATE: 18 BRPM | HEART RATE: 84 BPM | WEIGHT: 222.67 LBS | BODY MASS INDEX: 39.95 KG/M2

## 2021-03-26 LAB
BASOPHILS # BLD AUTO: 0.03 K/UL — SIGNIFICANT CHANGE UP (ref 0–0.2)
BASOPHILS NFR BLD AUTO: 0.5 % — SIGNIFICANT CHANGE UP (ref 0–2)
EOSINOPHIL # BLD AUTO: 0.04 K/UL — SIGNIFICANT CHANGE UP (ref 0–0.5)
EOSINOPHIL NFR BLD AUTO: 0.6 % — SIGNIFICANT CHANGE UP (ref 0–6)
HCT VFR BLD CALC: 39.9 % — SIGNIFICANT CHANGE UP (ref 34.5–45)
HGB BLD-MCNC: 13.9 G/DL — SIGNIFICANT CHANGE UP (ref 11.5–15.5)
IMM GRANULOCYTES NFR BLD AUTO: 0.8 % — SIGNIFICANT CHANGE UP (ref 0–1.5)
LYMPHOCYTES # BLD AUTO: 1.87 K/UL — SIGNIFICANT CHANGE UP (ref 1–3.3)
LYMPHOCYTES # BLD AUTO: 28.4 % — SIGNIFICANT CHANGE UP (ref 13–44)
MCHC RBC-ENTMCNC: 32.7 PG — SIGNIFICANT CHANGE UP (ref 27–34)
MCHC RBC-ENTMCNC: 34.8 G/DL — SIGNIFICANT CHANGE UP (ref 32–36)
MCV RBC AUTO: 93.9 FL — SIGNIFICANT CHANGE UP (ref 80–100)
MONOCYTES # BLD AUTO: 0.67 K/UL — SIGNIFICANT CHANGE UP (ref 0–0.9)
MONOCYTES NFR BLD AUTO: 10.2 % — SIGNIFICANT CHANGE UP (ref 2–14)
NEUTROPHILS # BLD AUTO: 3.92 K/UL — SIGNIFICANT CHANGE UP (ref 1.8–7.4)
NEUTROPHILS NFR BLD AUTO: 59.5 % — SIGNIFICANT CHANGE UP (ref 43–77)
NRBC # BLD: 0 /100 WBCS — SIGNIFICANT CHANGE UP (ref 0–0)
PLATELET # BLD AUTO: 164 K/UL — SIGNIFICANT CHANGE UP (ref 150–400)
RBC # BLD: 4.25 M/UL — SIGNIFICANT CHANGE UP (ref 3.8–5.2)
RBC # FLD: 13.1 % — SIGNIFICANT CHANGE UP (ref 10.3–14.5)
WBC # BLD: 6.58 K/UL — SIGNIFICANT CHANGE UP (ref 3.8–10.5)
WBC # FLD AUTO: 6.58 K/UL — SIGNIFICANT CHANGE UP (ref 3.8–10.5)

## 2021-03-26 PROCEDURE — 99072 ADDL SUPL MATRL&STAF TM PHE: CPT

## 2021-03-26 PROCEDURE — 99214 OFFICE O/P EST MOD 30 MIN: CPT

## 2021-03-29 NOTE — HISTORY OF PRESENT ILLNESS
[Disease: _____________________] : Disease: [unfilled] [M: ___] : M[unfilled] [AJCC Stage: ____] : AJCC Stage: [unfilled] [de-identified] : 70 Y/O F HCV infection (treated with Harvoni), IBS, Anxiety, GERD who had some GI symptoms/elevated LFTs (wt loss but on wt watchers) October 2019 and consulted Dr Brunner GI and non contrast CT A/P showed enlargement of the head of the pancreas with an approximately 3.3 cm hypodense area posteriorly within the pancreatic head which is suspicious for neoplasm. There are enlarged retroperitoneal lymph nodes posterior to the head of the pancreas which are suspicious appearing as well. There are few small nodular soft tissue densities lying deep to the right anterior abdominal wall suspicious for peritoneal implants, and soft tissue density material within the gallbladder which may reflect sludge.  Patient was referred for MRCP which revealed an irregularly enhancing 5.8 cm mass at the level of the confluence of the right and left hepatic biliary ducts. This mass obstructs the common hepatic duct and main left intrahepatic bile duct causing dilation of the intrahepatic biliary ducts within the left hepatic lobe. The mid to distal portion of the CBD are normal in caliber and appear uninvolved by the mass. 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 There are enlarged retroperitoneal lymph nodes, some of which demonstrate central necrosis lying posterior to the head of the pancreas and within the aortocaval region, suspicious for metastatic disease. There is no evidence of primary pancreatic neoplasm. There are small nodular densities within the right anterior abdomen suspicious for peritoneal metastatic foci ranging from 3-10 mm in size.  She consulted Dr Adams on 11/13/19 and is planned for biopsy planned 11/27/19. \par \par She had biopsy of RUQ peritoneal nodule which confirmed metastatic cholangiocarcinoma.  \par She was started on Gemzar/Cisplatin 12/20/19  but unable to tolerate cisplatin due to fatigue and patient wanted to stop cisplatin \par \par after her second cycle, she had a port placed, her Cisplatin was held due to SE and was treated for a cellulitis and influenza\par \par CT chest on 6/21 (2 mm left upper lobe pulmonary nodule is new since November 26, 2019, nonspecific finding. A few other bilateral 2 mm pulmonary nodules are unchanged since November 26, 2019 as described above.) and MRI abd (Ill-defined infiltrative tumor involving the left hepatic lobe medial \par segment and central left biliary tree, difficult to compare with prior imaging November 2019. Degree of left intrahepatic biliary ductal dilatation is unchanged. Upper retroperitoneal adenopathy and peritoneal disease is mildly improved). 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  + fatigue increased and states it hasn't really improved since last dose \par + anxiety \par IBS related diarrhea \par pain increased and managed by pall care

## 2021-03-29 NOTE — PHYSICAL EXAM
[Fully active, able to carry on all pre-disease performance without restriction] : Status 0 - Fully active, able to carry on all pre-disease performance without restriction [Obese] : obese [Normal] : grossly intact [de-identified] : obese [de-identified] : anxious

## 2021-03-29 NOTE — REVIEW OF SYSTEMS
[Recent Change In Weight] : ~T recent weight change [Diarrhea] : diarrhea [Anxiety] : anxiety [Negative] : Allergic/Immunologic [Fever] : no fever [Chills] : no chills [Night Sweats] : no night sweats [Fatigue] : no fatigue [Chest Pain] : no chest pain [Lower Ext Edema] : no lower extremity edema [Shortness Of Breath] : no shortness of breath [Cough] : no cough [SOB on Exertion] : no shortness of breath during exertion [Abdominal Pain] : no abdominal pain [Vomiting] : no vomiting

## 2021-03-30 NOTE — ED ADULT NURSE NOTE - [facility]AL [facility], MLM
Regarding: [place], heavy nose bleed, headache, chills, vomiting  ----- Message from Kym Heaton sent at 3/30/2021 12:28 AM CDT -----  Patient Name: Behnett J Sandberg    Full Name of Provider seen for current symptoms: Dr. Alie Lazaro    Pregnant (If Yes, how long?): n/a    Symptoms: heavy nose bleed, headache, chills, vomiting    Do you or any of your household members have the following symptoms:  Fever >100.0#F or >38.0#C: No    New or worsening cough, shortness of breath, sore throat, congestion, or runny nose: No    New onset of nausea, vomiting or diarrhea: Yes    New onset of loss of taste or smell, chills, repeated shaking with chills, muscle pain, or headache: Yes    Have you, a household member, or another person you have been in contact with tested positive for COVID-19 in the last 14 days?: No    Call Back #: [phone]    Call Center Account #: [id number]    Which State are you currently located in? (enter State name in Summary field):  [place]     Please update the Demographics section with the patients permanent resident address     Emergent COVID-19 Symptoms requiring Nurse Triage:  Trouble breathing, Persistent pain or pressure in the chest, New confusion, Inability to wake or stay awake, Bluish lips or face       Patent

## 2021-04-01 ENCOUNTER — RESULT REVIEW (OUTPATIENT)
Age: 71
End: 2021-04-01

## 2021-04-03 ENCOUNTER — APPOINTMENT (OUTPATIENT)
Dept: MRI IMAGING | Facility: IMAGING CENTER | Age: 71
End: 2021-04-03
Payer: COMMERCIAL

## 2021-04-03 ENCOUNTER — APPOINTMENT (OUTPATIENT)
Dept: CT IMAGING | Facility: IMAGING CENTER | Age: 71
End: 2021-04-03
Payer: COMMERCIAL

## 2021-04-03 ENCOUNTER — RESULT REVIEW (OUTPATIENT)
Age: 71
End: 2021-04-03

## 2021-04-03 ENCOUNTER — OUTPATIENT (OUTPATIENT)
Dept: OUTPATIENT SERVICES | Facility: HOSPITAL | Age: 71
LOS: 1 days | End: 2021-04-03
Payer: COMMERCIAL

## 2021-04-03 DIAGNOSIS — C22.1 INTRAHEPATIC BILE DUCT CARCINOMA: ICD-10-CM

## 2021-04-03 DIAGNOSIS — Z00.8 ENCOUNTER FOR OTHER GENERAL EXAMINATION: ICD-10-CM

## 2021-04-03 PROCEDURE — A9585: CPT

## 2021-04-03 PROCEDURE — 74183 MRI ABD W/O CNTR FLWD CNTR: CPT | Mod: 26

## 2021-04-03 PROCEDURE — 72197 MRI PELVIS W/O & W/DYE: CPT | Mod: 26

## 2021-04-03 PROCEDURE — 74183 MRI ABD W/O CNTR FLWD CNTR: CPT

## 2021-04-03 PROCEDURE — 71250 CT THORAX DX C-: CPT | Mod: 26

## 2021-04-03 PROCEDURE — 72197 MRI PELVIS W/O & W/DYE: CPT

## 2021-04-03 PROCEDURE — 71250 CT THORAX DX C-: CPT

## 2021-04-06 ENCOUNTER — APPOINTMENT (OUTPATIENT)
Dept: HEMATOLOGY ONCOLOGY | Facility: CLINIC | Age: 71
End: 2021-04-06
Payer: COMMERCIAL

## 2021-04-06 PROCEDURE — 99214 OFFICE O/P EST MOD 30 MIN: CPT | Mod: 95

## 2021-04-06 NOTE — REASON FOR VISIT
[Follow-Up] : a follow-up visit [Home] : at home, [unfilled] , at the time of the visit. [Medical Office: ([facility])___] : at the medical office located in  [Verbal consent obtained from patient] : the patient, [unfilled]

## 2021-04-06 NOTE — REASON FOR VISIT
[Follow-Up] : a follow-up visit [Home] : at home, [unfilled] , at the time of the visit. [Medical Office: ([facility])___] : at the medical office located in  [Verbal consent obtained from patient] : the patient, [unfilled]

## 2021-04-07 ENCOUNTER — APPOINTMENT (OUTPATIENT)
Dept: HEMATOLOGY ONCOLOGY | Facility: CLINIC | Age: 71
End: 2021-04-07
Payer: COMMERCIAL

## 2021-04-07 ENCOUNTER — APPOINTMENT (OUTPATIENT)
Dept: GASTROENTEROLOGY | Facility: CLINIC | Age: 71
End: 2021-04-07
Payer: COMMERCIAL

## 2021-04-07 VITALS
RESPIRATION RATE: 17 BRPM | BODY MASS INDEX: 40.19 KG/M2 | TEMPERATURE: 96.8 F | HEIGHT: 62.6 IN | WEIGHT: 224 LBS | HEART RATE: 74 BPM | OXYGEN SATURATION: 100 % | SYSTOLIC BLOOD PRESSURE: 130 MMHG | DIASTOLIC BLOOD PRESSURE: 85 MMHG

## 2021-04-07 PROCEDURE — 99072 ADDL SUPL MATRL&STAF TM PHE: CPT

## 2021-04-07 PROCEDURE — 99214 OFFICE O/P EST MOD 30 MIN: CPT | Mod: 95

## 2021-04-07 PROCEDURE — 99214 OFFICE O/P EST MOD 30 MIN: CPT

## 2021-04-07 NOTE — ASSESSMENT
[FreeTextEntry1] : 1) Bloating \par \par  A low FODMAP diet was discussed with the patient at length. The patient had multiple questions all of which were answered. I recommended a nutritionist. Also recommended that the patient keep a food diary. We discussed  options such as Vegetables. Fresh fruits. Dairy that is lactose-free, and hard cheeses, or ripened/matured cheeses including... Beef, pork, chicken, fish, eggs. Avoid breadcrumbs, marinades, and sauces/gravies that may be high in FODMAPs. Soy products including tofu, tempeh. Grains.\par \par \par 2) GAS\par \par We discussed Probiotics and limiting leafy vegetables and other changes\par \par 3) GERD\par \par We discussed a low acid , high protien diet \par \par Avoid Spicey oily greasy foods\par \par 4) Change in Bowels \par \par We discussed the proper use of fiber and water intake \par \par The causes of Bowel irregularities were discussed at length  We discussed : Eat three meals each day. Do not skip meals. Gradually increase the amount of FIBER  in your diet. Choose more whole grain breads, cereals and rice. Select more raw fruits and vegetables -- eat the peel, if appropriate. Read food labels and look for the "dietary fiber" content of foods. Good sources have 2 grams of fiber or more. Drink six to eight glasses of water each day. Limit highly refined and processed foods. \par \par \par Citrucel 1 scoop daily \par \par All questions answered\par \par Reading material given \par \par F/U in office after procedure or in 8 weeks \par \par \par The above medical issues were discussed in detail and all questions answered over a 45 minute period.\par \par \par \par ? Cancer induced / MS04

## 2021-04-07 NOTE — PHYSICAL EXAM
[General Appearance - Alert] : alert [General Appearance - In No Acute Distress] : in no acute distress [Sclera] : the sclera and conjunctiva were normal [PERRL With Normal Accommodation] : pupils were equal in size, round, and reactive to light [Extraocular Movements] : extraocular movements were intact [Outer Ear] : the ears and nose were normal in appearance [Oropharynx] : the oropharynx was normal [Neck Appearance] : the appearance of the neck was normal [Neck Cervical Mass (___cm)] : no neck mass was observed [Jugular Venous Distention Increased] : there was no jugular-venous distention [Thyroid Diffuse Enlargement] : the thyroid was not enlarged [Thyroid Nodule] : there were no palpable thyroid nodules [Auscultation Breath Sounds / Voice Sounds] : lungs were clear to auscultation bilaterally [Heart Rate And Rhythm] : heart rate was normal and rhythm regular [Heart Sounds] : normal S1 and S2 [Heart Sounds Gallop] : no gallops [Murmurs] : no murmurs [Heart Sounds Pericardial Friction Rub] : no pericardial rub [Normal] : normal [Soft, Nontender] : the abdomen was soft and nontender [Epigastric] : in the epigastric area [No Mass] : no masses were palpated [No HSM] : no hepatosplenomegaly noted [Occult Blood Positive] : stool was negative for occult blood [No CVA Tenderness] : no ~M costovertebral angle tenderness [No Spinal Tenderness] : no spinal tenderness [Abnormal Walk] : normal gait [Nail Clubbing] : no clubbing  or cyanosis of the fingernails [Musculoskeletal - Swelling] : no joint swelling seen [Motor Tone] : muscle strength and tone were normal [Skin Color & Pigmentation] : normal skin color and pigmentation [Skin Turgor] : normal skin turgor [] : no rash [Oriented To Time, Place, And Person] : oriented to person, place, and time [Impaired Insight] : insight and judgment were intact [Affect] : the affect was normal

## 2021-04-09 ENCOUNTER — APPOINTMENT (OUTPATIENT)
Dept: INFUSION THERAPY | Facility: HOSPITAL | Age: 71
End: 2021-04-09

## 2021-04-09 NOTE — ASSESSMENT
[DNR] : Code Status: DNR [Limited] : Treatment Guidelines: Limited [DNI] : Intubation: DNI [[facility]: _____[facility]last [facility] date: [unfilled] [______] : HCP: [unfilled] [FreeTextEntry1] : 70yoF with:\par \par 1.  Cholangiocarcinoma - pending scans.  Follow up with Med Onc.\par \par 2. Abdominal pain / discomfort likely due to neoplasm\par - C/w MS IR 30mg PRN for moderate to severe pain \par - C/w with current medical cannabis regimen PRN.  \par \par 3. Reflux - C/w famotidine and PRN carafate.  \par \par 4. Intermittent constipation - c/w PRN senna, prunes. She is seeing GI tomorrow.\par \par 5. Nausea - C/w medical cannabis and Ondansetron ODT PRN.  \par \par 6. Anxiety - C/w escitalopram 5mg daily, PRN alprazolam and talk therapy.  Emotional support provided. \par \par 7. Fatigue - Advised patient to stay active as much as tolerated, take short, scheduled naps to restore energy. \par \par 8. Encounter for Palliative Care/Advance Care Planning \par - HCP on file\par - MOLST form on file, delineating wishes of limited care: DNR/DNI. Patient expressed that she "doesn’t want to suffer."  All questions answered.  Empathetic listening and emotional support provided. \par \par Follow up in 2 weeks, call sooner with questions or issues.

## 2021-04-09 NOTE — HISTORY OF PRESENT ILLNESS
[Opioids for treatment of cancer not in remission, and/or  hospice, palliative care] : Opioids for treatment of cancer not in remission, and/or  hospice, palliative care [FreeTextEntry1] : 70yoF with metastatic cholangiocarcinoma presents for follow-up palliative care visit, referred by Oncology team.  PMH significant for HCV (treated with Harvoni), IBS, anxiety, GERD, thrombophlebitis. \par \par Patient initially who had some GI symptoms/elevated LFTs, weight loss (but was on weight watchers) in October 2019.  She saw Dr. Brunner (GI)  whom ordered a CT A/P which demonstrated enlargement of the head of the pancreas with an approximately 3.3 cm hypodense area posteriorly within the pancreatic head which is suspicious for neoplasm. Patient was referred for MRCP which revealed an irregularly enhancing 5.8 cm mass at the level of the confluence of the right and left hepatic biliary ducts. 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 There was no evidence of primary pancreatic neoplasm. She had biopsy of RUQ peritoneal nodule which confirmed metastatic cholangiocarcinoma. She was started on Gemzar/Cisplatin 12/20/19.  Did not tolerate Cisplatin and this was discontinued, now on single agent Gemzar.  \par \par Main issue for which patient initially presented was abdominal pain. The pain is localized to the RUQ and lower abdominal, she endorses bloating.  She was previously prescribed Morphine IR 15mg which she does not use due to concerns about dependence. She took it twice and it made her very constipated.  She is using PRN Tylenol 500mg. \par \romaine Remains on gemcitabine with slow disease progression of disease on imaging.  Reports increased rise in abdominal pain in pain last week that was no longer relieved by PRN MS IR 15mg. This sudden rise in pain caused increased anxiety.   She continues to have issues with her bowels.  She continues to use medicinal cannabis in 1:1 formulation QHS and high THC:low CBD PRN. She finds that it helps with nausea and anxiety. She follows with her therapist weekly.\par \par Interval History: Patient presents for follow-up via Telemedicine. \par Patient continues to report fatigue.   Gemzar was held on 3/26 due fatigue and increased pain.  She still does not feel as though she has returned to her baseline. \par \par She is experiencing pain in the epigastrium/LUQ, accompanied by bloating. Sometimes the pain travels around to her back. MS IR 30mg controls the pain.\par Has been taking ODT ondansetron for her nausea, this helps. \par She has a "little" energy in the morning and then after noon she becomes fatigued. She tends to nap 2-3 hours in the afternoon. Of note, she goes to sleep from 7pm and wakes at 1-2AM. \par Continues to use 1:1 THC:CBD and high THC:low CBD \par \par ROS:\par + good appetite\par + ~ 20 lb weight gain in past 6 months\par + chronic anxiety - on Lexapro 5mg QD and Alprazolam 0.25mg QID\par + nausea - uses Ondansetron ODT and medical cannabis which usually helps\par + gas/bloating\par + GERD - PRN carafate\par + occasional diarrhea- controlled with PRN Imodium - has erratic BMs depending on what she eats. Eats fruits, prune juice to try and maintain regularity. Also uses PRN senna. \par All other ROS as outlined or noncontributory. \par \par Patient is , lives with her . Has supportive family.  She has three children (ages 54, 50, 45) from a previous marriage. It is important to patient that she does not suffer. \par \par Psychiatrist: Dr. NGOC Becerra ([facility])\par Therapist: Nataliya Bartholomew\par \par I-Stop Ref# [id number]

## 2021-04-09 NOTE — ASSESSMENT
[DNR] : Code Status: DNR [Limited] : Treatment Guidelines: Limited [DNI] : Intubation: DNI [[facility] Date: _____] [facility]/last verification date: [unfilled] [______] : HCP: [unfilled] [FreeTextEntry1] : 70yoF with:\par \par 1.  Cholangiocarcinoma - pending scans.  Follow up with Med Onc.\par \par 2. Abdominal pain / discomfort likely due to neoplasm\par - C/w MS IR 30mg PRN for moderate to severe pain \par - C/w with current medical cannabis regimen PRN.  \par \par 3. Reflux - C/w famotidine and PRN carafate.  \par \par 4. Intermittent constipation - c/w PRN senna, prunes. She is seeing GI tomorrow.\par \par 5. Nausea - C/w medical cannabis and Ondansetron ODT PRN.  \par \par 6. Anxiety - C/w escitalopram 5mg daily, PRN alprazolam and talk therapy.  Emotional support provided. \par \par 7. Fatigue - Advised patient to stay active as much as tolerated, take short, scheduled naps to restore energy. \par \par 8. Encounter for Palliative Care/Advance Care Planning \par - HCP on file\par - MOLST form on file, delineating wishes of limited care: DNR/DNI. Patient expressed that she "doesn’t want to suffer."  All questions answered.  Empathetic listening and emotional support provided. \par \par Follow up in 2 weeks, call sooner with questions or issues.

## 2021-04-09 NOTE — HISTORY OF PRESENT ILLNESS
[Opioids for treatment of cancer not in remission, and/or  hospice, palliative care] : Opioids for treatment of cancer not in remission, and/or  hospice, palliative care [FreeTextEntry1] : 70yoF with metastatic cholangiocarcinoma presents for follow-up palliative care visit, referred by Oncology team.  PMH significant for HCV (treated with Harvoni), IBS, anxiety, GERD, thrombophlebitis. \par \par Patient initially who had some GI symptoms/elevated LFTs, weight loss (but was on weight watchers) in October 2019.  She saw Dr. Brunner (GI)  whom ordered a CT A/P which demonstrated enlargement of the head of the pancreas with an approximately 3.3 cm hypodense area posteriorly within the pancreatic head which is suspicious for neoplasm. Patient was referred for MRCP which revealed an irregularly enhancing 5.8 cm mass at the level of the confluence of the right and left hepatic biliary ducts. 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 There was no evidence of primary pancreatic neoplasm. She had biopsy of RUQ peritoneal nodule which confirmed metastatic cholangiocarcinoma. She was started on Gemzar/Cisplatin 12/20/19.  Did not tolerate Cisplatin and this was discontinued, now on single agent Gemzar.  \par \par Main issue for which patient initially presented was abdominal pain. The pain is localized to the RUQ and lower abdominal, she endorses bloating.  She was previously prescribed Morphine IR 15mg which she does not use due to concerns about dependence. She took it twice and it made her very constipated.  She is using PRN Tylenol 500mg. \par \romaine Remains on gemcitabine with slow disease progression of disease on imaging.  Reports increased rise in abdominal pain in pain last week that was no longer relieved by PRN MS IR 15mg. This sudden rise in pain caused increased anxiety.   She continues to have issues with her bowels.  She continues to use medicinal cannabis in 1:1 formulation QHS and high THC:low CBD PRN. She finds that it helps with nausea and anxiety. She follows with her therapist weekly.\par \par Interval History: Patient presents for follow-up via Telemedicine. \par Patient continues to report fatigue.   Gemzar was held on 3/26 due fatigue and increased pain.  She still does not feel as though she has returned to her baseline. \par \par She is experiencing pain in the epigastrium/LUQ, accompanied by bloating. Sometimes the pain travels around to her back. MS IR 30mg controls the pain.\par Has been taking ODT ondansetron for her nausea, this helps. \par She has a "little" energy in the morning and then after noon she becomes fatigued. She tends to nap 2-3 hours in the afternoon. Of note, she goes to sleep from 7pm and wakes at 1-2AM. \par Continues to use 1:1 THC:CBD and high THC:low CBD \par \par ROS:\par + good appetite\par + ~ 20 lb weight gain in past 6 months\par + chronic anxiety - on Lexapro 5mg QD and Alprazolam 0.25mg QID\par + nausea - uses Ondansetron ODT and medical cannabis which usually helps\par + gas/bloating\par + GERD - PRN carafate\par + occasional diarrhea- controlled with PRN Imodium - has erratic BMs depending on what she eats. Eats fruits, prune juice to try and maintain regularity. Also uses PRN senna. \par All other ROS as outlined or noncontributory. \par \par Patient is , lives with her . Has supportive family.  She has three children (ages 54, 50, 45) from a previous marriage. It is important to patient that she does not suffer. \par \par Psychiatrist: Dr. NGOC Becerra ([facility])\par Therapist: Nataliya Bartholomew\par \par I-Stop Ref# [id number]

## 2021-04-11 NOTE — PHYSICAL EXAM
[Fully active, able to carry on all pre-disease performance without restriction] : Status 0 - Fully active, able to carry on all pre-disease performance without restriction [Obese] : obese [Normal] : grossly intact [de-identified] : obese [de-identified] : anxious

## 2021-04-11 NOTE — HISTORY OF PRESENT ILLNESS
[Home] : at home, [unfilled] , at the time of the visit. [Medical Office: ([facility])___] : at the medical office located in  [Verbal consent obtained from patient] : the patient, [unfilled] [Disease: _____________________] : Disease: [unfilled] [M: ___] : M[unfilled] [AJCC Stage: ____] : AJCC Stage: [unfilled] [de-identified] : 70 Y/O F HCV infection (treated with Harvoni), IBS, Anxiety, GERD who had some GI symptoms/elevated LFTs (wt loss but on wt watchers) October 2019 and consulted Dr Brunner GI and non contrast CT A/P showed enlargement of the head of the pancreas with an approximately 3.3 cm hypodense area posteriorly within the pancreatic head which is suspicious for neoplasm. There are enlarged retroperitoneal lymph nodes posterior to the head of the pancreas which are suspicious appearing as well. There are few small nodular soft tissue densities lying deep to the right anterior abdominal wall suspicious for peritoneal implants, and soft tissue density material within the gallbladder which may reflect sludge.  Patient was referred for MRCP which revealed an irregularly enhancing 5.8 cm mass at the level of the confluence of the right and left hepatic biliary ducts. This mass obstructs the common hepatic duct and main left intrahepatic bile duct causing dilation of the intrahepatic biliary ducts within the left hepatic lobe. The mid to distal portion of the CBD are normal in caliber and appear uninvolved by the mass. 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 There are enlarged retroperitoneal lymph nodes, some of which demonstrate central necrosis lying posterior to the head of the pancreas and within the aortocaval region, suspicious for metastatic disease. There is no evidence of primary pancreatic neoplasm. There are small nodular densities within the right anterior abdomen suspicious for peritoneal metastatic foci ranging from 3-10 mm in size.  She consulted Dr Adams on 11/13/19 and is planned for biopsy planned 11/27/19. \par \par She had biopsy of RUQ peritoneal nodule which confirmed metastatic cholangiocarcinoma.  \par She was started on Gemzar/Cisplatin 12/20/19  but unable to tolerate cisplatin due to fatigue and patient wanted to stop cisplatin \par \par after her second cycle, she had a port placed, her Cisplatin was held due to SE and was treated for a cellulitis and influenza\par \par CT chest on 6/21 (2 mm left upper lobe pulmonary nodule is new since November 26, 2019, nonspecific finding. A few other bilateral 2 mm pulmonary nodules are unchanged since November 26, 2019 as described above.) and MRI abd (Ill-defined infiltrative tumor involving the left hepatic lobe medial \par segment and central left biliary tree, difficult to compare with prior imaging November 2019. Degree of left intrahepatic biliary ductal dilatation is unchanged. Upper retroperitoneal adenopathy and peritoneal disease is mildly improved). \par \par  [de-identified] : IDH1 R132C  mutation  [de-identified] : Microsatellite status MS-Stable / Tumor Mutational [place] 1 Muts/Mb §\par ERRFI1 H250fs*14 / IDH1 - R132C\par TPS 20 [FreeTextEntry1] : palliative Gemcitabine q2 week  [de-identified] : patient seen via telehealth to review recent scan results\par stable abdominal pain \par fatigue remains the same despite chemo being held\par anxious to review scan results

## 2021-04-14 ENCOUNTER — NON-APPOINTMENT (OUTPATIENT)
Age: 71
End: 2021-04-14

## 2021-04-16 ENCOUNTER — OUTPATIENT (OUTPATIENT)
Dept: OUTPATIENT SERVICES | Facility: HOSPITAL | Age: 71
LOS: 1 days | Discharge: HOME | End: 2021-04-16

## 2021-04-16 DIAGNOSIS — C22.1 INTRAHEPATIC BILE DUCT CARCINOMA: ICD-10-CM

## 2021-04-20 ENCOUNTER — APPOINTMENT (OUTPATIENT)
Dept: HEMATOLOGY ONCOLOGY | Facility: CLINIC | Age: 71
End: 2021-04-20
Payer: COMMERCIAL

## 2021-04-20 PROCEDURE — 99213 OFFICE O/P EST LOW 20 MIN: CPT | Mod: 95

## 2021-04-20 RX ORDER — ONDANSETRON 8 MG/1
8 TABLET, ORALLY DISINTEGRATING ORAL EVERY 6 HOURS
Qty: 30 | Refills: 6 | Status: ACTIVE | COMMUNITY
Start: 2020-02-05 | End: 1900-01-01

## 2021-04-20 NOTE — REASON FOR VISIT
[Follow-Up] : a follow-up visit [Home] : at home, [unfilled] , at the time of the visit. [Medical Office: ([facility])___] : at the medical office located in  [Verbal consent obtained from patient] : the patient, [unfilled]

## 2021-04-21 NOTE — ASSESSMENT
[DNR] : Code Status: DNR [Limited] : Treatment Guidelines: Limited [DNI] : Intubation: DNI [[facility]: _____[facility]last [facility] date: [unfilled] [______] : HCP: [unfilled] [FreeTextEntry1] : 70yoF with:\par \par 1.  Cholangiocarcinoma - on Tibsovo. Follow up with Med Onc.\par \par 2. Abdominal pain / discomfort likely due to neoplasm\par - C/w MS IR 30mg PRN for moderate to severe pain \par - C/w with current medical cannabis regimen PRN.  \par \par 3. Reflux - C/w famotidine, omeprazole and PRN carafate.  \par \par 4. Intermittent constipation - c/w PRN senna, prunes. \par \par 5. Nausea - C/w medical cannabis and Ondansetron ODT PRN.  \par \par 6. Anxiety - C/w escitalopram 5mg daily, PRN alprazolam and talk therapy.  Emotional support provided. \par \par 7. Fatigue - Advised patient to stay active as much as tolerated, take short, scheduled naps to restore energy. \par \par 8. Encounter for Palliative Care/Advance Care Planning \par - HCP on file\par - MOLST form on file, delineating wishes of limited care: DNR/DNI. Patient continues to express that she "doesn’t want to suffer."  All questions answered.  Empathetic listening and emotional support provided. \par \par Follow up in 2 weeks, call sooner with questions or issues.

## 2021-04-21 NOTE — HISTORY OF PRESENT ILLNESS
[Opioids for treatment of cancer not in remission, and/or  hospice, palliative care] : Opioids for treatment of cancer not in remission, and/or  hospice, palliative care [FreeTextEntry1] : 70yoF with metastatic cholangiocarcinoma presents for follow-up palliative care visit, referred by Oncology team.  PMH significant for HCV (treated with Harvoni), IBS, anxiety, GERD, thrombophlebitis. \par \par Patient initially who had some GI symptoms/elevated LFTs, weight loss (but was on weight watchers) in October 2019.  She saw Dr. Brunner (GI)  whom ordered a CT A/P which demonstrated enlargement of the head of the pancreas with an approximately 3.3 cm hypodense area posteriorly within the pancreatic head which is suspicious for neoplasm. Patient was referred for MRCP which revealed an irregularly enhancing 5.8 cm mass at the level of the confluence of the right and left hepatic biliary ducts. 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 There was no evidence of primary pancreatic neoplasm. She had biopsy of RUQ peritoneal nodule which confirmed metastatic cholangiocarcinoma. She was started on Gemzar/Cisplatin 12/20/19.  Did not tolerate Cisplatin and this was discontinued, now on single agent Gemzar.  \par \par Main issue for which patient initially presented was abdominal pain. The pain is localized to the RUQ and lower abdominal, she endorses bloating.  She was previously prescribed Morphine IR 15mg which she does not use due to concerns about dependence. She took it twice and it made her very constipated.  She is using PRN Tylenol 500mg. \par \par Remains on gemcitabine with slow disease progression of disease on imaging.  Reports increased rise in abdominal pain in pain last week that was no longer relieved by PRN MS IR 15mg. This sudden rise in pain caused increased anxiety.   She continues to have issues with her bowels.  She continues to use medicinal cannabis in 1:1 formulation QHS and high THC:low CBD PRN. She finds that it helps with nausea and anxiety. She follows with her therapist weekly.\par \par Interval History: Patient presents for follow-up via Telemedicine. Reports that her energy level has returned to baseline with break from gemzar.  She took her first dose of Tibsovo yesterday morning. Denies SEs/AEs.\par \par She is experiencing pain in the epigastrium/LUQ, accompanied by bloating. Sometimes the pain travels around to her back. MS IR 30mg controls the pain. Is using approximately twice per day.\par Has been taking ODT ondansetron for her nausea, this helps. \par She has a "little" energy in the morning and then after noon she becomes fatigued. She tends to nap 2-3 hours in the afternoon. Of note, she goes to sleep from 7pm and wakes at 1-2AM. \par Continues to use 1:1 THC:CBD and high THC:low CBD\par \par ROS:\par + good appetite\par + ~ 20 lb weight gain in past 6 months\par + chronic anxiety - on Lexapro 5mg QD and Alprazolam 0.25mg QID\par + nausea - uses Ondansetron ODT and medical cannabis which usually helps\par + gas/bloating\par + GERD - PRN carafate\par + occasional diarrhea- controlled with PRN Imodium - has erratic BMs depending on what she eats. Eats fruits, prune juice to try and maintain regularity. Also uses PRN senna. \par All other ROS as outlined or noncontributory. \par \par Patient is , lives with her . Has supportive family.  She has three children (ages 54, 50, 45) from a previous marriage. It is important to patient that she does not suffer. \par \par Psychiatrist: Dr. NGOC Becerra ([facility])\par Therapist: Nataliya Bartholomew\par \par I-Stop Ref# [id number]

## 2021-04-23 ENCOUNTER — APPOINTMENT (OUTPATIENT)
Dept: INFUSION THERAPY | Facility: HOSPITAL | Age: 71
End: 2021-04-23

## 2021-04-28 ENCOUNTER — NON-APPOINTMENT (OUTPATIENT)
Age: 71
End: 2021-04-28

## 2021-04-29 ENCOUNTER — RX RENEWAL (OUTPATIENT)
Age: 71
End: 2021-04-29

## 2021-04-30 ENCOUNTER — EMERGENCY (EMERGENCY)
Facility: HOSPITAL | Age: 71
LOS: 1 days | Discharge: LEFT BEFORE DISCHARGE | End: 2021-04-30
Attending: EMERGENCY MEDICINE | Admitting: EMERGENCY MEDICINE
Payer: COMMERCIAL

## 2021-04-30 VITALS
RESPIRATION RATE: 18 BRPM | SYSTOLIC BLOOD PRESSURE: 121 MMHG | DIASTOLIC BLOOD PRESSURE: 60 MMHG | HEIGHT: 63.5 IN | TEMPERATURE: 98 F | OXYGEN SATURATION: 100 % | HEART RATE: 88 BPM

## 2021-04-30 LAB
ALBUMIN SERPL ELPH-MCNC: 4.3 G/DL — SIGNIFICANT CHANGE UP (ref 3.3–5)
ALP SERPL-CCNC: 80 U/L — SIGNIFICANT CHANGE UP (ref 40–120)
ALT FLD-CCNC: 11 U/L — SIGNIFICANT CHANGE UP (ref 4–33)
ANION GAP SERPL CALC-SCNC: 11 MMOL/L — SIGNIFICANT CHANGE UP (ref 7–14)
AST SERPL-CCNC: 21 U/L — SIGNIFICANT CHANGE UP (ref 4–32)
BASOPHILS # BLD AUTO: 0.02 K/UL — SIGNIFICANT CHANGE UP (ref 0–0.2)
BASOPHILS NFR BLD AUTO: 0.2 % — SIGNIFICANT CHANGE UP (ref 0–2)
BILIRUB SERPL-MCNC: 0.4 MG/DL — SIGNIFICANT CHANGE UP (ref 0.2–1.2)
BUN SERPL-MCNC: 17 MG/DL — SIGNIFICANT CHANGE UP (ref 7–23)
CALCIUM SERPL-MCNC: 9.7 MG/DL — SIGNIFICANT CHANGE UP (ref 8.4–10.5)
CHLORIDE SERPL-SCNC: 102 MMOL/L — SIGNIFICANT CHANGE UP (ref 98–107)
CO2 SERPL-SCNC: 28 MMOL/L — SIGNIFICANT CHANGE UP (ref 22–31)
CREAT SERPL-MCNC: 0.79 MG/DL — SIGNIFICANT CHANGE UP (ref 0.5–1.3)
EOSINOPHIL # BLD AUTO: 0.07 K/UL — SIGNIFICANT CHANGE UP (ref 0–0.5)
EOSINOPHIL NFR BLD AUTO: 0.8 % — SIGNIFICANT CHANGE UP (ref 0–6)
GLUCOSE SERPL-MCNC: 105 MG/DL — HIGH (ref 70–99)
HCT VFR BLD CALC: 43.7 % — SIGNIFICANT CHANGE UP (ref 34.5–45)
HGB BLD-MCNC: 14.6 G/DL — SIGNIFICANT CHANGE UP (ref 11.5–15.5)
IANC: 4.74 K/UL — SIGNIFICANT CHANGE UP (ref 1.5–8.5)
IMM GRANULOCYTES NFR BLD AUTO: 0.2 % — SIGNIFICANT CHANGE UP (ref 0–1.5)
LIDOCAIN IGE QN: 59 U/L — SIGNIFICANT CHANGE UP (ref 7–60)
LYMPHOCYTES # BLD AUTO: 2.85 K/UL — SIGNIFICANT CHANGE UP (ref 1–3.3)
LYMPHOCYTES # BLD AUTO: 34.1 % — SIGNIFICANT CHANGE UP (ref 13–44)
MCHC RBC-ENTMCNC: 31.1 PG — SIGNIFICANT CHANGE UP (ref 27–34)
MCHC RBC-ENTMCNC: 33.4 GM/DL — SIGNIFICANT CHANGE UP (ref 32–36)
MCV RBC AUTO: 93.2 FL — SIGNIFICANT CHANGE UP (ref 80–100)
MONOCYTES # BLD AUTO: 0.65 K/UL — SIGNIFICANT CHANGE UP (ref 0–0.9)
MONOCYTES NFR BLD AUTO: 7.8 % — SIGNIFICANT CHANGE UP (ref 2–14)
NEUTROPHILS # BLD AUTO: 4.74 K/UL — SIGNIFICANT CHANGE UP (ref 1.8–7.4)
NEUTROPHILS NFR BLD AUTO: 56.9 % — SIGNIFICANT CHANGE UP (ref 43–77)
NRBC # BLD: 0 /100 WBCS — SIGNIFICANT CHANGE UP
NRBC # FLD: 0 K/UL — SIGNIFICANT CHANGE UP
PLATELET # BLD AUTO: 232 K/UL — SIGNIFICANT CHANGE UP (ref 150–400)
POTASSIUM SERPL-MCNC: 4.2 MMOL/L — SIGNIFICANT CHANGE UP (ref 3.5–5.3)
POTASSIUM SERPL-SCNC: 4.2 MMOL/L — SIGNIFICANT CHANGE UP (ref 3.5–5.3)
PROT SERPL-MCNC: 7.3 G/DL — SIGNIFICANT CHANGE UP (ref 6–8.3)
RBC # BLD: 4.69 M/UL — SIGNIFICANT CHANGE UP (ref 3.8–5.2)
RBC # FLD: 11.9 % — SIGNIFICANT CHANGE UP (ref 10.3–14.5)
SODIUM SERPL-SCNC: 141 MMOL/L — SIGNIFICANT CHANGE UP (ref 135–145)
WBC # BLD: 8.35 K/UL — SIGNIFICANT CHANGE UP (ref 3.8–10.5)
WBC # FLD AUTO: 8.35 K/UL — SIGNIFICANT CHANGE UP (ref 3.8–10.5)

## 2021-04-30 PROCEDURE — 99285 EMERGENCY DEPT VISIT HI MDM: CPT

## 2021-04-30 RX ORDER — SODIUM CHLORIDE 9 MG/ML
1000 INJECTION INTRAMUSCULAR; INTRAVENOUS; SUBCUTANEOUS ONCE
Refills: 0 | Status: COMPLETED | OUTPATIENT
Start: 2021-04-30 | End: 2021-04-30

## 2021-04-30 RX ORDER — METOCLOPRAMIDE 10 MG/1
10 TABLET ORAL
Qty: 30 | Refills: 0 | Status: ACTIVE | COMMUNITY
Start: 2021-04-30 | End: 1900-01-01

## 2021-04-30 RX ORDER — ONDANSETRON 8 MG/1
4 TABLET, FILM COATED ORAL ONCE
Refills: 0 | Status: COMPLETED | OUTPATIENT
Start: 2021-04-30 | End: 2021-04-30

## 2021-04-30 RX ORDER — MORPHINE SULFATE 50 MG/1
4 CAPSULE, EXTENDED RELEASE ORAL ONCE
Refills: 0 | Status: DISCONTINUED | OUTPATIENT
Start: 2021-04-30 | End: 2021-04-30

## 2021-04-30 RX ADMIN — MORPHINE SULFATE 4 MILLIGRAM(S): 50 CAPSULE, EXTENDED RELEASE ORAL at 20:55

## 2021-04-30 RX ADMIN — ONDANSETRON 4 MILLIGRAM(S): 8 TABLET, FILM COATED ORAL at 20:54

## 2021-04-30 RX ADMIN — SODIUM CHLORIDE 1000 MILLILITER(S): 9 INJECTION INTRAMUSCULAR; INTRAVENOUS; SUBCUTANEOUS at 21:58

## 2021-04-30 NOTE — ED ADULT TRIAGE NOTE - CHIEF COMPLAINT QUOTE
Pt with stage 4 bile duct cancer , pt with co constipation an abdominal pain x 3 days. had one BM since

## 2021-04-30 NOTE — ED PROVIDER NOTE - PROGRESS NOTE DETAILS
Vandana - patient walking around department and appears well. Vandana - I spoke to CT scan 3 times regarding pt scan. Due to multiple code strokes tonight- pt kept getting pushed back. Patient is next up, but patient is adamant about leaving the hospital. Patient states she regretted coming here. I spoke with patients about all risks including death that may result with leaving without full work up. Patient understands risks and would still like to leave. I gave her return precautions and advised her to follow up with her PCP asap this week and continue all prescribed medications.

## 2021-04-30 NOTE — ED PROVIDER NOTE - DOMESTIC TRAVEL HIGH RISK QUESTION
Do you know who you spoke with in regards to his INR?- I do not see that our INR nurse made an encounter in regard to that reading-    No

## 2021-04-30 NOTE — ED PROVIDER NOTE - NSFOLLOWUPINSTRUCTIONS_ED_ALL_ED_FT
You were seen for abdominal pain.     We recommended that you wait for a CT scan.    Follow up with your primary care doctor this week.     Seek immediate medical assistance for any new or worsening symptoms.    Take all prescribed medications exactly as prescribed.    Try Miralax for constipation. This can be found at your local pharmacy store such as [employer].

## 2021-04-30 NOTE — ED ADULT NURSE NOTE - EXPLANATION OF PATIENT'S REASON FOR LEAVING
Patient did not want to wait any longer for CT scan, MD and RN explained to patient reason for wait for CT and importance of staying for a CT scan.

## 2021-04-30 NOTE — ED PROVIDER NOTE - OBJECTIVE STATEMENT
70F presents with abdominal pain. Patient states she has been constipated for the past week. She has taken sennacot x 2 days, and mag citrate x 1 bottle which helped her have a bowel movement this AM which was small. non bloody. Patient called her PCP who prescribed Lactulose, which pt didn't not take because someone told her it could cause bleeding. 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

## 2021-04-30 NOTE — ED PROVIDER NOTE - PATIENT PORTAL LINK FT
You can access the FollowMyHealth Patient Portal offered by [facility] by registering at the following website: [email or web address]. By joining [employer]’s FollowMyHealth portal, you will also be able to view your health information using other applications (apps) compatible with our system.

## 2021-04-30 NOTE — ED PROVIDER NOTE - CLINICAL SUMMARY MEDICAL DECISION MAKING FREE TEXT BOX
Vandana - elderly female with bile duct ca with mets to peritoneum presents with abominable pain x 1 day in setting of constipation. vss. pe has no abd tenderness to palpation. no distension. No active vomiting. SBO and mesenteric ischemia considered but unlikely given presentation. Will CT abd, pain management, labs and reassess.

## 2021-04-30 NOTE — ED ADULT NURSE NOTE - OBJECTIVE STATEMENT
Patient received in room 27, A&OX4, ambulatory at baseline. patient has hx of cholangiocarcinoma, receives oral chemotherapy every day, patient has right chest wall port. Patient presents to ED complaining of constipation x 4 days accompanied with abdominal pain/nausea. Patient takes Morphine for pain at home without relief. Patient took Magnesium Citrate and Miralax at home without relief. 20g placed in right ac, labs collected and sent. Medication given as per MD order. Patient in no acute distress, respirations even and unlabored. Will continue to monitor.

## 2021-04-30 NOTE — ED PROVIDER NOTE - ATTENDING CONTRIBUTION TO CARE
I performed a face to face history and physical exam of the patient and discussed their management with the resident. I reviewed the resident's note and agree with the documented findings and plan of care.     OPALATEL: 71 y/o female with biliary carcinoma here with constipation x 5 days, has taken multiple medications with minimal relief, pt describes diffuse abdominal pain, no fever, no chills, + nausea, no vomiting, no BRBPR, no CP, no SOB, on exam: abd soft, diffuse tenderness, no rebound, no guarding, back nontender I performed a face to face history and physical exam of the patient and discussed their management with the resident. I reviewed the resident's note and agree with the documented findings and plan of care.     OPALATEL: 69 y/o female with biliary carcinoma here with constipation x 5 days, has taken multiple medications with minimal relief, pt describes diffuse abdominal pain, no fever, no chills, + nausea, no vomiting, no BRBPR, no CP, no SOB, pertinent on exam: abd soft, diffuse tenderness to deep palpation, no rebound, no guarding, back nontender, pt ambulating in ED, 1)CBC, CMP, VBG, Lipase, U/A 2)ABd/pelvis CT without contrast(pt allergic and refused IV contrast)

## 2021-05-01 VITALS
OXYGEN SATURATION: 100 % | HEART RATE: 76 BPM | TEMPERATURE: 98 F | SYSTOLIC BLOOD PRESSURE: 141 MMHG | DIASTOLIC BLOOD PRESSURE: 69 MMHG | RESPIRATION RATE: 16 BRPM

## 2021-05-01 LAB
APPEARANCE UR: CLEAR — SIGNIFICANT CHANGE UP
BILIRUB UR-MCNC: NEGATIVE — SIGNIFICANT CHANGE UP
COLOR SPEC: YELLOW — SIGNIFICANT CHANGE UP
DIFF PNL FLD: NEGATIVE — SIGNIFICANT CHANGE UP
GLUCOSE UR QL: NEGATIVE — SIGNIFICANT CHANGE UP
KETONES UR-MCNC: NEGATIVE — SIGNIFICANT CHANGE UP
LEUKOCYTE ESTERASE UR-ACNC: NEGATIVE — SIGNIFICANT CHANGE UP
NITRITE UR-MCNC: NEGATIVE — SIGNIFICANT CHANGE UP
PH UR: 6.5 — SIGNIFICANT CHANGE UP (ref 5–8)
PROT UR-MCNC: NEGATIVE — SIGNIFICANT CHANGE UP
SP GR SPEC: 1.02 — SIGNIFICANT CHANGE UP (ref 1.01–1.02)
UROBILINOGEN FLD QL: SIGNIFICANT CHANGE UP

## 2021-05-03 ENCOUNTER — APPOINTMENT (OUTPATIENT)
Dept: HEMATOLOGY ONCOLOGY | Facility: CLINIC | Age: 71
End: 2021-05-03
Payer: COMMERCIAL

## 2021-05-03 ENCOUNTER — APPOINTMENT (OUTPATIENT)
Dept: INFUSION THERAPY | Facility: HOSPITAL | Age: 71
End: 2021-05-03

## 2021-05-03 VITALS
HEART RATE: 80 BPM | OXYGEN SATURATION: 100 % | SYSTOLIC BLOOD PRESSURE: 156 MMHG | BODY MASS INDEX: 39.11 KG/M2 | WEIGHT: 217.99 LBS | DIASTOLIC BLOOD PRESSURE: 90 MMHG | RESPIRATION RATE: 18 BRPM | TEMPERATURE: 96.4 F

## 2021-05-03 PROCEDURE — 99214 OFFICE O/P EST MOD 30 MIN: CPT

## 2021-05-03 PROCEDURE — 99072 ADDL SUPL MATRL&STAF TM PHE: CPT

## 2021-05-03 NOTE — HISTORY OF PRESENT ILLNESS
[Disease: _____________________] : Disease: [unfilled] [M: ___] : M[unfilled] [AJCC Stage: ____] : AJCC Stage: [unfilled] [de-identified] : 68 Y/O F HCV infection (treated with Harvoni), IBS, Anxiety, GERD who had some GI symptoms/elevated LFTs (wt loss but on wt watchers) October 2019 and consulted Dr Brunner GI and non contrast CT A/P showed enlargement of the head of the pancreas with an approximately 3.3 cm hypodense area posteriorly within the pancreatic head which is suspicious for neoplasm. There are enlarged retroperitoneal lymph nodes posterior to the head of the pancreas which are suspicious appearing as well. There are few small nodular soft tissue densities lying deep to the right anterior abdominal wall suspicious for peritoneal implants, and soft tissue density material within the gallbladder which may reflect sludge.  Patient was referred for MRCP which revealed an irregularly enhancing 5.8 cm mass at the level of the confluence of the right and left hepatic biliary ducts. This mass obstructs the common hepatic duct and main left intrahepatic bile duct causing dilation of the intrahepatic biliary ducts within the left hepatic lobe. The mid to distal portion of the CBD are normal in caliber and appear uninvolved by the mass. 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 There are enlarged retroperitoneal lymph nodes, some of which demonstrate central necrosis lying posterior to the head of the pancreas and within the aortocaval region, suspicious for metastatic disease. There is no evidence of primary pancreatic neoplasm. There are small nodular densities within the right anterior abdomen suspicious for peritoneal metastatic foci ranging from 3-10 mm in size.  She consulted Dr Adams on 11/13/19 and is planned for biopsy planned 11/27/19. \par \par She had biopsy of RUQ peritoneal nodule which confirmed metastatic cholangiocarcinoma.  \par She was started on Gemzar/Cisplatin 12/20/19  but unable to tolerate cisplatin due to fatigue and patient wanted to stop cisplatin \par \par after her second cycle, she had a port placed, her Cisplatin was held due to SE and was treated for a cellulitis and influenza\par \par April 2021 POD and switch to IDH1 inhibitor TIBSOVO start 4/19/21\par  \par \par  [de-identified] : Microsatellite status MS-Stable / Tumor Mutational [place] 1 Muts/Mb §\par ERRFI1 H250fs*14 / IDH1 - R132C\par TPS 20 [de-identified] : IDH1 R132C  mutation  [FreeTextEntry1] : TIBSOVO 250mg daily  [de-identified] : Karlie tolerates treatment so far however main issues remain abdominal pain related to disease and long standing IBS which causes constipation and causes more pain when she doesn't stick to a regular bowel regimen.  significant component of anxiety playing a role as well \par recently in ED for abd pain but signed out AMA because it was talking too long

## 2021-05-03 NOTE — PHYSICAL EXAM
[Fully active, able to carry on all pre-disease performance without restriction] : Status 0 - Fully active, able to carry on all pre-disease performance without restriction [Obese] : obese [Normal] : grossly intact [de-identified] : no jvd  [de-identified] : normal respiratory effort, no audible wheeze [de-identified] : reg rate  [de-identified] : obese [de-identified] : anxious

## 2021-05-04 ENCOUNTER — APPOINTMENT (OUTPATIENT)
Dept: HEMATOLOGY ONCOLOGY | Facility: CLINIC | Age: 71
End: 2021-05-04
Payer: COMMERCIAL

## 2021-05-04 PROCEDURE — 99214 OFFICE O/P EST MOD 30 MIN: CPT | Mod: 95

## 2021-05-04 RX ORDER — HYDROMORPHONE HYDROCHLORIDE 2 MG/1
2 TABLET ORAL
Qty: 30 | Refills: 0 | Status: DISCONTINUED | COMMUNITY
Start: 2021-04-23 | End: 2021-05-04

## 2021-05-04 NOTE — REASON FOR VISIT
[Follow-Up] : a follow-up visit [Home] : at home, [unfilled] , at the time of the visit. [Medical Office: ([facility])___] : at the medical office located in  [Verbal consent obtained from patient] : the patient, [unfilled]

## 2021-05-05 NOTE — ASSESSMENT
[DNR] : Code Status: DNR [Limited] : Treatment Guidelines: Limited [DNI] : Intubation: DNI [[facility] _____[facility]last verification date: [unfilled] [______] : HCP: [unfilled] [FreeTextEntry1] : 70yoF with:\par \par 1.  Cholangiocarcinoma - on Tibsovo. Follow up with Med Onc.\par \par 2. Abdominal pain / discomfort likely due to neoplasm\par - C/w MS IR 30mg PRN for moderate to severe pain \par - C/w with current medical cannabis regimen PRN.  \par \par 3. Reflux - C/w famotidine, omeprazole and PRN carafate.  \par \par 4. Intermittent constipation - May be exacerbated currently due to Tibsovo - C/w miralax, PRN lactulose BID. Patient extensively counseled on importance of adhering to bowel regimen.\par \par 5. Nausea - C/w medical cannabis and Ondansetron ODT PRN.  \par \par 6. Anxiety - C/w escitalopram 5mg daily, PRN alprazolam and talk therapy.  Emotional support provided. \par \par 7. Fatigue - Advised patient to stay active as much as tolerated, take short, scheduled naps to restore energy. \par \par 8. Encounter for Palliative Care/Advance Care Planning \par - HCP on file\par - MOLST form on file, delineating wishes of limited care: DNR/DNI. Patient continues to express that she "doesn’t want to suffer."  All questions answered.  Empathetic listening and emotional support provided. \par \par Follow up in 2 weeks, call sooner with questions or issues.

## 2021-05-05 NOTE — HISTORY OF PRESENT ILLNESS
[Opioids for treatment of cancer not in remission, and/or  hospice, palliative care] : Opioids for treatment of cancer not in remission, and/or  hospice, palliative care [FreeTextEntry1] : 70yoF with metastatic cholangiocarcinoma presents for follow-up palliative care visit, referred by Oncology team.  PMH significant for HCV (treated with Harvoni), IBS, anxiety, GERD, thrombophlebitis. \par \par Patient initially who had some GI symptoms/elevated LFTs, weight loss (but was on weight watchers) in October 2019.  She saw Dr. Brunner (GI)  whom ordered a CT A/P which demonstrated enlargement of the head of the pancreas with an approximately 3.3 cm hypodense area posteriorly within the pancreatic head which is suspicious for neoplasm. Patient was referred for MRCP which revealed an irregularly enhancing 5.8 cm mass at the level of the confluence of the right and left hepatic biliary ducts. 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 There was no evidence of primary pancreatic neoplasm. She had biopsy of RUQ peritoneal nodule which confirmed metastatic cholangiocarcinoma. She was started on Gemzar/Cisplatin 12/20/19.  Did not tolerate Cisplatin and this was discontinued, now on single agent Gemzar.  \par \par Main issue for which patient initially presented was abdominal pain. The pain is localized to the RUQ and lower abdominal, she endorses bloating.  She was previously prescribed Morphine IR 15mg which she does not use due to concerns about dependence. She took it twice and it made her very constipated.  She is using PRN Tylenol 500mg. \par \par Remains on gemcitabine with slow disease progression of disease on imaging.  Reports increased rise in abdominal pain in pain last week that was no longer relieved by PRN MS IR 15mg. This sudden rise in pain caused increased anxiety.   She continues to have issues with her bowels.  She continues to use medicinal cannabis in 1:1 formulation QHS and high THC:low CBD PRN. She finds that it helps with nausea and anxiety. She follows with her therapist weekly.\par \par Interval History: Patient presents for follow-up via Telemedicine. She is now on Tibosovo once daily.  Has been experiencing abdominal pain related to neoplasm necessitating use of opioids resulting in increased constipation/IBS.  Due to IBS, she has not been utilizing bowel regimen as she hear stools are loose at baseline.  Patient has been experiencing significant anxiety surrounding abdominal pain/constipation and endorses fear of suffering + panic attacks. She went to the ED recently for abdominal pain but signed out AMA before imaging could be completed.  She went home and had a large BM with the help of lactulose. This provided pain relief for 24 hours.  \par \par She is experiencing pain in the epigastrium/LUQ, accompanied by bloating. Sometimes the pain travels around to her back. MS IR 30mg controls the pain. Is using approximately twice per day.  Has been taking ODT ondansetron for her nausea, this helps. \par She has a "little" energy in the morning and then after noon she becomes fatigued. She tends to nap 2-3 hours in the afternoon. Of note, she goes to sleep from 7pm and wakes at 1-2AM. \par Continues to use 1:1 THC:CBD and high THC:low CBD\par \par ROS:\par + good appetite\par + ~ 20 lb weight gain in past 6 months\par + chronic anxiety - on Lexapro 5mg QD and Alprazolam 0.25mg QID\par + nausea - uses Ondansetron ODT and medical cannabis which usually helps \par +constipation - miralax QD, senna 2 tab QHS, PRN lactulose\par + gas/bloating\par + GERD - PRN carafate\par + occasional diarrhea- controlled with PRN Imodium - has erratic BMs depending on what she eats. Eats fruits, prune juice to try and maintain regularity. Also uses PRN senna. \par All other ROS as outlined or noncontributory. \par \par Patient is , lives with her . Has supportive family.  She has three children (ages 54, 50, 45) from a previous marriage. It is important to patient that she does not suffer. \par \par Psychiatrist: Dr. NGOC Becerra ([facility])\par Therapist: Nataliya Bartholomew\par \par I-Stop Ref# [id number]

## 2021-05-17 ENCOUNTER — OUTPATIENT (OUTPATIENT)
Dept: OUTPATIENT SERVICES | Facility: HOSPITAL | Age: 71
LOS: 1 days | Discharge: HOME | End: 2021-05-17

## 2021-05-17 DIAGNOSIS — C22.1 INTRAHEPATIC BILE DUCT CARCINOMA: ICD-10-CM

## 2021-05-18 ENCOUNTER — APPOINTMENT (OUTPATIENT)
Dept: HEMATOLOGY ONCOLOGY | Facility: CLINIC | Age: 71
End: 2021-05-18
Payer: COMMERCIAL

## 2021-05-18 PROCEDURE — 99215 OFFICE O/P EST HI 40 MIN: CPT | Mod: 95

## 2021-05-18 RX ORDER — NALOXEGOL OXALATE 12.5 MG/1
12.5 TABLET, FILM COATED ORAL
Qty: 30 | Refills: 0 | Status: DISCONTINUED | COMMUNITY
Start: 2021-05-06 | End: 2021-05-18

## 2021-05-18 NOTE — REASON FOR VISIT
[Follow-Up] : a follow-up visit [Home] : at home, [unfilled] , at the time of the visit. [Medical Office: ([facility])___] : at the medical office located in  [Verbal consent obtained from patient] : the patient, [unfilled]

## 2021-05-21 NOTE — HISTORY OF PRESENT ILLNESS
[Opioids for treatment of cancer not in remission, and/or  hospice, palliative care] : Opioids for treatment of cancer not in remission, and/or  hospice, palliative care [FreeTextEntry1] : 71yoF with metastatic cholangiocarcinoma presents for follow-up palliative care visit, referred by Oncology team.  PMH significant for HCV (treated with Harvoni), IBS, anxiety, GERD, thrombophlebitis. \par \par Patient initially who had some GI symptoms/elevated LFTs, weight loss (but was on weight watchers) in October 2019.  She saw Dr. Brunner (GI)  whom ordered a CT A/P which demonstrated enlargement of the head of the pancreas with an approximately 3.3 cm hypodense area posteriorly within the pancreatic head which is suspicious for neoplasm. Patient was referred for MRCP which revealed an irregularly enhancing 5.8 cm mass at the level of the confluence of the right and left hepatic biliary ducts. 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 There was no evidence of primary pancreatic neoplasm. She had biopsy of RUQ peritoneal nodule which confirmed metastatic cholangiocarcinoma. She was started on Gemzar/Cisplatin 12/20/19.  Did not tolerate Cisplatin and this was discontinued, now on single agent Gemzar.  \par \par Main issue for which patient initially presented was abdominal pain. She is experiencing pain in the epigastrium/LUQ, accompanied by bloating. Sometimes the pain travels around to her back. MS IR 30mg controls the pain.\par \par Interval History (5/18/21): Patient is now on Tibsovo.  Has been experiencing increased constipation despite use of miralax, senna, lactulose, fleet's enema.   She was started on movantik 12.5mg daily but did not like the way it made her feel + required administration on empty stomach.  She declines further use.  She has resumed previous bowel regimen + lactulose PRN which she is requiring up to three times daily.  Last BM today. \par \par She has been using MS IR 30mg up to three times daily for abdominal pain.  She tends to nap 2-3 hours in the afternoon. Of note, she goes to sleep from 7pm and wakes at 1-2AM. Continues to use 1:1 THC:CBD and high THC:low CBD which helps with nausea and pain.\par Anxiety has been heightened due to bowel issues.  She is fearful that her death may be imminent\par \par ROS:\par + good appetite\par + ~ 20 lb weight gain in past 6 months\par + chronic anxiety - on Lexapro 5mg QD and Alprazolam 0.25mg QID\par + nausea - uses Ondansetron ODT and medical cannabis which usually helps \par + IBS/OIC - miralax QD, senna 2 tab QHS, PRN lactulose\par + gas/bloating\par + GERD - PRN carafate\par + occasional diarrhea- controlled with PRN Imodium - has erratic BMs depending on what she eats. Eats fruits, prune juice to try and maintain regularity. Also uses PRN senna. \par All other ROS as outlined or noncontributory. \par \par Patient is , lives with her . Has supportive family.  She has three children (ages 54, 50, 45) from a previous marriage. It is important to patient that she does not suffer. \par \par Psychiatrist: Dr. NGOC Becerra ([facility])\par Therapist: Nataliya Bartholomew\par \par I-Stop Ref# [id number]

## 2021-05-21 NOTE — ASSESSMENT
[DNR] : Code Status: DNR [Limited] : Treatment Guidelines: Limited [DNI] : Intubation: DNI [Last Verification Date: _____] : [facility] Completion/last verification date: [unfilled] [______] : HCP: [unfilled] [FreeTextEntry1] : 71yoF with:\par \par 1.  Cholangiocarcinoma - on Tibsovo. Follow up with Med Onc.\par \par 2. Abdominal pain due to IBS/constipation +/- neoplasm\par - Patient with long standing history of IBS.  I suspect increased abdominal pain is likely due to refractory constipation which may be exacerbated by addition of new treatment medication and increased opioid use to treat constipation related pain.  She declines to re-trial movantik. \par - Given that she has known neoplasm related pain, she may c/w MS IR 30mg.  However, I encouraged diligent bowel regimen to ensure that she is not using opioid regimen to mistreat constipation related pain as this will only exacerbate her constipation.    Her bowels are now regular and she will c/w senna, miralax, PRN lactulose at this time.  Recommend potential trial of linzess.  Medication ordered for insurance approval. \par - Patient asking appropriate questions regarding need for imaging.   No acute need for imaging. Rationale explained, expressed understanding.  Will continue to monitor. \par \par 3. Reflux - C/w famotidine, omeprazole and PRN carafate.  \par \par 4. Nausea - C/w medical cannabis and Ondansetron ODT PRN.  \par \par 5. Anxiety - Recommend increasing escitalopram to 10mg. Will attempt to reach out to her psych MD.\par \par 6. Fatigue - Advised patient to stay active as much as tolerated, take short, scheduled naps to restore energy. \par \par 7. Encounter for Palliative Care/Advance Care Planning \par - HCP on file\par - MOLST form on file, delineating wishes of limited care: DNR/DNI. Patient continues to express that she "doesn’t want to suffer."  \par - All questions answered.  Empathetic listening and extensive emotional support provided. \par \par Follow up in 1 week, call sooner with questions or issues.

## 2021-05-21 NOTE — END OF VISIT
[Time Spent: ___ minutes] : I have spent [unfilled] minutes of time on the encounter. [FreeTextEntry3] : Agree with NP assessment and plan as outlined above.

## 2021-05-24 ENCOUNTER — APPOINTMENT (OUTPATIENT)
Dept: HEMATOLOGY ONCOLOGY | Facility: CLINIC | Age: 71
End: 2021-05-24
Payer: COMMERCIAL

## 2021-05-24 VITALS
DIASTOLIC BLOOD PRESSURE: 92 MMHG | SYSTOLIC BLOOD PRESSURE: 140 MMHG | RESPIRATION RATE: 16 BRPM | WEIGHT: 211.2 LBS | OXYGEN SATURATION: 100 % | BODY MASS INDEX: 37.89 KG/M2 | HEART RATE: 94 BPM | TEMPERATURE: 98 F

## 2021-05-24 DIAGNOSIS — R10.9 UNSPECIFIED ABDOMINAL PAIN: ICD-10-CM

## 2021-05-24 PROCEDURE — 99072 ADDL SUPL MATRL&STAF TM PHE: CPT

## 2021-05-24 PROCEDURE — 99213 OFFICE O/P EST LOW 20 MIN: CPT

## 2021-05-24 PROCEDURE — 99215 OFFICE O/P EST HI 40 MIN: CPT

## 2021-05-24 NOTE — HISTORY OF PRESENT ILLNESS
[Disease: _____________________] : Disease: [unfilled] [M: ___] : M[unfilled] [AJCC Stage: ____] : AJCC Stage: [unfilled] [de-identified] : 71F w/ HCV infection (treated with Harvoni), IBS, Anxiety, GERD who had some GI symptoms/elevated LFTs (wt loss but on wt watchers) October 2019 and consulted Dr Brunner GI and non contrast CT A/P showed enlargement of the head of the pancreas with an approximately 3.3 cm hypodense area posteriorly within the pancreatic head which is suspicious for neoplasm. There are enlarged retroperitoneal lymph nodes posterior to the head of the pancreas which are suspicious appearing as well. There are few small nodular soft tissue densities lying deep to the right anterior abdominal wall suspicious for peritoneal implants, and soft tissue density material within the gallbladder which may reflect sludge.  Patient was referred for MRCP which revealed an irregularly enhancing 5.8 cm mass at the level of the confluence of the right and left hepatic biliary ducts. This mass obstructs the common hepatic duct and main left intrahepatic bile duct causing dilation of the intrahepatic biliary ducts within the left hepatic lobe. The mid to distal portion of the CBD are normal in caliber and appear uninvolved by the mass. 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 There are enlarged retroperitoneal lymph nodes, some of which demonstrate central necrosis lying posterior to the head of the pancreas and within the aortocaval region, suspicious for metastatic disease. There is no evidence of primary pancreatic neoplasm. There are small nodular densities within the right anterior abdomen suspicious for peritoneal metastatic foci ranging from 3-10 mm in size.  She consulted Dr Adams on 11/13/19 and is planned for biopsy planned 11/27/19. \par \par She had biopsy of RUQ peritoneal nodule which confirmed metastatic cholangiocarcinoma.  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  She has tried hydromorphone for pain but did not see any benefit.  She has significant anxiety and believes that the cancer is worse.  Worried about stopping Tibsovo, but believes all her symptoms have been worse since starting new treatment. \par \par seen with NP yoselin bragg at same time

## 2021-05-24 NOTE — PHYSICAL EXAM
[Obese] : obese [Normal] : grossly intact [Ambulatory and capable of all self care but unable to carry out any work activities] : Status 2- Ambulatory and capable of all self care but unable to carry out any work activities. Up and about more than 50% of waking hours [de-identified] : no jvd  [de-identified] : normal respiratory effort, no audible wheeze [de-identified] : r [de-identified] : anxious

## 2021-05-24 NOTE — REVIEW OF SYSTEMS
[Recent Change In Weight] : ~T recent weight change [Diarrhea] : diarrhea [Anxiety] : anxiety [Negative] : Allergic/Immunologic [Abdominal Pain] : abdominal pain [Constipation] : constipation [Fever] : no fever [Chills] : no chills [Night Sweats] : no night sweats [Fatigue] : no fatigue [Chest Pain] : no chest pain [Lower Ext Edema] : no lower extremity edema [Shortness Of Breath] : no shortness of breath [Cough] : no cough [SOB on Exertion] : no shortness of breath during exertion [Vomiting] : no vomiting

## 2021-05-25 RX ORDER — MORPHINE SULFATE 15 MG/1
15 TABLET ORAL
Qty: 120 | Refills: 0 | Status: DISCONTINUED | COMMUNITY
Start: 2019-12-13 | End: 2021-05-25

## 2021-05-25 RX ORDER — SIMETHICONE 80 MG/1
80 TABLET, CHEWABLE ORAL
Qty: 120 | Refills: 0 | Status: ACTIVE | COMMUNITY
Start: 2021-05-25 | End: 1900-01-01

## 2021-05-25 RX ORDER — LACTULOSE 10 G/15ML
10 SOLUTION ORAL
Qty: 1 | Refills: 2 | Status: ACTIVE | COMMUNITY
Start: 2019-12-16 | End: 1900-01-01

## 2021-05-26 ENCOUNTER — APPOINTMENT (OUTPATIENT)
Dept: MRI IMAGING | Facility: IMAGING CENTER | Age: 71
End: 2021-05-26
Payer: COMMERCIAL

## 2021-05-26 ENCOUNTER — OUTPATIENT (OUTPATIENT)
Dept: OUTPATIENT SERVICES | Facility: HOSPITAL | Age: 71
LOS: 1 days | End: 2021-05-26
Payer: COMMERCIAL

## 2021-05-26 ENCOUNTER — APPOINTMENT (OUTPATIENT)
Dept: HEMATOLOGY ONCOLOGY | Facility: CLINIC | Age: 71
End: 2021-05-26
Payer: COMMERCIAL

## 2021-05-26 ENCOUNTER — NON-APPOINTMENT (OUTPATIENT)
Age: 71
End: 2021-05-26

## 2021-05-26 ENCOUNTER — APPOINTMENT (OUTPATIENT)
Dept: CT IMAGING | Facility: IMAGING CENTER | Age: 71
End: 2021-05-26
Payer: COMMERCIAL

## 2021-05-26 DIAGNOSIS — C22.1 INTRAHEPATIC BILE DUCT CARCINOMA: ICD-10-CM

## 2021-05-26 PROCEDURE — 74183 MRI ABD W/O CNTR FLWD CNTR: CPT | Mod: 26

## 2021-05-26 PROCEDURE — 71250 CT THORAX DX C-: CPT | Mod: 26

## 2021-05-26 PROCEDURE — A9585: CPT

## 2021-05-26 PROCEDURE — 72197 MRI PELVIS W/O & W/DYE: CPT

## 2021-05-26 PROCEDURE — 71250 CT THORAX DX C-: CPT

## 2021-05-26 PROCEDURE — 72197 MRI PELVIS W/O & W/DYE: CPT | Mod: 26

## 2021-05-26 PROCEDURE — 74183 MRI ABD W/O CNTR FLWD CNTR: CPT

## 2021-05-26 PROCEDURE — 99443: CPT

## 2021-05-26 NOTE — PHYSICAL EXAM
[General Appearance - Alert] : alert [Sclera] : the sclera and conjunctiva were normal [Neck Appearance] : the appearance of the neck was normal [] : no respiratory distress [Auscultation Breath Sounds / Voice Sounds] : lungs were clear to auscultation bilaterally [Heart Rate And Rhythm] : heart rate was normal and rhythm regular [Heart Sounds] : normal S1 and S2 [Bowel Sounds] : normal bowel sounds [Abdomen Soft] : soft [Abnormal Walk] : normal gait [Skin Color & Pigmentation] : normal skin color and pigmentation [No Focal Deficits] : no focal deficits [Oriented To Time, Place, And Person] : oriented to person, place, and time [Normal Oral Mucosa] : normal oral mucosa [Abdomen Tenderness] : non-tender [FreeTextEntry1] : Anxious, tearful

## 2021-05-26 NOTE — ASSESSMENT
[DNR] : Code Status: DNR [Limited] : Treatment Guidelines: Limited [DNI] : Intubation: DNI [Last Ver[facility] Date: _____] : [facility] Completion/last verification date: [unfilled] [______] : HCP: [unfilled] [FreeTextEntry1] : 71yoF with:\par \par 1.  Cholangiocarcinoma - Patient elects to hold Tibsovo and obtain scans to determine next steps. Follow up with Med Onc.\par \par 2. Abdominal pain due to IBS/constipation +/- neoplasm\par - Extensive conversation had with patient and her  regarding concerns that abdominal pain may be due to long standing history of IBS vs neoplasm related pain.  Will obtain scans to assess for further delineation.\par - Given increased need for opioids, will start methadone 2.5mg BID, c/w PRN hydromorphone 2-4mg.  Discussed that if pain is not related to neoplasm, then opioids are only further exacerbating.  Both patient and her spouse express understanding.\par \par 3.IBS/constipation - Resume miralax QD, senna 2 tab QHS, PRN lactulose. Will request insurance authorization for linzess. \par \par 4. Reflux - C/w famotidine, omeprazole and PRN carafate.  \par \par 5. Nausea - C/w medical cannabis and PRN metoclopramide.\par \par 6. Anxiety -  Patient's heightened anxiety is further exacerbating her uncontrolled symptoms.  I have reached out to her psych MD in hopes to request increasing escitalopram to 10mg.  Patient has long standing use of alprazolam which does not appear to be effective currently.  She has been following with therapist regularly but reports that neither psychiatrist nor therapist are trained to assist with complexities surrounding her cancer diagnosis.  Patient would benefit from establishing with psycho-oncology and psychiatry at our [facility].\par \par 7. Fatigue - Advised patient to stay active as much as tolerated, take short, scheduled naps to restore energy. \par \par 8. Encounter for Palliative Care/Advance Care Planning \par - HCP on file\par - MOLST form on file, delineating wishes of limited care: DNR/DNI. Patient continues to express that she "doesn’t want to suffer."  \par - All questions answered.  Empathetic listening and extensive emotional support provided. \par \par Follow up in 1 week, call sooner with questions or issues.

## 2021-05-26 NOTE — HISTORY OF PRESENT ILLNESS
[Opioids for treatment of cancer not in remission, and/or  hospice, palliative care] : Opioids for treatment of cancer not in remission, and/or  hospice, palliative care [FreeTextEntry1] : 71yoF with metastatic cholangiocarcinoma presents for follow-up palliative care visit, referred by Oncology team.  PMH significant for HCV (treated with Harvoni), IBS, anxiety, GERD, thrombophlebitis. \par \par Patient initially who had some GI symptoms/elevated LFTs, weight loss (but was on weight watchers) in October 2019.  She saw Dr. Brunner (GI)  whom ordered a CT A/P which demonstrated enlargement of the head of the pancreas with an approximately 3.3 cm hypodense area posteriorly within the pancreatic head which is suspicious for neoplasm. Patient was referred for MRCP which revealed an irregularly enhancing 5.8 cm mass at the level of the confluence of the right and left hepatic biliary ducts. 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 There was no evidence of primary pancreatic neoplasm. She had biopsy of RUQ peritoneal nodule which confirmed metastatic cholangiocarcinoma. She was started on Gemzar/Cisplatin 12/20/19.  Did not tolerate Cisplatin and this was discontinued, now on single agent Gemzar.  \par \par Main issue for which patient initially presented was abdominal pain. She is experiencing pain in the epigastrium/LUQ, accompanied by bloating. Sometimes the pain travels around to her back. MS IR 30mg controls the pain.\par \par Interval History (5/25/21):  Patient continues to experience abdominal pain. Has been using PRN hydromorphone 2-4mg without sufficient relief.  She did not adhere to bowel regimen yesterday in anticipation of her 30 minute car ride for office visit.   Patient and  endorse uncontrolled pain and increased anxiety/tearfulness.  Patient expresses that she is suffering.  She is fearful that pain signifies progression of disease but is also worried that her symptoms may be related to Tibsovo.  \par \par ROS:\par + decreased appetite\par + 13 lb weight loss \par + chronic anxiety - on Lexapro 5mg QD and Alprazolam 0.25mg QID\par + nausea - uses Ondansetron ODT and medical cannabis which usually helps \par + IBS/OIC - miralax QD, senna 2 tab QHS, PRN lactulose\par + gas/bloating\par + GERD - PRN carafate\par All other ROS as outlined or noncontributory. \par \par Patient is , lives with her . Has supportive family.  She has three children (ages 54, 50, 45) from a previous marriage. It is important to patient that she does not suffer. \par \par Psychiatrist: Dr. NGOC Becerra ([facility])\par Therapist: Nataliya Bartholomew\par \par I-Stop Ref# [id number]

## 2021-05-28 RX ORDER — DEXAMETHASONE 1 MG/1
1 TABLET ORAL
Qty: 14 | Refills: 0 | Status: DISCONTINUED | COMMUNITY
Start: 2021-05-27 | End: 2021-05-28

## 2021-06-01 ENCOUNTER — APPOINTMENT (OUTPATIENT)
Dept: HEMATOLOGY ONCOLOGY | Facility: CLINIC | Age: 71
End: 2021-06-01
Payer: COMMERCIAL

## 2021-06-01 ENCOUNTER — NON-APPOINTMENT (OUTPATIENT)
Age: 71
End: 2021-06-01

## 2021-06-01 PROCEDURE — 99214 OFFICE O/P EST MOD 30 MIN: CPT | Mod: 95

## 2021-06-01 PROCEDURE — 99441: CPT

## 2021-06-01 RX ORDER — METHADONE HYDROCHLORIDE 5 MG/1
5 TABLET ORAL TWICE DAILY
Qty: 30 | Refills: 0 | Status: DISCONTINUED | COMMUNITY
Start: 2021-05-24 | End: 2021-06-01

## 2021-06-01 RX ORDER — IVOSIDENIB 250 MG/1
250 TABLET, FILM COATED ORAL
Qty: 30 | Refills: 0 | Status: DISCONTINUED | COMMUNITY
Start: 2021-01-29 | End: 2021-06-01

## 2021-06-01 NOTE — HISTORY OF PRESENT ILLNESS
[FreeTextEntry1] : 71yoF with metastatic cholangiocarcinoma presents for follow-up palliative care visit, referred by Oncology team.  PMH significant for HCV (treated with Harvoni), IBS, anxiety, GERD, thrombophlebitis. \par \par Patient initially who had some GI symptoms/elevated LFTs, weight loss (but was on weight watchers) in October 2019.  She saw Dr. Brunner (GI)  whom ordered a CT A/P which demonstrated enlargement of the head of the pancreas with an approximately 3.3 cm hypodense area posteriorly within the pancreatic head which is suspicious for neoplasm. Patient was referred for MRCP which revealed an irregularly enhancing 5.8 cm mass at the level of the confluence of the right and left hepatic biliary ducts. 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 There was no evidence of primary pancreatic neoplasm. She had biopsy of RUQ peritoneal nodule which confirmed metastatic cholangiocarcinoma. She was started on Gemzar/Cisplatin 12/20/19.  Did not tolerate Cisplatin and this was discontinued, now on single agent Gemzar.  \par \par Main issue for which patient initially presented was abdominal pain. She is experiencing pain in the epigastrium/LUQ, accompanied by bloating. Sometimes the pain travels around to her back. MS IR 30mg controls the pain.\par \par Interval History (6/1/21): Patient presents for follow-up via TeleMedicine.   Recent imaging demonstrated POD with increased omental carcinomatosis and new/worsening pulmonary mets.  She discussed results with Oncology today and elects supportive care.  She reports feeling a "weight lifted off of shoulders".  \par \par She is now on methadone 5mg BID, PRN hydromorphone 4-6 mg.  Reports some improvement in pain control over the past few days.  Her appetite remains decreased.   Is moving her bowels with miralax daily and senna 2 tab BID.   Has not needed PRN lactulose.\par \par ROS:\par + decreased appetite\par + 13 lb weight loss \par + chronic anxiety (increased) - on Lexapro 5mg QD and Alprazolam 0.25mg QID\par + nausea - uses Ondansetron ODT and medical cannabis which usually helps \par + IBS/OIC - miralax QD, senna 2 tab QHS, PRN lactulose\par + gas/bloating\par + GERD - PRN carafate\par All other ROS as outlined or noncontributory. \par \par Patient is , lives with her . Has supportive family.  She has three children (ages 54, 50, 45) from a previous marriage. It is important to patient that she does not suffer. \par \par Psychiatrist: Dr. NGOC Becerra ([facility])\par Therapist: Nataliya Bartholomew\par \par I-Stop Ref# [id number]

## 2021-06-01 NOTE — ASSESSMENT
[FreeTextEntry1] : 71yoF with:\par \par 1.  Cholangiocarcinoma - Patient elects supportive care.  \par \par 2. Neoplasm related pain - Will increase methadone to 5/2.2/5mg, c/w PRN hydromorphone 4-6mg.   Will decrease to 4mg when able to tolerate.\par \par 3.IBS/constipation - C/w  miralax QD, senna 2 tab BID, PRN lactulose. \par \par 4. Reflux - C/w famotidine, omeprazole and PRN carafate.  \par \par 5. Nausea - C/w medical cannabis and PRN metoclopramide.\par \par 6. Anxiety -  Patient's heightened anxiety is further exacerbating her uncontrolled symptoms.  I have reached out to her psych MD in hopes to request increasing escitalopram to 10mg.  Patient has long standing use of alprazolam which does not appear to be effective currently.  She has been following with therapist regularly but reports that neither psychiatrist nor therapist are trained to assist with complexities surrounding her cancer diagnosis.  Patient would benefit from establishing with psycho-oncology and psychiatry at our [facility].\par \par 7. Fatigue - Advised patient to stay active as much as tolerated, take short, scheduled naps to restore energy. \par \par 8. Encounter for Palliative Care/Advance Care Planning \par - HCP on file.  MOLST form on file, delineating wishes of limited care: DNR/DNI. Patient continues to express that she "doesn’t want to suffer."  \par - Introduced hospice care services. Patient is not ready to elect at this time.  Will continue to monitor. \par - All questions answered.  Empathetic listening and extensive emotional support provided. \par \par Follow up in 1 week, call sooner with questions or issues.

## 2021-06-02 RX ORDER — METHADONE HYDROCHLORIDE 5 MG/1
5 TABLET ORAL
Qty: 75 | Refills: 0 | Status: ACTIVE | COMMUNITY
Start: 2021-06-01 | End: 1900-01-01

## 2021-06-07 ENCOUNTER — APPOINTMENT (OUTPATIENT)
Dept: INFUSION THERAPY | Facility: HOSPITAL | Age: 71
End: 2021-06-07

## 2021-06-07 NOTE — REASON FOR VISIT
[Follow-Up] : a follow-up visit [Home] : at home, [unfilled] , at the time of the visit. [Medical Office: ([facility])___] : at the medical office located in  [Verbal consent obtained from patient] : the patient, [unfilled]

## 2021-06-08 ENCOUNTER — APPOINTMENT (OUTPATIENT)
Dept: HEMATOLOGY ONCOLOGY | Facility: CLINIC | Age: 71
End: 2021-06-08

## 2021-06-08 ENCOUNTER — APPOINTMENT (OUTPATIENT)
Dept: HEMATOLOGY ONCOLOGY | Facility: CLINIC | Age: 71
End: 2021-06-08
Payer: COMMERCIAL

## 2021-06-08 DIAGNOSIS — K59.00 CONSTIPATION, UNSPECIFIED: ICD-10-CM

## 2021-06-08 PROCEDURE — 99214 OFFICE O/P EST MOD 30 MIN: CPT | Mod: 95

## 2021-06-08 NOTE — PHYSICAL EXAM
[General Appearance - Alert] : alert [Oriented To Time, Place, And Person] : oriented to person, place, and time [General Appearance - In No Acute Distress] : in no acute distress [Affect] : the affect was normal

## 2021-06-09 NOTE — HISTORY OF PRESENT ILLNESS
[Opioids for treatment of cancer not in remission, and/or  hospice, palliative care] : Opioids for treatment of cancer not in remission, and/or  hospice, palliative care [FreeTextEntry1] : 71yoF with metastatic cholangiocarcinoma presents for follow-up palliative care visit, referred by Oncology team.  PMH significant for HCV (treated with Harvoni), IBS, anxiety, GERD, thrombophlebitis. \par \par Patient initially who had some GI symptoms/elevated LFTs, weight loss (but was on weight watchers) in October 2019.  She saw Dr. Brunner (GI)  whom ordered a CT A/P which demonstrated enlargement of the head of the pancreas with an approximately 3.3 cm hypodense area posteriorly within the pancreatic head which is suspicious for neoplasm. Patient was referred for MRCP which revealed an irregularly enhancing 5.8 cm mass at the level of the confluence of the right and left hepatic biliary ducts. 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 There was no evidence of primary pancreatic neoplasm. She had biopsy of RUQ peritoneal nodule which confirmed metastatic cholangiocarcinoma. She was started on Gemzar/Cisplatin 12/20/19.  Did not tolerate Cisplatin and this was discontinued, now on single agent Gemzar.  \par \par Main issue for which patient initially presented was abdominal pain. She is experiencing pain in the epigastrium/LUQ, accompanied by bloating. Sometimes the pain travels around to her back. \par \par Interval History (6/8/21): Patient presents for follow-up via TeleMedicine.  She has scheduled follow-up with Med Onc in one month to discuss next steps in treatment plan.  She previously elected supportive care but her children are requesting discussing further treatment options specifically potential for immunotherapy. She reports that she was told scans are to be obtained only every 6 months.  Is requesting an updated prognosis based.\par \par Patient reports overall improvement of symptoms. She is on methadone 5mg BID.   She was recommended to alter to 5/2.5/5mg but does not recall.  Continues to utilize PRN hydromorphone 6mg two to three times daily.  She reports that she does "not feel as sick as she was".   Reports some days are better than others.  Her appetite remains decreased from baseline. \par \par Patient has resumed spending some time with family and friends.  \par \par Methadone 5mg BID\par PRN hydromorphone 6mg \par Gabapentin 100mg TID\par  \par ROS:\par + decreased appetite\par + 13 lb weight loss \par + chronic anxiety (increased) - on Lexapro 5mg QD and Alprazolam 0.25mg QID\par + nausea - uses Ondansetron ODT and medical cannabis which usually helps \par + IBS/OIC - miralax QD, senna 2 tab BID, PRN lactulose\par + gas/bloating\par + GERD - PRN carafate\par All other ROS as outlined or noncontributory. \par \par Patient is , lives with her . Has supportive family.  She has three children (ages 54, 50, 45) from a previous marriage. It is important to patient that she does not suffer. \par \par Psychiatrist: Dr. NGOC Becerra ([facility])\par Therapist: Nataliya Bartholomew\par \par I-Stop Ref# [id number]

## 2021-06-09 NOTE — ASSESSMENT
[DNR] : Code Status: DNR [Limited] : Treatment Guidelines: Limited [DNI] : Intubation: DNI [[facility] Date: _____[facility]last [facility]: [unfilled] [______] : HCP: [unfilled] [FreeTextEntry1] : 71yoF with:\par \par 1. Cholangiocarcinoma - Patient previously elected best supportive care.  Her symptoms are controlled at this time.   She is now requesting follow-up with Med Onc based on her children's wishes.  Encouraged patient to outline questions for upcoming appointment to guide discussion.\par \par 2. Neoplasm related pain - Will increase methadone to 5/2.5/5mg, c/w PRN hydromorphone 4-6mg.   Will decrease to 4mg when able to tolerate.  C/w gabapentin 100mg TID.\par \par 3.IBS/constipation - C/w  miralax QD, senna 2 tab BID, PRN lactulose. Educated on the importance of maintaining bowel regularity in light of opioid use. Follow up with GI.\par \par 4. Reflux - C/w famotidine, omeprazole and PRN carafate.  \par \par 5. Nausea - C/w medical cannabis and PRN metoclopramide.\par \par 6. Anxiety -  Patient's heightened anxiety is further exacerbating her uncontrolled symptoms.  I have reached out to her psych MD in hopes to request increasing escitalopram to 10mg.  Patient has long standing use of alprazolam which does not appear to be effective any longer.She has been following with therapist regularly but patient herself reports that neither psychiatrist nor therapist are trained to assist with complexities surrounding her cancer diagnosis.  Patient may benefit from establishing with psycho-oncology and psychiatry at our Cancer Center; will discuss amongst the inter-disciplinary team whether a transfer of care to [facility]'s psych-oncology and psychiatry services is in the patient's best interest. In the interim, she will continue to follow with current providers.\par \par 7. Fatigue - Advised patient to stay active as much as tolerated, take short, scheduled naps to restore energy.\par \par 8. Encounter for Palliative Care/Advance Care Planning \par - HCP on file.  MOLST form on file, delineating wishes of limited care: DNR/DNI. Patient continues to express that she "doesn’t want to suffer."  \par - Previously introduced hospice care services. Patient is not ready to elect at this time.  Will continue to monitor. \par - All questions answered.  Empathetic listening and extensive emotional support provided. \par \par Follow up in 2 weeks, call sooner with questions or issues.

## 2021-06-15 ENCOUNTER — NON-APPOINTMENT (OUTPATIENT)
Age: 71
End: 2021-06-15

## 2021-06-16 NOTE — H&P ADULT - NS NEC GEN PE MLT EXAM PC
Is This A New Presentation, Or A Follow-Up?: Skin Lesions How Severe Is Your Skin Lesion?: mild Have Your Skin Lesions Been Treated?: not been treated No bruits; no thyromegaly or nodules

## 2021-06-18 ENCOUNTER — OUTPATIENT (OUTPATIENT)
Dept: OUTPATIENT SERVICES | Facility: HOSPITAL | Age: 71
LOS: 1 days | Discharge: HOME | End: 2021-06-18

## 2021-06-18 DIAGNOSIS — C22.1 INTRAHEPATIC BILE DUCT CARCINOMA: ICD-10-CM

## 2021-06-22 ENCOUNTER — APPOINTMENT (OUTPATIENT)
Dept: HEMATOLOGY ONCOLOGY | Facility: CLINIC | Age: 71
End: 2021-06-22
Payer: COMMERCIAL

## 2021-06-22 DIAGNOSIS — R53.0 NEOPLASTIC (MALIGNANT) RELATED FATIGUE: ICD-10-CM

## 2021-06-22 DIAGNOSIS — K58.9 IRRITABLE BOWEL SYNDROME W/OUT DIARRHEA: ICD-10-CM

## 2021-06-22 DIAGNOSIS — K21.9 GASTRO-ESOPHAGEAL REFLUX DISEASE W/OUT ESOPHAGITIS: ICD-10-CM

## 2021-06-22 PROCEDURE — 99215 OFFICE O/P EST HI 40 MIN: CPT | Mod: 95

## 2021-06-22 RX ORDER — GABAPENTIN 100 MG/1
100 CAPSULE ORAL 3 TIMES DAILY
Qty: 180 | Refills: 0 | Status: ACTIVE | COMMUNITY
Start: 2021-05-26 | End: 1900-01-01

## 2021-06-22 NOTE — REASON FOR VISIT
[Follow-Up] : a follow-up visit [Home] : at home, [unfilled] , at the time of the visit. [Medical Office: ([facility])___] : at the medical office located in  [Verbal consent obtained from patient] : the patient, [unfilled]

## 2021-06-24 ENCOUNTER — APPOINTMENT (OUTPATIENT)
Dept: HEMATOLOGY ONCOLOGY | Facility: CLINIC | Age: 71
End: 2021-06-24
Payer: COMMERCIAL

## 2021-06-24 ENCOUNTER — NON-APPOINTMENT (OUTPATIENT)
Age: 71
End: 2021-06-24

## 2021-06-24 DIAGNOSIS — R11.0 NAUSEA: ICD-10-CM

## 2021-06-24 PROBLEM — R53.0 NEOPLASTIC MALIGNANT RELATED FATIGUE: Status: ACTIVE | Noted: 2020-06-12

## 2021-06-24 PROBLEM — K21.9 GERD (GASTROESOPHAGEAL REFLUX DISEASE): Status: ACTIVE | Noted: 2021-01-11

## 2021-06-24 PROCEDURE — 99442: CPT

## 2021-06-24 RX ORDER — LINACLOTIDE 72 UG/1
72 CAPSULE, GELATIN COATED ORAL
Qty: 10 | Refills: 0 | Status: DISCONTINUED | COMMUNITY
Start: 2021-05-18 | End: 2021-06-24

## 2021-06-24 NOTE — ASSESSMENT
[DNR] : Code Status: DNR [Limited] : Treatment Guidelines: Limited [DNI] : Intubation: DNI [[facility]: _____[facility]/last verification date: [unfilled] [______] : HCP: [unfilled] [FreeTextEntry1] : 71yoF with:\par \par 1. Cholangiocarcinoma - Patient previously elected best supportive care.  Her symptoms are controlled at this time.   She is now requesting follow-up with Med Onc based on her children's wishes.  Encouraged patient to outline questions for upcoming appointment to guide discussion.\par \par 2. Neoplasm related pain - C/w methadone to 5/2.5/5mg, c/w PRN hydromorphone 4-6mg.   Will decrease to 4mg when able to tolerate.  Will increase gabapentin to 200mg TID, as tolerated.\par - Will request EKG at next in office visit.\par \par 3.IBS/constipation - C/w  miralax QD, senna 2 tab BID, PRN lactulose. Educated on the importance of maintaining bowel regularity in light of opioid use. Follow up with GI.\par \par 4. Reflux - C/w famotidine, omeprazole and PRN carafate.   Follow up with GI.\par \par 5. Nausea - C/w medical cannabis and PRN Ondansetron.\par \par 6. Anxiety -  C/w psychiatry and talk therapy follow-up.\par \par 7. Fatigue - Advised patient to stay active as much as tolerated, take short, scheduled naps to restore energy.\par \par 8. Encounter for Palliative Care/Advance Care Planning \par - HCP on file.  MOLST form on file, delineating wishes of limited care: DNR/DNI. Patient continues to express that she "doesn’t want to suffer."  \par - Previously introduced hospice care services. Patient is not ready to elect at this time.  Will continue to monitor. \par - All questions answered.  Empathetic listening and extensive emotional support provided. \par \par Follow up in 2 weeks, call sooner with questions or issues.

## 2021-06-24 NOTE — HISTORY OF PRESENT ILLNESS
[Opioids for treatment of cancer not in remission, and/or  hospice, palliative care] : Opioids for treatment of cancer not in remission, and/or  hospice, palliative care [FreeTextEntry1] : 71yoF with metastatic cholangiocarcinoma presents for follow-up palliative care visit, referred by Oncology team.  PMH significant for HCV (treated with Harvoni), IBS, anxiety, GERD, thrombophlebitis. \par \par Patient initially who had some GI symptoms/elevated LFTs, weight loss (but was on weight watchers) in October 2019.  She saw Dr. Brunner (GI)  whom ordered a CT A/P which demonstrated enlargement of the head of the pancreas with an approximately 3.3 cm hypodense area posteriorly within the pancreatic head which is suspicious for neoplasm. Patient was referred for MRCP which revealed an irregularly enhancing 5.8 cm mass at the level of the confluence of the right and left hepatic biliary ducts. 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 There was no evidence of primary pancreatic neoplasm. She had biopsy of RUQ peritoneal nodule which confirmed metastatic cholangiocarcinoma. She was started on Gemzar/Cisplatin 12/20/19.  Did not tolerate Cisplatin and this was discontinued, now on single agent Gemzar.  \par \par Main issue for which patient initially presented was abdominal pain. She is experiencing pain in the epigastrium/LUQ, accompanied by bloating. Sometimes the pain travels around to her back. \par \par Interval History (6/22/21): Patient presents for follow-up via TeleMedicine.  She has scheduled follow-up with Med Onc next week to discuss next steps in treatment plan.  She previously elected supportive care but her children are requesting discussing further treatment options specifically potential for immunotherapy. She reports that she was told scans are to be obtained only every 6 months.  Is requesting an updated prognosis. \par \par Patient reports overall improvement of symptoms. She is on methadone 5/2.5/5mg and PRN hydromorphone 4-6mg two to three times daily.  Reports some days are better than others.  Her appetite remains decreased from baseline. \par \par Patient has resumed spending some time with family and friends.  \par \par Current pain regimen:\par Methadone 5/2.5/5mg \par PRN hydromorphone 4-6mg \par Gabapentin 100mg TID\par  \par ROS:\par + fatigue\par + decreased appetite\par + 13 lb weight loss \par + chronic anxiety (increased) - on Lexapro 5mg QD and PRN Alprazolam 0.25mg QID\par + nausea - uses Ondansetron ODT and medical cannabis which usually helps \par + IBS/OIC - miralax QD, senna 2 tab BID, PRN lactulose\par + gas/bloating\par + GERD - PRN carafate\par All other ROS as outlined or noncontributory. \par \par Patient is , lives with her . Has supportive family.  She has three children (ages 54, 50, 45) from a previous marriage. It is important to patient that she does not suffer. \par \par Psychiatrist: Dr. NGOC Becerra ([facility])\par Therapist: Nataliya Bartholomew\[facility] \par I-Stop Ref#  [id number]

## 2021-06-25 RX ORDER — PANTOPRAZOLE 40 MG/1
40 TABLET, DELAYED RELEASE ORAL
Qty: 60 | Refills: 0 | Status: DISCONTINUED | COMMUNITY
Start: 2021-05-27 | End: 2021-06-25

## 2021-06-25 RX ORDER — OMEPRAZOLE 40 MG/1
40 CAPSULE, DELAYED RELEASE ORAL TWICE DAILY
Qty: 30 | Refills: 2 | Status: ACTIVE | COMMUNITY
Start: 2021-04-07 | End: 1900-01-01

## 2021-06-28 ENCOUNTER — NON-APPOINTMENT (OUTPATIENT)
Age: 71
End: 2021-06-28

## 2021-06-28 ENCOUNTER — APPOINTMENT (OUTPATIENT)
Dept: HEMATOLOGY ONCOLOGY | Facility: CLINIC | Age: 71
End: 2021-06-28
Payer: COMMERCIAL

## 2021-06-28 DIAGNOSIS — F41.9 ANXIETY DISORDER, UNSPECIFIED: ICD-10-CM

## 2021-06-28 DIAGNOSIS — Z51.5 ENCOUNTER FOR PALLIATIVE CARE: ICD-10-CM

## 2021-06-28 DIAGNOSIS — G89.3 NEOPLASM RELATED PAIN (ACUTE) (CHRONIC): ICD-10-CM

## 2021-06-28 DIAGNOSIS — C22.1 INTRAHEPATIC BILE DUCT CARCINOMA: ICD-10-CM

## 2021-06-28 PROCEDURE — 99443: CPT

## 2021-06-29 PROBLEM — G89.3 PAIN, NEOPLASM-RELATED: Status: ACTIVE | Noted: 2021-04-23

## 2021-06-29 PROBLEM — F41.9 ANXIETY: Status: ACTIVE | Noted: 2019-11-13

## 2021-06-29 PROBLEM — C22.1 CHOLANGIOCARCINOMA: Status: ACTIVE | Noted: 2019-11-22

## 2021-06-29 PROBLEM — Z51.5 ENCOUNTER FOR PALLIATIVE CARE: Status: ACTIVE | Noted: 2020-02-26

## 2021-06-30 ENCOUNTER — INPATIENT (INPATIENT)
Facility: HOSPITAL | Age: 71
LOS: 11 days | Discharge: HOSPICE | DRG: 436 | End: 2021-07-12
Attending: INTERNAL MEDICINE | Admitting: INTERNAL MEDICINE
Payer: COMMERCIAL

## 2021-06-30 VITALS
TEMPERATURE: 98 F | OXYGEN SATURATION: 96 % | HEART RATE: 86 BPM | RESPIRATION RATE: 16 BRPM | WEIGHT: 199.96 LBS | SYSTOLIC BLOOD PRESSURE: 149 MMHG | DIASTOLIC BLOOD PRESSURE: 81 MMHG | HEIGHT: 63.5 IN

## 2021-06-30 LAB
ALBUMIN SERPL ELPH-MCNC: 3.5 G/DL — SIGNIFICANT CHANGE UP (ref 3.3–5)
ALP SERPL-CCNC: 69 U/L — SIGNIFICANT CHANGE UP (ref 40–120)
ALT FLD-CCNC: 6 U/L — LOW (ref 10–45)
ANION GAP SERPL CALC-SCNC: 12 MMOL/L — SIGNIFICANT CHANGE UP (ref 5–17)
AST SERPL-CCNC: 20 U/L — SIGNIFICANT CHANGE UP (ref 10–40)
BASOPHILS # BLD AUTO: 0.01 K/UL — SIGNIFICANT CHANGE UP (ref 0–0.2)
BASOPHILS NFR BLD AUTO: 0.1 % — SIGNIFICANT CHANGE UP (ref 0–2)
BILIRUB SERPL-MCNC: 0.4 MG/DL — SIGNIFICANT CHANGE UP (ref 0.2–1.2)
BUN SERPL-MCNC: 13 MG/DL — SIGNIFICANT CHANGE UP (ref 7–23)
CALCIUM SERPL-MCNC: 9 MG/DL — SIGNIFICANT CHANGE UP (ref 8.4–10.5)
CHLORIDE SERPL-SCNC: 99 MMOL/L — SIGNIFICANT CHANGE UP (ref 96–108)
CO2 SERPL-SCNC: 26 MMOL/L — SIGNIFICANT CHANGE UP (ref 22–31)
CREAT SERPL-MCNC: 0.69 MG/DL — SIGNIFICANT CHANGE UP (ref 0.5–1.3)
EOSINOPHIL # BLD AUTO: 0.01 K/UL — SIGNIFICANT CHANGE UP (ref 0–0.5)
EOSINOPHIL NFR BLD AUTO: 0.1 % — SIGNIFICANT CHANGE UP (ref 0–6)
GLUCOSE SERPL-MCNC: 114 MG/DL — HIGH (ref 70–99)
HCT VFR BLD CALC: 43.1 % — SIGNIFICANT CHANGE UP (ref 34.5–45)
HGB BLD-MCNC: 14.1 G/DL — SIGNIFICANT CHANGE UP (ref 11.5–15.5)
IMM GRANULOCYTES NFR BLD AUTO: 0.3 % — SIGNIFICANT CHANGE UP (ref 0–1.5)
LIDOCAIN IGE QN: 21 U/L — SIGNIFICANT CHANGE UP (ref 7–60)
LYMPHOCYTES # BLD AUTO: 1.08 K/UL — SIGNIFICANT CHANGE UP (ref 1–3.3)
LYMPHOCYTES # BLD AUTO: 14.9 % — SIGNIFICANT CHANGE UP (ref 13–44)
MCHC RBC-ENTMCNC: 30 PG — SIGNIFICANT CHANGE UP (ref 27–34)
MCHC RBC-ENTMCNC: 32.7 GM/DL — SIGNIFICANT CHANGE UP (ref 32–36)
MCV RBC AUTO: 91.7 FL — SIGNIFICANT CHANGE UP (ref 80–100)
MONOCYTES # BLD AUTO: 0.39 K/UL — SIGNIFICANT CHANGE UP (ref 0–0.9)
MONOCYTES NFR BLD AUTO: 5.4 % — SIGNIFICANT CHANGE UP (ref 2–14)
NEUTROPHILS # BLD AUTO: 5.76 K/UL — SIGNIFICANT CHANGE UP (ref 1.8–7.4)
NEUTROPHILS NFR BLD AUTO: 79.2 % — HIGH (ref 43–77)
NRBC # BLD: 0 /100 WBCS — SIGNIFICANT CHANGE UP (ref 0–0)
PLATELET # BLD AUTO: 226 K/UL — SIGNIFICANT CHANGE UP (ref 150–400)
POTASSIUM SERPL-MCNC: 4.2 MMOL/L — SIGNIFICANT CHANGE UP (ref 3.5–5.3)
POTASSIUM SERPL-SCNC: 4.2 MMOL/L — SIGNIFICANT CHANGE UP (ref 3.5–5.3)
PROT SERPL-MCNC: 6.7 G/DL — SIGNIFICANT CHANGE UP (ref 6–8.3)
RBC # BLD: 4.7 M/UL — SIGNIFICANT CHANGE UP (ref 3.8–5.2)
RBC # FLD: 11.9 % — SIGNIFICANT CHANGE UP (ref 10.3–14.5)
SODIUM SERPL-SCNC: 137 MMOL/L — SIGNIFICANT CHANGE UP (ref 135–145)
WBC # BLD: 7.27 K/UL — SIGNIFICANT CHANGE UP (ref 3.8–10.5)
WBC # FLD AUTO: 7.27 K/UL — SIGNIFICANT CHANGE UP (ref 3.8–10.5)

## 2021-06-30 PROCEDURE — 99285 EMERGENCY DEPT VISIT HI MDM: CPT | Mod: GC

## 2021-06-30 PROCEDURE — 74176 CT ABD & PELVIS W/O CONTRAST: CPT | Mod: 26,ME

## 2021-06-30 PROCEDURE — G1004: CPT

## 2021-06-30 RX ORDER — HYDROMORPHONE HYDROCHLORIDE 8 MG/1
8 TABLET ORAL
Qty: 120 | Refills: 0 | Status: ACTIVE | COMMUNITY
Start: 2021-05-24 | End: 1900-01-01

## 2021-06-30 RX ORDER — ONDANSETRON 8 MG/1
4 TABLET, FILM COATED ORAL ONCE
Refills: 0 | Status: COMPLETED | OUTPATIENT
Start: 2021-06-30 | End: 2021-06-30

## 2021-06-30 RX ORDER — HYDROMORPHONE HYDROCHLORIDE 2 MG/ML
1 INJECTION INTRAMUSCULAR; INTRAVENOUS; SUBCUTANEOUS ONCE
Refills: 0 | Status: DISCONTINUED | OUTPATIENT
Start: 2021-06-30 | End: 2021-06-30

## 2021-06-30 RX ADMIN — HYDROMORPHONE HYDROCHLORIDE 1 MILLIGRAM(S): 2 INJECTION INTRAMUSCULAR; INTRAVENOUS; SUBCUTANEOUS at 22:19

## 2021-06-30 RX ADMIN — ONDANSETRON 4 MILLIGRAM(S): 8 TABLET, FILM COATED ORAL at 20:52

## 2021-06-30 RX ADMIN — HYDROMORPHONE HYDROCHLORIDE 1 MILLIGRAM(S): 2 INJECTION INTRAMUSCULAR; INTRAVENOUS; SUBCUTANEOUS at 20:52

## 2021-06-30 NOTE — ED PROVIDER NOTE - ATTENDING CONTRIBUTION TO CARE
Phi Benitez MD, FACEP: In this physician's medical judgement based on clinical history and physical exam, patient with acute on chronic pain sent in by her physician for evaluation and treatment. + moderate to severe pain and not better as an outpatient.  no f/c  no cough  no whining, grimacing, tachycardia, writhing, normal relaxed position, lying quietly and moves easily  patient becomes restless when team members walk by or enter the room   requesting pain relief   will get iv, cbc, cmp, analgesia and antiemetic prn, ct a/p with iv contrast, and reassess  Will follow up on labs, analgesia, imaging, reassess and disposition to the inpatient team as clinically indicated.   Patient endorsed to the medical team at the time of admission. Based on patient's history and physical exam, as well as the results of today's workup, I feel that patient warrants admission to the hospital for further workup/evaluation and continued management. I discussed the findings of today's workup with the patient and addressed the patient's questions and concerns. The patient was agreeable with admission. Our team spoke with the inpatient receiving team who accepted the patient for admission and subsequently took over the patient's care at the time of admission. The receiving team will follow up on pending labs, analgesia, any clinical imaging results, ancillary findings, reassess, and disposition as clinically indicated. Details of patient and plan conveyed to receiving physician team and conveyed back for understanding. There were no questions at this time about the patient's status, disposition, and plan. Patient's care to be taken over by receiving physician team at this time, all decisions regarding the progression of care will be made at their discretion.

## 2021-06-30 NOTE — ED PROVIDER NOTE - OBJECTIVE STATEMENT
70yo F with PMHx of cholangiocarcinoma (not gastric cancer as previously writtent) p/w abdominal pain. She endorses that this pain has been ongoing for months and that she has stopped chemo a month ago due to not improving her symptoms. Denies any fevers, chills, rashes, chest pain or other infectious symptoms. She came in today because she did not feel like her pain was being managed appropriately as an outpatient. Vomited twice prior to arrival; non-bloody.

## 2021-06-30 NOTE — ED PROVIDER NOTE - PROGRESS NOTE DETAILS
Zach Elena MD: No acute findings on CT scan. Patient still endorsing significant pain; will admit for pain management.

## 2021-06-30 NOTE — ED ADULT NURSE NOTE - OBJECTIVE STATEMENT
70 y/o female pmh cholangiocarcinoma stage IV, anxiety arrives to ED c/o abdominal pain, nausea. Patient arrives to ED by EMS from home, given two doses zofran 4mg IVP and arrives with Liter of NS infusing 200cc. Patient is a/ox4, comfortable appearance but anxious. Patient abdomen soft NT/ND. No active vomiting at this time. No chest pain, shortness of breath, fever/chills, urinary symptoms. IV in place by EMS. Awaiting MD assessment and plan of care. Safety in place. Patient in gown.

## 2021-06-30 NOTE — ED ADULT NURSE REASSESSMENT NOTE - NS ED NURSE REASSESS COMMENT FT1
Received report from ASHWIN Marquez. Pt is awake and alert, resting comfortably in stretcher, speaking in full coherent sentences. Pt denies CP, SOB, fevers, chills, N/V/D, HA, vision changes. Call bell within reach, comfort & safety provided.

## 2021-06-30 NOTE — ED PROVIDER NOTE - CLINICAL SUMMARY MEDICAL DECISION MAKING FREE TEXT BOX
71F with cholangiocarcinoma metastatic p/w abdominal pain. Will obtain CT of the abdomen looking for possible diverticulitis or SBO; patient is anaphylactic to contrast and so will do scans without. Will give IV pain meds and likely will need an admission for better pain control and nausea control.

## 2021-07-01 DIAGNOSIS — R10.9 UNSPECIFIED ABDOMINAL PAIN: ICD-10-CM

## 2021-07-01 DIAGNOSIS — Z98.51 TUBAL LIGATION STATUS: Chronic | ICD-10-CM

## 2021-07-01 DIAGNOSIS — F41.9 ANXIETY DISORDER, UNSPECIFIED: ICD-10-CM

## 2021-07-01 DIAGNOSIS — I48.91 UNSPECIFIED ATRIAL FIBRILLATION: ICD-10-CM

## 2021-07-01 DIAGNOSIS — G89.29 OTHER CHRONIC PAIN: ICD-10-CM

## 2021-07-01 DIAGNOSIS — R06.03 ACUTE RESPIRATORY DISTRESS: ICD-10-CM

## 2021-07-01 DIAGNOSIS — C22.1 INTRAHEPATIC BILE DUCT CARCINOMA: ICD-10-CM

## 2021-07-01 DIAGNOSIS — R03.0 ELEVATED BLOOD-PRESSURE READING, WITHOUT DIAGNOSIS OF HYPERTENSION: ICD-10-CM

## 2021-07-01 DIAGNOSIS — Z29.9 ENCOUNTER FOR PROPHYLACTIC MEASURES, UNSPECIFIED: ICD-10-CM

## 2021-07-01 LAB
ALBUMIN SERPL ELPH-MCNC: 3.8 G/DL — SIGNIFICANT CHANGE UP (ref 3.3–5)
ALP SERPL-CCNC: 71 U/L — SIGNIFICANT CHANGE UP (ref 40–120)
ALT FLD-CCNC: 7 U/L — LOW (ref 10–45)
ANION GAP SERPL CALC-SCNC: 18 MMOL/L — HIGH (ref 5–17)
AST SERPL-CCNC: 20 U/L — SIGNIFICANT CHANGE UP (ref 10–40)
BASOPHILS # BLD AUTO: 0.01 K/UL — SIGNIFICANT CHANGE UP (ref 0–0.2)
BASOPHILS NFR BLD AUTO: 0.1 % — SIGNIFICANT CHANGE UP (ref 0–2)
BILIRUB SERPL-MCNC: 0.5 MG/DL — SIGNIFICANT CHANGE UP (ref 0.2–1.2)
BUN SERPL-MCNC: 12 MG/DL — SIGNIFICANT CHANGE UP (ref 7–23)
CALCIUM SERPL-MCNC: 9.4 MG/DL — SIGNIFICANT CHANGE UP (ref 8.4–10.5)
CHLORIDE SERPL-SCNC: 97 MMOL/L — SIGNIFICANT CHANGE UP (ref 96–108)
CO2 SERPL-SCNC: 22 MMOL/L — SIGNIFICANT CHANGE UP (ref 22–31)
CREAT SERPL-MCNC: 0.67 MG/DL — SIGNIFICANT CHANGE UP (ref 0.5–1.3)
EOSINOPHIL # BLD AUTO: 0.01 K/UL — SIGNIFICANT CHANGE UP (ref 0–0.5)
EOSINOPHIL NFR BLD AUTO: 0.1 % — SIGNIFICANT CHANGE UP (ref 0–6)
GLUCOSE SERPL-MCNC: 125 MG/DL — HIGH (ref 70–99)
HCT VFR BLD CALC: 44.6 % — SIGNIFICANT CHANGE UP (ref 34.5–45)
HGB BLD-MCNC: 14.8 G/DL — SIGNIFICANT CHANGE UP (ref 11.5–15.5)
IMM GRANULOCYTES NFR BLD AUTO: 0.3 % — SIGNIFICANT CHANGE UP (ref 0–1.5)
INR BLD: 1.14 RATIO — SIGNIFICANT CHANGE UP (ref 0.88–1.16)
LYMPHOCYTES # BLD AUTO: 1.28 K/UL — SIGNIFICANT CHANGE UP (ref 1–3.3)
LYMPHOCYTES # BLD AUTO: 14.7 % — SIGNIFICANT CHANGE UP (ref 13–44)
MAGNESIUM SERPL-MCNC: 1.9 MG/DL — SIGNIFICANT CHANGE UP (ref 1.6–2.6)
MCHC RBC-ENTMCNC: 29.7 PG — SIGNIFICANT CHANGE UP (ref 27–34)
MCHC RBC-ENTMCNC: 33.2 GM/DL — SIGNIFICANT CHANGE UP (ref 32–36)
MCV RBC AUTO: 89.4 FL — SIGNIFICANT CHANGE UP (ref 80–100)
MONOCYTES # BLD AUTO: 0.69 K/UL — SIGNIFICANT CHANGE UP (ref 0–0.9)
MONOCYTES NFR BLD AUTO: 7.9 % — SIGNIFICANT CHANGE UP (ref 2–14)
NEUTROPHILS # BLD AUTO: 6.66 K/UL — SIGNIFICANT CHANGE UP (ref 1.8–7.4)
NEUTROPHILS NFR BLD AUTO: 76.9 % — SIGNIFICANT CHANGE UP (ref 43–77)
NRBC # BLD: 0 /100 WBCS — SIGNIFICANT CHANGE UP (ref 0–0)
PHOSPHATE SERPL-MCNC: 3 MG/DL — SIGNIFICANT CHANGE UP (ref 2.5–4.5)
PLATELET # BLD AUTO: 270 K/UL — SIGNIFICANT CHANGE UP (ref 150–400)
POTASSIUM SERPL-MCNC: 3.8 MMOL/L — SIGNIFICANT CHANGE UP (ref 3.5–5.3)
POTASSIUM SERPL-SCNC: 3.8 MMOL/L — SIGNIFICANT CHANGE UP (ref 3.5–5.3)
PROT SERPL-MCNC: 7.2 G/DL — SIGNIFICANT CHANGE UP (ref 6–8.3)
PROTHROM AB SERPL-ACNC: 13.6 SEC — SIGNIFICANT CHANGE UP (ref 10.6–13.6)
RBC # BLD: 4.99 M/UL — SIGNIFICANT CHANGE UP (ref 3.8–5.2)
RBC # FLD: 11.9 % — SIGNIFICANT CHANGE UP (ref 10.3–14.5)
SARS-COV-2 RNA SPEC QL NAA+PROBE: SIGNIFICANT CHANGE UP
SODIUM SERPL-SCNC: 137 MMOL/L — SIGNIFICANT CHANGE UP (ref 135–145)
WBC # BLD: 8.68 K/UL — SIGNIFICANT CHANGE UP (ref 3.8–10.5)
WBC # FLD AUTO: 8.68 K/UL — SIGNIFICANT CHANGE UP (ref 3.8–10.5)

## 2021-07-01 PROCEDURE — 99253 IP/OBS CNSLTJ NEW/EST LOW 45: CPT | Mod: GC

## 2021-07-01 PROCEDURE — 99223 1ST HOSP IP/OBS HIGH 75: CPT

## 2021-07-01 PROCEDURE — 78582 LUNG VENTILAT&PERFUS IMAGING: CPT | Mod: 26

## 2021-07-01 PROCEDURE — 93010 ELECTROCARDIOGRAM REPORT: CPT | Mod: 77

## 2021-07-01 PROCEDURE — 99254 IP/OBS CNSLTJ NEW/EST MOD 60: CPT

## 2021-07-01 PROCEDURE — 71045 X-RAY EXAM CHEST 1 VIEW: CPT | Mod: 26

## 2021-07-01 PROCEDURE — 93010 ELECTROCARDIOGRAM REPORT: CPT

## 2021-07-01 RX ORDER — METOCLOPRAMIDE HCL 10 MG
5 TABLET ORAL EVERY 6 HOURS
Refills: 0 | Status: DISCONTINUED | OUTPATIENT
Start: 2021-07-01 | End: 2021-07-01

## 2021-07-01 RX ORDER — ACETAMINOPHEN 500 MG
650 TABLET ORAL EVERY 6 HOURS
Refills: 0 | Status: DISCONTINUED | OUTPATIENT
Start: 2021-07-01 | End: 2021-07-12

## 2021-07-01 RX ORDER — SENNA PLUS 8.6 MG/1
2 TABLET ORAL AT BEDTIME
Refills: 0 | Status: DISCONTINUED | OUTPATIENT
Start: 2021-07-01 | End: 2021-07-12

## 2021-07-01 RX ORDER — ONDANSETRON 8 MG/1
4 TABLET, FILM COATED ORAL EVERY 8 HOURS
Refills: 0 | Status: DISCONTINUED | OUTPATIENT
Start: 2021-07-01 | End: 2021-07-02

## 2021-07-01 RX ORDER — HYDROMORPHONE HYDROCHLORIDE 2 MG/ML
2 INJECTION INTRAMUSCULAR; INTRAVENOUS; SUBCUTANEOUS EVERY 4 HOURS
Refills: 0 | Status: DISCONTINUED | OUTPATIENT
Start: 2021-07-01 | End: 2021-07-02

## 2021-07-01 RX ORDER — ONDANSETRON 8 MG/1
4 TABLET, FILM COATED ORAL ONCE
Refills: 0 | Status: COMPLETED | OUTPATIENT
Start: 2021-07-01 | End: 2021-07-01

## 2021-07-01 RX ORDER — METHADONE HYDROCHLORIDE 40 MG/1
5 TABLET ORAL EVERY 8 HOURS
Refills: 0 | Status: DISCONTINUED | OUTPATIENT
Start: 2021-07-01 | End: 2021-07-05

## 2021-07-01 RX ORDER — FUROSEMIDE 40 MG
20 TABLET ORAL ONCE
Refills: 0 | Status: COMPLETED | OUTPATIENT
Start: 2021-07-01 | End: 2021-07-01

## 2021-07-01 RX ORDER — HEPARIN SODIUM 5000 [USP'U]/ML
5000 INJECTION INTRAVENOUS; SUBCUTANEOUS EVERY 12 HOURS
Refills: 0 | Status: DISCONTINUED | OUTPATIENT
Start: 2021-07-01 | End: 2021-07-12

## 2021-07-01 RX ORDER — HYDROMORPHONE HYDROCHLORIDE 2 MG/ML
4 INJECTION INTRAMUSCULAR; INTRAVENOUS; SUBCUTANEOUS EVERY 4 HOURS
Refills: 0 | Status: DISCONTINUED | OUTPATIENT
Start: 2021-07-01 | End: 2021-07-02

## 2021-07-01 RX ORDER — METOPROLOL TARTRATE 50 MG
25 TABLET ORAL ONCE
Refills: 0 | Status: COMPLETED | OUTPATIENT
Start: 2021-07-01 | End: 2021-07-01

## 2021-07-01 RX ORDER — METOPROLOL TARTRATE 50 MG
5 TABLET ORAL ONCE
Refills: 0 | Status: COMPLETED | OUTPATIENT
Start: 2021-07-01 | End: 2021-07-01

## 2021-07-01 RX ORDER — ESCITALOPRAM OXALATE 10 MG/1
5 TABLET, FILM COATED ORAL DAILY
Refills: 0 | Status: DISCONTINUED | OUTPATIENT
Start: 2021-07-01 | End: 2021-07-12

## 2021-07-01 RX ORDER — HYDRALAZINE HCL 50 MG
5 TABLET ORAL ONCE
Refills: 0 | Status: COMPLETED | OUTPATIENT
Start: 2021-07-01 | End: 2021-07-01

## 2021-07-01 RX ORDER — HYDROMORPHONE HYDROCHLORIDE 2 MG/ML
2 INJECTION INTRAMUSCULAR; INTRAVENOUS; SUBCUTANEOUS
Refills: 0 | Status: DISCONTINUED | OUTPATIENT
Start: 2021-07-01 | End: 2021-07-01

## 2021-07-01 RX ORDER — ALPRAZOLAM 0.25 MG
0.25 TABLET ORAL
Refills: 0 | Status: DISCONTINUED | OUTPATIENT
Start: 2021-07-01 | End: 2021-07-03

## 2021-07-01 RX ORDER — METOPROLOL TARTRATE 50 MG
25 TABLET ORAL
Refills: 0 | Status: DISCONTINUED | OUTPATIENT
Start: 2021-07-01 | End: 2021-07-01

## 2021-07-01 RX ORDER — LACTULOSE 10 G/15ML
10 SOLUTION ORAL DAILY
Refills: 0 | Status: DISCONTINUED | OUTPATIENT
Start: 2021-07-01 | End: 2021-07-12

## 2021-07-01 RX ORDER — METOPROLOL TARTRATE 50 MG
50 TABLET ORAL
Refills: 0 | Status: DISCONTINUED | OUTPATIENT
Start: 2021-07-01 | End: 2021-07-12

## 2021-07-01 RX ADMIN — HYDROMORPHONE HYDROCHLORIDE 2 MILLIGRAM(S): 2 INJECTION INTRAMUSCULAR; INTRAVENOUS; SUBCUTANEOUS at 02:37

## 2021-07-01 RX ADMIN — METHADONE HYDROCHLORIDE 5 MILLIGRAM(S): 40 TABLET ORAL at 21:43

## 2021-07-01 RX ADMIN — METHADONE HYDROCHLORIDE 5 MILLIGRAM(S): 40 TABLET ORAL at 13:55

## 2021-07-01 RX ADMIN — Medication 5 MILLIGRAM(S): at 06:11

## 2021-07-01 RX ADMIN — HEPARIN SODIUM 5000 UNIT(S): 5000 INJECTION INTRAVENOUS; SUBCUTANEOUS at 21:43

## 2021-07-01 RX ADMIN — Medication 5 MILLIGRAM(S): at 01:43

## 2021-07-01 RX ADMIN — ONDANSETRON 4 MILLIGRAM(S): 8 TABLET, FILM COATED ORAL at 21:43

## 2021-07-01 RX ADMIN — METHADONE HYDROCHLORIDE 5 MILLIGRAM(S): 40 TABLET ORAL at 05:33

## 2021-07-01 RX ADMIN — Medication 20 MILLIGRAM(S): at 01:42

## 2021-07-01 RX ADMIN — ONDANSETRON 4 MILLIGRAM(S): 8 TABLET, FILM COATED ORAL at 03:13

## 2021-07-01 RX ADMIN — Medication 25 MILLIGRAM(S): at 10:44

## 2021-07-01 RX ADMIN — Medication 5 MILLIGRAM(S): at 07:58

## 2021-07-01 RX ADMIN — Medication 0.25 MILLIGRAM(S): at 04:58

## 2021-07-01 RX ADMIN — Medication 50 MILLIGRAM(S): at 18:34

## 2021-07-01 RX ADMIN — Medication 5 MILLIGRAM(S): at 04:47

## 2021-07-01 RX ADMIN — ESCITALOPRAM OXALATE 5 MILLIGRAM(S): 10 TABLET, FILM COATED ORAL at 11:27

## 2021-07-01 RX ADMIN — Medication 0.25 MILLIGRAM(S): at 10:28

## 2021-07-01 RX ADMIN — Medication 5 MILLIGRAM(S): at 11:27

## 2021-07-01 RX ADMIN — Medication 25 MILLIGRAM(S): at 05:33

## 2021-07-01 NOTE — CONSULT NOTE ADULT - ATTENDING COMMENTS
71 year old female with past medical history of anxiety, IBS, GERD, Cholangiocarcinoma with metastases not currently on chemo, presenting with progressively worsening chronic abdominal pain and distention complicated by new diagnosis asymptomatic AF with RVR. Started on metoprolol tartrate and spontaneously converted to NSR overnight. Intermediate CHADS2-Vasc od 2 (age, gender) therefore a candidate for A/C, however high bleeding risk considering malignancy with metastasis. Given that her CAHDS2-Vasc is low/intermediate from lower risk features of the risk algorithm, I am not in favor of starting therapeutic A/C for this indication. Continue metoprolol tartrate 50 mg BID (can transition to metoprolol succinate 100 mg daily upon discharge if no additional changes made). Pending ECHO. Further workup and management of malignany/abdominal pain as per primary teams.     Aneesh Mleendrez MD, MPH, JAZMYNE, RPVI, [facility]  Inpatient Cardiovascular Specialist; Chantel Arguello [facility], [facility] ([facility])  ; [employer] [facility]  c: [phone] (text preferred and/or call)  e: [email or web address]    For all [facility] Cardiology and Cardiovascular Surgery on-service contact/call information, go to [email or web address] and use "[email or web address]" to login.  For outpatient Cardiology appointments, call  [phone] to arrange with a colleague; I do not have outpatient Cardiology clinic.
Pt seen with her dtr at bedside. She understands she has stopped palliative chemotherapy since April but did not seem aware that her disease progression and ascites were related. She has been followed by Dr Castro her oncologist and Dr Maykel Tilley from palliative care as outpatient and they have been working with her for palliative options. She is not a chemotherapy candidate and needs to have supportive care/ home hospice. Would consult palliative care and will continuing working with her and her family to help her realize her prognosis and expectations.

## 2021-07-01 NOTE — H&P ADULT - NSHPLABSRESULTS_GEN_ALL_CORE
Labs personally reviewed:                          14.1   7.27  )-----------( 226      ( 30 Jun 2021 21:07 )             43.1     06-30    137  |  99  |  13  ----------------------------<  114<H>  4.2   |  26  |  0.69    Ca    9.0      30 Jun 2021 21:07    TPro  6.7  /  Alb  3.5  /  TBili  0.4  /  DBili  x   /  AST  20  /  ALT  6<L>  /  AlkPhos  69  06-30        LIVER FUNCTIONS - ( 30 Jun 2021 21:07 )  Alb: 3.5 g/dL / Pro: 6.7 g/dL / ALK PHOS: 69 U/L / ALT: 6 U/L / AST: 20 U/L / GGT: x             Imaging:  CT abd/pelv reviewed:  FINDINGS:  LOWER CHEST: Scattered bilateral pulmonary nodules including example 8 mm right lower lobe pulmonary nodule (2:1) a and left lower lobe pulmonary nodule measuring 6 mm (2:7) consistent metastatic disease and no significant changes compared with 05/26/2021.. New Small right-sided pleural effusion.    LIVER: Left hepatic lobe atrophy with associated intrahepatic biliary dilatation unchanged. The cholangiocarcinoma in the liver is not well visualized secondary to lack of intravenous contrast.  BILE DUCTS: Left intrahepatic biliary dilatation. No common bile duct dilatation.  GALLBLADDER: Contracted.  SPLEEN: Within normal limits.  PANCREAS: Within normal limits.  ADRENALS: Within normal limits.  KIDNEYS/URETERS: Within normal limits.    BLADDER: Within normal limits.  REPRODUCTIVE ORGANS: Uterus and adnexa are unremarkable.    BOWEL: Sigmoid diverticulosis without evidence of diverticulitis. Appendix is normal.  PERITONEUM: Moderate volume ascites. Omental carcinomatosis, not significantly changed as compared with 05/26/2021.  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  RETROPERITONEUM/LYMPH NODES: Lymphadenopathy noted within the portal caval region although distinct margins are not well delineated given the lack of intravenous contrast..  ABDOMINAL WALL: Unremarkable.  BONES: Degenerative changes.    IMPRESSION:  New moderate amount of ascites and small right pleural effusion.  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      EKG pending

## 2021-07-01 NOTE — H&P ADULT - PROBLEM SELECTOR PLAN 7
no AC given bleeding risk and pending possible procedure (paracentesis) Patient with orthopnea, GANNON, and now tachypneic  no hypoxia  ?likely 2/2 to pleural effusion or due to pressure from ascites; r/o PE  IV lasix 20mg x 1  reevaluate  CTA chest to r/o PE elevated BP, possibly 2/2 to pain and anxiety  will give stat IV hydralazine 5mg x 1 and lasix 20mg x 1 and reevaluate

## 2021-07-01 NOTE — H&P ADULT - PROBLEM SELECTOR PLAN 1
Patient with abdominal pain, likely 2/2 to underlying metastatic cholangiocarcinoma and peritoneal carcinomatosis; Also with new moderate amount ascites, no signs of SBP  will likely need diagnostic and therapeutic paracentesis  IR consult in AM  pain control

## 2021-07-01 NOTE — CONSULT NOTE ADULT - SUBJECTIVE AND OBJECTIVE BOX
HPI:  70 yo F with PMH of anxiety, IBS, GERD, Cholangiocarcinoma with metastases, presents here with abdominal pain.  Patient states she stopped chemotherapy mid May due to abdominal pain. Over the last few weeks her abdominal pain has been getting worse. She describes it as starting LUQ, radiating across bandlike to the RUQ and back.  Pain is constant and worse with movement and walking.  She also has been having SOB with minimal walking or laying flat.  Denies fever, chills, cough, runny nose.  She does have nausea and vomiting and constipation.  She has been seeing pain management, but over the last few weeks she has been calling pain management doctor multiple times a day because their regimen is not working.  She has tried hydromorphone, methadone, suboxone, etc.  Currently she is on hydromorphone 2mg 4x/d and methadone 5mg TID.  Vitals show 97.9, HR 86, /81, saturation 96%.  While in ED, patient had an episode of Afib w/RVR rate of 150s.          (01 Jul 2021 00:33)    PERTINENT PM/SXH:   Cholangiocarcinoma    Irritable bowel syndrome (IBS)      No significant past surgical history    S/P tubal ligation      FAMILY HISTORY:  Family history of early CAD (Father)    FH: type 2 diabetes      ITEMS NOT CHECKED ARE NOT PRESENT    SOCIAL HISTORY:   Significant other/partner[ ]  Children[ ]  Anabaptism/Spirituality:  Substance hx:  [ ]   Tobacco hx:  [ ]   Alcohol hx: [ ]   Home Opioid hx:  [ ] I-Stop Reference No: d/w primary palliative care provider, RENÉ Dominguez. The patient was supposed to be on Methadone 5mg q 8h and Dilaudid 8mg PRN.   Living Situation: [ ]Home  [ ]Long term care  [ ]Rehab [ ]Other    ADVANCE DIRECTIVES:    DNR  Yes    MOLST  [ ]  Living Will  [ ]   DECISION MAKER(s):  [ ] Health Care Proxy(s)  [ ] Surrogate(s)  [ ] Guardian           Name(s): Phone Number(s):    BASELINE (I)ADL(s) (prior to admission):  [place]anson: [ ]Total  [ ] Moderate [ ]Dependent    Allergies    codeine (Short breath)  contrast media (iodine-based) (Anaphylaxis)    Intolerances    MEDICATIONS  (STANDING):  escitalopram 5 milliGRAM(s) Oral daily  methadone    Tablet 5 milliGRAM(s) Oral every 8 hours  metoprolol tartrate 50 milliGRAM(s) Oral two times a day    MEDICATIONS  (PRN):  acetaminophen   Tablet .. 650 milliGRAM(s) Oral every 6 hours PRN Temp greater or equal to 38C (100.4F), Mild Pain (1 - 3)  ALPRAZolam 0.25 milliGRAM(s) Oral four times a day PRN anxiety  HYDROmorphone   Tablet 2 milliGRAM(s) Oral four times a day PRN Moderate Pain (4 - 6)  lactulose Syrup 10 Gram(s) Oral daily PRN constipation  metoclopramide Injectable 5 milliGRAM(s) IV Push every 6 hours PRN nausea    PRESENT SYMPTOMS: [ ]Unable to obtain due to poor mentation   Source if other than patient:  [ ]Family   [ ]Team     Pain: [ ]yes [ ]no  QOL impact -   Location -                    Aggravating factors -  Quality -  Radiation -  Timing-  Severity (0-10 scale):  Minimal acceptable level (0-10 scale):     CPOT:    https://www.[facility].org/getattachment/[email or web address]/Critical-Care-Pain-Observation-Tool-(CPOT)      PAIN AD Score:     http://geriatrictoolkit.[place].[email or web address]/cog/painad.pdf (press ctrl +  left click to view)    Dyspnea:                           [ ]Mild [ ]Moderate [ ]Severe  Anxiety:                             [ ]Mild [ ]Moderate [ ]Severe  Fatigue:                             [ ]Mild [ ]Moderate [ ]Severe  Nausea:                             [ ]Mild [ ]Moderate [ ]Severe  Loss of appetite:              [ ]Mild [ ]Moderate [ ]Severe  Constipation:                    [ ]Mild [ ]Moderate [ ]Severe    Other Symptoms:  [ ]All other review of systems negative     Palliative Performance Status Version 2:         %    http://npcrc.org/files/news/palliative_performance_scale_ppsv2.pdf  PHYSICAL EXAM:  Vital Signs Last 24 Hrs  T(C): 36.6 (01 Jul 2021 16:05), Max: 37.6 (30 Jun 2021 19:30)  T(F): 97.8 (01 Jul 2021 16:05), Max: 99.7 (30 Jun 2021 19:30)  HR: 68 (01 Jul 2021 16:05) (68 - 152)  BP: 110/69 (01 Jul 2021 16:05) (107/70 - 166/92)  BP(mean): --  RR: 19 (01 Jul 2021 16:05) (16 - 22)  SpO2: 94% (01 Jul 2021 16:05) (91% - 100%) I&O's Summary    GENERAL:  [ ]Alert  [ ]Oriented x   [ ]Lethargic  [ ]Cachexia  [ ]Unarousable  [ ]Verbal  [ ]Non-Verbal  Behavioral:   [ ] Anxiety  [ ] Delirium [ ] Agitation [ ] Other  HEENT:  [ ]Normal   [ ]Dry mouth   [ ]ET Tube/Trach  [ ]Oral lesions  PULMONARY:   [ ]Clear [ ]Tachypnea  [ ]Audible excessive secretions   [ ]Rhonchi        [ ]Right [ ]Left [ ]Bilateral  [ ]Crackles        [ ]Right [ ]Left [ ]Bilateral  [ ]Wheezing     [ ]Right [ ]Left [ ]Bilateral  [ ]Diminished breath sounds [ ]right [ ]left [ ]bilateral  CARDIOVASCULAR:    [ ]Regular [ ]Irregular [ ]Tachy  [ ][place] [ ]Murmur [ ]Other  GASTROINTESTINAL:  [ ]Soft  [ ]Distended   [ ]+BS  [ ]Non tender [ ]Tender  [ ]PEG [ ]OGT/ NGT  Last BM:   GENITOURINARY:  [ ]Normal [ ] Incontinent   [ ]Oliguria/Anuria   [ ]Doran  MUSCULOSKELETAL:   [ ]Normal   [ ]Weakness  [ ]Bed/Wheelchair bound [ ]Edema  NEUROLOGIC:   [ ]No focal deficits  [ ]Cognitive impairment  [ ]Dysphagia [ ]Dysarthria [ ]Paresis [ ]Other   SKIN:   [ ]Normal    [ ]Rash  [ ]Pressure ulcer(s)       Present on admission [ ]y [ ]n    CRITICAL CARE:  [ ] Shock Present  [ ]Septic [ ]Cardiogenic [ ]Neurologic [ ]Hypovolemic  [ ]  Vasopressors [ ]  Inotropes   [ ]Respiratory failure present [ ]Mechanical ventilation [ ]Non-invasive ventilatory support [ ]High flow    [ ]Acute  [ ]Chronic [ ]Hypoxic  [ ]Hypercarbic [ ]Other  [ ]Other organ failure     LABS:                        14.8   8.68  )-----------( 270      ( 01 Jul 2021 05:24 )             44.6   07-01    137  |  97  |  12  ----------------------------<  125<H>  3.8   |  22  |  0.67    Ca    9.4      01 Jul 2021 05:24  Phos  3.0     07-01  Mg     1.9     07-01    TPro  7.2  /  Alb  3.8  /  TBili  0.5  /  DBili  x   /  AST  20  /  ALT  7<L>  /  AlkPhos  71  07-01  PT/INR - ( 01 Jul 2021 05:24 )   PT: 13.6 sec;   INR: 1.14 ratio               RADIOLOGY & ADDITIONAL STUDIES:    PROTEIN CALORIE MALNUTRITION PRESENT: [ ]mild [ ]moderate [ ]severe [ ]underweight [ ]morbid obesity  https://www.andeal.org/vault/2440/web/files/ONC/Table_Clinical%20Characteristics%20to%20Document%20Malnutrition-White%20JV%20et%20al%202012.pdf    Height (cm): 160 (07-01-21 @ 06:36), 161.3 (04-30-21 @ 19:21), 161.3 (09-04-20 @ 10:29)  Weight (kg): 99.4 (07-01-21 @ 09:17), 95.6 (09-04-20 @ 10:29)  BMI (kg/m2): 38.8 (07-01-21 @ 09:17), 37.3 (07-01-21 @ 06:36), 36.7 (04-30-21 @ 19:21)    [ ]PPSV2 < or = to 30% [ ]significant weight loss  [ ]poor nutritional intake  [ ]anasarca     Albumin, Serum: 3.8 g/dL (07-01-21 @ 05:24)   [ ]Artificial Nutrition      REFERRALS:   [ ]Chaplaincy  [ ]Hospice  [ ]Child Life  [ ]Social Work  [ ]Case management [ ]Holistic Therapy     Goals of Care Document:  HPI:  72 yo F with PMH of anxiety, IBS, GERD, Cholangiocarcinoma with metastases, presents here with abdominal pain.  Patient states she stopped chemotherapy mid May due to abdominal pain. Over the last few weeks her abdominal pain has been getting worse. She describes it as starting LUQ, radiating across bandlike to the RUQ and back.  Pain is constant and worse with movement and walking.  She also has been having SOB with minimal walking or laying flat.  Denies fever, chills, cough, runny nose.  She does have nausea and vomiting and constipation.  She has been seeing pain management, but over the last few weeks she has been calling pain management doctor multiple times a day because their regimen is not working.  She has tried hydromorphone, methadone, suboxone, etc.  Currently she is on hydromorphone 2mg 4x/d and methadone 5mg TID.  Vitals show 97.9, HR 86, /81, saturation 96%.  While in ED, patient had an episode of Afib w/RVR rate of 150s.     (01 Jul 2021 00:33)    7/1 Palliative care was called for pain management. The patient was also c/o intermittent nausea, anxiety, and dyspnea with moderate efforts.     PERTINENT PM/SXH:   Cholangiocarcinoma    Irritable bowel syndrome (IBS)      No significant past surgical history    S/P tubal ligation      FAMILY HISTORY:  Family history of early CAD (Father)    FH: type 2 diabetes      ITEMS NOT CHECKED ARE NOT PRESENT    SOCIAL HISTORY:   Significant other/partner[x ]   Children[ ]  Gnosticist/Spirituality:  Substance hx:  [ ]   Tobacco hx:  [ ]   Alcohol hx: [ ]   Home Opioid hx:  [ ] I-Stop Reference No: d/w primary palliative care provider, RENÉ Dominguez. The patient was supposed to be on Methadone 5mg q 8h and Dilaudid 8mg PRN.   Living Situation: [ x]Home  [ ]Long term care  [ ]Rehab [ ]Other    ADVANCE DIRECTIVES:    DNR  Yes    MOLST  [ ]  Living Will  [ ]   DECISION MAKER(s):  [ ] Health Care Proxy(s)  [ x] Surrogate(s)  [ ] Guardian           Name(s): Phone Number(s):      BASELINE (I)ADL(s) (prior to admission):  [place]owler: [x ]Total  [ ] Moderate [ ]Dependent    Allergies    codeine (Short breath)  contrast media (iodine-based) (Anaphylaxis)    Intolerances    MEDICATIONS  (STANDING):  escitalopram 5 milliGRAM(s) Oral daily  methadone    Tablet 5 milliGRAM(s) Oral every 8 hours  metoprolol tartrate 50 milliGRAM(s) Oral two times a day    MEDICATIONS  (PRN):  acetaminophen   Tablet .. 650 milliGRAM(s) Oral every 6 hours PRN Temp greater or equal to 38C (100.4F), Mild Pain (1 - 3)  ALPRAZolam 0.25 milliGRAM(s) Oral four times a day PRN anxiety  HYDROmorphone   Tablet 2 milliGRAM(s) Oral four times a day PRN Moderate Pain (4 - 6)  lactulose Syrup 10 Gram(s) Oral daily PRN constipation  metoclopramide Injectable 5 milliGRAM(s) IV Push every 6 hours PRN nausea    PRESENT SYMPTOMS: [ ]Unable to obtain due to poor mentation   Source if other than patient:  [ ]Family   [ ]Team     Pain: [x ]yes [ ]no  QOL impact - Not able to rest   Location -                  abdomen   Aggravating factors - none   Quality - pressure   Radiation - none   Timing- intermittent   Severity (0-10 scale): 7  Minimal acceptable level (0-10 scale): 4    CPOT:    https://www.[facility].org/getattachment/[email or web address]/Critical-Care-Pain-Observation-Tool-(CPOT)      PAIN AD Score:     http://geriatrictoolkit.[facility]/cog/painad.pdf (press ctrl +  left click to view)    Dyspnea:                           [x ]Mild [ ]Moderate [ ]Severe  Anxiety:                             [x ]Mild [ ]Moderate [ ]Severe  Fatigue:                             [ ]Mild [ ]Moderate [ ]Severe  Nausea:                             [ ]Mild [ ]Moderate [ ]Severe  Loss of appetite:              [ ]Mild [ ]Moderate [ ]Severe  Constipation:                    [ ]Mild [ ]Moderate [ ]Severe    Other Symptoms:  [x ]All other review of systems negative     Palliative Performance Status Version 2:  70       %    http://npcrc.org/files/news/palliative_performance_scale_ppsv2.pdf  PHYSICAL EXAM:  Vital Signs Last 24 Hrs  T(C): 36.6 (01 Jul 2021 16:05), Max: 37.6 (30 Jun 2021 19:30)  T(F): 97.8 (01 Jul 2021 16:05), Max: 99.7 (30 Jun 2021 19:30)  HR: 68 (01 Jul 2021 16:05) (68 - 152)  BP: 110/69 (01 Jul 2021 16:05) (107/70 - 166/92)  BP(mean): --  RR: 19 (01 Jul 2021 16:05) (16 - 22)  SpO2: 94% (01 Jul 2021 16:05) (91% - 100%) I&O's Summary    GENERAL:  [ x]Alert  [x ]Oriented x  3 [ ]Lethargic  [ ]Cachexia  [ ]Unarousable  [ ]Verbal  [ ]Non-Verbal  Behavioral:   [ ] Anxiety  [ ] Delirium [ ] Agitation [ ] Other  HEENT:  [x ]Normal   [ ]Dry mouth   [ ]ET Tube/Trach  [ ]Oral lesions  PULMONARY:   [ ]Clear [ ]Tachypnea  [ ]Audible excessive secretions   [ ]Rhonchi        [ ]Right [ ]Left [ ]Bilateral  [ ]Crackles        [ ]Right [ ]Left [ ]Bilateral  [ ]Wheezing     [ ]Right [ ]Left [ ]Bilateral  x[ ]Diminished breath sounds [ ]right [ ]left [ x]bilateral but mainly on the rsh  CARDIOVASCULAR:    [x ]Regular [ ]Irregular [ ]Tachy  [ ][place] [ ]Murmur [ ]Other  GASTROINTESTINAL:  [ ]Soft  [ ]Distended   [ ]+BS  [ ]Non tender [ ]Tender  [ ]PEG [ ]OGT/ NGT  Last BM:   GENITOURINARY:  [x ]Normal [ ] Incontinent   [ ]Oliguria/Anuria   [ ]Doran  MUSCULOSKELETAL:   [ ]Normal   [x ]Weakness  [ ]Bed/Wheelchair bound [ ]Edema  NEUROLOGIC:   x[ ]No focal deficits  [ ]Cognitive impairment  [ ]Dysphagia [ ]Dysarthria [ ]Paresis [ ]Other   SKIN:   [x ]Normal    [ ]Rash  [ ]Pressure ulcer(s)       Present on admission [ ]y [ ]n    CRITICAL CARE:  [ ] Shock Present  [ ]Septic [ ]Cardiogenic [ ]Neurologic [ ]Hypovolemic  [ ]  Vasopressors [ ]  Inotropes   [ ]Respiratory failure present [ ]Mechanical ventilation [ ]Non-invasive ventilatory support [ ]High flow    [ ]Acute  [ ]Chronic [ ]Hypoxic  [ ]Hypercarbic [ ]Other  [ ]Other organ failure     LABS:                        14.8   8.68  )-----------( 270      ( 01 Jul 2021 05:24 )             44.6   07-01    137  |  97  |  12  ----------------------------<  125<H>  3.8   |  22  |  0.67    Ca    9.4      01 Jul 2021 05:24  Phos  3.0     07-01  Mg     1.9     07-01    TPro  7.2  /  Alb  3.8  /  TBili  0.5  /  DBili  x   /  AST  20  /  ALT  7<L>  /  AlkPhos  71  07-01  PT/INR - ( 01 Jul 2021 05:24 )   PT: 13.6 sec;   INR: 1.14 ratio               RADIOLOGY & ADDITIONAL STUDIES:  ct< from: CT Abdomen and Pelvis No Cont (06.30.21 @ 20:17) >  IMPRESSION:    New moderate amount of ascites and small right pleural effusion..    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        AMISHA DUMONT MD; Resident RadiologyEXAM:  CT ABDOMEN AND PELVIS                            PROCEDURE DATE:  06/30/2021      < end of copied text >    PROTEIN CALORIE MALNUTRITION PRESENT: [ ]mild [ ]moderate [ ]severe [ ]underweight [ ]morbid obesity  https://www.andeal.org/vault/2440/web/files/ONC/Table_Clinical%20Characteristics%20to%20Document%20Malnutrition-White%20JV%20et%20al%202012.pdf    Height (cm): 160 (07-01-21 @ 06:36), 161.3 (04-30-21 @ 19:21), 161.3 (09-04-20 @ 10:29)  Weight (kg): 99.4 (07-01-21 @ 09:17), 95.6 (09-04-20 @ 10:29)  BMI (kg/m2): 38.8 (07-01-21 @ 09:17), 37.3 (07-01-21 @ 06:36), 36.7 (04-30-21 @ 19:21)    [ ]PPSV2 < or = to 30% [ ]significant weight loss  [ ]poor nutritional intake  [ ]anasarca     Albumin, Serum: 3.8 g/dL (07-01-21 @ 05:24)   [ ]Artificial Nutrition      REFERRALS:   [ ]Chaplaincy  [ ]Hospice  [ ]Child Life  [ ]Social Work  [ ]Case management [ ]Holistic Therapy     Goals of Care Document:

## 2021-07-01 NOTE — CHART NOTE - NSCHARTNOTEFT_GEN_A_CORE
Medicine PA Episodic Note    Patient is a 71y old  Female who presents with a chief complaint of abdominal pain (01 Jul 2021 00:33)  Notified by RN that patient w/HR in 140s during AM vitals. Patient seen and assessed by me at bedside.    Patient denies chest pain, palpitations. EKG performed at bedside showing afib w/RVR HR in the 160s. Patient endorses no hx of afib/aflutter/arrhythmia in the past. Of note patient is being admitted for abdominal pain 2/2 to CA.      Vital Signs Last 24 Hrs  T(C): 37.1 (01 Jul 2021 04:15), Max: 37.6 (30 Jun 2021 19:30)  T(F): 98.7 (01 Jul 2021 04:15), Max: 99.7 (30 Jun 2021 19:30)  HR: 152 (01 Jul 2021 04:15) (86 - 152)  BP: 117/82 (01 Jul 2021 04:15) (117/82 - 166/92)  BP(mean): --  RR: 22 (01 Jul 2021 04:15) (16 - 22)  SpO2: 98% (01 Jul 2021 04:15) (95% - 100%)      Labs:                          14.1   7.27  )-----------( 226      ( 30 Jun 2021 21:07 )             43.1     06-30    137  |  99  |  13  ----------------------------<  114<H>  4.2   |  26  |  0.69    Ca    9.0      30 Jun 2021 21:07    TPro  6.7  /  Alb  3.5  /  TBili  0.4  /  DBili  x   /  AST  20  /  ALT  6<L>  /  AlkPhos  69  06-30    Radiology:    Physical Exam:  General: WN/WD NAD  Neurology: A&Ox3, nonfocal, FERRARA x 4  Head:  Normocephalic, atraumatic  Respiratory: CTA B/L  CV: RRR, S1S2, no murmur  Abdominal:  Distended, mild TTP, ND no palpable mass  MSK: No edema, + peripheral pulses, FROM all 4 extremity    Assessment & Plan:  HPI:  70 yo F with PMH of anxiety, IBS, GERD, Cholangiocarcinoma with metastases, presents here with abdominal pain.  Patient states she stopped chemotherapy mid May due to abdominal pain. Over the last few weeks her abdominal pain has been getting worse. She describes it as starting LUQ, radiating across bandlike to the RUQ and back.  Pain is constant and worse with movement and walking.  She also has been having SOB with minimal walking or laying flat.  Denies fever, chills, cough, runny nose.  She does have nausea and vomiting and constipation.  She has been seeing pain management, but over the last few weeks she has been calling pain management doctor multiple times a day because their regimen is not working.  She has tried hydromorphone, methadone, suboxone, etc.  Currently she is on hydromorphone 2mg 4x/d and methadone 5mg TID.  Vitals show 97.9, HR 86, /81, saturation 96%.  While in ED, patient had an episode of Afib w/RVR rate of 150s.     HR afib 150s.     Plan:  #Afib w/RVR   - EKG showing, afib w/RVR HR 160s. Patient endorses no acute s/s.   - Lopressor 5mg IVP now for rate control. Metoprolol 25 mg BID started.   - Defer AC given patient's pending procedure for paracentesis.   - Echo pending.   - Cards c/s in AM.  - CTA Chest ordered to r/o PE, given pt's extensive cholangiocarcinoma and reyes, however pt refuses given allergic reaction to contrast media. Patient is A&Ox3 and understands risks and benefits of testing.   - Telemetry  - Plan d/w RN  - Plan d/w Dr. Boston (Hospitalist)    Endorse to AM team.   Molly BUCKNER  Dept of Medicine  [id number] Medicine PA Episodic Note    Patient is a 71y old  Female who presents with a chief complaint of abdominal pain (01 Jul 2021 00:33)  Notified by RN that patient w/HR in 140s during AM vitals. Patient seen and assessed by me at bedside.    Patient denies chest pain, palpitations. EKG performed at bedside showing afib w/RVR HR in the 160s. Patient endorses no hx of afib/aflutter/arrhythmia in the past. Of note patient is being admitted for abdominal pain 2/2 to CA and is complaining of abdominal pain + nausea with no change in pain scale.       Vital Signs Last 24 Hrs  T(C): 37.1 (01 Jul 2021 04:15), Max: 37.6 (30 Jun 2021 19:30)  T(F): 98.7 (01 Jul 2021 04:15), Max: 99.7 (30 Jun 2021 19:30)  HR: 152 (01 Jul 2021 04:15) (86 - 152)  BP: 117/82 (01 Jul 2021 04:15) (117/82 - 166/92)  BP(mean): --  RR: 22 (01 Jul 2021 04:15) (16 - 22)  SpO2: 98% (01 Jul 2021 04:15) (95% - 100%)      Labs:                          14.1   7.27  )-----------( 226      ( 30 Jun 2021 21:07 )             43.1     06-30    137  |  99  |  13  ----------------------------<  114<H>  4.2   |  26  |  0.69    Ca    9.0      30 Jun 2021 21:07    TPro  6.7  /  Alb  3.5  /  TBili  0.4  /  DBili  x   /  AST  20  /  ALT  6<L>  /  AlkPhos  69  06-30    Radiology:    < from: CT Abdomen and Pelvis No Cont (06.30.21 @ 20:17) >    IMPRESSION:    New moderate amount of ascites and small right pleural effusion..    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    < end of copied text >    < from: CT Chest No Cont (05.26.21 @ 15:27) >      IMPRESSION:    New/worsening pulmonary metastasis      < end of copied text >    Physical Exam:  General: WN/WD NAD  Neurology: A&Ox3, nonfocal, FERRARA x 4  Head:  Normocephalic, atraumatic  Respiratory: CTA B/L  CV: Tachycardic, S1S2, no murmur  Abdominal:  Distended, mild TTP, ND no palpable mass  MSK: + edema, + peripheral pulses, FROM all 4 extremity    Assessment & Plan:  HPI:  70 yo F with PMH of anxiety, IBS, GERD, Cholangiocarcinoma with metastases p/w abdominal pain w/new onset of afib w/RVR.    HR afib 150s.     Plan:  #New Onset of Afib w/RVR  - EKG showing, afib w/RVR HR 160s. Patient endorses no acute s/s, asymptomatic w/stable VS.   - Lopressor 5mg IVP now for rate control. Metoprolol 25 mg BID started.   - Defer AC given patient's pending procedure for paracentesis.   - Echo pending.   - Cards c/s in AM.  - CTA Chest ordered to r/o PE, given pt's extensive cholangiocarcinoma and dyspnea, however pt refuses given allergic reaction to contrast media. Patient is A&Ox3 and understands risks and benefits of testing.   - Telemetry  - Plan d/w RN  - Plan d/w Dr. Boston (Hospitalist)    Endorse to AM team.   Molly BUCKNER  Dept of Medicine  [id number]    ----- ADDENDUM   #New Onset Afib w/RVR  Patient's HR trending between 140s - 160s. Patient's BP stable. Afebrile.  Lopressor 5mg IVP ordered w/appropriate response - HR downtrending 130s.   Cards c/s. F/u cardiology recs.  Telemetry   Echo     Molly BUCKNER  Dept of Medicine   [id number] Medicine PA Episodic Note    Patient is a 71y old  Female who presents with a chief complaint of abdominal pain (01 Jul 2021 00:33)  Notified by RN that patient w/HR in 140s during AM vitals. Patient seen and assessed by me at bedside.    Patient denies chest pain, palpitations. EKG performed at bedside showing afib w/RVR HR in the 160s. Patient endorses no hx of afib/aflutter/arrhythmia in the past. Of note patient is being admitted for abdominal pain 2/2 to CA and is complaining of abdominal pain + nausea with no change in pain scale. Denies chest pain, headache,fevers/chills,  numbness/tingling in the extremities, dysuria.     Vital Signs Last 24 Hrs  T(C): 37.1 (01 Jul 2021 04:15), Max: 37.6 (30 Jun 2021 19:30)  T(F): 98.7 (01 Jul 2021 04:15), Max: 99.7 (30 Jun 2021 19:30)  HR: 152 (01 Jul 2021 04:15) (86 - 152)  BP: 117/82 (01 Jul 2021 04:15) (117/82 - 166/92)  BP(mean): --  RR: 22 (01 Jul 2021 04:15) (16 - 22)  SpO2: 98% (01 Jul 2021 04:15) (95% - 100%)      Labs:                          14.1   7.27  )-----------( 226      ( 30 Jun 2021 21:07 )             43.1     06-30    137  |  99  |  13  ----------------------------<  114<H>  4.2   |  26  |  0.69    Ca    9.0      30 Jun 2021 21:07    TPro  6.7  /  Alb  3.5  /  TBili  0.4  /  DBili  x   /  AST  20  /  ALT  6<L>  /  AlkPhos  69  06-30    Radiology:    < from: CT Abdomen and Pelvis No Cont (06.30.21 @ 20:17) >    IMPRESSION:    New moderate amount of ascites and small right pleural effusion..    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    < end of copied text >    < from: CT Chest No Cont (05.26.21 @ 15:27) >      IMPRESSION:    New/worsening pulmonary metastasis      < end of copied text >    Physical Exam:  General: WN/WD NAD  Neurology: A&Ox3, nonfocal, FERRARA x 4  Head:  Normocephalic, atraumatic  Respiratory: CTA B/L  CV: Tachycardic, S1S2, no murmur  Abdominal:  Distended, mild TTP, ND no palpable mass  MSK: + edema, + peripheral pulses, FROM all 4 extremity    Assessment & Plan:  HPI:  70 yo F with PMH of anxiety, IBS, GERD, Cholangiocarcinoma with metastases p/w abdominal pain w/new onset of afib w/RVR.    HR afib 150s.     Plan:  #New Onset of Afib w/RVR  - EKG showing, afib w/RVR HR 160s. Patient endorses no acute s/s, asymptomatic w/stable VS.   - Lopressor 5mg IVP now for rate control. Metoprolol 25 mg BID started.   - Defer AC given patient's pending procedure for paracentesis.   - Echo pending.   - Cards c/s in AM.  - CTA Chest ordered to r/o PE, given pt's extensive cholangiocarcinoma and dyspnea, however pt refuses given allergic reaction to contrast media. Patient is A&Ox3 and understands risks and benefits of testing.   - Telemetry  - Plan d/w RN  - Plan d/w Dr. Boston (Hospitalist)    Endorse to AM team.   Molly BUCKNER  [facility]  Medicine  [id number]    ----- ADDENDUM   #New Onset Afib w/RVR  Patient's HR trending between 140s - 160s. Patient's BP stable. Afebrile.  Lopressor 5mg IVP ordered w/appropriate response - HR downtrending 130s.   Cards c/s. F/u cardiology recs.  Telemetry   Echo     Molly BUCKNER  [facility]  Medicine   [id number]

## 2021-07-01 NOTE — PROVIDER CONTACT NOTE (OTHER) - ASSESSMENT
AxOx4  vs stable denies CP  pt c/o nausea at that time, just received zofran po
vitals noted
vitals noted, denies any dizziness, CP at this time
Pt AOx4, anxious but denies any chest pain or palpitations. pt complaining of abdominal pain 6/10- same pain she came in with. Other VSS.
vitals noted , has c/o mild chest pain 3/10 upper chest non radiating , had one time diarrhoes and nauseated, unable to eat, per pt , pt feels anxious.

## 2021-07-01 NOTE — CHART NOTE - NSCHARTNOTEFT_GEN_A_CORE
Search Terms: raul sims, 1950Search Date: 07/01/2021 06:29:32 AM  The Drug Utilization Report below displays all of the controlled substance prescriptions, if any, that your patient has filled in the last twelve months. The information displayed on this report is compiled from pharmacy submissions to the Department, and accurately reflects the information as submitted by the pharmacies.    This report was requested by: James Boston | Reference #: [id number]    You have not added a BREANNA number. Keeping your BREANNA number(s) up to date on the My BREANNA # page will enable the separation of your prescriptions from others in the search results.    Others' Prescriptions  Patient Name: Raul Solorzano Date: 1950  Address: [address] [place] [id number]: Female  Rx Written	Rx Dispensed	Drug	Quantity	Days Supply	Prescriber Name	Prescriber Breanna #	Payment Method	Dispenser  06/08/2021	06/08/2021	hydromorphone 2 mg tablet	180	10	Chelsey Dominguez	[id number]	[facility]  06/02/2021	06/04/2021	methadone hcl 5 mg tablet	75	30	PadronChelsey ulloa	[id number]	[facility]  05/27/2021	05/29/2021	hydromorphone 2 mg tablet	120	7	PadChelsey aguayo	[id number]	[facility]  05/24/2021	05/24/2021	methadone hcl 5 mg tablet	30	30	Chelsey Dominguez	[id number]	[facility]  05/24/2021	05/24/2021	hydromorphone 2 mg tablet	80	6	Chelsey Dominguez	[id number]	[facility]  05/05/2021	05/05/2021	morphine sulfate ir 15 mg tab	120	10	Chelsey Dominguez	[id number]	[facility]  04/23/2021	04/23/2021	hydromorphone 2 mg tablet	30	5	Chelsey Dominguez	[id number]	[facility]  04/08/2021	04/09/2021	alprazolam 0.25 mg tablet	120	30	Mike Becerra J	[id number]	Insurance	[facility]  03/11/2021	03/12/2021	alprazolam 0.25 mg tablet	120	30	Mike Becerra	[id number]	Insurance	[facility]  02/20/2021	02/23/2021	morphine sulfate ir 15 mg tab	42	7	Chelsey Dominguez	[id number]	Insurance	[facility]  02/11/2021	02/15/2021	alprazolam 0.25 mg tablet	120	30	Mike Becerra	[id number]	Insurance	[facility]  12/17/2020	12/21/2020	alprazolam 0.25 mg tablet	120	30	Mike Becerra Jr, MD	[id number]	Insurance	[facility]  11/09/2020	11/09/2020	morphine sulfate ir 15 mg tab	30	5	Chelsey Dominguez Christina	[id number]	Insurance	[facility]  10/22/2020	10/23/2020	alprazolam 0.25 mg tablet	120	30	Mike Becerra Jr, MD	[id number]	Insurance	[facility]  09/24/2020	09/25/2020	alprazolam 0.25 mg tablet	120	30	Mike Becerra Jr, MD	[id number]	Insurance	[facility]  08/27/2020	08/27/2020	alprazolam 0.25 mg tablet	120	30	Mike Becerra Jr, MD	[id number]	Insurance	[facility]  07/30/2020	07/31/2020	alprazolam 0.25 mg tablet	120	30	Mike Becerra Jr, MD	[id number]	Insurance	[facility]  07/02/2020	07/06/2020	alprazolam 0.25 mg tablet	120	30	Mike Becerra Jr, MD	[id number]	Insurance	[facility]    Patient Name: Raul Solorzano Date: 1950  Address: [address] [place] [id number]: Male  Rx Written	Rx Dispensed	Drug	Quantity	Days Supply	Prescriber Name	Prescriber Breanna #	Payment Method	Dispenser  06/03/2021	06/07/2021	alprazolam 0.25 mg tablet	120	30	Hernan, Espiridion P Jr MD	[id number]	Insurance	[facility]  05/06/2021	05/07/2021	alprazolam 0.25 mg tablet	120	30	Mike Becerra	[id number]	Insurance	[facility]    Patient Name: Raul Patterson Date: 1950  Address: [address] [place], [place] [address]Sex: Female  Rx Written	Rx Dispensed	Drug	Quantity	Days Supply	Prescriber Name	Prescriber Breanna #	Payment Method	Dispenser  11/19/2020	11/19/2020	alprazolam 0.25 mg tablet	120	30	Mike Becerra Jr, MD	[id number]	Insurance	[facility]  * - Drugs marked with an asterisk are compound drugs. If the compound drug is made up of more than one controlled substance, then each controlled substance will be a separate row in the

## 2021-07-01 NOTE — H&P ADULT - PROBLEM SELECTOR PLAN 6
Patient with orthopnea, GANNON, and now tachypneic  no hypoxia  ?likely 2/2 to pleural effusion or due to pressure from ascites  IV lasix 20mg x 1  reevaluate elevated BP, possibly 2/2 to pain and anxiety  will give stat IV hydralazine 5mg x 1 and lasix 20mg x 1 and reevaluate xanax 1mg qid prn

## 2021-07-01 NOTE — CHART NOTE - NSCHARTNOTEFT_GEN_A_CORE
IR Follow-Up     IR will defer paracentesis at this time due to uncontrolled new onset a-fib.   Please re-consult once HR is controlled.    --  Obed Greco DO/JAZMYNE  Interventional Radiology Resident (PGY-3)   IR Pager [phone]

## 2021-07-01 NOTE — CONSULT NOTE ADULT - ASSESSMENT
70 yo F with PMH of anxiety, IBS, GERD, Cholangiocarcinoma with metastases, presents here with abdominal pain. Concern for POD with ascites. Plan for paracentesis and palliative following for pain control.     # Stage VI Cholangiocarcinoma  - Diagnosed 2019  - Started on Gemzar/Cisplatin 12/20/19 but unable to tolerate cisplatin due to side effects  - Was on Gemzar alone until 4/2021 when patient had POD  - Switched to IDH1 inhibitor TIBSOVO started 4/19/21  - Repeat scans with POD and Dr. Castro recommended hospice  - Palliative care following; recommend that they readdress GOC which have been brought up to patient several times as outpatient per d/w Dr. Castro. Patient is hospice appropriate and not a candidate for further DMT.       Chel Lucero, PGY-4  Hematology-Oncology Fellow  [phone] ([place]) [phone] ([facility])  I can also be reached on [employer] Teams  Please page fellow on call after 5pm and on weekends 72 yo F with PMH of anxiety, IBS, GERD, Cholangiocarcinoma with metastases, presents here with abdominal pain. Concern for POD with ascites. Plan for paracentesis and palliative following for pain control.     # Stage IV Cholangiocarcinoma  - Diagnosed 2019  - Started on Gemzar/Cisplatin 12/20/19 but unable to tolerate cisplatin due to side effects  - Was on Gemzar alone until 4/2021 when patient had POD  - Switched to IDH1 inhibitor TIBSOVO started 4/19/21  - Repeat scans with POD and Dr. Castro recommended hospice  - Palliative care following; recommend that they readdress GOC which have been brought up to patient several times as outpatient per d/w Dr. Castro. Patient is hospice appropriate and not a candidate for further DMT.       Chel Lucero, PGY-4  Hematology-Oncology Fellow  [phone] ([place]) [phone] ([facility])  I can also be reached on [employer] Teams  Please page fellow on call after 5pm and on weekends

## 2021-07-01 NOTE — CONSULT NOTE ADULT - CONSULT REASON
Afib
Known patient of Dr. Sridhar Castro with cholangiocarcinoma
diagnostic and therapeutic paracentesis
Pain management.

## 2021-07-01 NOTE — CONSULT NOTE ADULT - ASSESSMENT
Interventional Radiology    Evaluate for Procedure: diagnostic and therapeutic paracentesis    HPI: 71y Female with hx of metastatic cholangiocarcinoma with peritoneal carcinomatosis, p/w moderate ascites. IR was consulted for diagnostic and therapeutic paracentesis.     Allergies: codeine (Short breath)  contrast media (iodine-based) (Anaphylaxis)    Medications (Abx/Cardiac/Anticoagulation/Blood Products)    furosemide   Injectable: 20 milliGRAM(s) IV Push (07-01 @ 01:42)  hydrALAZINE Injectable: 5 milliGRAM(s) IV Push (07-01 @ 01:43)  metoprolol tartrate: 25 milliGRAM(s) Oral (07-01 @ 05:33)  metoprolol tartrate Injectable: 5 milliGRAM(s) IV Push (07-01 @ 06:11)  metoprolol tartrate Injectable: 5 milliGRAM(s) IV Push (07-01 @ 04:47)    Data:  160  90.7  T(C): 36.9  HR: 133  BP: 114/73  RR: 18  SpO2: 99%    -WBC 8.68 / HgB 14.8 / Hct 44.6 / Plt 270  -Na 137 / Cl 97 / BUN 12 / Glucose 125  -K 3.8 / CO2 22 / Cr 0.67  -ALT 7 / Alk Phos 71 / T.Bili 0.5  -INR 1.14 / PTT --      Radiology: CT abdomen pelvis reviewed; moderate ascites    Assessment/Plan:   71y Female with hx of metastatic cholangiocarcinoma with peritoneal carcinomatosis, p/w moderate ascites. IR was consulted for diagnostic and therapeutic paracentesis.   -- IR will plan to perform diagnostic and therapeutic paracentesis today 7/1.   -- please place IR procedure request order under [facility]el[facility] MAHER)     --  Obed Greco DO/JAZMYNE  Interventional Radiology Resident (PGY-2)   IR Pager [phone]

## 2021-07-01 NOTE — CONSULT NOTE ADULT - PROBLEM SELECTOR RECOMMENDATION 6
Will continue to f/u for symptoms.         Loyd Torres MD   Geriatrics and Palliative Care ([facility]) Consult Service    of Geriatric and Palliative Medicine  [facility]      Please page the following number for clinical matters between the hours of 9 am and 5 pm from Monday through Friday : [phone]    After 5pm and on weekends, please see the contact information below:    In the event of newly developing, evolving, or worsening symptoms, please contact the Palliative Medicine team via pager (if the patient is at [facility] #[phone] or if the patient is at [facility] #[phone]) The Geriatric and Palliative Medicine service has coverage 24 hours a day/ 7 days a week to provide medical recommendations regarding symptom management needs via telephone

## 2021-07-01 NOTE — H&P ADULT - PROBLEM SELECTOR PLAN 5
elevated BP, possibly 2/2 to pain and anxiety  will give stat IV hydralazine 5mg x 1 and lasix 20mg x 1 and reevaluate xanax 1mg qid prn patient on methadone 5mg tid and hydromorphone  she still complains of pain  will get pain management on board  for now, c/w home meds patient on methadone 5mg tid and hydromorphone 2mg qid (although does not correlate with I-STOP)  she still complains of pain  will get pain management on board  for now, c/w home meds

## 2021-07-01 NOTE — H&P ADULT - PROBLEM SELECTOR PROBLEM 7
Complains of cough for about a week.  Denies fever.  Improperly wearing a mask.   Prophylactic measure Respiratory distress Elevated blood pressure reading

## 2021-07-01 NOTE — CONSULT NOTE ADULT - SUBJECTIVE AND OBJECTIVE BOX
All Cardiology service information can be found 24/7 on [email or web address], password: [email or web address]    Patient seen and evaluated at bedside    Chief Complaint: Abdominal pain     HPI:  72 yo F with PMH of anxiety, IBS, GERD, Cholangiocarcinoma with metastases not currently on chemo, presents here with abdominal pain - has been chronic in the setting of her malignancy however has been becoming worse over the last few weeks associated with distention. Has also been reporting increasing shortness of breath over this time. While in ED, patient initially NSR, subsequent episode of Afib w/RVR rate of 150s, improved s/p initiation of metoprolol, cardiology consulted for persistent rates in the 130s. On my assessment the patient is well appearing in no distress, HR in 130s, currently asymptomatic from a cardiovascular perspective, states that she is asymptomatic with her elevated heart rates, has never had similar episodes that she knows of, denies CP palpitations dizziness syncope visual changes.     PMHx:   Cholangiocarcinoma  Irritable bowel syndrome (IBS)    PSHx:   No significant past surgical history  S/P tubal ligation    Allergies  codeine (Short breath)  contrast media (iodine-based) (Anaphylaxis)    Current Medications:   acetaminophen   Tablet .. 650 milliGRAM(s) Oral every 6 hours PRN  ALPRAZolam 0.25 milliGRAM(s) Oral four times a day PRN  escitalopram 5 milliGRAM(s) Oral daily  HYDROmorphone   Tablet 2 milliGRAM(s) Oral four times a day PRN  lactulose Syrup 10 Gram(s) Oral daily PRN  methadone    Tablet 5 milliGRAM(s) Oral every 8 hours  metoprolol tartrate 25 milliGRAM(s) Oral two times a day    FAMILY HISTORY:  Family history of early CAD (Father)  FH: type 2 diabetes    REVIEW OF SYSTEMS:  CONSTITUTIONAL: No weakness, fevers or chills  EYES/ENT: No visual changes;  No dysphagia  NECK: No pain or stiffness  RESPIRATORY: No cough, wheezing, hemoptysis  CARDIOVASCULAR: No chest pain or palpitations  GASTROINTESTINAL: No abdominal or epigastric pain. No nausea, vomiting, or hematemesis; No diarrhea or constipation. No melena or hematochezia.  BACK: No back pain  GENITOURINARY: No dysuria, frequency or hematuria  NEUROLOGICAL: No numbness or weakness  SKIN: No itching, burning, rashes, or lesions   All other review of systems is negative unless indicated above.    Physical Exam:  T(F): 98.7 (07-01), Max: 99.7 (06-30)  HR: 130 (07-01) (86 - 152)  BP: 109/79 (07-01) (109/78 - 166/92)  RR: 20 (07-01)  SpO2: 97% (07-01)  GENERAL: No acute distress, well-developed  CHEST/LUNG: Clear to auscultation bilaterally; No wheeze, equal breath sounds bilaterally   HEART: irregular tach, no mrg   ABDOMEN: Soft, Nontender, Nondistended; Bowel sounds present  EXTREMITIES: 1+ pitting edema on L > R   PSYCH: Nl behavior, nl affect  NEUROLOGY: AAOx3, non-focal, cranial nerves intact  SKIN: Normal color, No rashes or lesions    Cardiovascular Diagnostic Testing:    ECG: afib w/ RVR to 130s     Imaging:  CXR: Currently present right chest wall CT compatible power infusion port with tip in SVC region.  Clear lungs. No pleural effusions or pneumothorax.  Cardiac and mediastinal silhouettes within normal limits.  Trachea midline.  Generalized mild osteopenia. Mild spinal degenerative change.    Labs: Personally reviewed                        14.8   8.68  )-----------( 270      ( 01 Jul 2021 05:24 )             44.6     07-01    137  |  97  |  12  ----------------------------<  125<H>  3.8   |  22  |  0.67    Ca    9.4      01 Jul 2021 05:24  Phos  3.0     07-01  Mg     1.9     07-01    TPro  7.2  /  Alb  3.8  /  TBili  0.5  /  DBili  x   /  AST  20  /  ALT  7<L>  /  AlkPhos  71  07-01    PT/INR - ( 01 Jul 2021 05:24 )   PT: 13.6 sec;   INR: 1.14 ratio         72 yo F with PMH of anxiety, IBS, GERD, Cholangiocarcinoma with metastases not currently on chemo, presents here with abdominal pain in the setting of active malignancy, found to be in new onset asymptomatic atrial fibrillation.    - agree with rate control in asymptomatic patient with overall poor prognosis, continue metoprolol, can uptitrate as needed for rate < 110 to max of 50mg q6h  - agree with TTE as ordered   - agree with anticoagulation when clinically appropriate in the setting of elevated CHADS and active malignancy   - telemetry monitoring   - consider LE venous duplex in setting of asymmetric edema and active malignancy   - cardiology will continue to follow                      All Cardiology service information can be found 24/7 on [email or web address], password: [email or web address]    Patient seen and evaluated at bedside    Chief Complaint: Abdominal pain     HPI:  72 yo F with PMH of anxiety, IBS, GERD, Cholangiocarcinoma with metastases not currently on chemo, presents here with abdominal pain - has been chronic in the setting of her malignancy however has been becoming worse over the last few weeks associated with distention. Has also been reporting increasing shortness of breath over this time. While in ED, patient initially NSR, subsequent episode of Afib w/RVR rate of 150s, improved s/p initiation of metoprolol, cardiology consulted for persistent rates in the 130s. On my assessment the patient is well appearing in no distress, HR in 130s, currently asymptomatic from a cardiovascular perspective, states that she is asymptomatic with her elevated heart rates, has never had similar episodes that she knows of, denies CP palpitations dizziness syncope visual changes.     PMHx:   Cholangiocarcinoma  Irritable bowel syndrome (IBS)    PSHx:   No significant past surgical history  S/P tubal ligation    Allergies  codeine (Short breath)  contrast media (iodine-based) (Anaphylaxis)    Current Medications:   acetaminophen   Tablet .. 650 milliGRAM(s) Oral every 6 hours PRN  ALPRAZolam 0.25 milliGRAM(s) Oral four times a day PRN  escitalopram 5 milliGRAM(s) Oral daily  HYDROmorphone   Tablet 2 milliGRAM(s) Oral four times a day PRN  lactulose Syrup 10 Gram(s) Oral daily PRN  methadone    Tablet 5 milliGRAM(s) Oral every 8 hours  metoprolol tartrate 25 milliGRAM(s) Oral two times a day    FAMILY HISTORY:  Family history of early CAD (Father)  FH: type 2 diabetes    REVIEW OF SYSTEMS:  CONSTITUTIONAL: No weakness, fevers or chills  EYES/ENT: No visual changes;  No dysphagia  NECK: No pain or stiffness  RESPIRATORY: No cough, wheezing, hemoptysis  CARDIOVASCULAR: No chest pain or palpitations  GASTROINTESTINAL: No abdominal or epigastric pain. No nausea, vomiting, or hematemesis; No diarrhea or constipation. No melena or hematochezia.  BACK: No back pain  GENITOURINARY: No dysuria, frequency or hematuria  NEUROLOGICAL: No numbness or weakness  SKIN: No itching, burning, rashes, or lesions   All other review of systems is negative unless indicated above.    Physical Exam:  T(F): 98.7 (07-01), Max: 99.7 (06-30)  HR: 130 (07-01) (86 - 152)  BP: 109/79 (07-01) (109/78 - 166/92)  RR: 20 (07-01)  SpO2: 97% (07-01)  GENERAL: No acute distress, well-developed  CHEST/LUNG: Clear to auscultation bilaterally; No wheeze, equal breath sounds bilaterally   HEART: irregular tach, no mrg   ABDOMEN: Soft, Nontender, Nondistended; Bowel sounds present  EXTREMITIES: 1+ pitting edema on L > R   PSYCH: Nl behavior, nl affect  NEUROLOGY: AAOx3, non-focal, cranial nerves intact  SKIN: Normal color, No rashes or lesions    Cardiovascular Diagnostic Testing:    ECG: afib w/ RVR to 130s     Imaging:  CXR: Currently present right chest wall CT compatible power infusion port with tip in SVC region.  Clear lungs. No pleural effusions or pneumothorax.  Cardiac and mediastinal silhouettes within normal limits.  Trachea midline.  Generalized mild osteopenia. Mild spinal degenerative change.    Labs: Personally reviewed                        14.8   8.68  )-----------( 270      ( 01 Jul 2021 05:24 )             44.6     07-01    137  |  97  |  12  ----------------------------<  125<H>  3.8   |  22  |  0.67    Ca    9.4      01 Jul 2021 05:24  Phos  3.0     07-01  Mg     1.9     07-01    TPro  7.2  /  Alb  3.8  /  TBili  0.5  /  DBili  x   /  AST  20  /  ALT  7<L>  /  AlkPhos  71  07-01    PT/INR - ( 01 Jul 2021 05:24 )   PT: 13.6 sec;   INR: 1.14 ratio      72 yo F with PMH of anxiety, IBS, GERD, Cholangiocarcinoma with metastases not currently on chemo, presents here with  worsening chronic abdominal pain, incidentally found to be in new onset asymptomatic atrial fibrillation.    - agree with rate control in asymptomatic patient with overall poor prognosis, continue metoprolol, can uptitrate as needed for rate < 110 to max of 50mg q6h  - agree with TTE as ordered   - agree with anticoagulation when clinically appropriate in the setting of elevated CHADS and active malignancy   - telemetry monitoring   - consider LE venous duplex in setting of asymmetric edema and active malignancy                    Skin normal color for race, warm, dry and intact. No evidence of rash.

## 2021-07-01 NOTE — H&P ADULT - PROBLEM SELECTOR PLAN 4
xanax 1mg qid prn patient on methadone 5mg tid and hydromorphone  she still complains of pain  will get pain management on board  for now, c/w home meds Patient with orthopnea, GANNON, and now tachypneic  no hypoxia  ?likely 2/2 to pleural effusion or due to pressure from ascites; r/o PE  IV lasix 20mg x 1  reevaluate  CTA chest to r/o PE

## 2021-07-01 NOTE — ED ADULT NURSE REASSESSMENT NOTE - NS ED NURSE REASSESS COMMENT FT1
pt is awake and alert, resting comfortably in stretcher, speaking in full coherent sentences. Admitting MD at bedside speaking with pt. Pt admitted as per MD orders, awaiting bed assignment. Call bell within reach, comfort & safety provided.

## 2021-07-01 NOTE — CHART NOTE - NSCHARTNOTEFT_GEN_A_CORE
MEDICINE NP NOTE  Spectra [id number]    Notified by RN that patient was having chest pain/pressure.  Patient seen and examined with  at the bedside.  Patient denies any episodes of chest pain and states that when she gets very anxious she gets "chest flutters" but it is not pain.  Currently on telemetry patient is in Afib RVR 150s.  Xanax was given as per orders.  EKG done with no acute changes noted.  Metoprolol dose increased as per cardiology recommendations and  extra dose was given.  Patient converted to NSR a few hours after.  Continue to monitor on telemetry.

## 2021-07-01 NOTE — PROVIDER CONTACT NOTE (OTHER) - BACKGROUND
Abdominal pain 2/2 to cholangiocarninoma w/ new mod ascites
Pt admitted with abd pain/N/V. Hx of CA, GERD, IBS, anxiety
pt admitted to the [facility] of A fib , has a h/o IBS, cholangiocarcinoma
pt admitted ot the hospital for new onset of Afib with RVR
pt admitted to the hospital for new onset of A fib

## 2021-07-01 NOTE — H&P ADULT - HISTORY OF PRESENT ILLNESS
72 yo F with PMH of anxiety, IBS, GERD, Cholangiocarcinoma with metastases, presents here with abdominal pain.           72 yo F with PMH of anxiety, IBS, GERD, Cholangiocarcinoma with metastases, presents here with abdominal pain.  Patient states she stopped chemotherapy mid May due to abdominal pain. Over the last few weeks her abdominal pain has been getting worse. She describes it as starting LUQ, radiating across bandlike to the RUQ and back.  Pain is constant and worse with movement and walking.  She also has been having SOB with minimal walking or laying flat.  Denies fever, chills, cough, runny nose.  She does have nausea and vomiting and constipation.  She has been seeing pain management, but over the last few weeks she has been calling pain management doctor multiple times a day because their regimen is not working.  She has tried hydromorphone, methadone, suboxone, etc.  Currently she is on hydromorphone 2mg 4x/d and methadone 5mg TID.  Vitals show 97.9, HR 86, /81, saturation 96%.          72 yo F with PMH of anxiety, IBS, GERD, Cholangiocarcinoma with metastases, presents here with abdominal pain.  Patient states she stopped chemotherapy mid May due to abdominal pain. Over the last few weeks her abdominal pain has been getting worse. She describes it as starting LUQ, radiating across bandlike to the RUQ and back.  Pain is constant and worse with movement and walking.  She also has been having SOB with minimal walking or laying flat.  Denies fever, chills, cough, runny nose.  She does have nausea and vomiting and constipation.  She has been seeing pain management, but over the last few weeks she has been calling pain management doctor multiple times a day because their regimen is not working.  She has tried hydromorphone, methadone, suboxone, etc.  Currently she is on hydromorphone 2mg 4x/d and methadone 5mg TID.  Vitals show 97.9, HR 86, /81, saturation 96%.  While in ED, patient had an episode of Afib w/RVR rate of 150s.

## 2021-07-01 NOTE — H&P ADULT - NSHPPHYSICALEXAM_GEN_ALL_CORE
PHYSICAL EXAM:  Vital Signs Last 24 Hrs  T(C): 37.2 (07-01-21 @ 01:12)  T(F): 98.9 (07-01-21 @ 01:12), Max: 99.7 (06-30-21 @ 19:30)  HR: 87 (07-01-21 @ 01:12) (86 - 100)  BP: 157/98 (07-01-21 @ 01:12)  BP(mean): --  RR: 22 (07-01-21 @ 01:12) (16 - 22)  SpO2: 96% (07-01-21 @ 01:12) (95% - 100%)  Wt(kg): --    Constitutional: NAD, awake and alert  EYES: EOMI  ENT:  Normal Hearing, no tonsillar exudates   Neck: Soft and supple, No JVD  Lungs: tachypneic; Breath sounds are clear bilaterally, No wheezing, rales or rhonchi  Heart: S1 and S2, regular rate and rhythm, no Murmurs, gallops or rubs  Abdomen: Bowel Sounds present, soft, +distended, +abdominal tenderness  Extremities: No cyanosis or clubbing; warm to touch  Vascular: 2+ peripheral pulses lower ex  Neurological: A/O x 3, no focal deficits  Musculoskeletal: 5/5 strength b/l upper and lower extremities  Skin: No rashes  Psych: no depression or anhedonia  HEME: no bruises, no nose bleeds

## 2021-07-01 NOTE — H&P ADULT - ASSESSMENT
72 yo F with PMH of anxiety, IBS, GERD, Cholangiocarcinoma with metastases, presents here with abdominal pain.

## 2021-07-01 NOTE — CONSULT NOTE ADULT - NSPROBSELRECBLANK_6_GEN
· POA  Likely in setting of hyponatremia with component of delirium contributing given recurrent hospitalizations/underlying cognitive impairment  · Urine culture unremarkable - monitor off antibiotics at this time  Pt remains asymptomatic  · Mental status seems to be at baseline although does wax and wane  On exam today pt is AO x3 and able to state she was brought to the hospital "for my sodium"  · She has hx of adrenal insufficiency, previously on steroids and then tapered off  She follows with Endocrinology as outpatient  · Cosyntropin stim test with adequate response in cortisol, hydrocortisone discontinued, she does not have adrenal insufficiency   · Vitamin B12 and folate normal   · MRI brain was done at [facility] last month which was negative for any acute infarct but possible small questionable hemorrhage versus area of mineralization  CT head without contrast in [facility] negative for bleed    · Geriatrics following, patient should follow up outpatient with them DISPLAY PLAN FREE TEXT

## 2021-07-01 NOTE — H&P ADULT - NSHPREVIEWOFSYSTEMS_GEN_ALL_CORE
CONSTITUTIONAL: No weakness, fevers or chills  EYES/ENT: No visual changes;  No dysphagia  NECK: No pain or stiffness  RESPIRATORY: No cough, wheezing, hemoptysis; No shortness of breath  CARDIOVASCULAR: No chest pain or palpitations; No lower extremity edema  EXTREMITIES: no le edema, cyanosis, clubbing  MUSCULOSKELETAL: no joint pain, swelling  GASTROINTESTINAL: +abdominal pain, +nausea, +vomiting  BACK: No back pain  GENITOURINARY: No dysuria, frequency or hematuria  NEUROLOGICAL: No numbness or weakness  SKIN: No itching, burning, rashes, or lesions   PSYCH: no agitation  All other review of systems is negative unless indicated above.

## 2021-07-01 NOTE — CONSULT NOTE ADULT - SUBJECTIVE AND OBJECTIVE BOX
HPI:  70 yo F with PMH of anxiety, IBS, GERD, Cholangiocarcinoma with metastases, presents here with abdominal pain.  Patient states she stopped chemotherapy mid May due to abdominal pain. Over the last few weeks her abdominal pain has been getting worse. She describes it as starting LUQ, radiating across bandlike to the RUQ and back.  Pain is constant and worse with movement and walking.  She also has been having SOB with minimal walking or laying flat.  Denies fever, chills, cough, runny nose.  She does have nausea and vomiting and constipation.  She has been seeing pain management, but over the last few weeks she has been calling pain management doctor multiple times a day because their regimen is not working.  She has tried hydromorphone, methadone, suboxone, etc.  Currently she is on hydromorphone 2mg 4x/d and methadone 5mg TID.  Vitals show 97.9, HR 86, /81, saturation 96%.  While in ED, patient had an episode of Afib w/RVR rate of 150s.     Patient anxious when assessed and not wanting to onc team about her prognosis. Daughter at bedside.          (01 Jul 2021 00:33)      14 point ROS otherwise negative    PAST MEDICAL & SURGICAL HISTORY:  Cholangiocarcinoma    Irritable bowel syndrome (IBS)    S/P tubal ligation        Allergies    codeine (Short breath)  contrast media (iodine-based) (Anaphylaxis)    Intolerances        MEDICATIONS  (STANDING):  escitalopram 5 milliGRAM(s) Oral daily  heparin   Injectable 5000 Unit(s) SubCutaneous every 12 hours  methadone    Tablet 5 milliGRAM(s) Oral every 8 hours  metoprolol tartrate 50 milliGRAM(s) Oral two times a day  ondansetron    Tablet 4 milliGRAM(s) Oral every 8 hours  senna 2 Tablet(s) Oral at bedtime    MEDICATIONS  (PRN):  acetaminophen   Tablet .. 650 milliGRAM(s) Oral every 6 hours PRN Temp greater or equal to 38C (100.4F), Mild Pain (1 - 3)  ALPRAZolam 0.25 milliGRAM(s) Oral four times a day PRN anxiety  HYDROmorphone   Tablet 4 milliGRAM(s) Oral every 4 hours PRN Severe Pain (7 - 10)  HYDROmorphone   Tablet 2 milliGRAM(s) Oral every 4 hours PRN Moderate Pain (4 - 6)  lactulose Syrup 10 Gram(s) Oral daily PRN constipation  ondansetron Injectable 4 milliGRAM(s) IV Push every 8 hours PRN Nausea and/or Vomiting      FAMILY HISTORY:  Family history of early CAD (Father)    FH: type 2 diabetes        SOCIAL HISTORY: No EtOH, no tobacco    Height (cm): 160 (07-01 @ 06:36)  Weight (kg): 99.4 (07-01 @ 09:17)  BMI (kg/m2): 38.8 (07-01 @ 09:17)  BSA (m2): 2.01 (07-01 @ 09:17)    VITALS:   T(F): 97.8 (07-01-21 @ 20:24), Max: 98.9 (07-01-21 @ 01:12)  HR: 66 (07-01-21 @ 20:24)  BP: 132/76 (07-01-21 @ 20:24)  RR: 18 (07-01-21 @ 20:24)  SpO2: 99% (07-01-21 @ 20:24)  Wt(kg): --    PHYSICAL EXAM    GENERAL: NAD, well-developed  HEAD:  Atraumatic, Normocephalic  EYES: EOMI, PERRLA, conjunctiva and sclera clear  NECK: Supple, No JVD  CHEST/LUNG: Clear to auscultation bilaterally; No wheeze  HEART: Regular rate and rhythm; No murmurs, rubs, or gallops  ABDOMEN: Soft, Distended. BS +ve  EXTREMITIES:  2+ Peripheral Pulses, No clubbing, cyanosis, or edema  NEUROLOGY: non-focal  SKIN: No rashes or lesions    LABS:                         14.8   8.68  )-----------( 270      ( 01 Jul 2021 05:24 )             44.6     07-01    137  |  97  |  12  ----------------------------<  125<H>  3.8   |  22  |  0.67    Ca    9.4      01 Jul 2021 05:24  Phos  3.0     07-01  Mg     1.9     07-01    TPro  7.2  /  Alb  3.8  /  TBili  0.5  /  DBili  x   /  AST  20  /  ALT  7<L>  /  AlkPhos  71  07-01    Magnesium, Serum: 1.9 mg/dL (07-01 @ 05:24)  Phosphorus Level, Serum: 3.0 mg/dL (07-01 @ 05:24)    PT/INR - ( 01 Jul 2021 05:24 )   PT: 13.6 sec;   INR: 1.14 ratio               IMAGING:

## 2021-07-01 NOTE — PROVIDER CONTACT NOTE (OTHER) - ACTION/TREATMENT ORDERED:
Advised to xanax, do stat EKG, per NP will wait for EKG for more orders , will continue to monitor
PA at bedside to assess pt. EKG ordered and pt placed on tele
advised to do STAT EKG and is done, will continue to monitor.
no new orders at this time , ok to give evening metoprolol , will continue to monitor.
will order BMP for AM  continue to monitor pt

## 2021-07-01 NOTE — CONSULT NOTE ADULT - PROBLEM SELECTOR RECOMMENDATION 5
Will continue to f/u for symptoms.         Loyd Torres MD   Geriatrics and Palliative Care ([facility]) Consult Service    of Geriatric and Palliative Medicine  [facility]      Please page the following number for clinical matters between the hours of 9 am and 5 pm from Monday through Friday : [phone]    After 5pm and on weekends, please see the contact information below:    In the event of newly developing, evolving, or worsening symptoms, please contact the Palliative Medicine team via pager (if the patient is at [facility] #[phone] or if the patient is at [facility] #[phone]) The Geriatric and Palliative Medicine service has coverage 24 hours a day/ 7 days a week to provide medical recommendations regarding symptom management needs via telephone Work up for any possible causes and per the primary team.   O2 PRN   Consider paracentesis.   Can also use Dilaudid (as above) for recurrent dyspnea if needed.

## 2021-07-01 NOTE — PROVIDER CONTACT NOTE (OTHER) - REASON
pt HR own to 45/min for 1 sec
pt has c/o of mild chest pain,  diarrhoea, and nausea
pt converted to NSR 60s on tele from A fib
pt had PAT 3.4 seconds up to 120 first time
pt tachycardiac

## 2021-07-01 NOTE — PATIENT PROFILE ADULT - MEDICATIONS/VISITS
Referred by: Berry Bustamante MD; Medical Diagnosis (from order):    Diagnosis Information      Diagnosis    V58.89, 959.2 (ICD-9-CM) - S46.102D (ICD-10-CM) - Injury of tendon of long head of left biceps, subsequent encounter              Physical Therapy -  Daily Treatment Note    Visit:  6     SUBJECTIVE                                                                                                             Patient reports that she has no pain with below shoulder heigh activities of movements and is able to do all daily activities and self care tasks without issues.    Functional Change: Pain during painful arc for the shoulder with abduction and flexion.      OBJECTIVE                                                                                                                     Range of Motion (ROM)   (degrees unless noted; active unless noted; norms in ( ); negative=lacking to 0, positive=beyond 0)   Shoulder:     - Flexion (180):        • Left: 156     - Behind Back Internal Rotation:        • Left: Passive: L1    - External Rotation at 45°:         • Left: 70      TREATMENT                                                                                                                  Therapeutic Exercise:  UBE: 4 min, lvl 2.0, seat: 3 performed in order to promote blood flow and soft tissue mobility and extensibility to shoulders and arms. (2:00 forward, 2:00 retro)    PROM shoulder flexion, abduction, external rotation and internal rotation with respect to end feel and post-op guidelines  **external rotation and internal rotation performed at ~45 degrees abduction    Eccentric shoulder external rotation, 3 lbs  Eccentric sleeper stretch, 3 lbs    Sidelying shoulder triplet, performed with 3 lbs  sidelying shoulder flexion: performed to RPE of 6/10  sidelying shoulder horizontal abduction: performed to RPE of 7/10  sidelying external rotation: performed to fatigue    Discussed and educated on how to  perform self-glenohumeral mobilizations for shoulder using heavy theraband      Manual Therapy:   GH joint mobilizations grade 2-3  -distraction  -inferior/posterior glides     Soft tissue mobilization with active release technique to posterior scap as well as anterior deltoid    Skilled input: verbal instruction/cues    Home Exercise Program: Access Code: [email or web address]     Exercises  Standing Fold Over Stretch at Counter - 10 reps - 3-5 hold - 2-3x daily - 7x weekly  Plank on Counter - 10 hold - 1x daily - 7x weekly  Rachelle Pose - 10 reps - 5 hold - 2x daily - 7x weekly  Quadruped Rocking Side-to-side - 10 reps - 2x daily - 7x weekly  Serratus Activation at Wall - 8 reps - 3 hold - 2x daily - 7x weekly  Sidelying Shoulder ER with Towel and Dumbbell - 5 reps - 2 sets - 5 hold - 3-4 pounds - 1x daily - 4x weekly     ASSESSMENT                                                                                                             Patient with significant improvement in overhead shoulder motion in all planes following session and introduction of external rotation loading and strengthening/activation. Patient reports no pain through painful arc previously experiencing prior to session and significant improvement in overhead active range of motion.  Pain/symptoms after session: 0    Patient Education:   Results of above outlined education: Verbalizes understanding and Needs reinforcement          Procedures and total treatment time documented Time Entry flowsheet.   no

## 2021-07-01 NOTE — H&P ADULT - NSICDXFAMILYHX_GEN_ALL_CORE_FT
FAMILY HISTORY:  FH: type 2 diabetes    Father  Still living? Unknown  Family history of early CAD, Age at diagnosis: Age Unknown

## 2021-07-01 NOTE — H&P ADULT - PROBLEM SELECTOR PLAN 3
patient on methadone 5mg tid and hydromorphone  she still complains of pain  will get pain management on board  for now, c/w home meds Patient with new onset Afib, unclear etiology  Will start patient on metoprolol 25mg bid for rate control  check CTA chest to r/o PE given SOB and GANNON  CHADSVASC 2-3 (age, female, ?HTN)-Will need to start anticoagulation, however she also may need paracentesis; will hold off AC for now  check echocardiogram

## 2021-07-01 NOTE — CONSULT NOTE ADULT - ASSESSMENT
full note to f/u   The patient indicated her pain was controlled.   Will then continue Methadone 6am, 2pm, and 10 pm and Dilaudid 2mg for moderate pain and 4 mg PO for Severe pain. Will also c/w Gabapentin.   Will add Zofran 4mg PO q 8 ATC and q 8 PRN.   Consider Therapeutic paracentesis when possible.   Bowel regimen to prevent opioids induced constipation.  72 yo F with PMH of anxiety, IBS, GERD, Cholangiocarcinoma with metastases, presents here with abdominal pain.  Patient states she stopped chemotherapy mid May due to abdominal pain.

## 2021-07-01 NOTE — CONSULT NOTE ADULT - PROBLEM SELECTOR RECOMMENDATION 9
The patient indicated her pain was controlled.   For now, will continue Methadone 5mg 6am, 2pm, and 10 pm. However, will DC Reglan and will re-evaluate QTc since last one was 504 (7/1/2021). If QTc continues to be > 500, may need to rotate Methadone to a different opiod (? Fentanyl). Will continue Dilaudid 2mg for moderate pain and 4 mg PO for Severe pain. Will also c/w Gabapentin.  Will add Zofran 4mg PO q 8 ATC and q 8 PRN.   Consider Therapeutic paracentesis when possible.   Holistic Nurse.   Bowel regimen to prevent opioids induced constipation.

## 2021-07-02 ENCOUNTER — RESULT REVIEW (OUTPATIENT)
Age: 71
End: 2021-07-02

## 2021-07-02 ENCOUNTER — APPOINTMENT (OUTPATIENT)
Dept: HEMATOLOGY ONCOLOGY | Facility: CLINIC | Age: 71
End: 2021-07-02

## 2021-07-02 DIAGNOSIS — Z51.5 ENCOUNTER FOR PALLIATIVE CARE: ICD-10-CM

## 2021-07-02 DIAGNOSIS — R18.0 MALIGNANT ASCITES: ICD-10-CM

## 2021-07-02 DIAGNOSIS — G89.3 NEOPLASM RELATED PAIN (ACUTE) (CHRONIC): ICD-10-CM

## 2021-07-02 DIAGNOSIS — R11.0 NAUSEA: ICD-10-CM

## 2021-07-02 DIAGNOSIS — R06.00 DYSPNEA, UNSPECIFIED: ICD-10-CM

## 2021-07-02 LAB
ANION GAP SERPL CALC-SCNC: 13 MMOL/L — SIGNIFICANT CHANGE UP (ref 5–17)
BUN SERPL-MCNC: 11 MG/DL — SIGNIFICANT CHANGE UP (ref 7–23)
CALCIUM SERPL-MCNC: 9.1 MG/DL — SIGNIFICANT CHANGE UP (ref 8.4–10.5)
CHLORIDE SERPL-SCNC: 98 MMOL/L — SIGNIFICANT CHANGE UP (ref 96–108)
CO2 SERPL-SCNC: 25 MMOL/L — SIGNIFICANT CHANGE UP (ref 22–31)
COVID-19 SPIKE DOMAIN AB INTERP: POSITIVE
COVID-19 SPIKE DOMAIN ANTIBODY RESULT: 206 U/ML — HIGH
CREAT SERPL-MCNC: 0.65 MG/DL — SIGNIFICANT CHANGE UP (ref 0.5–1.3)
GLUCOSE SERPL-MCNC: 111 MG/DL — HIGH (ref 70–99)
POTASSIUM SERPL-MCNC: 3.8 MMOL/L — SIGNIFICANT CHANGE UP (ref 3.5–5.3)
POTASSIUM SERPL-SCNC: 3.8 MMOL/L — SIGNIFICANT CHANGE UP (ref 3.5–5.3)
SARS-COV-2 IGG+IGM SERPL QL IA: 206 U/ML — HIGH
SARS-COV-2 IGG+IGM SERPL QL IA: POSITIVE
SODIUM SERPL-SCNC: 136 MMOL/L — SIGNIFICANT CHANGE UP (ref 135–145)

## 2021-07-02 PROCEDURE — 88112 CYTOPATH CELL ENHANCE TECH: CPT | Mod: 26

## 2021-07-02 PROCEDURE — 93010 ELECTROCARDIOGRAM REPORT: CPT

## 2021-07-02 PROCEDURE — 99233 SBSQ HOSP IP/OBS HIGH 50: CPT

## 2021-07-02 PROCEDURE — 99232 SBSQ HOSP IP/OBS MODERATE 35: CPT | Mod: GC

## 2021-07-02 PROCEDURE — 49083 ABD PARACENTESIS W/IMAGING: CPT

## 2021-07-02 PROCEDURE — 88341 IMHCHEM/IMCYTCHM EA ADD ANTB: CPT | Mod: 26

## 2021-07-02 PROCEDURE — 88342 IMHCHEM/IMCYTCHM 1ST ANTB: CPT | Mod: 26

## 2021-07-02 PROCEDURE — 88305 TISSUE EXAM BY PATHOLOGIST: CPT | Mod: 26

## 2021-07-02 RX ORDER — HYDROMORPHONE HYDROCHLORIDE 2 MG/ML
0.4 INJECTION INTRAMUSCULAR; INTRAVENOUS; SUBCUTANEOUS
Refills: 0 | Status: DISCONTINUED | OUTPATIENT
Start: 2021-07-02 | End: 2021-07-02

## 2021-07-02 RX ORDER — HYDROMORPHONE HYDROCHLORIDE 2 MG/ML
1.2 INJECTION INTRAMUSCULAR; INTRAVENOUS; SUBCUTANEOUS
Refills: 0 | Status: DISCONTINUED | OUTPATIENT
Start: 2021-07-02 | End: 2021-07-03

## 2021-07-02 RX ORDER — ONDANSETRON 8 MG/1
8 TABLET, FILM COATED ORAL EVERY 8 HOURS
Refills: 0 | Status: DISCONTINUED | OUTPATIENT
Start: 2021-07-02 | End: 2021-07-07

## 2021-07-02 RX ORDER — HYDROMORPHONE HYDROCHLORIDE 2 MG/ML
0.8 INJECTION INTRAMUSCULAR; INTRAVENOUS; SUBCUTANEOUS
Refills: 0 | Status: DISCONTINUED | OUTPATIENT
Start: 2021-07-02 | End: 2021-07-02

## 2021-07-02 RX ORDER — HYDROMORPHONE HYDROCHLORIDE 2 MG/ML
0.8 INJECTION INTRAMUSCULAR; INTRAVENOUS; SUBCUTANEOUS
Refills: 0 | Status: DISCONTINUED | OUTPATIENT
Start: 2021-07-02 | End: 2021-07-03

## 2021-07-02 RX ORDER — DRONABINOL 2.5 MG
2.5 CAPSULE ORAL
Refills: 0 | Status: DISCONTINUED | OUTPATIENT
Start: 2021-07-02 | End: 2021-07-07

## 2021-07-02 RX ORDER — PROCHLORPERAZINE MALEATE 5 MG
5 TABLET ORAL EVERY 6 HOURS
Refills: 0 | Status: DISCONTINUED | OUTPATIENT
Start: 2021-07-02 | End: 2021-07-12

## 2021-07-02 RX ADMIN — HYDROMORPHONE HYDROCHLORIDE 0.8 MILLIGRAM(S): 2 INJECTION INTRAMUSCULAR; INTRAVENOUS; SUBCUTANEOUS at 19:54

## 2021-07-02 RX ADMIN — HYDROMORPHONE HYDROCHLORIDE 2 MILLIGRAM(S): 2 INJECTION INTRAMUSCULAR; INTRAVENOUS; SUBCUTANEOUS at 04:18

## 2021-07-02 RX ADMIN — HYDROMORPHONE HYDROCHLORIDE 2 MILLIGRAM(S): 2 INJECTION INTRAMUSCULAR; INTRAVENOUS; SUBCUTANEOUS at 04:48

## 2021-07-02 RX ADMIN — HYDROMORPHONE HYDROCHLORIDE 1.2 MILLIGRAM(S): 2 INJECTION INTRAMUSCULAR; INTRAVENOUS; SUBCUTANEOUS at 23:55

## 2021-07-02 RX ADMIN — HYDROMORPHONE HYDROCHLORIDE 1.2 MILLIGRAM(S): 2 INJECTION INTRAMUSCULAR; INTRAVENOUS; SUBCUTANEOUS at 23:25

## 2021-07-02 RX ADMIN — METHADONE HYDROCHLORIDE 5 MILLIGRAM(S): 40 TABLET ORAL at 06:13

## 2021-07-02 RX ADMIN — Medication 50 MILLIGRAM(S): at 17:52

## 2021-07-02 RX ADMIN — Medication 2.5 MILLIGRAM(S): at 13:31

## 2021-07-02 RX ADMIN — ESCITALOPRAM OXALATE 5 MILLIGRAM(S): 10 TABLET, FILM COATED ORAL at 10:35

## 2021-07-02 RX ADMIN — HYDROMORPHONE HYDROCHLORIDE 4 MILLIGRAM(S): 2 INJECTION INTRAMUSCULAR; INTRAVENOUS; SUBCUTANEOUS at 01:45

## 2021-07-02 RX ADMIN — HYDROMORPHONE HYDROCHLORIDE 0.8 MILLIGRAM(S): 2 INJECTION INTRAMUSCULAR; INTRAVENOUS; SUBCUTANEOUS at 20:24

## 2021-07-02 RX ADMIN — Medication 0.25 MILLIGRAM(S): at 10:35

## 2021-07-02 RX ADMIN — ONDANSETRON 4 MILLIGRAM(S): 8 TABLET, FILM COATED ORAL at 06:13

## 2021-07-02 RX ADMIN — HEPARIN SODIUM 5000 UNIT(S): 5000 INJECTION INTRAVENOUS; SUBCUTANEOUS at 17:52

## 2021-07-02 RX ADMIN — Medication 0.25 MILLIGRAM(S): at 04:51

## 2021-07-02 RX ADMIN — ONDANSETRON 8 MILLIGRAM(S): 8 TABLET, FILM COATED ORAL at 21:25

## 2021-07-02 RX ADMIN — Medication 2.5 MILLIGRAM(S): at 17:52

## 2021-07-02 RX ADMIN — HYDROMORPHONE HYDROCHLORIDE 4 MILLIGRAM(S): 2 INJECTION INTRAMUSCULAR; INTRAVENOUS; SUBCUTANEOUS at 02:09

## 2021-07-02 RX ADMIN — Medication 0.25 MILLIGRAM(S): at 21:28

## 2021-07-02 RX ADMIN — HYDROMORPHONE HYDROCHLORIDE 0.8 MILLIGRAM(S): 2 INJECTION INTRAMUSCULAR; INTRAVENOUS; SUBCUTANEOUS at 18:13

## 2021-07-02 RX ADMIN — ONDANSETRON 4 MILLIGRAM(S): 8 TABLET, FILM COATED ORAL at 13:36

## 2021-07-02 RX ADMIN — Medication 50 MILLIGRAM(S): at 06:13

## 2021-07-02 RX ADMIN — METHADONE HYDROCHLORIDE 5 MILLIGRAM(S): 40 TABLET ORAL at 21:28

## 2021-07-02 RX ADMIN — ONDANSETRON 4 MILLIGRAM(S): 8 TABLET, FILM COATED ORAL at 08:18

## 2021-07-02 RX ADMIN — HEPARIN SODIUM 5000 UNIT(S): 5000 INJECTION INTRAVENOUS; SUBCUTANEOUS at 06:13

## 2021-07-02 RX ADMIN — METHADONE HYDROCHLORIDE 5 MILLIGRAM(S): 40 TABLET ORAL at 13:40

## 2021-07-02 RX ADMIN — HYDROMORPHONE HYDROCHLORIDE 0.8 MILLIGRAM(S): 2 INJECTION INTRAMUSCULAR; INTRAVENOUS; SUBCUTANEOUS at 17:58

## 2021-07-02 NOTE — PROGRESS NOTE ADULT - PROBLEM SELECTOR PLAN 1
Will stop Dilaudid PO and start Dilaudid IV for faster symptomatic relief while adjusting baseline opioids.   Will start Dilaudid IV 0.4mg moderate and 0.8mg IV severe pain   Will continue Methadone 5mg q 8 hours (QTc 448).   Bowel regimen.   Will transfer to PCU for symptoms monitoring and Rx. start dilaudid PCA  0/1/30//1.5 ; discussed with patient, spouse and daughter  Will continue Methadone 5mg q 8 hours (QTc 448), may need to increase over coming days depending on usage  Bowel regimen.   Will transfer to PCU for symptoms monitoring and Rx. c/w methadone 5 mg TID (Qtc reviewed)  -bowel regimen  -will increase dilaudid PRN to 0.4mg q4h prn for moderate, and 0.8mg q4h prn for severe

## 2021-07-02 NOTE — PROGRESS NOTE ADULT - PROBLEM SELECTOR PLAN 6
Onc inputs noticed. As per the patient and her daughter current plan is for focusing on Symptoms Rx only and trying to go home with hospice.

## 2021-07-02 NOTE — CHART NOTE - NSCHARTNOTEFT_GEN_A_CORE
Paged by RN with question on pain regimen. Patient received a dose of her dilaudid 0.8mg IV PRN for severe (8/10) pain about 2 hours ago. Pain was relieved to about a 6/10 for nearly 2 hours then went back to an 8/10. RN asking about a dose increase. Dilaudid regimen increased to 1.2mg IV q2 PRN for severe and 0.8mg IV q2 for moderate pain. Advised to page again for refractory pain or other related concerns.

## 2021-07-02 NOTE — PROGRESS NOTE ADULT - PROBLEM SELECTOR PLAN 4
On Xanax 0.25mg IV q 6 PRN will make 0.25mg TID standing given multiple PRNs and uncontrolled anxiety xanax 0.25mg q6h prn for anxiety

## 2021-07-02 NOTE — PROGRESS NOTE ADULT - SUBJECTIVE AND OBJECTIVE BOX
INTERVAL HPI/OVERNIGHT EVENTS:  No overnight events.     MEDICATIONS  (STANDING):  dronabinol 2.5 milliGRAM(s) Oral two times a day  escitalopram 5 milliGRAM(s) Oral daily  heparin   Injectable 5000 Unit(s) SubCutaneous every 12 hours  methadone    Tablet 5 milliGRAM(s) Oral every 8 hours  metoprolol tartrate 50 milliGRAM(s) Oral two times a day  ondansetron    Tablet 4 milliGRAM(s) Oral every 8 hours  senna 2 Tablet(s) Oral at bedtime    MEDICATIONS  (PRN):  acetaminophen   Tablet .. 650 milliGRAM(s) Oral every 6 hours PRN Temp greater or equal to 38C (100.4F), Mild Pain (1 - 3)  ALPRAZolam 0.25 milliGRAM(s) Oral four times a day PRN anxiety  HYDROmorphone   Tablet 4 milliGRAM(s) Oral every 4 hours PRN Severe Pain (7 - 10)  HYDROmorphone   Tablet 2 milliGRAM(s) Oral every 4 hours PRN Moderate Pain (4 - 6)  lactulose Syrup 10 Gram(s) Oral daily PRN constipation  ondansetron Injectable 4 milliGRAM(s) IV Push every 8 hours PRN Nausea and/or Vomiting    Allergies    codeine (Short breath)  contrast media (iodine-based) (Anaphylaxis)    Intolerances          VITAL SIGNS:  T(F): 97.4 (07-02-21 @ 13:25)  HR: 66 (07-02-21 @ 13:25)  BP: 136/76 (07-02-21 @ 13:25)  RR: 18 (07-02-21 @ 13:25)  SpO2: 96% (07-02-21 @ 13:25)  Wt(kg): --    PHYSICAL EXAM:    Constitutional: NAD, lying comfortably in bed  Eyes: EOMI, PERRLA  Neck: supple, no masses, no JVD  Respiratory: CTAB; no r/r/w  Cardiovascular: RRR, no M/R/G  Gastrointestinal: +BS, soft, NTND, no hepatosplenomegaly  Extremities: no c/c/e  Neurological: AAOx3, nonfocal    LABS:                        14.8   8.68  )-----------( 270      ( 01 Jul 2021 05:24 )             44.6     07-02    136  |  98  |  11  ----------------------------<  111<H>  3.8   |  25  |  0.65    Ca    9.1      02 Jul 2021 06:20  Phos  3.0     07-01  Mg     1.9     07-01    TPro  7.2  /  Alb  3.8  /  TBili  0.5  /  DBili  x   /  AST  20  /  ALT  7<L>  /  AlkPhos  71  07-01    PT/INR - ( 01 Jul 2021 05:24 )   PT: 13.6 sec;   INR: 1.14 ratio               RADIOLOGY & ADDITIONAL TESTS:  Studies reviewed.   INTERVAL HPI/OVERNIGHT EVENTS:  No overnight events. Continues to report some abdominal pain.  and daughter at bedside.     MEDICATIONS  (STANDING):  dronabinol 2.5 milliGRAM(s) Oral two times a day  escitalopram 5 milliGRAM(s) Oral daily  heparin   Injectable 5000 Unit(s) SubCutaneous every 12 hours  methadone    Tablet 5 milliGRAM(s) Oral every 8 hours  metoprolol tartrate 50 milliGRAM(s) Oral two times a day  ondansetron    Tablet 4 milliGRAM(s) Oral every 8 hours  senna 2 Tablet(s) Oral at bedtime    MEDICATIONS  (PRN):  acetaminophen   Tablet .. 650 milliGRAM(s) Oral every 6 hours PRN Temp greater or equal to 38C (100.4F), Mild Pain (1 - 3)  ALPRAZolam 0.25 milliGRAM(s) Oral four times a day PRN anxiety  HYDROmorphone   Tablet 4 milliGRAM(s) Oral every 4 hours PRN Severe Pain (7 - 10)  HYDROmorphone   Tablet 2 milliGRAM(s) Oral every 4 hours PRN Moderate Pain (4 - 6)  lactulose Syrup 10 Gram(s) Oral daily PRN constipation  ondansetron Injectable 4 milliGRAM(s) IV Push every 8 hours PRN Nausea and/or Vomiting    Allergies    codeine (Short breath)  contrast media (iodine-based) (Anaphylaxis)    Intolerances          VITAL SIGNS:  T(F): 97.4 (07-02-21 @ 13:25)  HR: 66 (07-02-21 @ 13:25)  BP: 136/76 (07-02-21 @ 13:25)  RR: 18 (07-02-21 @ 13:25)  SpO2: 96% (07-02-21 @ 13:25)  Wt(kg): --    PHYSICAL EXAM:    Constitutional: NAD, lying comfortably in bed  Eyes: EOMI, PERRLA  Neck: supple, no masses, no JVD  Respiratory: CTAB; no r/r/w  Cardiovascular: RRR, no M/R/G  Gastrointestinal: +BS, soft, NTND, no hepatosplenomegaly  Extremities: no c/c/e  Neurological: AAOx3, nonfocal    LABS:                        14.8   8.68  )-----------( 270      ( 01 Jul 2021 05:24 )             44.6     07-02    136  |  98  |  11  ----------------------------<  111<H>  3.8   |  25  |  0.65    Ca    9.1      02 Jul 2021 06:20  Phos  3.0     07-01  Mg     1.9     07-01    TPro  7.2  /  Alb  3.8  /  TBili  0.5  /  DBili  x   /  AST  20  /  ALT  7<L>  /  AlkPhos  71  07-01    PT/INR - ( 01 Jul 2021 05:24 )   PT: 13.6 sec;   INR: 1.14 ratio               RADIOLOGY & ADDITIONAL TESTS:  Studies reviewed.

## 2021-07-02 NOTE — PROGRESS NOTE ADULT - SUBJECTIVE AND OBJECTIVE BOX
Patient is a 71y old  Female who presents with a chief complaint of abdominal pain (02 Jul 2021 18:45)      SUBJECTIVE / OVERNIGHT EVENTS: No new complaints. Family at bedside.   Review of Systems  chest pain no  palpitations no  sob no  nausea no  headache no    MEDICATIONS  (STANDING):  dronabinol 2.5 milliGRAM(s) Oral two times a day  escitalopram 5 milliGRAM(s) Oral daily  heparin   Injectable 5000 Unit(s) SubCutaneous every 12 hours  methadone    Tablet 5 milliGRAM(s) Oral every 8 hours  metoprolol tartrate 50 milliGRAM(s) Oral two times a day  ondansetron Injectable 8 milliGRAM(s) IV Push every 8 hours  senna 2 Tablet(s) Oral at bedtime    MEDICATIONS  (PRN):  acetaminophen   Tablet .. 650 milliGRAM(s) Oral every 6 hours PRN Temp greater or equal to 38C (100.4F), Mild Pain (1 - 3)  ALPRAZolam 0.25 milliGRAM(s) Oral four times a day PRN anxiety  HYDROmorphone  Injectable 0.4 milliGRAM(s) IV Push every 2 hours PRN Moderate Pain (4 - 6)  HYDROmorphone  Injectable 0.8 milliGRAM(s) IV Push every 2 hours PRN Severe Pain (7 - 10)  lactulose Syrup 10 Gram(s) Oral daily PRN constipation  prochlorperazine   Injectable 5 milliGRAM(s) IV Push every 6 hours PRN Nauease or vomiting      Vital Signs Last 24 Hrs  T(C): 36.8 (02 Jul 2021 20:49), Max: 97 (02 Jul 2021 11:19)  T(F): 98.2 (02 Jul 2021 20:49), Max: 98.2 (02 Jul 2021 20:49)  HR: 67 (02 Jul 2021 20:49) (62 - 71)  BP: 147/73 (02 Jul 2021 20:49) (126/74 - 155/90)  BP(mean): --  RR: 18 (02 Jul 2021 20:49) (18 - 19)  SpO2: 97% (02 Jul 2021 20:49) (92% - 97%)    PHYSICAL EXAM:  GENERAL: NAD, well-developed  HEAD:  Atraumatic, Normocephalic  EYES: EOMI, PERRLA, conjunctiva and sclera clear  NECK: Supple, No JVD  CHEST/LUNG: Clear to auscultation bilaterally; No wheeze  HEART: Regular rate and rhythm; No murmurs, rubs, or gallops  ABDOMEN: Soft, Nontender, less distended; Bowel sounds present  EXTREMITIES:  2+ Peripheral Pulses, No clubbing, cyanosis, or edema  PSYCH: AAOx3  NEUROLOGY: non-focal  SKIN: No rashes or lesions    LABS:                        14.8   8.68  )-----------( 270      ( 01 Jul 2021 05:24 )             44.6     07-02    136  |  98  |  11  ----------------------------<  111<H>  3.8   |  25  |  0.65    Ca    9.1      02 Jul 2021 06:20  Phos  3.0     07-01  Mg     1.9     07-01    TPro  7.2  /  Alb  3.8  /  TBili  0.5  /  DBili  x   /  AST  20  /  ALT  7<L>  /  AlkPhos  71  07-01    PT/INR - ( 01 Jul 2021 05:24 )   PT: 13.6 sec;   INR: 1.14 ratio                     RADIOLOGY & ADDITIONAL TESTS:    Imaging Personally Reviewed:    Consultant(s) Notes Reviewed:      Care Discussed with Consultants/Other Providers:

## 2021-07-02 NOTE — PROGRESS NOTE ADULT - ASSESSMENT
72 yo F with PMH of anxiety, IBS, GERD, Cholangiocarcinoma with metastases, presents here with abdominal pain.  Patient states she stopped chemotherapy mid May due to abdominal pain.

## 2021-07-02 NOTE — PROGRESS NOTE ADULT - ASSESSMENT
72 yo F with PMH of anxiety, IBS, GERD, Cholangiocarcinoma with metastases, presents here with abdominal pain.    Abdominal pain.  - Patient with abdominal pain, likely 2/2 to underlying metastatic cholangiocarcinoma and peritoneal carcinomatosis; Also with new moderate amount ascites, no signs of SBP  - s/p therapeutic paracentesis  - pain control.     Cholangiocarcinoma.   - patient with cholangiocarcinoma, currently not on chemo  - oncology f/u.     Atrial fibrillation.    - Patient with new onset Afib, unclear etiology  - start patient on metoprolol 25mg bid for rate control  - check CTA chest to r/o PE given SOB and GANNON  - CHADSVASC 2-3 (age, female, ?HTN)  - hold off AC for now  - echocardiogram.     Respiratory distress.   - Patient with orthopnea, GANNON, and now tachypneic  - no hypoxia  - due to pressure from ascites    Chronic pain.   - patient on methadone 5mg tid and hydromorphone 2mg qid   - pain management evaluation  Anxiety.   - xanax 1mg qid prn.    Elevated blood pressure reading.    - elevated BP, possibly 2/2 to pain and anxiety    Prophylactic measure.   - no AC given bleeding risk     Palliative evaluation.    DNR    d/w patient and family     Arden Benitez MD pager [phone]

## 2021-07-02 NOTE — PROGRESS NOTE ADULT - SUBJECTIVE AND OBJECTIVE BOX
HPI:  70 yo F with PMH of anxiety, IBS, GERD, Cholangiocarcinoma with metastases, presents here with abdominal pain.  Patient states she stopped chemotherapy mid May due to abdominal pain. Over the last few weeks her abdominal pain has been getting worse. She describes it as starting LUQ, radiating across bandlike to the RUQ and back.  Pain is constant and worse with movement and walking.  She also has been having SOB with minimal walking or laying flat.  Denies fever, chills, cough, runny nose.  She does have nausea and vomiting and constipation.  She has been seeing pain management, but over the last few weeks she has been calling pain management doctor multiple times a day because their regimen is not working.  She has tried hydromorphone, methadone, suboxone, etc.  Currently she is on hydromorphone 2mg 4x/d and methadone 5mg TID.  Vitals show 97.9, HR 86, /81, saturation 96%.  While in ED, patient had an episode of Afib w/RVR rate of 150s.     (01 Jul 2021 00:33)    7/1 Palliative care was called for pain management. The patient was also c/o intermittent nausea, anxiety, and dyspnea with moderate efforts.   7/2 s/p therapeutic paracentesis. Seen with her daughter by her bedside. Still c/o pain over her abdomen though it was improving with Dilaudid.     PERTINENT PM/SXH:   Cholangiocarcinoma    Irritable bowel syndrome (IBS)      No significant past surgical history    S/P tubal ligation      FAMILY HISTORY:  Family history of early CAD (Father)    FH: type 2 diabetes      ITEMS NOT CHECKED ARE NOT PRESENT    SOCIAL HISTORY:   Significant other/partner[x ]   Children[ ]  Jewish/Spirituality:  Substance hx:  [ ]   Tobacco hx:  [ ]   Alcohol hx: [ ]   Home Opioid hx:  [ ] I-Stop Reference No: d/w primary palliative care provider, RENÉ Dominguez. The patient was supposed to be on Methadone 5mg q 8h and Dilaudid 8mg PRN.   Living Situation: [ x]Home  [ ]Long term care  [ ]Rehab [ ]Other    ADVANCE DIRECTIVES:    DNR  Yes    MOLST  [ ]  Living Will  [ ]   DECISION MAKER(s):  [ ] Health Care Proxy(s)  [ x] Surrogate(s)  [ ] Guardian           Name(s): Phone Number(s):      BASELINE (I)ADL(s) (prior to admission):  [place]: [x ]Total  [ ] Moderate [ ]Dependent    Allergies    codeine (Short breath)  contrast media (iodine-based) (Anaphylaxis)    Intolerances    MEDICATIONS  (STANDING):  dronabinol 2.5 milliGRAM(s) Oral two times a day  escitalopram 5 milliGRAM(s) Oral daily  heparin   Injectable 5000 Unit(s) SubCutaneous every 12 hours  methadone    Tablet 5 milliGRAM(s) Oral every 8 hours  metoprolol tartrate 50 milliGRAM(s) Oral two times a day  ondansetron Injectable 8 milliGRAM(s) IV Push every 8 hours  senna 2 Tablet(s) Oral at bedtime    MEDICATIONS  (PRN):  acetaminophen   Tablet .. 650 milliGRAM(s) Oral every 6 hours PRN Temp greater or equal to 38C (100.4F), Mild Pain (1 - 3)  ALPRAZolam 0.25 milliGRAM(s) Oral four times a day PRN anxiety  HYDROmorphone  Injectable 0.4 milliGRAM(s) IV Push every 2 hours PRN Moderate Pain (4 - 6)  HYDROmorphone  Injectable 0.8 milliGRAM(s) IV Push every 2 hours PRN Severe Pain (7 - 10)  lactulose Syrup 10 Gram(s) Oral daily PRN constipation  prochlorperazine   Injectable 5 milliGRAM(s) IV Push every 6 hours PRN Nauease or vomiting      PRESENT SYMPTOMS: [ ]Unable to obtain due to poor mentation   Source if other than patient:  [ ]Family   [ ]Team     Pain: [x ]yes [ ]no  QOL impact - Not able to rest   Location -                  abdomen   Aggravating factors - none   Quality - pressure   Radiation - none   Timing- intermittent   Severity (0-10 scale): 7  Minimal acceptable level (0-10 scale): 4    CPOT:    https://www.[facility].org/getattachment/[email or web address]/Critical-Care-Pain-Observation-Tool-(CPOT)      PAIN AD Score:     http://geriatrictoolkit.[facility]/cog/painad.pdf (press ctrl +  left click to view)    Dyspnea:                           [x ]Mild [ ]Moderate [ ]Severe  Anxiety:                             [x ]Mild [ ]Moderate [ ]Severe  Fatigue:                             [ ]Mild [ ]Moderate [ ]Severe  Nausea:                             [ ]Mild [ ]Moderate [ ]Severe  Loss of appetite:              [ ]Mild [ ]Moderate [ ]Severe  Constipation:                    [ ]Mild [ ]Moderate [ ]Severe    Other Symptoms:  [x ]All other review of systems negative     Palliative Performance Status Version 2:  70       %    http://[facility].org/files/news/palliative_performance_scale_ppsv2.pdf  PHYSICAL EXAM:  Vital Signs Last 24 Hrs  T(C): 36.3 (02 Jul 2021 13:25), Max: 97 (02 Jul 2021 11:19)  T(F): 97.4 (02 Jul 2021 13:25), Max: 97.8 (01 Jul 2021 20:24)  HR: 71 (02 Jul 2021 13:30) (62 - 71)  BP: 132/70 (02 Jul 2021 13:30) (126/74 - 155/90)  BP(mean): --  RR: 18 (02 Jul 2021 13:30) (18 - 19)  SpO2: 97% (02 Jul 2021 13:30) (92% - 99%)  GENERAL:  [ x]Alert  [x ]Oriented x  3 [ ]Lethargic  [ ]Cachexia  [ ]Unarousable  [ ]Verbal  [ ]Non-Verbal  Behavioral:   [ ] Anxiety  [ ] Delirium [ ] Agitation [ ] Other  HEENT:  [x ]Normal   [ ]Dry mouth   [ ]ET Tube/Trach  [ ]Oral lesions  PULMONARY:   [ ]Clear [ ]Tachypnea  [ ]Audible excessive secretions   [ ]Rhonchi        [ ]Right [ ]Left [ ]Bilateral  [ ]Crackles        [ ]Right [ ]Left [ ]Bilateral  [ ]Wheezing     [ ]Right [ ]Left [ ]Bilateral  x[ ]Diminished breath sounds [ ]right [ ]left [ x]bilateral but mainly on the rsh  CARDIOVASCULAR:    [x ]Regular [ ]Irregular [ ]Tachy  [ ][place] [ ]Murmur [ ]Other  GASTROINTESTINAL:  [ ]Soft  [ ]Distended   [ ]+BS  [ ]Non tender [ ]Tender  [ ]PEG [ ]OGT/ NGT  Last BM: no documented up to now.   GENITOURINARY:  [x ]Normal [ ] Incontinent   [ ]Oliguria/Anuria   [ ]Doran  MUSCULOSKELETAL:   [ ]Normal   [x ]Weakness  [ ]Bed/Wheelchair bound [ ]Edema  NEUROLOGIC:   x[ ]No focal deficits  [ ]Cognitive impairment  [ ]Dysphagia [ ]Dysarthria [ ]Paresis [ ]Other   SKIN:   [x ]Normal    [ ]Rash  [ ]Pressure ulcer(s)       Present on admission [ ]y [ ]n    CRITICAL CARE:  [ ] Shock Present  [ ]Septic [ ]Cardiogenic [ ]Neurologic [ ]Hypovolemic  [ ]  Vasopressors [ ]  Inotropes   [ ]Respiratory failure present [ ]Mechanical ventilation [ ]Non-invasive ventilatory support [ ]High flow    [ ]Acute  [ ]Chronic [ ]Hypoxic  [ ]Hypercarbic [ ]Other  [ ]Other organ failure     LABS:                                            14.8   8.68  )-----------( 270      ( 01 Jul 2021 05:24 )             44.6   07-02    136  |  98  |  11  ----------------------------<  111<H>  3.8   |  25  |  0.65    Ca    9.1      02 Jul 2021 06:20  Phos  3.0     07-01  Mg     1.9     07-01    TPro  7.2  /  Alb  3.8  /  TBili  0.5  /  DBili  x   /  AST  20  /  ALT  7<L>  /  AlkPhos  71  07-01             RADIOLOGY & ADDITIONAL STUDIES:  ct< from: CT Abdomen and Pelvis No Cont (06.30.21 @ 20:17) >  IMPRESSION:    New moderate amount of ascites and small right pleural effusion..    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        AMISHA DUMONT MD; Resident RadiologyEXAM:  CT ABDOMEN AND PELVIS                            PROCEDURE DATE:  06/30/2021      < end of copied text >    PROTEIN CALORIE MALNUTRITION PRESENT: [ ]mild [ ]moderate [ ]severe [ ]underweight [ ]morbid obesity  https://www.andeal.org/vault/2440/web/files/ONC/Table_Clinical%20Characteristics%20to%20Document%20Malnutrition-White%20JV%20et%20al%202012.pdf    Height (cm): 160 (07-01-21 @ 06:36), 161.3 (04-30-21 @ 19:21), 161.3 (09-04-20 @ 10:29)  Weight (kg): 99.4 (07-01-21 @ 09:17), 95.6 (09-04-20 @ 10:29)  BMI (kg/m2): 38.8 (07-01-21 @ 09:17), 37.3 (07-01-21 @ 06:36), 36.7 (04-30-21 @ 19:21)    [ ]PPSV2 < or = to 30% [ ]significant weight loss  [ ]poor nutritional intake  [ ]anasarca     Albumin, Serum: 3.8 g/dL (07-01-21 @ 05:24)   [ ]Artificial Nutrition      REFERRALS:   [ ]Chaplaincy  [ ]Hospice  [ ]Child Life  [ ]Social Work  [ ]Case management [ ]Holistic Therapy     Goals of Care Document:  SUBJECTIVE AND OBJECTIVE: patient seen and evaluated. Palliative called overnight for adjustment of PRN med from 0.8mg to 1.2mg. Despite increase, remains with frequent PRN usage and uncontrolled pain as well as anxiety. Discussed PCA with patient, spouse and daughter. Awaiting bed in PCU.     INTERVAL HPI/OVERNIGHT EVENTS: x3 PRNs of 1.2mg IV dilaudid    DNR on chart: Yes      Allergies    codeine (Short breath)  contrast media (iodine-based) (Anaphylaxis)    Intolerances    MEDICATIONS  (STANDING):  ALPRAZolam 0.25 milliGRAM(s) Oral three times a day  dronabinol 2.5 milliGRAM(s) Oral two times a day  escitalopram 5 milliGRAM(s) Oral daily  heparin   Injectable 5000 Unit(s) SubCutaneous every 12 hours  HYDROmorphone PCA (1 mG/mL) 30 milliLiter(s) PCA Continuous PCA Continuous  methadone    Tablet 5 milliGRAM(s) Oral every 8 hours  metoprolol tartrate 50 milliGRAM(s) Oral two times a day  ondansetron Injectable 8 milliGRAM(s) IV Push every 8 hours  senna 2 Tablet(s) Oral at bedtime    MEDICATIONS  (PRN):  acetaminophen   Tablet .. 650 milliGRAM(s) Oral every 6 hours PRN Temp greater or equal to 38C (100.4F), Mild Pain (1 - 3)  HYDROmorphone  Injectable 1.2 milliGRAM(s) IV Push every 2 hours PRN Severe Pain (7 - 10)  HYDROmorphone PCA (1 mG/mL) Rescue Clinician Bolus 1.5 milliGRAM(s) IV Push every 1 hour PRN Severe Pain (7 - 10)  lactulose Syrup 10 Gram(s) Oral daily PRN constipation  prochlorperazine   Injectable 5 milliGRAM(s) IV Push every 6 hours PRN Nauease or vomiting      ITEMS UNCHECKED ARE NOT PRESENT    PRESENT SYMPTOMS: [ ]Unable to obtain due to poor mentation   Source if other than patient:  [ ]Family   [ ]Team     Pain:  [x ]yes [ ]no  QOL impact - debilitating, poor sleep  Location -         lower abdomen           Aggravating factors - medication wearing off  Quality -aching  Radiation -localized  Timing- comes/goes  Severity (0-10 scale): 8  Minimal acceptable level (0-10 scale): 4    Dyspnea:                           [ ]Mild [ ]Moderate [ ]Severe  Anxiety:                             [ ]Mild [ ]Moderate [x ]Severe  Fatigue:                             [ ]Mild [ ]Moderate [ ]Severe  Nausea:                             [ ]Mild [ ]Moderate [ ]Severe  Loss of appetite:              [ ]Mild [ ]Moderate [ ]Severe  Constipation:                    [ ]Mild [ ]Moderate [ ]Severe    CPOT:    https://www.[facility].org/getattachment/[email or web address]/Critical-Care-Pain-Observation-Tool-(CPOT)    PAIN AD Score:	  http://geriatrictoolkit.[facility]/cog/painad.pdf (Ctrl + left click to view)    Other Symptoms:  [ x]All other review of systems negative     Palliative Performance Status Version 2:       70  %      http://[facility].org/files/news/palliative_performance_scale_ppsv2.pdf  PHYSICAL EXAM:  Vital Signs Last 24 Hrs  T(C): 36.8 (03 Jul 2021 12:50), Max: 37 (03 Jul 2021 04:14)  T(F): 98.3 (03 Jul 2021 12:50), Max: 98.6 (03 Jul 2021 04:14)  HR: 68 (03 Jul 2021 12:50) (61 - 68)  BP: 124/72 (03 Jul 2021 12:50) (105/64 - 147/73)  BP(mean): --  RR: 18 (03 Jul 2021 12:50) (18 - 18)  SpO2: 96% (03 Jul 2021 12:50) (96% - 97%) I&O's Summary    02 Jul 2021 07:01  -  03 Jul 2021 07:00  --------------------------------------------------------  IN: 960 mL / OUT: 0 mL / NET: 960 mL    03 Jul 2021 07:01  -  03 Jul 2021 14:13  --------------------------------------------------------  IN: 300 mL / OUT: 0 mL / NET: 300 mL       GENERAL:  [ x]Alert  [x ]Oriented x3   [ ]Lethargic  [ ]Cachexia  [ ]Unarousable  [ ]Verbal  [ ]Non-Verbal  Behavioral:   [x ]Anxiety  [ ]Delirium [ ]Agitation [ ]Other  HEENT:  [x ]Normal   [ ]Dry mouth   [ ]ET Tube/Trach  [ ]Oral lesions  PULMONARY:   [ x]Clear [ ]Tachypnea  [ ]Audible excessive secretions   [ ]Rhonchi        [ ]Right [ ]Left [ ]Bilateral  [ ]Crackles        [ ]Right [ ]Left [ ]Bilateral  [ ]Wheezing     [ ]Right [ ]Left [ ]Bilateral  [ ]Diminished BS [ ] Right [ ]Left [ ]Bilateral  CARDIOVASCULAR:    [x ]Regular [ ]Irregular [ ]Tachy  [ ][place] [ ]Murmur [ ]Other  GASTROINTESTINAL:  [x ]Soft  [ ]Distended   [x ]+BS  [ ]Non tender [ x]Tender  [ ]PEG [ ]OGT/ NGT   Last BM: 7/3     GENITOURINARY:  [ ]Normal [ x]Incontinent   [ ]Oliguria/Anuria   [ ]Doran  MUSCULOSKELETAL:   [ ]Normal   [ x]Weakness  [ ]Bed/Wheelchair bound [ ]Edema  NEUROLOGIC:   [ x]No focal deficits  [ ] Cognitive impairment  [ ] Dysphagia [ ]Dysarthria [ ] Paresis [ ]Other   SKIN:   [ ]Normal  [ ]Rash   [ ]Pressure ulcer(s) [ ]y [ ]n present on admission    CRITICAL CARE:  [ ]Shock Present  [ ]Septic [ ]Cardiogenic [ ]Neurologic [ ]Hypovolemic  [ ]Vasopressors [ ]Inotropes  [ ]Respiratory failure present [ ]Mechanical Ventilation [ ]Non-invasive ventilatory support [ ]High-Flow   [ ]Acute  [ ]Chronic [ ]Hypoxic  [ ]Hypercarbic [ ]Other  [ ]Other organ failure     LABS:  07-02    136  |  98  |  11  ----------------------------<  111<H>  3.8   |  25  |  0.65    Ca    9.1      02 Jul 2021 06:20      RADIOLOGY & ADDITIONAL STUDIES: no new imaging studies    Protein Calorie Malnutrition Present: [ ]mild [ ]moderate [ ]severe [ ]underweight [ ]morbid obesity  https://www.andeal.org/vault/2440/web/files/ONC/Table_Clinical%20Characteristics%20to%20Document%20Malnutrition-White%20JV%20et%20al%202012.pdf    Height (cm): 160 (07-01-21 @ 06:36), 161.3 (04-30-21 @ 19:21), 161.3 (09-04-20 @ 10:29)  Weight (kg): 99.4 (07-01-21 @ 09:17), 95.6 (09-04-20 @ 10:29)  BMI (kg/m2): 38.8 (07-01-21 @ 09:17), 37.3 (07-01-21 @ 06:36), 36.7 (04-30-21 @ 19:21)    [ ]PPSV2 < or = 30%  [ ]significant weight loss [ ]poor nutritional intake [ ]anasarca   Albumin, Serum: 3.8 g/dL (07-01-21 @ 05:24)   [ ]Artificial Nutrition    REFERRALS:   [ ]Chaplaincy  [ ]Hospice  [ ]Child Life  [ ]Social Work  [x ]Case management [ ]Holistic Therapy     Goals of Care Document:     HPI:  72 yo F with PMH of anxiety, IBS, GERD, Cholangiocarcinoma with metastases, presents here with abdominal pain.  Patient states she stopped chemotherapy mid May due to abdominal pain. Over the last few weeks her abdominal pain has been getting worse. She describes it as starting LUQ, radiating across bandlike to the RUQ and back.  Pain is constant and worse with movement and walking.  She also has been having SOB with minimal walking or laying flat.  Denies fever, chills, cough, runny nose.  She does have nausea and vomiting and constipation.  She has been seeing pain management, but over the last few weeks she has been calling pain management doctor multiple times a day because their regimen is not working.  She has tried hydromorphone, methadone, suboxone, etc.  Currently she is on hydromorphone 2mg 4x/d and methadone 5mg TID.  Vitals show 97.9, HR 86, /81, saturation 96%.  While in ED, patient had an episode of Afib w/RVR rate of 150s.     (01 Jul 2021 00:33)    7/1 Palliative care was called for pain management. The patient was also c/o intermittent nausea, anxiety, and dyspnea with moderate efforts.     7/2 Patient continues to complain of pain. Dilaudid increased to 0.4mg q4h prn for moderate, and 0.8mg q4h prn for severe. awaiting transfer to PCU.    DNR on chart: Yes      Allergies    codeine (Short breath)  contrast media (iodine-based) (Anaphylaxis)    Intolerances    MEDICATIONS  (STANDING):  ALPRAZolam 0.25 milliGRAM(s) Oral three times a day  dronabinol 2.5 milliGRAM(s) Oral two times a day  escitalopram 5 milliGRAM(s) Oral daily  heparin   Injectable 5000 Unit(s) SubCutaneous every 12 hours  HYDROmorphone PCA (1 mG/mL) 30 milliLiter(s) PCA Continuous PCA Continuous  methadone    Tablet 5 milliGRAM(s) Oral every 8 hours  metoprolol tartrate 50 milliGRAM(s) Oral two times a day  ondansetron Injectable 8 milliGRAM(s) IV Push every 8 hours  senna 2 Tablet(s) Oral at bedtime    MEDICATIONS  (PRN):  acetaminophen   Tablet .. 650 milliGRAM(s) Oral every 6 hours PRN Temp greater or equal to 38C (100.4F), Mild Pain (1 - 3)  HYDROmorphone  Injectable 1.2 milliGRAM(s) IV Push every 2 hours PRN Severe Pain (7 - 10)  HYDROmorphone PCA (1 mG/mL) Rescue Clinician Bolus 1.5 milliGRAM(s) IV Push every 1 hour PRN Severe Pain (7 - 10)  lactulose Syrup 10 Gram(s) Oral daily PRN constipation  prochlorperazine   Injectable 5 milliGRAM(s) IV Push every 6 hours PRN Nauease or vomiting      ITEMS UNCHECKED ARE NOT PRESENT    PRESENT SYMPTOMS: [ ]Unable to obtain due to poor mentation   Source if other than patient:  [ ]Family   [ ]Team     Pain:  [x ]yes [ ]no  QOL impact - debilitating, poor sleep  Location -         lower abdomen           Aggravating factors - medication wearing off  Quality -aching  Radiation -localized  Timing- comes/goes  Severity (0-10 scale): 8  Minimal acceptable level (0-10 scale): 4    Dyspnea:                           [ ]Mild [ ]Moderate [ ]Severe  Anxiety:                             [ ]Mild [ ]Moderate [x ]Severe  Fatigue:                             [ ]Mild [ ]Moderate [ ]Severe  Nausea:                             [ ]Mild [ ]Moderate [ ]Severe  Loss of appetite:              [ ]Mild [ ]Moderate [ ]Severe  Constipation:                    [ ]Mild [ ]Moderate [ ]Severe    CPOT:    https://www.[facility].org/getattachment/[email or web address]/Critical-Care-Pain-Observation-Tool-(CPOT)    PAIN AD Score:	  http://geriatrictoolkit.[facility]/cog/painad.pdf (Ctrl + left click to view)    Other Symptoms:  [ x]All other review of systems negative     Palliative Performance Status Version 2:       70  %      http://[facility].org/files/news/palliative_performance_scale_ppsv2.pdf  PHYSICAL EXAM:  Vital Signs Last 24 Hrs  T(C): 36.8 (03 Jul 2021 12:50), Max: 37 (03 Jul 2021 04:14)  T(F): 98.3 (03 Jul 2021 12:50), Max: 98.6 (03 Jul 2021 04:14)  HR: 68 (03 Jul 2021 12:50) (61 - 68)  BP: 124/72 (03 Jul 2021 12:50) (105/64 - 147/73)  BP(mean): --  RR: 18 (03 Jul 2021 12:50) (18 - 18)  SpO2: 96% (03 Jul 2021 12:50) (96% - 97%) I&O's Summary    02 Jul 2021 07:01  -  03 Jul 2021 07:00  --------------------------------------------------------  IN: 960 mL / OUT: 0 mL / NET: 960 mL    03 Jul 2021 07:01  -  03 Jul 2021 14:13  --------------------------------------------------------  IN: 300 mL / OUT: 0 mL / NET: 300 mL       GENERAL:  [ x]Alert  [x ]Oriented x3   [ ]Lethargic  [ ]Cachexia  [ ]Unarousable  [ ]Verbal  [ ]Non-Verbal  Behavioral:   [x ]Anxiety  [ ]Delirium [ ]Agitation [ ]Other  HEENT:  [x ]Normal   [ ]Dry mouth   [ ]ET Tube/Trach  [ ]Oral lesions  PULMONARY:   [ x]Clear [ ]Tachypnea  [ ]Audible excessive secretions   [ ]Rhonchi        [ ]Right [ ]Left [ ]Bilateral  [ ]Crackles        [ ]Right [ ]Left [ ]Bilateral  [ ]Wheezing     [ ]Right [ ]Left [ ]Bilateral  [ ]Diminished BS [ ] Right [ ]Left [ ]Bilateral  CARDIOVASCULAR:    [x ]Regular [ ]Irregular [ ]Tachy  [ ][place] [ ]Murmur [ ]Other  GASTROINTESTINAL:  [x ]Soft  [ ]Distended   [x ]+BS  [ ]Non tender [ x]Tender  [ ]PEG [ ]OGT/ NGT   Last BM: 7/3     GENITOURINARY:  [ ]Normal [ x]Incontinent   [ ]Oliguria/Anuria   [ ]Doran  MUSCULOSKELETAL:   [ ]Normal   [ x]Weakness  [ ]Bed/Wheelchair bound [ ]Edema  NEUROLOGIC:   [ x]No focal deficits  [ ] Cognitive impairment  [ ] Dysphagia [ ]Dysarthria [ ] Paresis [ ]Other   SKIN:   [ ]Normal  [ ]Rash   [ ]Pressure ulcer(s) [ ]y [ ]n present on admission    CRITICAL CARE:  [ ]Shock Present  [ ]Septic [ ]Cardiogenic [ ]Neurologic [ ]Hypovolemic  [ ]Vasopressors [ ]Inotropes  [ ]Respiratory failure present [ ]Mechanical Ventilation [ ]Non-invasive ventilatory support [ ]High-Flow   [ ]Acute  [ ]Chronic [ ]Hypoxic  [ ]Hypercarbic [ ]Other  [ ]Other organ failure     LABS:  07-02    136  |  98  |  11  ----------------------------<  111<H>  3.8   |  25  |  0.65    Ca    9.1      02 Jul 2021 06:20      RADIOLOGY & ADDITIONAL STUDIES: no new imaging studies    Protein Calorie Malnutrition Present: [ ]mild [ ]moderate [ ]severe [ ]underweight [ ]morbid obesity  https://www.andeal.org/vault/2440/web/files/ONC/Table_Clinical%20Characteristics%20to%20Document%20Malnutrition-White%20JV%20et%20al%202012.pdf    Height (cm): 160 (07-01-21 @ 06:36), 161.3 (04-30-21 @ 19:21), 161.3 (09-04-20 @ 10:29)  Weight (kg): 99.4 (07-01-21 @ 09:17), 95.6 (09-04-20 @ 10:29)  BMI (kg/m2): 38.8 (07-01-21 @ 09:17), 37.3 (07-01-21 @ 06:36), 36.7 (04-30-21 @ 19:21)    [ ]PPSV2 < or = 30%  [ ]significant weight loss [ ]poor nutritional intake [ ]anasarca   Albumin, Serum: 3.8 g/dL (07-01-21 @ 05:24)   [ ]Artificial Nutrition    REFERRALS:   [ ]Chaplaincy  [ ]Hospice  [ ]Child Life  [ ]Social Work  [x ]Case management [ ]Holistic Therapy     Goals of Care Document:

## 2021-07-02 NOTE — PRE PROCEDURE NOTE - PRE PROCEDURE EVALUATION
Interventional Radiology    HPI: 71y Female with hx of metastatic cholangiocarcinoma with peritoneal carcinomatosis, p/w moderate ascites. IR was consulted for diagnostic and therapeutic paracentesis.     Allergies: codeine (Short breath)  contrast media (iodine-based) (Anaphylaxis)    Medications (Abx/Cardiac/Anticoagulation/Blood Products)  furosemide   Injectable: 20 milliGRAM(s) IV Push (07-01 @ 01:42)  heparin   Injectable: 5000 Unit(s) SubCutaneous (07-02 @ 06:13)  hydrALAZINE Injectable: 5 milliGRAM(s) IV Push (07-01 @ 01:43)  metoprolol tartrate: 25 milliGRAM(s) Oral (07-01 @ 05:33)  metoprolol tartrate: 50 milliGRAM(s) Oral (07-02 @ 06:13)  metoprolol tartrate: 25 milliGRAM(s) Oral (07-01 @ 10:44)  metoprolol tartrate Injectable: 5 milliGRAM(s) IV Push (07-01 @ 06:11)  metoprolol tartrate Injectable: 5 milliGRAM(s) IV Push (07-01 @ 07:58)  metoprolol tartrate Injectable: 5 milliGRAM(s) IV Push (07-01 @ 04:47)    Data:    T(C): 36.1  HR: 62  BP: 126/74  RR: 18  SpO2: 95%    Exam  General: No acute distress  Chest: Non labored breathing  Abdomen: Non-distended  Extremities: No swelling, warm    -WBC 8.68 / HgB 14.8 / Hct 44.6 / Plt 270  -Na 136 / Cl 98 / BUN 11 / Glucose 111  -K 3.8 / CO2 25 / Cr 0.65  -ALT -- / Alk Phos -- / T.Bili --  -INR1.14    Plan:   71y Female with hx of metastatic cholangiocarcinoma with peritoneal carcinomatosis, p/w moderate ascites. IR was consulted for diagnostic and therapeutic paracentesis.   -- Relevant imaging and labs were reviewed.   -- No additional antibiotics are indicated for this procedure.   -- Patient is DNR; was not modified or suspended.   -- Risks, benefits, and alternatives were explained to the patient and informed consent was obtained.   Interventional Radiology    HPI: 71y Female with hx of metastatic cholangiocarcinoma with peritoneal carcinomatosis, p/w moderate ascites. IR was consulted for diagnostic and therapeutic paracentesis.     Allergies: codeine (Short breath)  contrast media (iodine-based) (Anaphylaxis)    Medications (Abx/Cardiac/Anticoagulation/Blood Products)  furosemide   Injectable: 20 milliGRAM(s) IV Push (07-01 @ 01:42)  heparin   Injectable: 5000 Unit(s) SubCutaneous (07-02 @ 06:13)  hydrALAZINE Injectable: 5 milliGRAM(s) IV Push (07-01 @ 01:43)  metoprolol tartrate: 25 milliGRAM(s) Oral (07-01 @ 05:33)  metoprolol tartrate: 50 milliGRAM(s) Oral (07-02 @ 06:13)  metoprolol tartrate: 25 milliGRAM(s) Oral (07-01 @ 10:44)  metoprolol tartrate Injectable: 5 milliGRAM(s) IV Push (07-01 @ 06:11)  metoprolol tartrate Injectable: 5 milliGRAM(s) IV Push (07-01 @ 07:58)  metoprolol tartrate Injectable: 5 milliGRAM(s) IV Push (07-01 @ 04:47)    Data:    T(C): 36.1  HR: 62  BP: 126/74  RR: 18  SpO2: 95%    Exam  General: No acute distress  Chest: Non labored breathing  Abdomen: Non-distended  Extremities: No swelling, warm    -WBC 8.68 / HgB 14.8 / Hct 44.6 / Plt 270  -Na 136 / Cl 98 / BUN 11 / Glucose 111  -K 3.8 / CO2 25 / Cr 0.65  -ALT -- / Alk Phos -- / T.Bili --  -INR1.14    Plan:   71y Female with hx of metastatic cholangiocarcinoma with peritoneal carcinomatosis, p/w moderate ascites. IR was consulted for diagnostic and therapeutic paracentesis.   -- Relevant imaging and labs were reviewed.   -- No additional antibiotics are indicated for this procedure.   -- Patient is DNR; was not modified or suspended.    -- Risks, benefits, and alternatives were explained to the patient and informed consent was obtained.   You can access the FollowMyHealth Patient Portal offered by [facility] by registering at the following website: [email or web address] By joining [facility]’s FollowMyHealth portal, you will also be able to view your health information using other applications (apps) compatible with our system.

## 2021-07-02 NOTE — PROCEDURE NOTE - PROCEDURE FINDINGS AND DETAILS
4.8 L serous, straw-colored fluid drained.  no complications.  patient tolerated procedure well. 4.8 L serous, straw-colored fluid drained.  No immediate complications.  patient tolerated procedure well.

## 2021-07-02 NOTE — PROGRESS NOTE ADULT - PROBLEM SELECTOR PLAN 7
The patient is already DNR.   Will transfer to PCU for symptoms monitoring and Rx. The patient is already DNR.   Will transfer to PCU for symptoms monitoring and Rx.  in the event of new or worsening symptoms, please page on call [phone]    Discussed with patient, family, bedside RN and primary team NP. The patient is already DNR.   Will transfer to PCU for symptoms monitoring and Rx.  page if needed [phone]

## 2021-07-02 NOTE — PROGRESS NOTE ADULT - ASSESSMENT
72 yo F with PMH of anxiety, IBS, GERD, Cholangiocarcinoma with metastases, presents here with abdominal pain. Concern for POD with ascites. Plan for paracentesis and palliative following for pain control.     # Stage IV Cholangiocarcinoma  - Diagnosed 2019  - Started on Gemzar/Cisplatin 12/20/19 but unable to tolerate cisplatin due to side effects  - Was on Gemzar alone until 4/2021 when patient had POD  - Switched to IDH1 inhibitor TIBSOVO started 4/19/21  - Repeat scans with POD and Dr. Castro recommended hospice  - Palliative care following; recommend that they readdress GOC which have been brought up to patient several times as outpatient per d/w Dr. Castro. Patient is hospice appropriate and not a candidate for further DMT.   - D/w patient and family today:       Chel Lucero, PGY-4  Hematology-Oncology Fellow  [phone] ([place]) [phone] ([facility])  I can also be reached on [employer] Teams  Please page fellow on call after 5pm and on weekends 72 yo F with PMH of anxiety, IBS, GERD, Cholangiocarcinoma with metastases, presents here with abdominal pain. Concern for POD with ascites. Plan for paracentesis and palliative following for pain control.     # Stage IV Cholangiocarcinoma  - Diagnosed 2019  - Started on Gemzar/Cisplatin 12/20/19 but unable to tolerate cisplatin due to side effects  - Was on Gemzar alone until 4/2021 when patient had POD  - Switched to IDH1 inhibitor TIBSOVO started 4/19/21  - Repeat scans with POD and Dr. Castro recommended hospice  - Palliative care following; recommend that they readdress GOC which have been brought up to patient several times as outpatient per d/w Dr. Castro. Patient is hospice appropriate and not a candidate for further DMT.   - D/w patient and family today:  and daughter at bedside. Patient states her goal is to be pain free. She states she is aware she is dying and all she wants is not to be in pain when it happens.  and daughter state they don't want team to go 'digging for problems that aren't bothering her currently'. Agree that a comfort focused approach during this hospitalization with eventual transition to hospice is best. Patient and family's wishes were communicated to palliative care and primary team.       Chel Lucero, PGY-4  Hematology-Oncology Fellow  [phone] ([place]) [phone] ([facility])  I can also be reached on "[employer] Teams  Please page fellow on call after 5pm and on weekends

## 2021-07-03 PROCEDURE — 99233 SBSQ HOSP IP/OBS HIGH 50: CPT

## 2021-07-03 RX ORDER — HYDROMORPHONE HYDROCHLORIDE 2 MG/ML
30 INJECTION INTRAMUSCULAR; INTRAVENOUS; SUBCUTANEOUS
Refills: 0 | Status: DISCONTINUED | OUTPATIENT
Start: 2021-07-03 | End: 2021-07-07

## 2021-07-03 RX ORDER — HYDROMORPHONE HYDROCHLORIDE 2 MG/ML
1.5 INJECTION INTRAMUSCULAR; INTRAVENOUS; SUBCUTANEOUS
Refills: 0 | Status: DISCONTINUED | OUTPATIENT
Start: 2021-07-03 | End: 2021-07-09

## 2021-07-03 RX ORDER — ALPRAZOLAM 0.25 MG
0.25 TABLET ORAL THREE TIMES A DAY
Refills: 0 | Status: DISCONTINUED | OUTPATIENT
Start: 2021-07-03 | End: 2021-07-09

## 2021-07-03 RX ADMIN — HEPARIN SODIUM 5000 UNIT(S): 5000 INJECTION INTRAVENOUS; SUBCUTANEOUS at 18:07

## 2021-07-03 RX ADMIN — ESCITALOPRAM OXALATE 5 MILLIGRAM(S): 10 TABLET, FILM COATED ORAL at 11:24

## 2021-07-03 RX ADMIN — Medication 50 MILLIGRAM(S): at 18:00

## 2021-07-03 RX ADMIN — HEPARIN SODIUM 5000 UNIT(S): 5000 INJECTION INTRAVENOUS; SUBCUTANEOUS at 05:23

## 2021-07-03 RX ADMIN — ONDANSETRON 8 MILLIGRAM(S): 8 TABLET, FILM COATED ORAL at 15:25

## 2021-07-03 RX ADMIN — METHADONE HYDROCHLORIDE 5 MILLIGRAM(S): 40 TABLET ORAL at 21:50

## 2021-07-03 RX ADMIN — HYDROMORPHONE HYDROCHLORIDE 1.2 MILLIGRAM(S): 2 INJECTION INTRAMUSCULAR; INTRAVENOUS; SUBCUTANEOUS at 10:46

## 2021-07-03 RX ADMIN — HYDROMORPHONE HYDROCHLORIDE 1.2 MILLIGRAM(S): 2 INJECTION INTRAMUSCULAR; INTRAVENOUS; SUBCUTANEOUS at 09:36

## 2021-07-03 RX ADMIN — Medication 0.25 MILLIGRAM(S): at 09:36

## 2021-07-03 RX ADMIN — METHADONE HYDROCHLORIDE 5 MILLIGRAM(S): 40 TABLET ORAL at 15:24

## 2021-07-03 RX ADMIN — Medication 2.5 MILLIGRAM(S): at 17:59

## 2021-07-03 RX ADMIN — Medication 0.25 MILLIGRAM(S): at 15:24

## 2021-07-03 RX ADMIN — HYDROMORPHONE HYDROCHLORIDE 1.2 MILLIGRAM(S): 2 INJECTION INTRAMUSCULAR; INTRAVENOUS; SUBCUTANEOUS at 02:22

## 2021-07-03 RX ADMIN — SENNA PLUS 2 TABLET(S): 8.6 TABLET ORAL at 21:51

## 2021-07-03 RX ADMIN — HYDROMORPHONE HYDROCHLORIDE 30 MILLILITER(S): 2 INJECTION INTRAMUSCULAR; INTRAVENOUS; SUBCUTANEOUS at 15:01

## 2021-07-03 RX ADMIN — Medication 0.25 MILLIGRAM(S): at 21:51

## 2021-07-03 RX ADMIN — ONDANSETRON 8 MILLIGRAM(S): 8 TABLET, FILM COATED ORAL at 05:23

## 2021-07-03 RX ADMIN — Medication 50 MILLIGRAM(S): at 05:34

## 2021-07-03 RX ADMIN — Medication 2.5 MILLIGRAM(S): at 11:24

## 2021-07-03 RX ADMIN — HYDROMORPHONE HYDROCHLORIDE 1.2 MILLIGRAM(S): 2 INJECTION INTRAMUSCULAR; INTRAVENOUS; SUBCUTANEOUS at 12:11

## 2021-07-03 RX ADMIN — HYDROMORPHONE HYDROCHLORIDE 1.2 MILLIGRAM(S): 2 INJECTION INTRAMUSCULAR; INTRAVENOUS; SUBCUTANEOUS at 02:52

## 2021-07-03 RX ADMIN — METHADONE HYDROCHLORIDE 5 MILLIGRAM(S): 40 TABLET ORAL at 05:23

## 2021-07-03 RX ADMIN — Medication 5 MILLIGRAM(S): at 09:37

## 2021-07-03 RX ADMIN — ONDANSETRON 8 MILLIGRAM(S): 8 TABLET, FILM COATED ORAL at 21:51

## 2021-07-03 RX ADMIN — HYDROMORPHONE HYDROCHLORIDE 30 MILLILITER(S): 2 INJECTION INTRAMUSCULAR; INTRAVENOUS; SUBCUTANEOUS at 19:19

## 2021-07-03 NOTE — PROGRESS NOTE ADULT - ASSESSMENT
70 yo F with PMH of anxiety, IBS, GERD, Cholangiocarcinoma with metastases, presents here with abdominal pain.    Abdominal pain.  - Patient with abdominal pain, likely 2/2 to underlying metastatic cholangiocarcinoma and peritoneal carcinomatosis; Also with new moderate amount ascites, no signs of SBP  - s/p therapeutic paracentesis  - pain control.     Cholangiocarcinoma.   - patient with cholangiocarcinoma, currently not on chemo  - oncology f/u.     Atrial fibrillation.    - Patient with new onset Afib, unclear etiology  - start patient on metoprolol 25mg bid for rate control  - check CTA chest to r/o PE given SOB and GANNON  - CHADSVASC 2-3 (age, female, ?HTN)  - hold off AC for now  - echocardiogram.     Respiratory distress.   - Patient with orthopnea, GANNON, and now tachypneic  - no hypoxia  - due to pressure from ascites    Chronic pain.   - patient on methadone 5mg tid and hydromorphone 2mg qid   - pain management evaluation  Anxiety.   - xanax 1mg qid prn.    Elevated blood pressure reading.    - elevated BP, possibly 2/2 to pain and anxiety    Prophylactic measure.   - no AC given bleeding risk     Palliative follow. Possible transfer to PCU.    DNR    d/w patient and     Arden Benitez MD pager [phone]

## 2021-07-03 NOTE — PROGRESS NOTE ADULT - SUBJECTIVE AND OBJECTIVE BOX
SUBJECTIVE AND OBJECTIVE:  INTERVAL HPI/OVERNIGHT EVENTS:    DNR on chart: Yes      Allergies    codeine (Short breath)  contrast media (iodine-based) (Anaphylaxis)    Intolerances    MEDICATIONS  (STANDING):  ALPRAZolam 0.25 milliGRAM(s) Oral three times a day  dronabinol 2.5 milliGRAM(s) Oral two times a day  escitalopram 5 milliGRAM(s) Oral daily  heparin   Injectable 5000 Unit(s) SubCutaneous every 12 hours  HYDROmorphone PCA (1 mG/mL) 30 milliLiter(s) PCA Continuous PCA Continuous  methadone    Tablet 5 milliGRAM(s) Oral every 8 hours  metoprolol tartrate 50 milliGRAM(s) Oral two times a day  ondansetron Injectable 8 milliGRAM(s) IV Push every 8 hours  senna 2 Tablet(s) Oral at bedtime    MEDICATIONS  (PRN):  acetaminophen   Tablet .. 650 milliGRAM(s) Oral every 6 hours PRN Temp greater or equal to 38C (100.4F), Mild Pain (1 - 3)  HYDROmorphone  Injectable 1.2 milliGRAM(s) IV Push every 2 hours PRN Severe Pain (7 - 10)  HYDROmorphone PCA (1 mG/mL) Rescue Clinician Bolus 1.5 milliGRAM(s) IV Push every 1 hour PRN Severe Pain (7 - 10)  lactulose Syrup 10 Gram(s) Oral daily PRN constipation  prochlorperazine   Injectable 5 milliGRAM(s) IV Push every 6 hours PRN Nauease or vomiting      ITEMS UNCHECKED ARE NOT PRESENT    PRESENT SYMPTOMS: [ ]Unable to obtain due to poor mentation   Source if other than patient:  [ ]Family   [ ]Team     Pain:  [ ]yes [ ]no  QOL impact -   Location -                    Aggravating factors -  Quality -  Radiation -  Timing-  Severity (0-10 scale):  Minimal acceptable level (0-10 scale):     Dyspnea:                           [ ]Mild [ ]Moderate [ ]Severe  Anxiety:                             [ ]Mild [ ]Moderate [ ]Severe  Fatigue:                             [ ]Mild [ ]Moderate [ ]Severe  Nausea:                             [ ]Mild [ ]Moderate [ ]Severe  Loss of appetite:              [ ]Mild [ ]Moderate [ ]Severe  Constipation:                    [ ]Mild [ ]Moderate [ ]Severe    CPOT:    https://www.[facility].org/getattachment/[email or web address]/Critical-Care-Pain-Observation-Tool-(CPOT)    PAIN AD Score:	  http://geriatrictoolkit.[facility]/cog/painad.pdf (Ctrl + left click to view)    Other Symptoms:  [ ]All other review of systems negative     Palliative Performance Status Version 2:         %      http://[facility].org/files/news/palliative_performance_scale_ppsv2.pdf  PHYSICAL EXAM:  Vital Signs Last 24 Hrs  T(C): 36.8 (03 Jul 2021 12:50), Max: 37 (03 Jul 2021 04:14)  T(F): 98.3 (03 Jul 2021 12:50), Max: 98.6 (03 Jul 2021 04:14)  HR: 68 (03 Jul 2021 12:50) (61 - 71)  BP: 124/72 (03 Jul 2021 12:50) (105/64 - 147/73)  BP(mean): --  RR: 18 (03 Jul 2021 12:50) (18 - 18)  SpO2: 96% (03 Jul 2021 12:50) (96% - 97%) I&O's Summary    02 Jul 2021 07:01  -  03 Jul 2021 07:00  --------------------------------------------------------  IN: 960 mL / OUT: 0 mL / NET: 960 mL    03 Jul 2021 07:01  -  03 Jul 2021 12:54  --------------------------------------------------------  IN: 300 mL / OUT: 0 mL / NET: 300 mL       GENERAL:  [ ]Alert  [ ]Oriented x   [ ]Lethargic  [ ]Cachexia  [ ]Unarousable  [ ]Verbal  [ ]Non-Verbal  Behavioral:   [ ]Anxiety  [ ]Delirium [ ]Agitation [ ]Other  HEENT:  [ ]Normal   [ ]Dry mouth   [ ]ET Tube/Trach  [ ]Oral lesions  PULMONARY:   [ ]Clear [ ]Tachypnea  [ ]Audible excessive secretions   [ ]Rhonchi        [ ]Right [ ]Left [ ]Bilateral  [ ]Crackles        [ ]Right [ ]Left [ ]Bilateral  [ ]Wheezing     [ ]Right [ ]Left [ ]Bilateral  [ ]Diminished BS [ ] Right [ ]Left [ ]Bilateral  CARDIOVASCULAR:    [ ]Regular [ ]Irregular [ ]Tachy  [ ][place] [ ]Murmur [ ]Other  GASTROINTESTINAL:  [ ]Soft  [ ]Distended   [ ]+BS  [ ]Non tender [ ]Tender  [ ]PEG [ ]OGT/ NGT   Last BM:      GENITOURINARY:  [ ]Normal [ ]Incontinent   [ ]Oliguria/Anuria   [ ]Doran  MUSCULOSKELETAL:   [ ]Normal   [ ]Weakness  [ ]Bed/Wheelchair bound [ ]Edema  NEUROLOGIC:   [ ]No focal deficits  [ ] Cognitive impairment  [ ] Dysphagia [ ]Dysarthria [ ] Paresis [ ]Other   SKIN:   [ ]Normal  [ ]Rash   [ ]Pressure ulcer(s) [ ]y [ ]n present on admission    CRITICAL CARE:  [ ]Shock Present  [ ]Septic [ ]Cardiogenic [ ]Neurologic [ ]Hypovolemic  [ ]Vasopressors [ ]Inotropes  [ ]Respiratory failure present [ ]Mechanical Ventilation [ ]Non-invasive ventilatory support [ ]High-Flow   [ ]Acute  [ ]Chronic [ ]Hypoxic  [ ]Hypercarbic [ ]Other  [ ]Other organ failure     LABS:  07-02    136  |  98  |  11  ----------------------------<  111<H>  3.8   |  25  |  0.65    Ca    9.1      02 Jul 2021 06:20          RADIOLOGY & ADDITIONAL STUDIES:    Protein Calorie Malnutrition Present: [ ]mild [ ]moderate [ ]severe [ ]underweight [ ]morbid obesity  https://www.andeal.org/vault/2440/web/files/ONC/Table_Clinical%20Characteristics%20to%20Document%20Malnutrition-White%20JV%20et%20al%202012.pdf    Height (cm): 160 (07-01-21 @ 06:36), 161.3 (04-30-21 @ 19:21), 161.3 (09-04-20 @ 10:29)  Weight (kg): 99.4 (07-01-21 @ 09:17), 95.6 (09-04-20 @ 10:29)  BMI (kg/m2): 38.8 (07-01-21 @ 09:17), 37.3 (07-01-21 @ 06:36), 36.7 (04-30-21 @ 19:21)    [ ]PPSV2 < or = 30%  [ ]significant weight loss [ ]poor nutritional intake [ ]anasarca   Albumin, Serum: 3.8 g/dL (07-01-21 @ 05:24)   [ ]Artificial Nutrition    REFERRALS:   [ ]Chaplaincy  [ ]Hospice  [ ]Child Life  [ ]Social Work  [ ]Case management [ ]Holistic Therapy     Goals of Care Document:     SUBJECTIVE AND OBJECTIVE: patient seen and evaluated. Palliative called overnight for adjustment of PRN med from 0.8mg to 1.2mg. Despite increase, remains with frequent PRN usage and uncontrolled pain as well as anxiety. Discussed PCA with patient, spouse and daughter. Awaiting bed in PCU.     INTERVAL HPI/OVERNIGHT EVENTS: x3 PRNs of 1.2mg IV dilaudid    DNR on chart: Yes      Allergies    codeine (Short breath)  contrast media (iodine-based) (Anaphylaxis)    Intolerances    MEDICATIONS  (STANDING):  ALPRAZolam 0.25 milliGRAM(s) Oral three times a day  dronabinol 2.5 milliGRAM(s) Oral two times a day  escitalopram 5 milliGRAM(s) Oral daily  heparin   Injectable 5000 Unit(s) SubCutaneous every 12 hours  HYDROmorphone PCA (1 mG/mL) 30 milliLiter(s) PCA Continuous PCA Continuous  methadone    Tablet 5 milliGRAM(s) Oral every 8 hours  metoprolol tartrate 50 milliGRAM(s) Oral two times a day  ondansetron Injectable 8 milliGRAM(s) IV Push every 8 hours  senna 2 Tablet(s) Oral at bedtime    MEDICATIONS  (PRN):  acetaminophen   Tablet .. 650 milliGRAM(s) Oral every 6 hours PRN Temp greater or equal to 38C (100.4F), Mild Pain (1 - 3)  HYDROmorphone  Injectable 1.2 milliGRAM(s) IV Push every 2 hours PRN Severe Pain (7 - 10)  HYDROmorphone PCA (1 mG/mL) Rescue Clinician Bolus 1.5 milliGRAM(s) IV Push every 1 hour PRN Severe Pain (7 - 10)  lactulose Syrup 10 Gram(s) Oral daily PRN constipation  prochlorperazine   Injectable 5 milliGRAM(s) IV Push every 6 hours PRN Nauease or vomiting      ITEMS UNCHECKED ARE NOT PRESENT    PRESENT SYMPTOMS: [ ]Unable to obtain due to poor mentation   Source if other than patient:  [ ]Family   [ ]Team     Pain:  [x ]yes [ ]no  QOL impact - debilitating, poor sleep  Location -         lower abdomen           Aggravating factors - medication wearing off  Quality -aching  Radiation -localized  Timing- comes/goes  Severity (0-10 scale): 8  Minimal acceptable level (0-10 scale): 4    Dyspnea:                           [ ]Mild [ ]Moderate [ ]Severe  Anxiety:                             [ ]Mild [ ]Moderate [x ]Severe  Fatigue:                             [ ]Mild [ ]Moderate [ ]Severe  Nausea:                             [ ]Mild [ ]Moderate [ ]Severe  Loss of appetite:              [ ]Mild [ ]Moderate [ ]Severe  Constipation:                    [ ]Mild [ ]Moderate [ ]Severe    CPOT:    https://www.[facility].org/getattachment/[email or web address]/Critical-Care-Pain-Observation-Tool-(CPOT)    PAIN AD Score:	  http://geriatrictoolkit.[facility]/cog/painad.pdf (Ctrl + left click to view)    Other Symptoms:  [ x]All other review of systems negative     Palliative Performance Status Version 2:       70  %      http://[facility].org/files/news/palliative_performance_scale_ppsv2.pdf  PHYSICAL EXAM:  Vital Signs Last 24 Hrs  T(C): 36.8 (03 Jul 2021 12:50), Max: 37 (03 Jul 2021 04:14)  T(F): 98.3 (03 Jul 2021 12:50), Max: 98.6 (03 Jul 2021 04:14)  HR: 68 (03 Jul 2021 12:50) (61 - 68)  BP: 124/72 (03 Jul 2021 12:50) (105/64 - 147/73)  BP(mean): --  RR: 18 (03 Jul 2021 12:50) (18 - 18)  SpO2: 96% (03 Jul 2021 12:50) (96% - 97%) I&O's Summary    02 Jul 2021 07:01  -  03 Jul 2021 07:00  --------------------------------------------------------  IN: 960 mL / OUT: 0 mL / NET: 960 mL    03 Jul 2021 07:01  -  03 Jul 2021 14:13  --------------------------------------------------------  IN: 300 mL / OUT: 0 mL / NET: 300 mL       GENERAL:  [ x]Alert  [x ]Oriented x3   [ ]Lethargic  [ ]Cachexia  [ ]Unarousable  [ ]Verbal  [ ]Non-Verbal  Behavioral:   [x ]Anxiety  [ ]Delirium [ ]Agitation [ ]Other  HEENT:  [x ]Normal   [ ]Dry mouth   [ ]ET Tube/Trach  [ ]Oral lesions  PULMONARY:   [ x]Clear [ ]Tachypnea  [ ]Audible excessive secretions   [ ]Rhonchi        [ ]Right [ ]Left [ ]Bilateral  [ ]Crackles        [ ]Right [ ]Left [ ]Bilateral  [ ]Wheezing     [ ]Right [ ]Left [ ]Bilateral  [ ]Diminished BS [ ] Right [ ]Left [ ]Bilateral  CARDIOVASCULAR:    [x ]Regular [ ]Irregular [ ]Tachy  [ ][place] [ ]Murmur [ ]Other  GASTROINTESTINAL:  [x ]Soft  [ ]Distended   [x ]+BS  [ ]Non tender [ x]Tender  [ ]PEG [ ]OGT/ NGT   Last BM: 7/3     GENITOURINARY:  [ ]Normal [ x]Incontinent   [ ]Oliguria/Anuria   [ ]Doran  MUSCULOSKELETAL:   [ ]Normal   [ x]Weakness  [ ]Bed/Wheelchair bound [ ]Edema  NEUROLOGIC:   [ x]No focal deficits  [ ] Cognitive impairment  [ ] Dysphagia [ ]Dysarthria [ ] Paresis [ ]Other   SKIN:   [ ]Normal  [ ]Rash   [ ]Pressure ulcer(s) [ ]y [ ]n present on admission    CRITICAL CARE:  [ ]Shock Present  [ ]Septic [ ]Cardiogenic [ ]Neurologic [ ]Hypovolemic  [ ]Vasopressors [ ]Inotropes  [ ]Respiratory failure present [ ]Mechanical Ventilation [ ]Non-invasive ventilatory support [ ]High-Flow   [ ]Acute  [ ]Chronic [ ]Hypoxic  [ ]Hypercarbic [ ]Other  [ ]Other organ failure     LABS:  07-02    136  |  98  |  11  ----------------------------<  111<H>  3.8   |  25  |  0.65    Ca    9.1      02 Jul 2021 06:20      RADIOLOGY & ADDITIONAL STUDIES: no new imaging studies    Protein Calorie Malnutrition Present: [ ]mild [ ]moderate [ ]severe [ ]underweight [ ]morbid obesity  https://www.andeal.org/vault/2440/web/files/ONC/Table_Clinical%20Characteristics%20to%20Document%20Malnutrition-White%20JV%20et%20al%202012.pdf    Height (cm): 160 (07-01-21 @ 06:36), 161.3 (04-30-21 @ 19:21), 161.3 (09-04-20 @ 10:29)  Weight (kg): 99.4 (07-01-21 @ 09:17), 95.6 (09-04-20 @ 10:29)  BMI (kg/m2): 38.8 (07-01-21 @ 09:17), 37.3 (07-01-21 @ 06:36), 36.7 (04-30-21 @ 19:21)    [ ]PPSV2 < or = 30%  [ ]significant weight loss [ ]poor nutritional intake [ ]anasarca   Albumin, Serum: 3.8 g/dL (07-01-21 @ 05:24)   [ ]Artificial Nutrition    REFERRALS:   [ ]Chaplaincy  [ ]Hospice  [ ]Child Life  [ ]Social Work  [x ]Case management [ ]Holistic Therapy     Goals of Care Document:          DNR on chart: Yes      Allergies    codeine (Short breath)  contrast media (iodine-based) (Anaphylaxis)    Intolerances    MEDICATIONS  (STANDING):  ALPRAZolam 0.25 milliGRAM(s) Oral three times a day  dronabinol 2.5 milliGRAM(s) Oral two times a day  escitalopram 5 milliGRAM(s) Oral daily  heparin   Injectable 5000 Unit(s) SubCutaneous every 12 hours  HYDROmorphone PCA (1 mG/mL) 30 milliLiter(s) PCA Continuous PCA Continuous  methadone    Tablet 5 milliGRAM(s) Oral every 8 hours  metoprolol tartrate 50 milliGRAM(s) Oral two times a day  ondansetron Injectable 8 milliGRAM(s) IV Push every 8 hours  senna 2 Tablet(s) Oral at bedtime    MEDICATIONS  (PRN):  acetaminophen   Tablet .. 650 milliGRAM(s) Oral every 6 hours PRN Temp greater or equal to 38C (100.4F), Mild Pain (1 - 3)  HYDROmorphone  Injectable 1.2 milliGRAM(s) IV Push every 2 hours PRN Severe Pain (7 - 10)  HYDROmorphone PCA (1 mG/mL) Rescue Clinician Bolus 1.5 milliGRAM(s) IV Push every 1 hour PRN Severe Pain (7 - 10)  lactulose Syrup 10 Gram(s) Oral daily PRN constipation  prochlorperazine   Injectable 5 milliGRAM(s) IV Push every 6 hours PRN Nauease or vomiting      ITEMS UNCHECKED ARE NOT PRESENT    PRESENT SYMPTOMS: [ ]Unable to obtain due to poor mentation   Source if other than patient:  [ ]Family   [ ]Team     Pain:  [ ]yes [ ]no  QOL impact -   Location -                    Aggravating factors -  Quality -  Radiation -  Timing-  Severity (0-10 scale):  Minimal acceptable level (0-10 scale):     Dyspnea:                           [ ]Mild [ ]Moderate [ ]Severe  Anxiety:                             [ ]Mild [ ]Moderate [ ]Severe  Fatigue:                             [ ]Mild [ ]Moderate [ ]Severe  Nausea:                             [ ]Mild [ ]Moderate [ ]Severe  Loss of appetite:              [ ]Mild [ ]Moderate [ ]Severe  Constipation:                    [ ]Mild [ ]Moderate [ ]Severe    CPOT:    https://www.[facility].org/getattachment/[email or web address]/Critical-Care-Pain-Observation-Tool-(CPOT)    PAIN AD Score:	  http://geriatrictoolkit.[place]/cog/painad.pdf (Ctrl + left click to view)    Other Symptoms:  [ ]All other review of systems negative     Palliative Performance Status Version 2:         %      http://npcrc.org/files/news/palliative_performance_scale_ppsv2.pdf  PHYSICAL EXAM:  Vital Signs Last 24 Hrs  T(C): 36.8 (03 Jul 2021 12:50), Max: 37 (03 Jul 2021 04:14)  T(F): 98.3 (03 Jul 2021 12:50), Max: 98.6 (03 Jul 2021 04:14)  HR: 68 (03 Jul 2021 12:50) (61 - 71)  BP: 124/72 (03 Jul 2021 12:50) (105/64 - 147/73)  BP(mean): --  RR: 18 (03 Jul 2021 12:50) (18 - 18)  SpO2: 96% (03 Jul 2021 12:50) (96% - 97%) I&O's Summary    02 Jul 2021 07:01  -  03 Jul 2021 07:00  --------------------------------------------------------  IN: 960 mL / OUT: 0 mL / NET: 960 mL    03 Jul 2021 07:01  -  03 Jul 2021 12:54  --------------------------------------------------------  IN: 300 mL / OUT: 0 mL / NET: 300 mL       GENERAL:  [ ]Alert  [ ]Oriented x   [ ]Lethargic  [ ]Cachexia  [ ]Unarousable  [ ]Verbal  [ ]Non-Verbal  Behavioral:   [ ]Anxiety  [ ]Delirium [ ]Agitation [ ]Other  HEENT:  [ ]Normal   [ ]Dry mouth   [ ]ET Tube/Trach  [ ]Oral lesions  PULMONARY:   [ ]Clear [ ]Tachypnea  [ ]Audible excessive secretions   [ ]Rhonchi        [ ]Right [ ]Left [ ]Bilateral  [ ]Crackles        [ ]Right [ ]Left [ ]Bilateral  [ ]Wheezing     [ ]Right [ ]Left [ ]Bilateral  [ ]Diminished BS [ ] Right [ ]Left [ ]Bilateral  CARDIOVASCULAR:    [ ]Regular [ ]Irregular [ ]Tachy  [ ][place] [ ]Murmur [ ]Other  GASTROINTESTINAL:  [ ]Soft  [ ]Distended   [ ]+BS  [ ]Non tender [ ]Tender  [ ]PEG [ ]OGT/ NGT   Last BM:      GENITOURINARY:  [ ]Normal [ ]Incontinent   [ ]Oliguria/Anuria   [ ]Doran  MUSCULOSKELETAL:   [ ]Normal   [ ]Weakness  [ ]Bed/Wheelchair bound [ ]Edema  NEUROLOGIC:   [ ]No focal deficits  [ ] Cognitive impairment  [ ] Dysphagia [ ]Dysarthria [ ] Paresis [ ]Other   SKIN:   [ ]Normal  [ ]Rash   [ ]Pressure ulcer(s) [ ]y [ ]n present on admission    CRITICAL CARE:  [ ]Shock Present  [ ]Septic [ ]Cardiogenic [ ]Neurologic [ ]Hypovolemic  [ ]Vasopressors [ ]Inotropes  [ ]Respiratory failure present [ ]Mechanical Ventilation [ ]Non-invasive ventilatory support [ ]High-Flow   [ ]Acute  [ ]Chronic [ ]Hypoxic  [ ]Hypercarbic [ ]Other  [ ]Other organ failure     LABS:  07-02    136  |  98  |  11  ----------------------------<  111<H>  3.8   |  25  |  0.65    Ca    9.1      02 Jul 2021 06:20          RADIOLOGY & ADDITIONAL STUDIES:    Protein Calorie Malnutrition Present: [ ]mild [ ]moderate [ ]severe [ ]underweight [ ]morbid obesity  https://www.andeal.org/vault/2440/web/files/ONC/Table_Clinical%20Characteristics%20to%20Document%20Malnutrition-White%20JV%20et%20al%202012.pdf    Height (cm): 160 (07-01-21 @ 06:36), 161.3 (04-30-21 @ 19:21), 161.3 (09-04-20 @ 10:29)  Weight (kg): 99.4 (07-01-21 @ 09:17), 95.6 (09-04-20 @ 10:29)  BMI (kg/m2): 38.8 (07-01-21 @ 09:17), 37.3 (07-01-21 @ 06:36), 36.7 (04-30-21 @ 19:21)    [ ]PPSV2 < or = 30%  [ ]significant weight loss [ ]poor nutritional intake [ ]anasarca   Albumin, Serum: 3.8 g/dL (07-01-21 @ 05:24)   [ ]Artificial Nutrition    REFERRALS:   [ ]Chaplaincy  [ ]Hospice  [ ]Child Life  [ ]Social Work  [ ]Case management [ ]Holistic Therapy     Goals of Care Document:     SUBJECTIVE AND OBJECTIVE: patient seen and evaluated. Palliative called overnight for adjustment of PRN med from 0.8mg to 1.2mg. Despite increase, remains with frequent PRN usage and uncontrolled pain as well as anxiety. Discussed PCA with patient, spouse and daughter. Awaiting bed in PCU.     INTERVAL HPI/OVERNIGHT EVENTS: x3 PRNs of 1.2mg IV dilaudid    DNR on chart: Yes      Allergies    codeine (Short breath)  contrast media (iodine-based) (Anaphylaxis)    Intolerances    MEDICATIONS  (STANDING):  ALPRAZolam 0.25 milliGRAM(s) Oral three times a day  dronabinol 2.5 milliGRAM(s) Oral two times a day  escitalopram 5 milliGRAM(s) Oral daily  heparin   Injectable 5000 Unit(s) SubCutaneous every 12 hours  HYDROmorphone PCA (1 mG/mL) 30 milliLiter(s) PCA Continuous PCA Continuous  methadone    Tablet 5 milliGRAM(s) Oral every 8 hours  metoprolol tartrate 50 milliGRAM(s) Oral two times a day  ondansetron Injectable 8 milliGRAM(s) IV Push every 8 hours  senna 2 Tablet(s) Oral at bedtime    MEDICATIONS  (PRN):  acetaminophen   Tablet .. 650 milliGRAM(s) Oral every 6 hours PRN Temp greater or equal to 38C (100.4F), Mild Pain (1 - 3)  HYDROmorphone  Injectable 1.2 milliGRAM(s) IV Push every 2 hours PRN Severe Pain (7 - 10)  HYDROmorphone PCA (1 mG/mL) Rescue Clinician Bolus 1.5 milliGRAM(s) IV Push every 1 hour PRN Severe Pain (7 - 10)  lactulose Syrup 10 Gram(s) Oral daily PRN constipation  prochlorperazine   Injectable 5 milliGRAM(s) IV Push every 6 hours PRN Nauease or vomiting      ITEMS UNCHECKED ARE NOT PRESENT    PRESENT SYMPTOMS: [ ]Unable to obtain due to poor mentation   Source if other than patient:  [ ]Family   [ ]Team     Pain:  [x ]yes [ ]no  QOL impact - debilitating, poor sleep  Location -         lower abdomen           Aggravating factors - medication wearing off  Quality -aching  Radiation -localized  Timing- comes/goes  Severity (0-10 scale): 8  Minimal acceptable level (0-10 scale): 4    Dyspnea:                           [ ]Mild [ ]Moderate [ ]Severe  Anxiety:                             [ ]Mild [ ]Moderate [x ]Severe  Fatigue:                             [ ]Mild [ ]Moderate [ ]Severe  Nausea:                             [ ]Mild [ ]Moderate [ ]Severe  Loss of appetite:              [ ]Mild [ ]Moderate [ ]Severe  Constipation:                    [ ]Mild [ ]Moderate [ ]Severe    CPOT:    https://www.[facility].org/getattachment/[email or web address]/Critical-Care-Pain-Observation-Tool-(CPOT)    PAIN AD Score:	  http://geriatrictoolkit.[facility]/cog/painad.pdf (Ctrl + left click to view)    Other Symptoms:  [ x]All other review of systems negative     Palliative Performance Status Version 2:       70  %      http://[facility].org/files/news/palliative_performance_scale_ppsv2.pdf  PHYSICAL EXAM:  Vital Signs Last 24 Hrs  T(C): 36.8 (03 Jul 2021 12:50), Max: 37 (03 Jul 2021 04:14)  T(F): 98.3 (03 Jul 2021 12:50), Max: 98.6 (03 Jul 2021 04:14)  HR: 68 (03 Jul 2021 12:50) (61 - 68)  BP: 124/72 (03 Jul 2021 12:50) (105/64 - 147/73)  BP(mean): --  RR: 18 (03 Jul 2021 12:50) (18 - 18)  SpO2: 96% (03 Jul 2021 12:50) (96% - 97%) I&O's Summary    02 Jul 2021 07:01  -  03 Jul 2021 07:00  --------------------------------------------------------  IN: 960 mL / OUT: 0 mL / NET: 960 mL    03 Jul 2021 07:01  -  03 Jul 2021 14:13  --------------------------------------------------------  IN: 300 mL / OUT: 0 mL / NET: 300 mL       GENERAL:  [ x]Alert  [x ]Oriented x3   [ ]Lethargic  [ ]Cachexia  [ ]Unarousable  [ ]Verbal  [ ]Non-Verbal  Behavioral:   [x ]Anxiety  [ ]Delirium [ ]Agitation [ ]Other  HEENT:  [x ]Normal   [ ]Dry mouth   [ ]ET Tube/Trach  [ ]Oral lesions  PULMONARY:   [ x]Clear [ ]Tachypnea  [ ]Audible excessive secretions   [ ]Rhonchi        [ ]Right [ ]Left [ ]Bilateral  [ ]Crackles        [ ]Right [ ]Left [ ]Bilateral  [ ]Wheezing     [ ]Right [ ]Left [ ]Bilateral  [ ]Diminished BS [ ] Right [ ]Left [ ]Bilateral  CARDIOVASCULAR:    [x ]Regular [ ]Irregular [ ]Tachy  [ ][place] [ ]Murmur [ ]Other  GASTROINTESTINAL:  [x ]Soft  [ ]Distended   [x ]+BS  [ ]Non tender [ x]Tender  [ ]PEG [ ]OGT/ NGT   Last BM: 7/3     GENITOURINARY:  [ ]Normal [ x]Incontinent   [ ]Oliguria/Anuria   [ ]Doran  MUSCULOSKELETAL:   [ ]Normal   [ x]Weakness  [ ]Bed/Wheelchair bound [ ]Edema  NEUROLOGIC:   [ x]No focal deficits  [ ] Cognitive impairment  [ ] Dysphagia [ ]Dysarthria [ ] Paresis [ ]Other   SKIN:   [ ]Normal  [ ]Rash   [ ]Pressure ulcer(s) [ ]y [ ]n present on admission    CRITICAL CARE:  [ ]Shock Present  [ ]Septic [ ]Cardiogenic [ ]Neurologic [ ]Hypovolemic  [ ]Vasopressors [ ]Inotropes  [ ]Respiratory failure present [ ]Mechanical Ventilation [ ]Non-invasive ventilatory support [ ]High-Flow   [ ]Acute  [ ]Chronic [ ]Hypoxic  [ ]Hypercarbic [ ]Other  [ ]Other organ failure     LABS:  07-02    136  |  98  |  11  ----------------------------<  111<H>  3.8   |  25  |  0.65    Ca    9.1      02 Jul 2021 06:20      RADIOLOGY & ADDITIONAL STUDIES: no new imaging studies    Protein Calorie Malnutrition Present: [ ]mild [ ]moderate [ ]severe [ ]underweight [ ]morbid obesity  https://www.andeal.org/vault/2440/web/files/ONC/Table_Clinical%20Characteristics%20to%20Document%20Malnutrition-White%20JV%20et%20al%202012.pdf    Height (cm): 160 (07-01-21 @ 06:36), 161.3 (04-30-21 @ 19:21), 161.3 (09-04-20 @ 10:29)  Weight (kg): 99.4 (07-01-21 @ 09:17), 95.6 (09-04-20 @ 10:29)  BMI (kg/m2): 38.8 (07-01-21 @ 09:17), 37.3 (07-01-21 @ 06:36), 36.7 (04-30-21 @ 19:21)    [ ]PPSV2 < or = 30%  [ ]significant weight loss [ ]poor nutritional intake [ ]anasarca   Albumin, Serum: 3.8 g/dL (07-01-21 @ 05:24)   [ ]Artificial Nutrition    REFERRALS:   [ ]Chaplaincy  [ ]Hospice  [ ]Child Life  [ ]Social Work  [x ]Case management [ ]Holistic Therapy     Goals of Care Document:

## 2021-07-03 NOTE — PROGRESS NOTE ADULT - PROBLEM SELECTOR PLAN 7
The patient is already DNR.   Will transfer to PCU for symptoms monitoring and Rx.  in the event of new or worsening symptoms, please page on call [phone]    Discussed with patient, family, bedside RN and primary team NP.

## 2021-07-03 NOTE — PROGRESS NOTE ADULT - PROBLEM SELECTOR PLAN 1
start dilaudid PCA  0/1/30//1.5 ; discussed with patient, spouse and daughter  Will continue Methadone 5mg q 8 hours (QTc 448), may need to increase over coming days depending on usage  Bowel regimen.   Will transfer to PCU for symptoms monitoring and Rx.

## 2021-07-03 NOTE — PROGRESS NOTE ADULT - SUBJECTIVE AND OBJECTIVE BOX
Patient is a 71y old  Female who presents with a chief complaint of abdominal pain (03 Jul 2021 12:54)      SUBJECTIVE / OVERNIGHT EVENTS: c/o abdominal pain, nausea.  at bedside.  Review of Systems  chest pain no  palpitations no  sob no  nausea no  headache no    MEDICATIONS  (STANDING):  ALPRAZolam 0.25 milliGRAM(s) Oral three times a day  dronabinol 2.5 milliGRAM(s) Oral two times a day  escitalopram 5 milliGRAM(s) Oral daily  heparin   Injectable 5000 Unit(s) SubCutaneous every 12 hours  HYDROmorphone PCA (1 mG/mL) 30 milliLiter(s) PCA Continuous PCA Continuous  methadone    Tablet 5 milliGRAM(s) Oral every 8 hours  metoprolol tartrate 50 milliGRAM(s) Oral two times a day  ondansetron Injectable 8 milliGRAM(s) IV Push every 8 hours  senna 2 Tablet(s) Oral at bedtime    MEDICATIONS  (PRN):  acetaminophen   Tablet .. 650 milliGRAM(s) Oral every 6 hours PRN Temp greater or equal to 38C (100.4F), Mild Pain (1 - 3)  HYDROmorphone  Injectable 1.2 milliGRAM(s) IV Push every 2 hours PRN Severe Pain (7 - 10)  HYDROmorphone PCA (1 mG/mL) Rescue Clinician Bolus 1.5 milliGRAM(s) IV Push every 1 hour PRN Severe Pain (7 - 10)  lactulose Syrup 10 Gram(s) Oral daily PRN constipation  prochlorperazine   Injectable 5 milliGRAM(s) IV Push every 6 hours PRN Nauease or vomiting      Vital Signs Last 24 Hrs  T(C): 36.8 (03 Jul 2021 12:50), Max: 37 (03 Jul 2021 04:14)  T(F): 98.3 (03 Jul 2021 12:50), Max: 98.6 (03 Jul 2021 04:14)  HR: 68 (03 Jul 2021 12:50) (61 - 68)  BP: 124/72 (03 Jul 2021 12:50) (105/64 - 147/73)  BP(mean): --  RR: 18 (03 Jul 2021 12:50) (18 - 18)  SpO2: 96% (03 Jul 2021 12:50) (96% - 97%)    PHYSICAL EXAM:  GENERAL: sick  HEAD:  Atraumatic, Normocephalic  EYES: EOMI, PERRLA, conjunctiva and sclera clear  NECK: Supple, No JVD  CHEST/LUNG: Clear to auscultation bilaterally; No wheeze  HEART: Regular rate and rhythm; No murmurs, rubs, or gallops  ABDOMEN: Soft, Nontender, distended; Bowel sounds present  EXTREMITIES:  2+ Peripheral Pulses, No clubbing, cyanosis, or edema  PSYCH: AAOx3  NEUROLOGY: non-focal  SKIN: No rashes or lesions    LABS:    07-02    136  |  98  |  11  ----------------------------<  111<H>  3.8   |  25  |  0.65    Ca    9.1      02 Jul 2021 06:20                  RADIOLOGY & ADDITIONAL TESTS:    Imaging Personally Reviewed:    Consultant(s) Notes Reviewed:      Care Discussed with Consultants/Other Providers:

## 2021-07-03 NOTE — PROGRESS NOTE ADULT - ASSESSMENT
70 yo F with PMH of anxiety, IBS, GERD, Cholangiocarcinoma with metastases, presents here with abdominal pain.  Patient states she stopped chemotherapy mid May due to abdominal pain.

## 2021-07-04 PROCEDURE — 99233 SBSQ HOSP IP/OBS HIGH 50: CPT

## 2021-07-04 RX ORDER — PANTOPRAZOLE SODIUM 20 MG/1
40 TABLET, DELAYED RELEASE ORAL
Refills: 0 | Status: DISCONTINUED | OUTPATIENT
Start: 2021-07-04 | End: 2021-07-12

## 2021-07-04 RX ORDER — SODIUM CHLORIDE 9 MG/ML
1000 INJECTION INTRAMUSCULAR; INTRAVENOUS; SUBCUTANEOUS
Refills: 0 | Status: DISCONTINUED | OUTPATIENT
Start: 2021-07-04 | End: 2021-07-12

## 2021-07-04 RX ADMIN — ONDANSETRON 8 MILLIGRAM(S): 8 TABLET, FILM COATED ORAL at 14:58

## 2021-07-04 RX ADMIN — SODIUM CHLORIDE 30 MILLILITER(S): 9 INJECTION INTRAMUSCULAR; INTRAVENOUS; SUBCUTANEOUS at 04:01

## 2021-07-04 RX ADMIN — Medication 0.25 MILLIGRAM(S): at 21:08

## 2021-07-04 RX ADMIN — SENNA PLUS 2 TABLET(S): 8.6 TABLET ORAL at 22:17

## 2021-07-04 RX ADMIN — Medication 0.25 MILLIGRAM(S): at 13:02

## 2021-07-04 RX ADMIN — HEPARIN SODIUM 5000 UNIT(S): 5000 INJECTION INTRAVENOUS; SUBCUTANEOUS at 05:08

## 2021-07-04 RX ADMIN — METHADONE HYDROCHLORIDE 5 MILLIGRAM(S): 40 TABLET ORAL at 05:07

## 2021-07-04 RX ADMIN — METHADONE HYDROCHLORIDE 5 MILLIGRAM(S): 40 TABLET ORAL at 22:15

## 2021-07-04 RX ADMIN — Medication 0.25 MILLIGRAM(S): at 05:07

## 2021-07-04 RX ADMIN — Medication 2.5 MILLIGRAM(S): at 12:52

## 2021-07-04 RX ADMIN — Medication 50 MILLIGRAM(S): at 05:07

## 2021-07-04 RX ADMIN — ESCITALOPRAM OXALATE 5 MILLIGRAM(S): 10 TABLET, FILM COATED ORAL at 12:52

## 2021-07-04 RX ADMIN — Medication 2.5 MILLIGRAM(S): at 17:38

## 2021-07-04 RX ADMIN — ONDANSETRON 4 MILLIGRAM(S): 8 TABLET, FILM COATED ORAL at 22:16

## 2021-07-04 RX ADMIN — Medication 50 MILLIGRAM(S): at 17:38

## 2021-07-04 RX ADMIN — METHADONE HYDROCHLORIDE 5 MILLIGRAM(S): 40 TABLET ORAL at 13:02

## 2021-07-04 RX ADMIN — HYDROMORPHONE HYDROCHLORIDE 30 MILLILITER(S): 2 INJECTION INTRAMUSCULAR; INTRAVENOUS; SUBCUTANEOUS at 07:03

## 2021-07-04 RX ADMIN — ONDANSETRON 8 MILLIGRAM(S): 8 TABLET, FILM COATED ORAL at 05:08

## 2021-07-04 RX ADMIN — PANTOPRAZOLE SODIUM 40 MILLIGRAM(S): 20 TABLET, DELAYED RELEASE ORAL at 14:58

## 2021-07-04 RX ADMIN — HEPARIN SODIUM 5000 UNIT(S): 5000 INJECTION INTRAVENOUS; SUBCUTANEOUS at 17:45

## 2021-07-04 RX ADMIN — HYDROMORPHONE HYDROCHLORIDE 30 MILLILITER(S): 2 INJECTION INTRAMUSCULAR; INTRAVENOUS; SUBCUTANEOUS at 19:07

## 2021-07-04 RX ADMIN — HYDROMORPHONE HYDROCHLORIDE 30 MILLILITER(S): 2 INJECTION INTRAMUSCULAR; INTRAVENOUS; SUBCUTANEOUS at 18:16

## 2021-07-04 RX ADMIN — HYDROMORPHONE HYDROCHLORIDE 30 MILLILITER(S): 2 INJECTION INTRAMUSCULAR; INTRAVENOUS; SUBCUTANEOUS at 20:46

## 2021-07-04 NOTE — PROGRESS NOTE ADULT - PROBLEM SELECTOR PLAN 7
The patient is already DNR.   Will transfer to PCU for symptoms monitoring and Rx.  in the event of new or worsening symptoms, please page on call [phone]    Discussed with patient, family

## 2021-07-04 NOTE — PROGRESS NOTE ADULT - ASSESSMENT
72 yo F with PMH of anxiety, IBS, GERD, Cholangiocarcinoma with metastases, presents here with abdominal pain.    Abdominal pain.  - Patient with abdominal pain, likely 2/2 to underlying metastatic cholangiocarcinoma and peritoneal carcinomatosis; Also with new moderate amount ascites, no signs of SBP  - s/p therapeutic paracentesis  - pain control.     Cholangiocarcinoma.   - patient with cholangiocarcinoma, currently not on chemo  - oncology f/u.     Atrial fibrillation.    - Patient with new onset Afib, unclear etiology  - start patient on metoprolol 25mg bid for rate control  - CHADSVASC 2-3 (age, female, ?HTN)  - hold off AC for now  - echocardiogram.     Respiratory distress.   - Patient with orthopnea, GANNON, and now tachypneic  - no hypoxia  - due to pressure from ascites    Chronic pain.   - patient on methadone 5mg tid and hydromorphone 2mg qid   - pain management evaluation  Anxiety.   - xanax 1mg qid prn.    Elevated blood pressure reading.    - elevated BP, possibly 2/2 to pain and anxiety    Prophylactic measure.   - no AC given bleeding risk     Palliative follow. Possible transfer to PCU.    DNR    d/w patient and     Arden Benitez MD pager [phone]

## 2021-07-04 NOTE — PROGRESS NOTE ADULT - PROBLEM SELECTOR PLAN 1
c/w dilaudid PCA  0/1/30//1.5 ; discussed with patient, spouse. Utilized 10 attempts, 9 injections since starting yesterdau (~18 hrs of data)  Will continue Methadone 5mg q 8 hours (QTc 448), recheck EKG 7/5 for possible uptitration  bowel regimen  Will transfer to PCU for symptoms monitoring and Rx.

## 2021-07-04 NOTE — PROGRESS NOTE ADULT - SUBJECTIVE AND OBJECTIVE BOX
Patient is a 71y old  Female who presents with a chief complaint of abdominal pain (04 Jul 2021 12:39)      SUBJECTIVE / OVERNIGHT EVENTS: No new complaints.   Review of Systems  chest pain no  palpitations no  sob no  nausea no  headache no    MEDICATIONS  (STANDING):  ALPRAZolam 0.25 milliGRAM(s) Oral three times a day  dronabinol 2.5 milliGRAM(s) Oral two times a day  escitalopram 5 milliGRAM(s) Oral daily  heparin   Injectable 5000 Unit(s) SubCutaneous every 12 hours  HYDROmorphone PCA (1 mG/mL) 30 milliLiter(s) PCA Continuous PCA Continuous  methadone    Tablet 5 milliGRAM(s) Oral every 8 hours  metoprolol tartrate 50 milliGRAM(s) Oral two times a day  ondansetron Injectable 8 milliGRAM(s) IV Push every 8 hours  pantoprazole    Tablet 40 milliGRAM(s) Oral before breakfast  senna 2 Tablet(s) Oral at bedtime  sodium chloride 0.9%. 1000 milliLiter(s) (30 mL/Hr) IV Continuous <Continuous>    MEDICATIONS  (PRN):  acetaminophen   Tablet .. 650 milliGRAM(s) Oral every 6 hours PRN Temp greater or equal to 38C (100.4F), Mild Pain (1 - 3)  HYDROmorphone PCA (1 mG/mL) Rescue Clinician Bolus 1.5 milliGRAM(s) IV Push every 1 hour PRN Severe Pain (7 - 10)  lactulose Syrup 10 Gram(s) Oral daily PRN constipation  prochlorperazine   Injectable 5 milliGRAM(s) IV Push every 6 hours PRN Nauease or vomiting      Vital Signs Last 24 Hrs  T(C): 36.8 (04 Jul 2021 12:52), Max: 36.8 (04 Jul 2021 12:52)  T(F): 98.2 (04 Jul 2021 12:52), Max: 98.2 (04 Jul 2021 12:52)  HR: 63 (04 Jul 2021 12:52) (59 - 66)  BP: 103/67 (04 Jul 2021 12:52) (103/67 - 107/64)  BP(mean): --  RR: 18 (04 Jul 2021 12:52) (18 - 18)  SpO2: 95% (04 Jul 2021 12:52) (95% - 98%)    PHYSICAL EXAM:  GENERAL: NAD, well-developed  HEAD:  Atraumatic, Normocephalic  EYES: EOMI, PERRLA, conjunctiva and sclera clear  NECK: Supple, No JVD  CHEST/LUNG: Clear to auscultation bilaterally; No wheeze  HEART: Regular rate and rhythm; No murmurs, rubs, or gallops  ABDOMEN: Soft, Nontender, distended; Bowel sounds present  EXTREMITIES:  2+ Peripheral Pulses, No clubbing, cyanosis, or edema  PSYCH: AAOx3  NEUROLOGY: non-focal  SKIN: No rashes or lesions    LABS:                    Culture - Body Fluid with Gram Stain (collected 02 Jul 2021 22:11)  Source: .Body Fluid Peritoneal Fluid  Preliminary Report (03 Jul 2021 23:12):    No growth        RADIOLOGY & ADDITIONAL TESTS:    Imaging Personally Reviewed:    Consultant(s) Notes Reviewed:      Care Discussed with Consultants/Other Providers:

## 2021-07-04 NOTE — PROGRESS NOTE ADULT - SUBJECTIVE AND OBJECTIVE BOX
SUBJECTIVE AND OBJECTIVE: Patient seen and evaluated. States her pain and restlessness are under much better control, slept last night.  at bedside as well. They are very happy that she is improved compared to yesterday.  We discussed next steps which would be an EKG likely tomorrow and consideration for uptitration of methadone with eventual off titration of PCA in preparation for home. Still awaiting bed in PCU    INTERVAL HPI/OVERNIGHT EVENTS: no acute events. on PCA    DNR on chart: Yes      Allergies    codeine (Short breath)  contrast media (iodine-based) (Anaphylaxis)    Intolerances    MEDICATIONS  (STANDING):  ALPRAZolam 0.25 milliGRAM(s) Oral three times a day  dronabinol 2.5 milliGRAM(s) Oral two times a day  escitalopram 5 milliGRAM(s) Oral daily  heparin   Injectable 5000 Unit(s) SubCutaneous every 12 hours  HYDROmorphone PCA (1 mG/mL) 30 milliLiter(s) PCA Continuous PCA Continuous  methadone    Tablet 5 milliGRAM(s) Oral every 8 hours  metoprolol tartrate 50 milliGRAM(s) Oral two times a day  ondansetron Injectable 8 milliGRAM(s) IV Push every 8 hours  senna 2 Tablet(s) Oral at bedtime  sodium chloride 0.9%. 1000 milliLiter(s) (30 mL/Hr) IV Continuous <Continuous>    MEDICATIONS  (PRN):  acetaminophen   Tablet .. 650 milliGRAM(s) Oral every 6 hours PRN Temp greater or equal to 38C (100.4F), Mild Pain (1 - 3)  HYDROmorphone PCA (1 mG/mL) Rescue Clinician Bolus 1.5 milliGRAM(s) IV Push every 1 hour PRN Severe Pain (7 - 10)  lactulose Syrup 10 Gram(s) Oral daily PRN constipation  prochlorperazine   Injectable 5 milliGRAM(s) IV Push every 6 hours PRN Nauease or vomiting      ITEMS UNCHECKED ARE NOT PRESENT    PRESENT SYMPTOMS: [ ]Unable to obtain due to poor mentation   Source if other than patient:  [ ]Family   [ ]Team     Pain:  [ x]yes [ ]no  QOL impact - poor sleep, restlessness  Location -      lower abdomen              Aggravating factors - palpation  Quality - aching  Radiation - none  Timing-comes/goes  Severity (0-10 scale):8  Minimal acceptable level (0-10 scale): 4    Dyspnea:                           [ ]Mild [ ]Moderate [ ]Severe  Anxiety:                             [ ]Mild [ ]Moderate [ ]Severe  Fatigue:                             [ ]Mild [ ]Moderate [ ]Severe  Nausea:                             [ ]Mild [ ]Moderate [ ]Severe  Loss of appetite:              [ ]Mild [ ]Moderate [ ]Severe  Constipation:                    [ ]Mild [ ]Moderate [ ]Severe    CPOT:    https://www.[email or web address]/getattachment/[email or web address]/Critical-Care-Pain-Observation-Tool-(CPOT)    PAIN AD Score:	  http://geriatrictoolkit.[email or web address]/painad.pdf (Ctrl + left click to view)    Other Symptoms:  [ ]All other review of systems negative     Palliative Performance Status Version 2:      60   %      http://[email or web address]/files/news/palliative_performance_scale_[email or web address].pdf  PHYSICAL EXAM:  Vital Signs Last 24 Hrs  T(C): 36.6 (04 Jul 2021 04:15), Max: 36.8 (03 Jul 2021 12:50)  T(F): 97.9 (04 Jul 2021 04:15), Max: 98.3 (03 Jul 2021 12:50)  HR: 66 (04 Jul 2021 04:15) (59 - 68)  BP: 107/64 (04 Jul 2021 04:15) (105/68 - 124/72)  BP(mean): --  RR: 18 (04 Jul 2021 04:15) (18 - 18)  SpO2: 98% (04 Jul 2021 04:15) (95% - 98%) I&O's Summary    03 Jul 2021 07:01  -  04 Jul 2021 07:00  --------------------------------------------------------  IN: 300 mL / OUT: 0 mL / NET: 300 mL    04 Jul 2021 07:01  -  04 Jul 2021 12:39  --------------------------------------------------------  IN: 200 mL / OUT: 0 mL / NET: 200 mL       GENERAL:  [x ]Alert  [x ]Oriented x3   [ ]Lethargic  [ ]Cachexia  [ ]Unarousable  [ ]Verbal  [ ]Non-Verbal  Behavioral:   [ ]Anxiety  [ ]Delirium [ ]Agitation [ ]Other  HEENT:  [x ]Normal   [ ]Dry mouth   [ ]ET Tube/Trach  [ ]Oral lesions  PULMONARY:   [x ]Clear [ ]Tachypnea  [ ]Audible excessive secretions   [ ]Rhonchi        [ ]Right [ ]Left [ ]Bilateral  [ ]Crackles        [ ]Right [ ]Left [ ]Bilateral  [ ]Wheezing     [ ]Right [ ]Left [ ]Bilateral  [ ]Diminished BS [ ] Right [ ]Left [ ]Bilateral  CARDIOVASCULAR:    [x ]Regular [ ]Irregular [ ]Tachy  [ ][place] [ ]Murmur [ ]Other  GASTROINTESTINAL:left lower and right lower quadrant  [x ]Soft  [ ]Distended   [ ]+BS  [ ]Non tender [ x]Tender  [ ]PEG [ ]OGT/ NGT   Last BM:   GENITOURINARY:  [x ]Normal [ ]Incontinent   [ ]Oliguria/Anuria   [ ][place]  MUSCULOSKELETAL:   [x ]Normal   [ ]Weakness  [ ]Bed/Wheelchair bound [ ]Edema  NEUROLOGIC:   [ ]No focal deficits  [ ] Cognitive impairment  [ ] Dysphagia [ ]Dysarthria [ ] Paresis [ ]Other   SKIN:   [ x]Normal  [ ]Rash   [ ]Pressure ulcer(s) [ ]y [ ]n present on admission    CRITICAL CARE:  [ ]Shock Present  [ ]Septic [ ]Cardiogenic [ ]Neurologic [ ]Hypovolemic  [ ]Vasopressors [ ]Inotropes  [ ]Respiratory failure present [ ]Mechanical Ventilation [ ]Non-invasive ventilatory support [ ]High-Flow   [ ]Acute  [ ]Chronic [ ]Hypoxic  [ ]Hypercarbic [ ]Other  [ ]Other organ failure     LABS: no new labs    RADIOLOGY & ADDITIONAL STUDIES: no new imaging studies    Protein Calorie Malnutrition Present: [ ]mild [ ]moderate [ ]severe [ ]underweight [ ]morbid obesity  https://www.andeal.org/vault/6120/web/files/ONC/Table_Clinical%20Characteristics%20to%20Document%20Malnutrition-White%20JV%20et%20al%202012.pdf    Height (cm): 160 (07-01-21 @ 06:36), 161.3 (04-30-21 @ 19:21), 161.3 (09-04-20 @ 10:29)  Weight (kg): 99.4 (07-01-21 @ 09:17), 95.6 (09-04-20 @ 10:29)  BMI (kg/m2): 38.8 (07-01-21 @ 09:17), 37.3 (07-01-21 @ 06:36), 36.7 (04-30-21 @ 19:21)    [ ]PPSV2 < or = 30%  [ ]significant weight loss [ ]poor nutritional intake [ ]anasarca   Albumin, Serum: 3.8 g/dL (07-01-21 @ 05:24)   [ ]Artificial Nutrition    REFERRALS:   [ ]Chaplaincy  [ ]Hospice  [ ]Child Life  [ ]Social Work  [ x]Case management [ ]Holistic Therapy     Goals of Care Document:

## 2021-07-05 PROCEDURE — 99233 SBSQ HOSP IP/OBS HIGH 50: CPT

## 2021-07-05 PROCEDURE — 93010 ELECTROCARDIOGRAM REPORT: CPT

## 2021-07-05 RX ORDER — METHADONE HYDROCHLORIDE 40 MG/1
5 TABLET ORAL
Refills: 0 | Status: DISCONTINUED | OUTPATIENT
Start: 2021-07-05 | End: 2021-07-08

## 2021-07-05 RX ORDER — METHADONE HYDROCHLORIDE 40 MG/1
10 TABLET ORAL
Refills: 0 | Status: DISCONTINUED | OUTPATIENT
Start: 2021-07-05 | End: 2021-07-08

## 2021-07-05 RX ORDER — METHADONE HYDROCHLORIDE 40 MG/1
5 TABLET ORAL
Refills: 0 | Status: DISCONTINUED | OUTPATIENT
Start: 2021-07-05 | End: 2021-07-05

## 2021-07-05 RX ORDER — POLYETHYLENE GLYCOL 3350 17 G/17G
17 POWDER, FOR SOLUTION ORAL DAILY
Refills: 0 | Status: DISCONTINUED | OUTPATIENT
Start: 2021-07-05 | End: 2021-07-08

## 2021-07-05 RX ADMIN — SENNA PLUS 2 TABLET(S): 8.6 TABLET ORAL at 21:58

## 2021-07-05 RX ADMIN — Medication 2.5 MILLIGRAM(S): at 11:27

## 2021-07-05 RX ADMIN — Medication 0.25 MILLIGRAM(S): at 21:30

## 2021-07-05 RX ADMIN — HEPARIN SODIUM 5000 UNIT(S): 5000 INJECTION INTRAVENOUS; SUBCUTANEOUS at 05:25

## 2021-07-05 RX ADMIN — METHADONE HYDROCHLORIDE 5 MILLIGRAM(S): 40 TABLET ORAL at 05:26

## 2021-07-05 RX ADMIN — Medication 0.25 MILLIGRAM(S): at 05:25

## 2021-07-05 RX ADMIN — Medication 2.5 MILLIGRAM(S): at 17:18

## 2021-07-05 RX ADMIN — Medication 50 MILLIGRAM(S): at 05:26

## 2021-07-05 RX ADMIN — ONDANSETRON 8 MILLIGRAM(S): 8 TABLET, FILM COATED ORAL at 21:58

## 2021-07-05 RX ADMIN — ONDANSETRON 8 MILLIGRAM(S): 8 TABLET, FILM COATED ORAL at 05:25

## 2021-07-05 RX ADMIN — METHADONE HYDROCHLORIDE 5 MILLIGRAM(S): 40 TABLET ORAL at 13:16

## 2021-07-05 RX ADMIN — PANTOPRAZOLE SODIUM 40 MILLIGRAM(S): 20 TABLET, DELAYED RELEASE ORAL at 05:39

## 2021-07-05 RX ADMIN — HYDROMORPHONE HYDROCHLORIDE 30 MILLILITER(S): 2 INJECTION INTRAMUSCULAR; INTRAVENOUS; SUBCUTANEOUS at 06:58

## 2021-07-05 RX ADMIN — Medication 50 MILLIGRAM(S): at 17:18

## 2021-07-05 RX ADMIN — HEPARIN SODIUM 5000 UNIT(S): 5000 INJECTION INTRAVENOUS; SUBCUTANEOUS at 17:18

## 2021-07-05 RX ADMIN — POLYETHYLENE GLYCOL 3350 17 GRAM(S): 17 POWDER, FOR SOLUTION ORAL at 13:16

## 2021-07-05 RX ADMIN — ESCITALOPRAM OXALATE 5 MILLIGRAM(S): 10 TABLET, FILM COATED ORAL at 11:39

## 2021-07-05 RX ADMIN — ONDANSETRON 8 MILLIGRAM(S): 8 TABLET, FILM COATED ORAL at 11:37

## 2021-07-05 RX ADMIN — Medication 0.25 MILLIGRAM(S): at 13:16

## 2021-07-05 RX ADMIN — HYDROMORPHONE HYDROCHLORIDE 30 MILLILITER(S): 2 INJECTION INTRAMUSCULAR; INTRAVENOUS; SUBCUTANEOUS at 18:53

## 2021-07-05 RX ADMIN — METHADONE HYDROCHLORIDE 10 MILLIGRAM(S): 40 TABLET ORAL at 21:58

## 2021-07-05 NOTE — PROGRESS NOTE ADULT - ASSESSMENT
72 yo F with PMH of anxiety, IBS, GERD, Cholangiocarcinoma with metastases, presents here with abdominal pain.    Abdominal pain.  - Patient with abdominal pain, likely 2/2 to underlying metastatic cholangiocarcinoma and peritoneal carcinomatosis; Also with new moderate amount ascites, no signs of SBP  - s/p therapeutic paracentesis  - pain control.     Cholangiocarcinoma.   - patient with cholangiocarcinoma, currently not on chemo  - oncology f/u.     Atrial fibrillation.    - Patient with new onset Afib, unclear etiology  - metoprolol 25mg bid for rate control  - CHADSVASC 2-3 (age, female, ?HTN)  - hold off AC for now  - echocardiogram.     Respiratory distress.   - Patient with orthopnea, GANNON, and now tachypneic  - no hypoxia  - due to pressure from ascites    Chronic pain.   - patient on methadone 5mg tid and hydromorphone 2mg qid   - pain management evaluation  Anxiety.   - xanax 1mg qid prn.    Elevated blood pressure reading.    - elevated BP, possibly 2/2 to pain and anxiety    Prophylactic measure.   - no AC given bleeding risk     Palliative care    DNR    PCU care    d/w patient and     Arden Benitez MD pager [phone]

## 2021-07-05 NOTE — PROGRESS NOTE ADULT - PROBLEM SELECTOR PLAN 1
c/w dilaudid PCA  0/1/30//1.5 ; discussed with patient, spouse. Utilized 14 attempts, 13 injections over last 24 hours  EKG completed today, 441ms Qtc-> increase methadone to 5/5/10  bowel regimen, start miralax PRN

## 2021-07-05 NOTE — PROGRESS NOTE ADULT - PROBLEM SELECTOR PLAN 7
The patient is already DNR.   in the event of new or worsening symptoms, please page on call [phone]  hospice referral will need to be sent and [facility] to follow up with family  Discussed with patient, family

## 2021-07-05 NOTE — PROGRESS NOTE ADULT - SUBJECTIVE AND OBJECTIVE BOX
Patient is a 71y old  Female who presents with a chief complaint of abdominal pain (05 Jul 2021 09:00)      SUBJECTIVE / OVERNIGHT EVENTS: No new complaints.   Review of Systems  chest pain no  palpitations no  sob no  nausea no  headache no    MEDICATIONS  (STANDING):  ALPRAZolam 0.25 milliGRAM(s) Oral three times a day  dronabinol 2.5 milliGRAM(s) Oral two times a day  escitalopram 5 milliGRAM(s) Oral daily  heparin   Injectable 5000 Unit(s) SubCutaneous every 12 hours  HYDROmorphone PCA (1 mG/mL) 30 milliLiter(s) PCA Continuous PCA Continuous  methadone    Tablet 10 milliGRAM(s) Oral <User Schedule>  methadone    Tablet 5 milliGRAM(s) Oral <User Schedule>  metoprolol tartrate 50 milliGRAM(s) Oral two times a day  ondansetron Injectable 8 milliGRAM(s) IV Push every 8 hours  pantoprazole    Tablet 40 milliGRAM(s) Oral before breakfast  senna 2 Tablet(s) Oral at bedtime  sodium chloride 0.9%. 1000 milliLiter(s) (30 mL/Hr) IV Continuous <Continuous>    MEDICATIONS  (PRN):  acetaminophen   Tablet .. 650 milliGRAM(s) Oral every 6 hours PRN Temp greater or equal to 38C (100.4F), Mild Pain (1 - 3)  HYDROmorphone PCA (1 mG/mL) Rescue Clinician Bolus 1.5 milliGRAM(s) IV Push every 1 hour PRN Severe Pain (7 - 10)  lactulose Syrup 10 Gram(s) Oral daily PRN constipation  polyethylene glycol 3350 17 Gram(s) Oral daily PRN Constipation  prochlorperazine   Injectable 5 milliGRAM(s) IV Push every 6 hours PRN Nauease or vomiting      Vital Signs Last 24 Hrs  T(C): 36.6 (05 Jul 2021 07:45), Max: 36.9 (04 Jul 2021 18:30)  T(F): 97.8 (05 Jul 2021 07:45), Max: 98.5 (04 Jul 2021 18:30)  HR: 55 (05 Jul 2021 07:45) (55 - 74)  BP: 117/68 (05 Jul 2021 07:45) (107/70 - 126/75)  BP(mean): --  RR: 18 (05 Jul 2021 07:45) (16 - 18)  SpO2: 96% (05 Jul 2021 07:45) (94% - 99%)    PHYSICAL EXAM:  GENERAL: NAD, well-developed  HEAD:  Atraumatic, Normocephalic  EYES: EOMI, PERRLA, conjunctiva and sclera clear  NECK: Supple, No JVD  CHEST/LUNG: Clear to auscultation bilaterally; No wheeze  HEART: Regular rate and rhythm; No murmurs, rubs, or gallops  ABDOMEN: Soft, Nontender, distended; Bowel sounds present  EXTREMITIES:  2+ Peripheral Pulses, No clubbing, cyanosis, or edema  PSYCH: AAOx3  NEUROLOGY: non-focal  SKIN: No rashes or lesions    LABS:                    Culture - Fungal, Body Fluid (collected 02 Jul 2021 22:12)  Source: .Body Fluid Peritoneal Fluid  Preliminary Report (05 Jul 2021 10:42):    Testing in progress    Culture - Body Fluid with Gram Stain (collected 02 Jul 2021 22:11)  Source: .Body Fluid Peritoneal Fluid  Preliminary Report (03 Jul 2021 23:12):    No growth        RADIOLOGY & ADDITIONAL TESTS:    Imaging Personally Reviewed:    Consultant(s) Notes Reviewed:      Care Discussed with Consultants/Other Providers:

## 2021-07-05 NOTE — PROGRESS NOTE ADULT - SUBJECTIVE AND OBJECTIVE BOX
GAP TEAM PALLIATIVE CARE UNIT PROGRESS NOTE:      [] Patient on hospice program.    INDICATION FOR PALLIATIVE CARE UNIT SERVICES: Symptom Control, Comfort Measures, GOC    INTERVAL HPI/OVERNIGHT EVENTS: No acute events overnight. Required PRNs of  in 24hrs.    DNR on chart: Yes      Allergies    codeine (Short breath)  contrast media (iodine-based) (Anaphylaxis)    Intolerances    MEDICATIONS  (STANDING):  ALPRAZolam 0.25 milliGRAM(s) Oral three times a day  dronabinol 2.5 milliGRAM(s) Oral two times a day  escitalopram 5 milliGRAM(s) Oral daily  heparin   Injectable 5000 Unit(s) SubCutaneous every 12 hours  HYDROmorphone PCA (1 mG/mL) 30 milliLiter(s) PCA Continuous PCA Continuous  methadone    Tablet 5 milliGRAM(s) Oral every 8 hours  metoprolol tartrate 50 milliGRAM(s) Oral two times a day  ondansetron Injectable 8 milliGRAM(s) IV Push every 8 hours  pantoprazole    Tablet 40 milliGRAM(s) Oral before breakfast  senna 2 Tablet(s) Oral at bedtime  sodium chloride 0.9%. 1000 milliLiter(s) (30 mL/Hr) IV Continuous <Continuous>    MEDICATIONS  (PRN):  acetaminophen   Tablet .. 650 milliGRAM(s) Oral every 6 hours PRN Temp greater or equal to 38C (100.4F), Mild Pain (1 - 3)  HYDROmorphone PCA (1 mG/mL) Rescue Clinician Bolus 1.5 milliGRAM(s) IV Push every 1 hour PRN Severe Pain (7 - 10)  lactulose Syrup 10 Gram(s) Oral daily PRN constipation  prochlorperazine   Injectable 5 milliGRAM(s) IV Push every 6 hours PRN Nauease or vomiting    ITEMS UNCHECKED ARE NOT PRESENT    PRESENT SYMPTOMS: [x]Unable to obtain due to poor mentation   Source if other than patient:  [x]Family   [x]Team     Pain: [] yes [] no  QOL impact -   Location -                    Aggravating factors -  Quality -  Radiation -  Timing -  Severity (0-10 scale):  Minimal acceptable level (0-10 scale):     Dyspnea:                           []Mild []Moderate []Severe  Anxiety:                             []Mild []Moderate []Severe  Fatigue:                             []Mild []Moderate []Severe  Nausea:                             []Mild []Moderate []Severe  Loss of appetite:              []Mild []Moderate []Severe  Constipation:                    []Mild []Moderate []Severe    PAINAD Score:  http://geriatrictoolkit.[place].[facility]/cog/painad.pdf  		  Other Symptoms:  [x]All other review of systems negative     Karnofsky Performance Score/Palliative Performance Status Version 2:         %  [email or web address]    PHYSICAL EXAM:  Vital Signs Last 24 Hrs  T(C): 36.6 (05 Jul 2021 07:45), Max: 36.9 (04 Jul 2021 18:30)  T(F): 97.8 (05 Jul 2021 07:45), Max: 98.5 (04 Jul 2021 18:30)  HR: 55 (05 Jul 2021 07:45) (55 - 74)  BP: 117/68 (05 Jul 2021 07:45) (103/67 - 126/75)  BP(mean): --  RR: 18 (05 Jul 2021 07:45) (16 - 18)  SpO2: 96% (05 Jul 2021 07:45) (94% - 99%) I&O's Summary    04 Jul 2021 07:01  -  05 Jul 2021 07:00  --------------------------------------------------------  IN: 450 mL / OUT: 850 mL / NET: -400 mL      GENERAL:  []Alert  []Oriented x   []Lethargic  []Cachexia  []Unarousable  []Verbal  []Non-Verbal  Behavioral:   [] Anxiety  [] Delirium [] Agitation [] Other  HEENT:  []Normal   []Dry mouth   []ET Tube/Trach  []Oral lesions  PULMONARY:   [x]Clear []Tachypnea  []Audible excessive secretions   []Rhonchi        []Right []Left []Bilateral  []Crackles        []Right []Left []Bilateral  []Wheezing     []Right []Left []Bilateral  CARDIOVASCULAR:    [x]Regular []Irregular []Tachy  [][place] []Murmur []Other  GASTROINTESTINAL:  [x]Soft []Distended [x]+BS [x]Non tender []Tender []PEG []OGT/ NGT   Last BM:       GENITOURINARY:  []Normal [x] Incontinent   []Oliguria/Anuria   []Doran  MUSCULOSKELETAL:   []Normal  []Weakness  [x]Bed/Wheelchair bound []Edema  NEUROLOGIC:   []No focal deficits  [x]Cognitive impairment  []Dysphagia []Dysarthria []Paresis []Other   SKIN:   []Normal   []Pressure ulcer(s)  [x]Rash: Please refer to nursing notes for further documentation    CRITICAL CARE:  [ ] Shock Present  [ ]Septic [ ]Cardiogenic [ ]Neurologic [ ]Hypovolemic  [ ]  Vasopressors [ ]  Inotropes   [ ] Respiratory failure present [ ] Mechanical Ventilation [ ] Non-invasive ventilatory support [ ] High-Flow  [ ] Acute  [ ] Chronic [ ] Hypoxic  [ ] Hypercarbic [ ] Other  [ ] Other organ failure     LABS: None new          RADIOLOGY & ADDITIONAL STUDIES: None new    PROTEIN CALORIE MALNUTRITION:   [x] PPSV2 < or = 30% [] significant weight loss [] poor nutritional intake [] anasarca [] catabolic state   Albumin, Serum: 3.8 g/dL (07-01-21 @ 05:24)   Artificial Nutrition []     REFERRALS:   []Chaplaincy  []Hospice  []Child Life  [x]Social Work  []Case management []Holistic Therapy []Physical Therapy []Dietary   Goals of Care Document:   Care Coordination Document: Care Coordination Assessment 201 [C. Provider] (07-02-21 @ 16:00)                              Care Coordination Assessment 201    COGNITIVE/LEARNING 201  Mental Status    Answers: Alert,  Answers: Oriented: Person,  Answers: Oriented: Place,  Answers: Oriented: Time,  Answers: Oriented: Situation    Ability to make needs known    Answers: Understands/communicates without difficulty    ADMISSION HISTORY 201  Admitted From    Answers: Home    Functional Status Prior to Admission    Answers: Independent    Services Present on Admission    Answers: None    FINANCIAL 201  Does patient have financial concerns for discharge?    Answers: None    LIVING ARRANGEMENTS/SUPPORT 201  Housing Environment    Answers: House,  Answers: Stairs, number of steps 4    Living Arrangements    Answers: Lives with spouse, significant other    Sources of support/caregivers    Answers: Spouse/Significant Other    CAREGIVER CONTACT 201  Does the patient wish to identify a Caregiver?    Answers: No    EMERGENCY CONTACTS OUTSIDE HOME 201  Emergency Contact    Answers: Emergency Contact Name Jose Brown,  Answers: Emergency Contact Phone # [phone],  Answers: Emergency Contact Relationship Spouse    DISCHARGE PLANNING 201  Potential Discharge Plan and Services    Answers: Anticipated Needs Unclear at Present    Anticipated Transportation Needs for Discharge    Answers: Auto    Who will accompany patient at discharge?    Answers: Spouse/Significant Other    SCREENING 201  Social Work Screen and Referral    Answers: None    SUMMARY 201  Initial Clinical Summary    Notes: Chart reviewed and case management referral received 70yo F with PMHx of  cholangiocarcinoma (not gastric cancer as previously writtent) p/w abdominal  pain. She endorses that this pain has been ongoing for months and that she has  stopped chemo a month ago due to not improving her symptoms. Denies any fevers,  chills, rashes, chest pain or other infectious symptoms. She came in today  because she did not feel like her pain was being managed appropriately as an  outpatient. Vomited twice prior to arrival; non-bloody.    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 Introduced self. Discussed the role of cm and  d/c planning. Patient and family verbalized understanding. Confirmed  demographics. Patient lives in an co-op with her spouse. 4 steps to enter.  Patient independent in ADL's. Patient verbalized she would prefer to seek  hospice care after speaking with Dr. Torres. SITA Quintana made aware.    Anticipated Discharge Plan and Services    Notes: Unclear pending hospital course. CM remains available to assist in d/c  planning.       Electronically signed by:  Ester Huston  Electronically signed on:  2021-07-02  16:00       GAP TEAM PALLIATIVE CARE UNIT PROGRESS NOTE:      [] Patient on hospice program.    INDICATION FOR PALLIATIVE CARE UNIT SERVICES: Symptom Control, GOC    INTERVAL HPI/OVERNIGHT EVENTS: No acute events overnight. Required 14 attempts and 13 injections of last 24 hours.    DNR on chart: Yes      Allergies    codeine (Short breath)  contrast media (iodine-based) (Anaphylaxis)    Intolerances    MEDICATIONS  (STANDING):  ALPRAZolam 0.25 milliGRAM(s) Oral three times a day  dronabinol 2.5 milliGRAM(s) Oral two times a day  escitalopram 5 milliGRAM(s) Oral daily  heparin   Injectable 5000 Unit(s) SubCutaneous every 12 hours  HYDROmorphone PCA (1 mG/mL) 30 milliLiter(s) PCA Continuous PCA Continuous  methadone    Tablet 5 milliGRAM(s) Oral every 8 hours  metoprolol tartrate 50 milliGRAM(s) Oral two times a day  ondansetron Injectable 8 milliGRAM(s) IV Push every 8 hours  pantoprazole    Tablet 40 milliGRAM(s) Oral before breakfast  senna 2 Tablet(s) Oral at bedtime  sodium chloride 0.9%. 1000 milliLiter(s) (30 mL/Hr) IV Continuous <Continuous>    MEDICATIONS  (PRN):  acetaminophen   Tablet .. 650 milliGRAM(s) Oral every 6 hours PRN Temp greater or equal to 38C (100.4F), Mild Pain (1 - 3)  HYDROmorphone PCA (1 mG/mL) Rescue Clinician Bolus 1.5 milliGRAM(s) IV Push every 1 hour PRN Severe Pain (7 - 10)  lactulose Syrup 10 Gram(s) Oral daily PRN constipation  prochlorperazine   Injectable 5 milliGRAM(s) IV Push every 6 hours PRN Nauease or vomiting    ITEMS UNCHECKED ARE NOT PRESENT    PRESENT SYMPTOMS: []Unable to obtain due to poor mentation   Source if other than patient:  []Family   []Team     Pain: [x] yes [] no  QOL impact - improving  Location -   lower abdomen                 Aggravating factors - palpation  Quality -aching  Radiation - localized  Timing - comes/goes  Severity (0-10 scale):8  Minimal acceptable level (0-10 scale): 4    Dyspnea:                           []Mild []Moderate []Severe  Anxiety:                             []Mild [x]Moderate []Severe  Fatigue:                             []Mild []Moderate []Severe  Nausea:                             []Mild []Moderate []Severe  Loss of appetite:              []Mild [x]Moderate []Severe  Constipation:                    []Mild []Moderate []Severe    PAINAD Score:  http://geriatrictoolkit.[place].[facility]/cog/painad.pdf  		  Other Symptoms:  [x]All other review of systems negative     Karnofsky Performance Score/Palliative Performance Status Version 2:    70     %  [email or web address]_[email or web address]    PHYSICAL EXAM:  Vital Signs Last 24 Hrs  T(C): 36.6 (05 Jul 2021 07:45), Max: 36.9 (04 Jul 2021 18:30)  T(F): 97.8 (05 Jul 2021 07:45), Max: 98.5 (04 Jul 2021 18:30)  HR: 55 (05 Jul 2021 07:45) (55 - 74)  BP: 117/68 (05 Jul 2021 07:45) (103/67 - 126/75)  BP(mean): --  RR: 18 (05 Jul 2021 07:45) (16 - 18)  SpO2: 96% (05 Jul 2021 07:45) (94% - 99%) I&O's Summary    04 Jul 2021 07:01  -  05 Jul 2021 07:00  --------------------------------------------------------  IN: 450 mL / OUT: 850 mL / NET: -400 mL      GENERAL:  [x]Alert  [x]Oriented x 3  []Lethargic  []Cachexia  []Unarousable  []Verbal  []Non-Verbal  Behavioral:   [x] Anxiety  [] Delirium [] Agitation [] Other  HEENT:  [x]Normal   []Dry mouth   []ET Tube/Trach  []Oral lesions  PULMONARY:   [x]Clear []Tachypnea  []Audible excessive secretions   []Rhonchi        []Right []Left []Bilateral  []Crackles        []Right []Left []Bilateral  []Wheezing     []Right []Left []Bilateral  CARDIOVASCULAR:    [x]Regular []Irregular []Tachy  [][place] []Murmur []Other  GASTROINTESTINAL:  [x]Soft []Distended [x]+BS [x]Non tender []Tender []PEG []OGT/ NGT   Last BM:   7/3    GENITOURINARY:  []Normal [x] Incontinent   []Oliguria/Anuria   []Doran  MUSCULOSKELETAL:   [x]Normal  []Weakness  []Bed/Wheelchair bound []Edema  NEUROLOGIC:   [x]No focal deficits  []Cognitive impairment  []Dysphagia []Dysarthria []Paresis []Other   SKIN:   []Normal   []Pressure ulcer(s)  [x]Rash: Please refer to nursing notes for further documentation    CRITICAL CARE:  [ ] Shock Present  [ ]Septic [ ]Cardiogenic [ ]Neurologic [ ]Hypovolemic  [ ]  Vasopressors [ ]  Inotropes   [ ] Respiratory failure present [ ] Mechanical Ventilation [ ] Non-invasive ventilatory support [ ] High-Flow  [ ] Acute  [ ] Chronic [ ] Hypoxic  [ ] Hypercarbic [ ] Other  [ ] Other organ failure     LABS: None new          RADIOLOGY & ADDITIONAL STUDIES: None new    PROTEIN CALORIE MALNUTRITION:   [x] PPSV2 < or = 30% [] significant weight loss [] poor nutritional intake [] anasarca [] catabolic state   Albumin, Serum: 3.8 g/dL (07-01-21 @ 05:24)   Artificial Nutrition []     REFERRALS:   []Chaplaincy  [x]Hospice  []Child Life  [x]Social Work  []Case management []Holistic Therapy []Physical Therapy []Dietary   Goals of Care Document:   Care Coordination Document: Care Coordination Assessment 201 [C. Provider] (07-02-21 @ 16:00)                              Care Coordination Assessment 201    COGNITIVE/LEARNING 201  Mental Status    Answers: Alert,  Answers: Oriented: Person,  Answers: Oriented: Place,  Answers: Oriented: Time,  Answers: Oriented: Situation    Ability to make needs known    Answers: Understands/communicates without difficulty    ADMISSION HISTORY 201  Admitted From    Answers: Home    Functional Status Prior to Admission    Answers: Independent    Services Present on Admission    Answers: None    FINANCIAL 201  Does patient have financial concerns for discharge?    Answers: None    LIVING ARRANGEMENTS/SUPPORT 201  Housing Environment    Answers: House,  Answers: Stairs, number of steps 4    Living Arrangements    Answers: Lives with spouse, significant other    Sources of support/caregivers    Answers: Spouse/Significant Other    CAREGIVER CONTACT 201  Does the patient wish to identify a Caregiver?    Answers: No    EMERGENCY CONTACTS OUTSIDE HOME 201  Emergency Contact    Answers: Emergency Contact Name Jose Brown,  Answers: Emergency Contact Phone # [phone],  Answers: Emergency Contact Relationship Spouse    DISCHARGE PLANNING 201  Potential Discharge Plan and Services    Answers: Anticipated Needs Unclear at Present    Anticipated Transportation Needs for Discharge    Answers: Auto    Who will accompany patient at discharge?    Answers: Spouse/Significant Other    SCREENING 201  Social Work Screen and Referral    Answers: None    SUMMARY 201  Initial Clinical Summary    Notes: Chart reviewed and case management referral received 70yo F with PMHx of  cholangiocarcinoma (not gastric cancer as previously writtent) p/w abdominal  pain. She endorses that this pain has been ongoing for months and that she has  stopped chemo a month ago due to not improving her symptoms. Denies any fevers,  chills, rashes, chest pain or other infectious symptoms. She came in today  because she did not feel like her pain was being managed appropriately as an  outpatient. Vomited twice prior to arrival; non-bloody.    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 Introduced self. Discussed the role of cm and  d/c planning. Patient and family verbalized understanding. Confirmed  demographics. Patient lives in an co-op with her spouse. 4 steps to enter.  Patient independent in ADL's. Patient verbalized she would prefer to seek  hospice care after speaking with Dr. Torres. SITA Quintana made aware.    Anticipated Discharge Plan and Services    Notes: Unclear pending hospital course. CM remains available to assist in d/c  planning.       Electronically signed by:  Ester Huston  Electronically signed on:  2021-07-02  16:00

## 2021-07-06 PROCEDURE — 99233 SBSQ HOSP IP/OBS HIGH 50: CPT | Mod: GC

## 2021-07-06 RX ORDER — CHLORHEXIDINE GLUCONATE 213 G/1000ML
1 SOLUTION TOPICAL DAILY
Refills: 0 | Status: DISCONTINUED | OUTPATIENT
Start: 2021-07-06 | End: 2021-07-12

## 2021-07-06 RX ADMIN — METHADONE HYDROCHLORIDE 5 MILLIGRAM(S): 40 TABLET ORAL at 06:08

## 2021-07-06 RX ADMIN — Medication 2.5 MILLIGRAM(S): at 17:07

## 2021-07-06 RX ADMIN — ONDANSETRON 8 MILLIGRAM(S): 8 TABLET, FILM COATED ORAL at 06:05

## 2021-07-06 RX ADMIN — CHLORHEXIDINE GLUCONATE 1 APPLICATION(S): 213 SOLUTION TOPICAL at 11:23

## 2021-07-06 RX ADMIN — Medication 0.25 MILLIGRAM(S): at 22:17

## 2021-07-06 RX ADMIN — HYDROMORPHONE HYDROCHLORIDE 30 MILLILITER(S): 2 INJECTION INTRAMUSCULAR; INTRAVENOUS; SUBCUTANEOUS at 18:59

## 2021-07-06 RX ADMIN — Medication 50 MILLIGRAM(S): at 06:05

## 2021-07-06 RX ADMIN — HEPARIN SODIUM 5000 UNIT(S): 5000 INJECTION INTRAVENOUS; SUBCUTANEOUS at 17:07

## 2021-07-06 RX ADMIN — SENNA PLUS 2 TABLET(S): 8.6 TABLET ORAL at 22:17

## 2021-07-06 RX ADMIN — ESCITALOPRAM OXALATE 5 MILLIGRAM(S): 10 TABLET, FILM COATED ORAL at 11:20

## 2021-07-06 RX ADMIN — METHADONE HYDROCHLORIDE 5 MILLIGRAM(S): 40 TABLET ORAL at 13:16

## 2021-07-06 RX ADMIN — HEPARIN SODIUM 5000 UNIT(S): 5000 INJECTION INTRAVENOUS; SUBCUTANEOUS at 06:05

## 2021-07-06 RX ADMIN — ONDANSETRON 8 MILLIGRAM(S): 8 TABLET, FILM COATED ORAL at 13:17

## 2021-07-06 RX ADMIN — PANTOPRAZOLE SODIUM 40 MILLIGRAM(S): 20 TABLET, DELAYED RELEASE ORAL at 06:05

## 2021-07-06 RX ADMIN — HYDROMORPHONE HYDROCHLORIDE 30 MILLILITER(S): 2 INJECTION INTRAMUSCULAR; INTRAVENOUS; SUBCUTANEOUS at 07:03

## 2021-07-06 RX ADMIN — ONDANSETRON 8 MILLIGRAM(S): 8 TABLET, FILM COATED ORAL at 22:17

## 2021-07-06 RX ADMIN — HYDROMORPHONE HYDROCHLORIDE 1.5 MILLIGRAM(S): 2 INJECTION INTRAMUSCULAR; INTRAVENOUS; SUBCUTANEOUS at 15:30

## 2021-07-06 RX ADMIN — Medication 0.25 MILLIGRAM(S): at 13:16

## 2021-07-06 RX ADMIN — POLYETHYLENE GLYCOL 3350 17 GRAM(S): 17 POWDER, FOR SOLUTION ORAL at 13:16

## 2021-07-06 RX ADMIN — METHADONE HYDROCHLORIDE 10 MILLIGRAM(S): 40 TABLET ORAL at 22:17

## 2021-07-06 RX ADMIN — Medication 0.25 MILLIGRAM(S): at 06:01

## 2021-07-06 RX ADMIN — Medication 50 MILLIGRAM(S): at 17:07

## 2021-07-06 RX ADMIN — Medication 2.5 MILLIGRAM(S): at 11:20

## 2021-07-06 RX ADMIN — HYDROMORPHONE HYDROCHLORIDE 30 MILLILITER(S): 2 INJECTION INTRAMUSCULAR; INTRAVENOUS; SUBCUTANEOUS at 12:02

## 2021-07-06 NOTE — DIETITIAN INITIAL EVALUATION ADULT. - ADD RECOMMEND
1. Recommend Ensure Enlive x 1 daily (350kcal, 20g protein per 8 ounces). 2. Made pt aware of alternatives and various items available in house and to eat as tolerated.

## 2021-07-06 NOTE — PROGRESS NOTE ADULT - SUBJECTIVE AND OBJECTIVE BOX
Patient is a 71y old  Female who presents with a chief complaint of abdominal pain; pt c mets cholangiocarcinoma, now in PCU for pain and symptom management, plan for hospice. Noted pt is on PCA pump. Pt also c malignant ascites, S/P paracentesis, 4.8 liters on 7/2/2021. (06 Jul 2021 12:11)      SUBJECTIVE / OVERNIGHT EVENTS:  Review of Systems  chest pain no  palpitations no  sob no  nausea no  headache no    MEDICATIONS  (STANDING):  ALPRAZolam 0.25 milliGRAM(s) Oral three times a day  chlorhexidine 4% Liquid 1 Application(s) Topical daily  dronabinol 2.5 milliGRAM(s) Oral two times a day  escitalopram 5 milliGRAM(s) Oral daily  heparin   Injectable 5000 Unit(s) SubCutaneous every 12 hours  HYDROmorphone PCA (1 mG/mL) 30 milliLiter(s) PCA Continuous PCA Continuous  methadone    Tablet 10 milliGRAM(s) Oral <User Schedule>  methadone    Tablet 5 milliGRAM(s) Oral <User Schedule>  metoprolol tartrate 50 milliGRAM(s) Oral two times a day  ondansetron Injectable 8 milliGRAM(s) IV Push every 8 hours  pantoprazole    Tablet 40 milliGRAM(s) Oral before breakfast  senna 2 Tablet(s) Oral at bedtime  sodium chloride 0.9%. 1000 milliLiter(s) (30 mL/Hr) IV Continuous <Continuous>    MEDICATIONS  (PRN):  acetaminophen   Tablet .. 650 milliGRAM(s) Oral every 6 hours PRN Temp greater or equal to 38C (100.4F), Mild Pain (1 - 3)  HYDROmorphone PCA (1 mG/mL) Rescue Clinician Bolus 1.5 milliGRAM(s) IV Push every 1 hour PRN Severe Pain (7 - 10)  lactulose Syrup 10 Gram(s) Oral daily PRN constipation  polyethylene glycol 3350 17 Gram(s) Oral daily PRN Constipation  prochlorperazine   Injectable 5 milliGRAM(s) IV Push every 6 hours PRN Nauease or vomiting      Vital Signs Last 24 Hrs  T(C): 37 (06 Jul 2021 08:47), Max: 37 (06 Jul 2021 08:47)  T(F): 98.6 (06 Jul 2021 08:47), Max: 98.6 (06 Jul 2021 08:47)  HR: 67 (06 Jul 2021 08:47) (56 - 67)  BP: 115/75 (06 Jul 2021 08:47) (105/75 - 132/80)  BP(mean): --  RR: 19 (06 Jul 2021 08:47) (16 - 19)  SpO2: 97% (06 Jul 2021 08:47) (93% - 100%)    PHYSICAL EXAM:  GENERAL: NAD, well-developed  HEAD:  Atraumatic, Normocephalic  EYES: EOMI, PERRLA, conjunctiva and sclera clear  NECK: Supple, No JVD  CHEST/LUNG: Clear to auscultation bilaterally; No wheeze  HEART: Regular rate and rhythm; No murmurs, rubs, or gallops  ABDOMEN: Soft, Nontender, distended; Bowel sounds present  EXTREMITIES:  2+ Peripheral Pulses, No clubbing, cyanosis, or edema  PSYCH: AAOx3  NEUROLOGY: non-focal  SKIN: No rashes or lesions    LABS:                      RADIOLOGY & ADDITIONAL TESTS:    Imaging Personally Reviewed:    Consultant(s) Notes Reviewed:      Care Discussed with Consultants/Other Providers:

## 2021-07-06 NOTE — DIETITIAN INITIAL EVALUATION ADULT. - PROBLEM SELECTOR PLAN 4
Patient with orthopnea, GANNON, and now tachypneic  no hypoxia  ?likely 2/2 to pleural effusion or due to pressure from ascites; r/o PE  IV lasix 20mg x 1  reevaluate  CTA chest to r/o PE

## 2021-07-06 NOTE — DIETITIAN INITIAL EVALUATION ADULT. - EDUCATION DIETARY MODIFICATIONS
Encouraged PO intake-small, frequent meals and nutrient dense snacks. In house, encouraged pt to order nutrient dense snacks c meals to consume in between meals to mimic small, frequent meals./(2) meets goals/outcomes/verbalization

## 2021-07-06 NOTE — DIETITIAN INITIAL EVALUATION ADULT. - FACTORS AFF FOOD INTAKE
in house, pt reports appetite and intake have improved-consuming more each day; denies any chewing/swallowing issues; +constipation, is on bowel regimen, made pt aware prunes and prune juice available as well

## 2021-07-06 NOTE — DIETITIAN INITIAL EVALUATION ADULT. - ORAL INTAKE PTA/DIET HISTORY
Pt reports decreased PO intake PTA due to abdominal pain. Reports she was taking Ensure shakes at times at home. Reports NKFA. States no micronutrient coverage at home.

## 2021-07-06 NOTE — DIETITIAN INITIAL EVALUATION ADULT. - PROBLEM SELECTOR PLAN 3
Patient with new onset Afib, unclear etiology  Will start patient on metoprolol 25mg bid for rate control  check CTA chest to r/o PE given SOB and GANNON  CHADSVASC 2-3 (age, female, ?HTN)-Will need to start anticoagulation, however she also may need paracentesis; will hold off AC for now  check echocardiogram

## 2021-07-06 NOTE — DIETITIAN INITIAL EVALUATION ADULT. - PROBLEM SELECTOR PROBLEM 1
Consent: The patient's consent was obtained including but not limited to risks of crusting, scabbing, blistering, scarring, darker or lighter pigmentary change, recurrence, incomplete removal and infection.
Render Post-Care Instructions In Note?: yes
Detail Level: Detailed
Post-Care Instructions: I reviewed with the patient in detail post-care instructions. Patient is to wear sunprotection, and avoid picking at any of the treated lesions. Pt may apply Vaseline to crusted or scabbing areas.
Duration Of Freeze Thaw-Cycle (Seconds): 0
Abdominal pain

## 2021-07-06 NOTE — DIETITIAN INITIAL EVALUATION ADULT. - REASON FOR ADMISSION
abdominal pain; pt c mets cholangiocarcinoma, now in PCU for pain and symptom management, plan for hospice. Noted pt is on PCA pump. Pt also c malignant ascites, S/P paracentesis, 4.8 liters on 7/2/2021.

## 2021-07-06 NOTE — PROGRESS NOTE ADULT - SUBJECTIVE AND OBJECTIVE BOX
GAP TEAM PALLIATIVE CARE UNIT PROGRESS NOTE:      [  ] Patient on hospice program.    INDICATION FOR PALLIATIVE CARE UNIT SERVICES: pain and symptom management    INTERVAL HPI/OVERNIGHT EVENTS: 7a-a PRNs: 15 injections. Patient states her current PCA pump regimen is working well for her.    DNR on chart: Yes      Allergies    codeine (Short breath)  contrast media (iodine-based) (Anaphylaxis)    Intolerances    MEDICATIONS  (STANDING):  ALPRAZolam 0.25 milliGRAM(s) Oral three times a day  dronabinol 2.5 milliGRAM(s) Oral two times a day  escitalopram 5 milliGRAM(s) Oral daily  heparin   Injectable 5000 Unit(s) SubCutaneous every 12 hours  HYDROmorphone PCA (1 mG/mL) 30 milliLiter(s) PCA Continuous PCA Continuous  methadone    Tablet 10 milliGRAM(s) Oral <User Schedule>  methadone    Tablet 5 milliGRAM(s) Oral <User Schedule>  metoprolol tartrate 50 milliGRAM(s) Oral two times a day  ondansetron Injectable 8 milliGRAM(s) IV Push every 8 hours  pantoprazole    Tablet 40 milliGRAM(s) Oral before breakfast  senna 2 Tablet(s) Oral at bedtime  sodium chloride 0.9%. 1000 milliLiter(s) (30 mL/Hr) IV Continuous <Continuous>    MEDICATIONS  (PRN):  acetaminophen   Tablet .. 650 milliGRAM(s) Oral every 6 hours PRN Temp greater or equal to 38C (100.4F), Mild Pain (1 - 3)  HYDROmorphone PCA (1 mG/mL) Rescue Clinician Bolus 1.5 milliGRAM(s) IV Push every 1 hour PRN Severe Pain (7 - 10)  lactulose Syrup 10 Gram(s) Oral daily PRN constipation  polyethylene glycol 3350 17 Gram(s) Oral daily PRN Constipation  prochlorperazine   Injectable 5 milliGRAM(s) IV Push every 6 hours PRN Nauease or vomiting    ITEMS UNCHECKED ARE NOT PRESENT    PRESENT SYMPTOMS: [ ]Unable to obtain due to poor mentation   Source if other than patient:  [ ]Family   [ ]Team     Pain: [ ] yes [x ] no  QOL impact -   Location -                    Aggravating factors -  Quality -  Radiation -  Timing-  Severity (0-10 scale):  Minimal acceptable level (0-10 scale):     Dyspnea:                           [ ]Mild [ ]Moderate [ ]Severe  Anxiety:                             [ ]Mild [ ]Moderate [ ]Severe  Fatigue:                             [ ]Mild [ ]Moderate [ ]Severe  Nausea:                             [ ]Mild [ ]Moderate [ ]Severe  Loss of appetite:              [ ]Mild [ ]Moderate [ ]Severe  Constipation:                    [ ]Mild [ ]Moderate [ ]Severe    PAINAD Score:    [email or web address] (Ctrl +  left click to view)  		  Other Symptoms:  [x]All other review of systems negative     Palliative Performance Status Version 2:    70     %         [email or web address]  PHYSICAL EXAM:  Vital Signs Last 24 Hrs  T(C): 37 (06 Jul 2021 08:47), Max: 37 (06 Jul 2021 08:47)  T(F): 98.6 (06 Jul 2021 08:47), Max: 98.6 (06 Jul 2021 08:47)  HR: 67 (06 Jul 2021 08:47) (56 - 67)  BP: 115/75 (06 Jul 2021 08:47) (105/75 - 132/80)  BP(mean): --  RR: 19 (06 Jul 2021 08:47) (16 - 19)  SpO2: 97% (06 Jul 2021 08:47) (93% - 100%) I&O's Summary  GENERAL:  [x ]Alert  [x ]Oriented x 3  [ ]Lethargic  [ ]Cachexia  [ ]Unarousable  [ ]Verbal  [ ]Non-Verbal  Behavioral:   [ ] Anxiety  [ ] Delirium [ ] Agitation [ ] Other  HEENT:  [x ]Normal   [ ]Dry mouth   [ ]ET Tube/Trach  [ ]Oral lesions  PULMONARY:   [x ]Clear [ ]Tachypnea  [ ]Audible excessive secretions   [ ]Rhonchi        [ ]Right [ ]Left [ ]Bilateral  [ ]Crackles        [ ]Right [ ]Left [ ]Bilateral  [ ]Wheezing     [ ]Right [ ]Left [ ]Bilateral  [ ]Diminshed BS [ ]Right [ ]Left [ ]Bilateral    CARDIOVASCULAR:    [ x]Regular [ ]Irregular [ ]Tachy  [ ][place] [ ]Murmur [ ]Other  GASTROINTESTINAL:  [x ]Soft  [ ]Distended   [ ]+BS  [x ]Non tender [ ]Tender  [ ]PEG [ ]OGT/ NGT   Last BM:       GENITOURINARY:  [x ]Normal [ ] Incontinent   [ ]Oliguria/Anuria   [ ]Doran  MUSCULOSKELETAL:   [ ]Normal   [x ]Weakness  [ ]Bed/Wheelchair bound [ ]Edema  NEUROLOGIC:   [x ]No focal deficits  [ ] Cognitive impairment  [ ] Dysphagia [ ]Dysarthria [ ] Paresis [ ]Other   SKIN:   [x ]Normal  [ ]Rash     [ ]Pressure ulcer(s)  [ ]y [ ]n  Present on admission      CRITICAL CARE:  [ ] Shock Present  [ ]Septic [ ]Cardiogenic [ ]Neurologic [ ]Hypovolemic  [ ]  Vasopressors [ ]  Inotropes   [ ] Respiratory failure present [ ] Mechanical Ventilation [ ] Non-invasive ventilatory support [ ] High-Flow  [ ] Acute  [ ] Chronic [ ] Hypoxic  [ ] Hypercarbic [ ] Other  [ ] Other organ failure     LABS: none new       RADIOLOGY & ADDITIONAL STUDIES: none new     PROTEIN CALORIE MALNUTRITION: [ ] mild [ ] moderate [ ] severe  [ ] underweight [ ] morbid obesity    https://www.andeal.org/vault/2440/web/files/ONC/Table_Clinical%20Characteristics%20to%20Document%20Malnutrition-White%20JV%20et%20al%690455.pdf    Height (cm): 160 (07-01-21 @ 06:36), 161.3 (04-30-21 @ 19:21), 161.3 (09-04-20 @ 10:29)  Weight (kg): 99.4 (07-01-21 @ 09:17), 95.6 (09-04-20 @ 10:29)  BMI (kg/m2): 38.8 (07-01-21 @ 09:17), 37.3 (07-01-21 @ 06:36), 36.7 (04-30-21 @ 19:21)    [ ] PPSV2 < or = 30% [ ] significant weight loss [ ] poor nutritional intake [ ] anasarca Albumin, Serum: 3.8 g/dL (07-01-21 @ 05:24)    Artificial Nutrition [ ]     REFERRALS:   [ ]Chaplaincy  [ ] Hospice  [ ]Child Life  [x ]Social Work  [ ]Case management [ ]Holistic Therapy [ ] Physical Therapy [ ] Dietary   Goals of Care Document:    GAP TEAM PALLIATIVE CARE UNIT PROGRESS NOTE:      [  ] Patient on hospice program.    INDICATION FOR PALLIATIVE CARE UNIT SERVICES: pain and symptom management    INTERVAL HPI/OVERNIGHT EVENTS: 7a-a PRNs: 15 injections. Patient states her current PCA pump regimen is working well for her.    DNR on chart: Yes      Allergies    codeine (Short breath)  contrast media (iodine-based) (Anaphylaxis)    Intolerances    MEDICATIONS  (STANDING):  ALPRAZolam 0.25 milliGRAM(s) Oral three times a day  dronabinol 2.5 milliGRAM(s) Oral two times a day  escitalopram 5 milliGRAM(s) Oral daily  heparin   Injectable 5000 Unit(s) SubCutaneous every 12 hours  HYDROmorphone PCA (1 mG/mL) 30 milliLiter(s) PCA Continuous PCA Continuous  methadone    Tablet 10 milliGRAM(s) Oral <User Schedule>  methadone    Tablet 5 milliGRAM(s) Oral <User Schedule>  metoprolol tartrate 50 milliGRAM(s) Oral two times a day  ondansetron Injectable 8 milliGRAM(s) IV Push every 8 hours  pantoprazole    Tablet 40 milliGRAM(s) Oral before breakfast  senna 2 Tablet(s) Oral at bedtime  sodium chloride 0.9%. 1000 milliLiter(s) (30 mL/Hr) IV Continuous <Continuous>    MEDICATIONS  (PRN):  acetaminophen   Tablet .. 650 milliGRAM(s) Oral every 6 hours PRN Temp greater or equal to 38C (100.4F), Mild Pain (1 - 3)  HYDROmorphone PCA (1 mG/mL) Rescue Clinician Bolus 1.5 milliGRAM(s) IV Push every 1 hour PRN Severe Pain (7 - 10)  lactulose Syrup 10 Gram(s) Oral daily PRN constipation  polyethylene glycol 3350 17 Gram(s) Oral daily PRN Constipation  prochlorperazine   Injectable 5 milliGRAM(s) IV Push every 6 hours PRN Nauease or vomiting    ITEMS UNCHECKED ARE NOT PRESENT    PRESENT SYMPTOMS: [ ]Unable to obtain due to poor mentation   Source if other than patient:  [ ]Family   [ ]Team     Pain: [ ] yes [x ] no  QOL impact -   Location -                    Aggravating factors -  Quality -  Radiation -  Timing-  Severity (0-10 scale):  Minimal acceptable level (0-10 scale):     Dyspnea:                           [ ]Mild [ ]Moderate [ ]Severe  Anxiety:                             [ ]Mild [ ]Moderate [ ]Severe  Fatigue:                             [ ]Mild [ ]Moderate [ ]Severe  Nausea:                             [ ]Mild [ ]Moderate [ ]Severe  Loss of appetite:              [ ]Mild [ ]Moderate [ ]Severe  Constipation:                    [ ]Mild [ ]Moderate [ ]Severe    PAINAD Score:    [email or web address] (Ctrl +  left click to view)  		  Other Symptoms:  [x]All other review of systems negative     Palliative Performance Status Version 2:    70     %         [email or web address]  PHYSICAL EXAM:  Vital Signs Last 24 Hrs  T(C): 37 (06 Jul 2021 08:47), Max: 37 (06 Jul 2021 08:47)  T(F): 98.6 (06 Jul 2021 08:47), Max: 98.6 (06 Jul 2021 08:47)  HR: 67 (06 Jul 2021 08:47) (56 - 67)  BP: 115/75 (06 Jul 2021 08:47) (105/75 - 132/80)  BP(mean): --  RR: 19 (06 Jul 2021 08:47) (16 - 19)  SpO2: 97% (06 Jul 2021 08:47) (93% - 100%) I&O's Summary  GENERAL:  [x ]Alert  [x ]Oriented x 3  [ ]Lethargic  [ ]Cachexia  [ ]Unarousable  [ ]Verbal  [ ]Non-Verbal  Behavioral:   [ ] Anxiety  [ ] Delirium [ ] Agitation [ ] Other  HEENT:  [x ]Normal   [ ]Dry mouth   [ ]ET Tube/Trach  [ ]Oral lesions  PULMONARY:   [x ]Clear [ ]Tachypnea  [ ]Audible excessive secretions   [ ]Rhonchi        [ ]Right [ ]Left [ ]Bilateral  [ ]Crackles        [ ]Right [ ]Left [ ]Bilateral  [ ]Wheezing     [ ]Right [ ]Left [ ]Bilateral  [ ]Diminshed BS [ ]Right [ ]Left [ ]Bilateral    CARDIOVASCULAR:    [ x]Regular [ ]Irregular [ ]Tachy  [ ][place] [ ]Murmur [ ]Other  GASTROINTESTINAL:  [x ]Soft  [ ]Distended   [ ]+BS  [x ]Non tender [ ]Tender  [ ]PEG [ ]OGT/ NGT   Last BM:   7/3    GENITOURINARY:  [x ]Normal [ ] Incontinent   [ ]Oliguria/Anuria   [ ]Doran  MUSCULOSKELETAL:   [ ]Normal   [x ]Weakness  [ ]Bed/Wheelchair bound [ ]Edema  NEUROLOGIC:   [x ]No focal deficits  [ ] Cognitive impairment  [ ] Dysphagia [ ]Dysarthria [ ] Paresis [ ]Other   SKIN:   [x ]Normal  [ ]Rash     [ ]Pressure ulcer(s)  [ ]y [ ]n  Present on admission      CRITICAL CARE:  [ ] Shock Present  [ ]Septic [ ]Cardiogenic [ ]Neurologic [ ]Hypovolemic  [ ]  Vasopressors [ ]  Inotropes   [ ] Respiratory failure present [ ] Mechanical Ventilation [ ] Non-invasive ventilatory support [ ] High-Flow  [ ] Acute  [ ] Chronic [ ] Hypoxic  [ ] Hypercarbic [ ] Other  [ ] Other organ failure     LABS: none new       RADIOLOGY & ADDITIONAL STUDIES: none new     PROTEIN CALORIE MALNUTRITION: [ ] mild [ ] moderate [ ] severe  [ ] underweight [ ] morbid obesity    https://www.andeal.org/vault/2440/web/files/ONC/Table_Clinical%20Characteristics%20to%20Document%20Malnutrition-White%20JV%20et%20al%935470.pdf    Height (cm): 160 (07-01-21 @ 06:36), 161.3 (04-30-21 @ 19:21), 161.3 (09-04-20 @ 10:29)  Weight (kg): 99.4 (07-01-21 @ 09:17), 95.6 (09-04-20 @ 10:29)  BMI (kg/m2): 38.8 (07-01-21 @ 09:17), 37.3 (07-01-21 @ 06:36), 36.7 (04-30-21 @ 19:21)    [ ] PPSV2 < or = 30% [ ] significant weight loss [ ] poor nutritional intake [ ] anasarca Albumin, Serum: 3.8 g/dL (07-01-21 @ 05:24)    Artificial Nutrition [ ]     REFERRALS:   [ ]Chaplaincy  [ ] Hospice  [ ]Child Life  [x ]Social Work  [ ]Case management [ ]Holistic Therapy [ ] Physical Therapy [ ] Dietary   Goals of Care Document:

## 2021-07-06 NOTE — DIETITIAN INITIAL EVALUATION ADULT. - PROBLEM SELECTOR PLAN 5
patient on methadone 5mg tid and hydromorphone 2mg qid (although does not correlate with I-STOP)  she still complains of pain  will get pain management on board  for now, c/w home meds

## 2021-07-06 NOTE — PROGRESS NOTE ADULT - PROBLEM SELECTOR PLAN 7
The patient is already DNR/DNI.  In the event of new or worsening symptoms, please page on call [phone]  hospice referral will need to be sent and HCN to follow up with family  Discussed with patient, family

## 2021-07-06 NOTE — DIETITIAN INITIAL EVALUATION ADULT. - PERTINENT MEDS FT
MEDICATIONS  (STANDING):  ALPRAZolam 0.25 milliGRAM(s) Oral three times a day  chlorhexidine 4% Liquid 1 Application(s) Topical daily  dronabinol 2.5 milliGRAM(s) Oral two times a day  escitalopram 5 milliGRAM(s) Oral daily  heparin   Injectable 5000 Unit(s) SubCutaneous every 12 hours  HYDROmorphone PCA (1 mG/mL) 30 milliLiter(s) PCA Continuous PCA Continuous  methadone    Tablet 10 milliGRAM(s) Oral <User Schedule>  methadone    Tablet 5 milliGRAM(s) Oral <User Schedule>  metoprolol tartrate 50 milliGRAM(s) Oral two times a day  ondansetron Injectable 8 milliGRAM(s) IV Push every 8 hours  pantoprazole    Tablet 40 milliGRAM(s) Oral before breakfast  senna 2 Tablet(s) Oral at bedtime  sodium chloride 0.9%. 1000 milliLiter(s) (30 mL/Hr) IV Continuous <Continuous>    MEDICATIONS  (PRN):  acetaminophen   Tablet .. 650 milliGRAM(s) Oral every 6 hours PRN Temp greater or equal to 38C (100.4F), Mild Pain (1 - 3)  HYDROmorphone PCA (1 mG/mL) Rescue Clinician Bolus 1.5 milliGRAM(s) IV Push every 1 hour PRN Severe Pain (7 - 10)  lactulose Syrup 10 Gram(s) Oral daily PRN constipation  polyethylene glycol 3350 17 Gram(s) Oral daily PRN Constipation  prochlorperazine   Injectable 5 milliGRAM(s) IV Push every 6 hours PRN Nauease or vomiting

## 2021-07-06 NOTE — PROGRESS NOTE ADULT - PROBLEM SELECTOR PLAN 1
c/w dilaudid PCA  0/1/30//1.5 ; discussed with patient, spouse. Utilized 19 attempts, 15 injections over last 24 hours  EKG completed 7/5441ms Qtc-> increased methadone to 5/5/10  bowel regimen, senna, miralax PRN c/w dilaudid PCA  0/1/30//1.5 ; discussed with patient, spouse. Utilized 19 attempts, 15 injections over last 24 hours  EKG completed 7/5; 441ms Qtc-> increased methadone to 5/5/10  bowel regimen, senna, miralax PRN

## 2021-07-06 NOTE — DIETITIAN INITIAL EVALUATION ADULT. - OTHER INFO
Pt reports wt has fluctuated in setting of ascites. Noted admit wt of 219.1 pounds prior to paracentesis. Noted per Guillermina CRUM, wt of 222 pounds in March 2021, 211 pounds in May 2021 & 199 pounds on June 30, 2021.

## 2021-07-06 NOTE — DIETITIAN INITIAL EVALUATION ADULT. - PROBLEM SELECTOR PLAN 7
elevated BP, possibly 2/2 to pain and anxiety  will give stat IV hydralazine 5mg x 1 and lasix 20mg x 1 and reevaluate

## 2021-07-06 NOTE — PROGRESS NOTE ADULT - ASSESSMENT
70 yo F with PMH of anxiety, IBS, GERD, Cholangiocarcinoma with metastases, presents here with abdominal pain.    Abdominal pain.  - Patient with abdominal pain, likely 2/2 to underlying metastatic cholangiocarcinoma and peritoneal carcinomatosis; Also with new moderate amount ascites, no signs of SBP  - s/p therapeutic paracentesis  - pain control.     Cholangiocarcinoma.   - patient with cholangiocarcinoma, currently not on chemo  - oncology f/u.     Atrial fibrillation.    - Patient with new onset Afib, unclear etiology  - metoprolol 25mg bid for rate control  - CHADSVASC 2-3 (age, female, ?HTN)  - hold off AC for now  - echocardiogram.     Respiratory distress.   - Patient with orthopnea, GANNON, and now tachypneic  - no hypoxia  - due to pressure from ascites    Chronic pain.   - patient on methadone 5mg tid and hydromorphone 2mg qid   - pain management evaluation  Anxiety.   - xanax 1mg qid prn.    Elevated blood pressure reading.    - elevated BP, possibly 2/2 to pain and anxiety    Prophylactic measure.   - no AC given bleeding risk     Palliative care    DNR    PCU care    DCP with home hospice    d/w patient and      Arden Benitez MD pager [phone]

## 2021-07-07 PROCEDURE — 99232 SBSQ HOSP IP/OBS MODERATE 35: CPT

## 2021-07-07 RX ORDER — ONDANSETRON 8 MG/1
8 TABLET, FILM COATED ORAL THREE TIMES A DAY
Refills: 0 | Status: DISCONTINUED | OUTPATIENT
Start: 2021-07-07 | End: 2021-07-10

## 2021-07-07 RX ORDER — DRONABINOL 2.5 MG
2.5 CAPSULE ORAL
Refills: 0 | Status: DISCONTINUED | OUTPATIENT
Start: 2021-07-07 | End: 2021-07-12

## 2021-07-07 RX ORDER — HYDROMORPHONE HYDROCHLORIDE 2 MG/ML
30 INJECTION INTRAMUSCULAR; INTRAVENOUS; SUBCUTANEOUS
Refills: 0 | Status: DISCONTINUED | OUTPATIENT
Start: 2021-07-07 | End: 2021-07-09

## 2021-07-07 RX ADMIN — ESCITALOPRAM OXALATE 5 MILLIGRAM(S): 10 TABLET, FILM COATED ORAL at 12:34

## 2021-07-07 RX ADMIN — METHADONE HYDROCHLORIDE 5 MILLIGRAM(S): 40 TABLET ORAL at 13:25

## 2021-07-07 RX ADMIN — METHADONE HYDROCHLORIDE 10 MILLIGRAM(S): 40 TABLET ORAL at 21:44

## 2021-07-07 RX ADMIN — ONDANSETRON 8 MILLIGRAM(S): 8 TABLET, FILM COATED ORAL at 21:44

## 2021-07-07 RX ADMIN — Medication 50 MILLIGRAM(S): at 18:36

## 2021-07-07 RX ADMIN — SENNA PLUS 2 TABLET(S): 8.6 TABLET ORAL at 21:45

## 2021-07-07 RX ADMIN — PANTOPRAZOLE SODIUM 40 MILLIGRAM(S): 20 TABLET, DELAYED RELEASE ORAL at 06:11

## 2021-07-07 RX ADMIN — Medication 2.5 MILLIGRAM(S): at 12:38

## 2021-07-07 RX ADMIN — METHADONE HYDROCHLORIDE 5 MILLIGRAM(S): 40 TABLET ORAL at 06:11

## 2021-07-07 RX ADMIN — SODIUM CHLORIDE 30 MILLILITER(S): 9 INJECTION INTRAMUSCULAR; INTRAVENOUS; SUBCUTANEOUS at 12:36

## 2021-07-07 RX ADMIN — Medication 0.25 MILLIGRAM(S): at 13:25

## 2021-07-07 RX ADMIN — CHLORHEXIDINE GLUCONATE 1 APPLICATION(S): 213 SOLUTION TOPICAL at 12:35

## 2021-07-07 RX ADMIN — HEPARIN SODIUM 5000 UNIT(S): 5000 INJECTION INTRAVENOUS; SUBCUTANEOUS at 18:36

## 2021-07-07 RX ADMIN — SODIUM CHLORIDE 30 MILLILITER(S): 9 INJECTION INTRAMUSCULAR; INTRAVENOUS; SUBCUTANEOUS at 07:52

## 2021-07-07 RX ADMIN — HEPARIN SODIUM 5000 UNIT(S): 5000 INJECTION INTRAVENOUS; SUBCUTANEOUS at 06:11

## 2021-07-07 RX ADMIN — Medication 50 MILLIGRAM(S): at 06:11

## 2021-07-07 RX ADMIN — ONDANSETRON 8 MILLIGRAM(S): 8 TABLET, FILM COATED ORAL at 06:11

## 2021-07-07 RX ADMIN — Medication 5 MILLIGRAM(S): at 07:50

## 2021-07-07 RX ADMIN — LACTULOSE 10 GRAM(S): 10 SOLUTION ORAL at 03:00

## 2021-07-07 RX ADMIN — Medication 0.25 MILLIGRAM(S): at 06:11

## 2021-07-07 RX ADMIN — HYDROMORPHONE HYDROCHLORIDE 30 MILLILITER(S): 2 INJECTION INTRAMUSCULAR; INTRAVENOUS; SUBCUTANEOUS at 19:04

## 2021-07-07 RX ADMIN — ONDANSETRON 8 MILLIGRAM(S): 8 TABLET, FILM COATED ORAL at 13:25

## 2021-07-07 RX ADMIN — Medication 0.25 MILLIGRAM(S): at 21:44

## 2021-07-07 NOTE — PROGRESS NOTE ADULT - PROBLEM SELECTOR PLAN 7
DNR/DNI. Patient's priority is now symptom-directed care. Patient agreeable to home hospice. Referral made.

## 2021-07-07 NOTE — PROGRESS NOTE ADULT - PROBLEM SELECTOR PLAN 1
c/w dilaudid PCA  0/1/30//1.5  Utilized 20 attempts, 17 injections over last 24 hours, similar to yesterday's 24-hr review  Increased methadone to 5/5/10 on 7/5, Qtc 441ms on 7/5  bowel regimen with senna, miralax

## 2021-07-07 NOTE — CHART NOTE - NSCHARTNOTEFT_GEN_A_CORE
ONCOLOGY FELLOW NOTE    Oncology was consulted for recommendations in this patient with metastatic cholangiocarcinoma. Patient follows with Dr. Sridhar Castro at [facility].    Patient has previously stated her goal is to be "pain free" and is now transitioning to a more comfort-based approach with plans for home hospice. Noted that hospice referral was made on 7/6/21.    We are agreeable to patient and family wishes. Oncology will sign off at this time. We will continue to follow peripherally and notify Dr. Castro when patient is discharged.    Please page or call with questions.    Sandra Bazan MD  Hematology/Oncology Fellow, PGY-5  Pager: [phone]  After 5pm and on weekends please page on-call fellow

## 2021-07-07 NOTE — PROGRESS NOTE ADULT - ASSESSMENT
70 yo F with PMH of anxiety, IBS, GERD, Cholangiocarcinoma with metastases, presents here with abdominal pain.    Abdominal pain.  - Patient with abdominal pain, likely 2/2 to underlying metastatic cholangiocarcinoma and peritoneal carcinomatosis; Also with new moderate amount ascites, no signs of SBP  - s/p therapeutic paracentesis  - pain control.     Cholangiocarcinoma.   - patient with cholangiocarcinoma, currently not on chemo  - oncology f/u.     Atrial fibrillation.    - Patient with new onset Afib, unclear etiology  - metoprolol 25mg bid for rate control  - CHADSVASC 2-3 (age, female, ?HTN)  - hold off AC for now    Respiratory distress.   - Patient with orthopnea, GANNON, and now tachypneic  - no hypoxia  - due to pressure from ascites    Chronic pain.   - patient on methadone 5mg tid and hydromorphone 2mg qid   - pain management evaluation    Anxiety.   - xanax 1mg qid prn.    Elevated blood pressure reading.    - elevated BP, possibly 2/2 to pain and anxiety    Prophylactic measure.   - no AC given bleeding risk     Palliative care    DNR    PCU care    DCP with home hospice    d/w patient    Arden Benitez MD pager [phone]

## 2021-07-07 NOTE — PROGRESS NOTE ADULT - PROBLEM SELECTOR PLAN 6
Onc inputs noticed. As per the patient and her daughter current plan is for focusing on Symptoms Rx only and trying to go home with hospice. Onc inputs noted. As per the patient and her daughter current plan is for focusing on Symptoms Rx only and trying to go home with hospice.

## 2021-07-07 NOTE — PROGRESS NOTE ADULT - SUBJECTIVE AND OBJECTIVE BOX
Patient is a 71y old  Female who presents with a chief complaint of abdominal pain (07 Jul 2021 11:04)      SUBJECTIVE / OVERNIGHT EVENTS: Comfortable without new complaints. Pain better controlled  Review of Systems  chest pain no  palpitations no  sob no  nausea no  headache no    MEDICATIONS  (STANDING):  ALPRAZolam 0.25 milliGRAM(s) Oral three times a day  chlorhexidine 4% Liquid 1 Application(s) Topical daily  dronabinol 2.5 milliGRAM(s) Oral two times a day  escitalopram 5 milliGRAM(s) Oral daily  heparin   Injectable 5000 Unit(s) SubCutaneous every 12 hours  HYDROmorphone PCA (1 mG/mL) 30 milliLiter(s) PCA Continuous PCA Continuous  methadone    Tablet 10 milliGRAM(s) Oral <User Schedule>  methadone    Tablet 5 milliGRAM(s) Oral <User Schedule>  metoprolol tartrate 50 milliGRAM(s) Oral two times a day  ondansetron    Tablet 8 milliGRAM(s) Oral three times a day  pantoprazole    Tablet 40 milliGRAM(s) Oral before breakfast  senna 2 Tablet(s) Oral at bedtime  sodium chloride 0.9%. 1000 milliLiter(s) (30 mL/Hr) IV Continuous <Continuous>    MEDICATIONS  (PRN):  acetaminophen   Tablet .. 650 milliGRAM(s) Oral every 6 hours PRN Temp greater or equal to 38C (100.4F), Mild Pain (1 - 3)  HYDROmorphone PCA (1 mG/mL) Rescue Clinician Bolus 1.5 milliGRAM(s) IV Push every 1 hour PRN Severe Pain (7 - 10)  lactulose Syrup 10 Gram(s) Oral daily PRN constipation  polyethylene glycol 3350 17 Gram(s) Oral daily PRN Constipation  prochlorperazine   Injectable 5 milliGRAM(s) IV Push every 6 hours PRN Nauease or vomiting      Vital Signs Last 24 Hrs  T(C): 37.1 (07 Jul 2021 15:52), Max: 37.1 (07 Jul 2021 06:00)  T(F): 98.8 (07 Jul 2021 15:52), Max: 98.8 (07 Jul 2021 15:52)  HR: 63 (07 Jul 2021 15:52) (62 - 63)  BP: 125/74 (07 Jul 2021 15:52) (125/74 - 126/74)  BP(mean): --  RR: 18 (07 Jul 2021 15:52) (18 - 18)  SpO2: 96% (07 Jul 2021 15:52) (96% - 97%)    PHYSICAL EXAM:  GENERAL: NAD, well-developed  HEAD:  Atraumatic, Normocephalic  EYES: EOMI, PERRLA, conjunctiva and sclera clear  NECK: Supple, No JVD  CHEST/LUNG: Clear to auscultation bilaterally; No wheeze  HEART: Regular rate and rhythm; No murmurs, rubs, or gallops  ABDOMEN: Soft, Nontender, distended; Bowel sounds present  EXTREMITIES:  2+ Peripheral Pulses, No clubbing, cyanosis, or edema  PSYCH: AAOx3  NEUROLOGY: non-focal  SKIN: No rashes or lesions    LABS:                      RADIOLOGY & ADDITIONAL TESTS:    Imaging Personally Reviewed:    Consultant(s) Notes Reviewed:      Care Discussed with Consultants/Other Providers:

## 2021-07-07 NOTE — PROGRESS NOTE ADULT - SUBJECTIVE AND OBJECTIVE BOX
GAP TEAM PALLIATIVE CARE UNIT PROGRESS NOTE:      [  ] Patient on hospice program.    INDICATION FOR PALLIATIVE CARE UNIT SERVICES: pain and symptom management    INTERVAL HPI/OVERNIGHT EVENTS: 530a-530a usage: 20 attempts, 17 injections, 1 CAB. Patient reports that her PCA pump is working well for her in controlling her pain. Denies acute issues or symptoms this AM.    DNR on chart: Yes      Allergies    codeine (Short breath)  contrast media (iodine-based) (Anaphylaxis)    Intolerances    MEDICATIONS  (STANDING):  ALPRAZolam 0.25 milliGRAM(s) Oral three times a day  chlorhexidine 4% Liquid 1 Application(s) Topical daily  dronabinol 2.5 milliGRAM(s) Oral two times a day  escitalopram 5 milliGRAM(s) Oral daily  heparin   Injectable 5000 Unit(s) SubCutaneous every 12 hours  HYDROmorphone PCA (1 mG/mL) 30 milliLiter(s) PCA Continuous PCA Continuous  methadone    Tablet 10 milliGRAM(s) Oral <User Schedule>  methadone    Tablet 5 milliGRAM(s) Oral <User Schedule>  metoprolol tartrate 50 milliGRAM(s) Oral two times a day  ondansetron Injectable 8 milliGRAM(s) IV Push every 8 hours  pantoprazole    Tablet 40 milliGRAM(s) Oral before breakfast  senna 2 Tablet(s) Oral at bedtime  sodium chloride 0.9%. 1000 milliLiter(s) (30 mL/Hr) IV Continuous <Continuous>    MEDICATIONS  (PRN):  acetaminophen   Tablet .. 650 milliGRAM(s) Oral every 6 hours PRN Temp greater or equal to 38C (100.4F), Mild Pain (1 - 3)  HYDROmorphone PCA (1 mG/mL) Rescue Clinician Bolus 1.5 milliGRAM(s) IV Push every 1 hour PRN Severe Pain (7 - 10)  lactulose Syrup 10 Gram(s) Oral daily PRN constipation  polyethylene glycol 3350 17 Gram(s) Oral daily PRN Constipation  prochlorperazine   Injectable 5 milliGRAM(s) IV Push every 6 hours PRN Nauease or vomiting    ITEMS UNCHECKED ARE NOT PRESENT    PRESENT SYMPTOMS: [ ]Unable to obtain due to poor mentation   Source if other than patient:  [ ]Family   [ ]Team     Pain: [ ] yes [ x] no  QOL impact -   Location -                    Aggravating factors -  Quality -  Radiation -  Timing-  Severity (0-10 scale):  Minimal acceptable level (0-10 scale):     Dyspnea:                           [ ]Mild [ ]Moderate [ ]Severe  Anxiety:                             [ ]Mild [ ]Moderate [ ]Severe  Fatigue:                             [ ]Mild [ ]Moderate [ ]Severe  Nausea:                             [ ]Mild [ ]Moderate [ ]Severe  Loss of appetite:              [ ]Mild [ ]Moderate [ ]Severe  Constipation:                    [ ]Mild [ ]Moderate [ ]Severe    PAINAD Score:    [email or web address] (Ctrl +  left click to view)  		  Other Symptoms:  [x]All other review of systems negative     Palliative Performance Status Version 2:     70    %         [email or web address]/[email or web address]_[email or web address].pdf  PHYSICAL EXAM:  Vital Signs Last 24 Hrs  T(C): 37.1 (07 Jul 2021 06:00), Max: 37.1 (07 Jul 2021 06:00)  T(F): 98.7 (07 Jul 2021 06:00), Max: 98.7 (07 Jul 2021 06:00)  HR: 62 (07 Jul 2021 06:00) (60 - 62)  BP: 126/74 (07 Jul 2021 06:00) (102/68 - 126/74)  BP(mean): --  RR: 18 (07 Jul 2021 06:00) (18 - 18)  SpO2: 97% (07 Jul 2021 06:00) (97% - 97%) I&O's Summary    06 Jul 2021 07:01  -  07 Jul 2021 07:00  --------------------------------------------------------  IN: 360 mL / OUT: 0 mL / NET: 360 mL    GENERAL:  [ x]Alert  [x ]Oriented x3   [ ]Lethargic  [ ]Cachexia  [ ]Unarousable  [ ]Verbal  [ ]Non-Verbal  Behavioral:   [ ] Anxiety  [ ] Delirium [ ] Agitation [ ] Other  HEENT:  [x ]Normal   [ ]Dry mouth   [ ]ET Tube/Trach  [ ]Oral lesions  PULMONARY:   [x ]Clear [ ]Tachypnea  [ ]Audible excessive secretions   [ ]Rhonchi        [ ]Right [ ]Left [ ]Bilateral  [ ]Crackles        [ ]Right [ ]Left [ ]Bilateral  [ ]Wheezing     [ ]Right [ ]Left [ ]Bilateral  [ ]Diminshed BS [ ]Right [ ]Left [ ]Bilateral    CARDIOVASCULAR:    [x ]Regular [ ]Irregular [ ]Tachy  [ ][place] [ ]Murmur [ ]Other  GASTROINTESTINAL:  [x ]Soft  [ ]Distended   [x ]+BS  [x ]Non tender [ ]Tender  [ ]PEG [ ]OGT/ NGT   Last BM:       GENITOURINARY:  [x ]Normal [ ] Incontinent   [ ]Oliguria/Anuria   [ ]Doran  MUSCULOSKELETAL:   [x ]Normal   [ ]Weakness  [ ]Bed/Wheelchair bound [ ]Edema  NEUROLOGIC:   [x ]No focal deficits  [ ] Cognitive impairment  [ ] Dysphagia [ ]Dysarthria [ ] Paresis [ ]Other   SKIN:   [x ]Normal  [ ]Rash     [ ]Pressure ulcer(s)  [ ]y [ ]n  Present on admission      CRITICAL CARE:  [ ] Shock Present  [ ]Septic [ ]Cardiogenic [ ]Neurologic [ ]Hypovolemic  [ ]  Vasopressors [ ]  Inotropes   [ ] Respiratory failure present [ ] Mechanical Ventilation [ ] Non-invasive ventilatory support [ ] High-Flow  [ ] Acute  [ ] Chronic [ ] Hypoxic  [ ] Hypercarbic [ ] Other  [ ] Other organ failure     LABS: none new       RADIOLOGY & ADDITIONAL STUDIES: none new     PROTEIN CALORIE MALNUTRITION: [ ] mild [ ] moderate [ ] severe  [ ] underweight [ ] morbid obesity    https://www.andeal.org/vault/2440/web/files/ONC/Table_Clinical%20Characteristics%20to%20Document%20Malnutrition-White%20JV%20et%20al%328250.pdf    Height (cm): 160 (07-01-21 @ 06:36), 161.3 (04-30-21 @ 19:21), 161.3 (09-04-20 @ 10:29)  Weight (kg): 99.4 (07-01-21 @ 09:17), 95.6 (09-04-20 @ 10:29)  BMI (kg/m2): 38.8 (07-01-21 @ 09:17), 37.3 (07-01-21 @ 06:36), 36.7 (04-30-21 @ 19:21)    [ ] PPSV2 < or = 30% [ ] significant weight loss [ ] poor nutritional intake [ ] anasarca Albumin, Serum: 3.8 g/dL (07-01-21 @ 05:24)    Artificial Nutrition [ ]     REFERRALS:   [ ]Chaplaincy  [ ] Hospice  [ ]Child Life  [x ]Social Work  [ ]Case management [ ]Holistic Therapy [ ] Physical Therapy [ ] Dietary   Goals of Care Document:    GAP TEAM PALLIATIVE CARE UNIT PROGRESS NOTE:      [  ] Patient on hospice program.    INDICATION FOR PALLIATIVE CARE UNIT SERVICES: pain and symptom management    INTERVAL HPI/OVERNIGHT EVENTS: 530a-530a usage: 20 attempts, 17 injections, 1 CAB. Patient reports that her PCA pump is working well for her in controlling her pain. Denies acute issues or symptoms this AM.    DNR on chart: Yes      Allergies    codeine (Short breath)  contrast media (iodine-based) (Anaphylaxis)    Intolerances    MEDICATIONS  (STANDING):  ALPRAZolam 0.25 milliGRAM(s) Oral three times a day  chlorhexidine 4% Liquid 1 Application(s) Topical daily  dronabinol 2.5 milliGRAM(s) Oral two times a day  escitalopram 5 milliGRAM(s) Oral daily  heparin   Injectable 5000 Unit(s) SubCutaneous every 12 hours  HYDROmorphone PCA (1 mG/mL) 30 milliLiter(s) PCA Continuous PCA Continuous  methadone    Tablet 10 milliGRAM(s) Oral <User Schedule>  methadone    Tablet 5 milliGRAM(s) Oral <User Schedule>  metoprolol tartrate 50 milliGRAM(s) Oral two times a day  ondansetron Injectable 8 milliGRAM(s) IV Push every 8 hours  pantoprazole    Tablet 40 milliGRAM(s) Oral before breakfast  senna 2 Tablet(s) Oral at bedtime  sodium chloride 0.9%. 1000 milliLiter(s) (30 mL/Hr) IV Continuous <Continuous>    MEDICATIONS  (PRN):  acetaminophen   Tablet .. 650 milliGRAM(s) Oral every 6 hours PRN Temp greater or equal to 38C (100.4F), Mild Pain (1 - 3)  HYDROmorphone PCA (1 mG/mL) Rescue Clinician Bolus 1.5 milliGRAM(s) IV Push every 1 hour PRN Severe Pain (7 - 10)  lactulose Syrup 10 Gram(s) Oral daily PRN constipation  polyethylene glycol 3350 17 Gram(s) Oral daily PRN Constipation  prochlorperazine   Injectable 5 milliGRAM(s) IV Push every 6 hours PRN Nauease or vomiting    ITEMS UNCHECKED ARE NOT PRESENT    PRESENT SYMPTOMS: [ ]Unable to obtain due to poor mentation   Source if other than patient:  [ ]Family   [ ]Team     Pain: [ x] yes [ ] no  QOL impact - abdominal discomfort, interrupted sleep  Location -   generalized abdomen               Aggravating factors -  none reported  Quality -  cramping  Radiation -  none  Timing-  constant  Severity (0-10 scale): up to 8/10 in the past 24 hours  Minimal acceptable level (0-10 scale): 3    Dyspnea:                           [ ]Mild [ ]Moderate [ ]Severe  Anxiety:                             [ ]Mild [ ]Moderate [ ]Severe  Fatigue:                             [ ]Mild [ ]Moderate [ ]Severe  Nausea:                             [ ]Mild [ ]Moderate [ ]Severe  Loss of appetite:              [ ]Mild [ ]Moderate [ ]Severe  Constipation:                    [ ]Mild [ ]Moderate [ ]Severe    PAINAD Score:    [email or web address] (Ctrl +  left click to view)  		  Other Symptoms:  [x]All other review of systems negative     Palliative Performance Status Version 2:     60    %         http://npc.org/files/news/palliative_performance_scale_[email or web address].pdf  PHYSICAL EXAM:  Vital Signs Last 24 Hrs  T(C): 37.1 (07 Jul 2021 06:00), Max: 37.1 (07 Jul 2021 06:00)  T(F): 98.7 (07 Jul 2021 06:00), Max: 98.7 (07 Jul 2021 06:00)  HR: 62 (07 Jul 2021 06:00) (60 - 62)  BP: 126/74 (07 Jul 2021 06:00) (102/68 - 126/74)  BP(mean): --  RR: 18 (07 Jul 2021 06:00) (18 - 18)  SpO2: 97% (07 Jul 2021 06:00) (97% - 97%) I&O's Summary    06 Jul 2021 07:01  -  07 Jul 2021 07:00  --------------------------------------------------------  IN: 360 mL / OUT: 0 mL / NET: 360 mL    GENERAL:  [ x]Alert  [x ]Oriented x3   [ ]Lethargic  [ ]Cachexia  [ ]Unarousable  [ ]Verbal  [ ]Non-Verbal  Behavioral:   [ ] Anxiety  [ ] Delirium [ ] Agitation [ ] Other  HEENT:  [x ]Normal   [ ]Dry mouth   [ ]ET Tube/Trach  [ ]Oral lesions  PULMONARY:   [x ]Clear [ ]Tachypnea  [ ]Audible excessive secretions   [ ]Rhonchi        [ ]Right [ ]Left [ ]Bilateral  [ ]Crackles        [ ]Right [ ]Left [ ]Bilateral  [ ]Wheezing     [ ]Right [ ]Left [ ]Bilateral  [ ]Diminshed BS [ ]Right [ ]Left [ ]Bilateral    CARDIOVASCULAR:    [x ]Regular [ ]Irregular [ ]Tachy  [ ][place] [ ]Murmur [ ]Other  GASTROINTESTINAL:  [x ]Soft  [ ]Distended   [x ]+BS  [x ]Non tender [ ]Tender  [ ]PEG [ ]OGT/ NGT   Last BM:       GENITOURINARY:  [x ]Normal [ ] Incontinent   [ ]Oliguria/Anuria   [ ]Doran  MUSCULOSKELETAL:   [x ]Normal   [ ]Weakness  [ ]Bed/Wheelchair bound [ ]Edema  NEUROLOGIC:   [x ]No focal deficits  [ ] Cognitive impairment  [ ] Dysphagia [ ]Dysarthria [ ] Paresis [ ]Other   SKIN:   [x ]Normal  [ ]Rash     [ ]Pressure ulcer(s)  [ ]y [ ]n  Present on admission      CRITICAL CARE:  [ ] Shock Present  [ ]Septic [ ]Cardiogenic [ ]Neurologic [ ]Hypovolemic  [ ]  Vasopressors [ ]  Inotropes   [ ] Respiratory failure present [ ] Mechanical Ventilation [ ] Non-invasive ventilatory support [ ] High-Flow  [ ] Acute  [ ] Chronic [ ] Hypoxic  [ ] Hypercarbic [ ] Other  [ ] Other organ failure     LABS: none new       RADIOLOGY & ADDITIONAL STUDIES: none new     PROTEIN CALORIE MALNUTRITION: [ ] mild [ ] moderate [ ] severe  [ ] underweight [ ] morbid obesity    https://www.andeal.org/vault/3240/web/files/ONC/Table_Clinical%20Characteristics%20to%20Document%20Malnutrition-White%20JV%20et%20al%900534.pdf    Height (cm): 160 (07-01-21 @ 06:36), 161.3 (04-30-21 @ 19:21), 161.3 (09-04-20 @ 10:29)  Weight (kg): 99.4 (07-01-21 @ 09:17), 95.6 (09-04-20 @ 10:29)  BMI (kg/m2): 38.8 (07-01-21 @ 09:17), 37.3 (07-01-21 @ 06:36), 36.7 (04-30-21 @ 19:21)    [ ] PPSV2 < or = 30% [ ] significant weight loss [ ] poor nutritional intake [ ] anasarca Albumin, Serum: 3.8 g/dL (07-01-21 @ 05:24)    Artificial Nutrition [ ]     REFERRALS:   [ ]Chaplaincy  [ ] Hospice  [ ]Child Life  [x ]Social Work  [ ]Case management [ ]Holistic Therapy [ ] Physical Therapy [ ] Dietary   Goals of Care Document:    GAP TEAM PALLIATIVE CARE UNIT PROGRESS NOTE:      [  ] Patient on hospice program.    INDICATION FOR PALLIATIVE CARE UNIT SERVICES: pain and symptom management in the setting of cholangiocarcinoma.    INTERVAL HPI/OVERNIGHT EVENTS: 530a-530a usage: 20 attempts, 17 injections, 1 CAB. Patient reports that her PCA pump is working well for her in controlling her pain. Denies acute issues or symptoms this AM.    DNR on chart: Yes      Allergies    codeine (Short breath)  contrast media (iodine-based) (Anaphylaxis)    Intolerances    MEDICATIONS  (STANDING):  ALPRAZolam 0.25 milliGRAM(s) Oral three times a day  chlorhexidine 4% Liquid 1 Application(s) Topical daily  dronabinol 2.5 milliGRAM(s) Oral two times a day  escitalopram 5 milliGRAM(s) Oral daily  heparin   Injectable 5000 Unit(s) SubCutaneous every 12 hours  HYDROmorphone PCA (1 mG/mL) 30 milliLiter(s) PCA Continuous PCA Continuous  methadone    Tablet 10 milliGRAM(s) Oral <User Schedule>  methadone    Tablet 5 milliGRAM(s) Oral <User Schedule>  metoprolol tartrate 50 milliGRAM(s) Oral two times a day  ondansetron Injectable 8 milliGRAM(s) IV Push every 8 hours  pantoprazole    Tablet 40 milliGRAM(s) Oral before breakfast  senna 2 Tablet(s) Oral at bedtime  sodium chloride 0.9%. 1000 milliLiter(s) (30 mL/Hr) IV Continuous <Continuous>    MEDICATIONS  (PRN):  acetaminophen   Tablet .. 650 milliGRAM(s) Oral every 6 hours PRN Temp greater or equal to 38C (100.4F), Mild Pain (1 - 3)  HYDROmorphone PCA (1 mG/mL) Rescue Clinician Bolus 1.5 milliGRAM(s) IV Push every 1 hour PRN Severe Pain (7 - 10)  lactulose Syrup 10 Gram(s) Oral daily PRN constipation  polyethylene glycol 3350 17 Gram(s) Oral daily PRN Constipation  prochlorperazine   Injectable 5 milliGRAM(s) IV Push every 6 hours PRN Nauease or vomiting    ITEMS UNCHECKED ARE NOT PRESENT    PRESENT SYMPTOMS: [ ]Unable to obtain due to poor mentation   Source if other than patient:  [ ]Family   [ ]Team     Pain: [ x] yes [ ] no  QOL impact - abdominal discomfort, interrupted sleep  Location -   generalized abdomen               Aggravating factors -  none reported  Quality -  cramping  Radiation -  none  Timing-  constant  Severity (0-10 scale): up to 8/10 in the past 24 hours  Minimal acceptable level (0-10 scale): 3    Dyspnea:                           [ ]Mild [ ]Moderate [ ]Severe  Anxiety:                             [ ]Mild [ ]Moderate [ ]Severe  Fatigue:                             [ ]Mild [ ]Moderate [ ]Severe  Nausea:                             [ ]Mild [ ]Moderate [ ]Severe  Loss of appetite:              [ ]Mild [ ]Moderate [ ]Severe  Constipation:                    [ ]Mild [ ]Moderate [ ]Severe    PAINAD Score:    [email or web address] (Ctrl +  left click to view)  		  Other Symptoms:  [x]All other review of systems negative     Palliative Performance Status Version 2:     60    %         http://npcrc.org/files/news/palliative_performance_scale_[email or web address].pdf  PHYSICAL EXAM:  Vital Signs Last 24 Hrs  T(C): 37.1 (07 Jul 2021 06:00), Max: 37.1 (07 Jul 2021 06:00)  T(F): 98.7 (07 Jul 2021 06:00), Max: 98.7 (07 Jul 2021 06:00)  HR: 62 (07 Jul 2021 06:00) (60 - 62)  BP: 126/74 (07 Jul 2021 06:00) (102/68 - 126/74)  BP(mean): --  RR: 18 (07 Jul 2021 06:00) (18 - 18)  SpO2: 97% (07 Jul 2021 06:00) (97% - 97%) I&O's Summary    06 Jul 2021 07:01  -  07 Jul 2021 07:00  --------------------------------------------------------  IN: 360 mL / OUT: 0 mL / NET: 360 mL    GENERAL:  [ x]Alert  [x ]Oriented x3   [ ]Lethargic  [ ]Cachexia  [ ]Unarousable  [x ]Verbal  [ ]Non-Verbal  Behavioral:   [ ] Anxiety  [ ] Delirium [ ] Agitation [ ] Other  HEENT:  [x ]Normal   [ ]Dry mouth   [ ]ET Tube/Trach  [ ]Oral lesions  PULMONARY:   [x ]Clear [ ]Tachypnea  [ ]Audible excessive secretions   [ ]Rhonchi        [ ]Right [ ]Left [ ]Bilateral  [ ]Crackles        [ ]Right [ ]Left [ ]Bilateral  [ ]Wheezing     [ ]Right [ ]Left [ ]Bilateral  [ ]Diminshed BS [ ]Right [ ]Left [ ]Bilateral    CARDIOVASCULAR:    [x ]Regular [ ]Irregular [ ]Tachy  [ ][place] [ ]Murmur [ ]Other  GASTROINTESTINAL:  [x ]Soft  [ ]Distended   [x ]+BS  [x ]Non tender [ ]Tender  [ ]PEG [ ]OGT/ NGT   Last BM:   7/7/2021    GENITOURINARY:  [x ]Normal [ ] Incontinent   [ ]Oliguria/Anuria   [ ]Doran  MUSCULOSKELETAL:   [x ]Normal   [ ]Weakness  [ ]Bed/Wheelchair bound [ ]Edema  NEUROLOGIC:   [x ]No focal deficits  [ ] Cognitive impairment  [ ] Dysphagia [ ]Dysarthria [ ] Paresis [ ]Other   SKIN:   [x ]Normal  [ ]Rash     [ ]Pressure ulcer(s)  [ ]y [ ]n  Present on admission      CRITICAL CARE:  [ ] Shock Present  [ ]Septic [ ]Cardiogenic [ ]Neurologic [ ]Hypovolemic  [ ]  Vasopressors [ ]  Inotropes   [ ] Respiratory failure present [ ] Mechanical Ventilation [ ] Non-invasive ventilatory support [ ] High-Flow  [ ] Acute  [ ] Chronic [ ] Hypoxic  [ ] Hypercarbic [ ] Other  [ ] Other organ failure     LABS: none new       RADIOLOGY & ADDITIONAL STUDIES: none new     PROTEIN CALORIE MALNUTRITION: [ ] mild [ ] moderate [ ] severe  [ ] underweight [ ] morbid obesity    https://www.andeal.org/vault/0990/web/files/ONC/Table_Clinical%20Characteristics%20to%20Document%20Malnutrition-White%20JV%20et%20al%796385.pdf    Height (cm): 160 (07-01-21 @ 06:36), 161.3 (04-30-21 @ 19:21), 161.3 (09-04-20 @ 10:29)  Weight (kg): 99.4 (07-01-21 @ 09:17), 95.6 (09-04-20 @ 10:29)  BMI (kg/m2): 38.8 (07-01-21 @ 09:17), 37.3 (07-01-21 @ 06:36), 36.7 (04-30-21 @ 19:21)    [ ] PPSV2 < or = 30% [ ] significant weight loss [ ] poor nutritional intake [ ] anasarca Albumin, Serum: 3.8 g/dL (07-01-21 @ 05:24)    Artificial Nutrition [ ]     REFERRALS:   [ ]Chaplaincy  [x ] Hospice  [ ]Child Life  [x ]Social Work  [ ]Case management [ ]Holistic Therapy [ ] Physical Therapy [ ] Dietary   Goals of Care Document:

## 2021-07-08 PROCEDURE — 99232 SBSQ HOSP IP/OBS MODERATE 35: CPT

## 2021-07-08 PROCEDURE — 93010 ELECTROCARDIOGRAM REPORT: CPT

## 2021-07-08 RX ORDER — POLYETHYLENE GLYCOL 3350 17 G/17G
17 POWDER, FOR SOLUTION ORAL DAILY
Refills: 0 | Status: DISCONTINUED | OUTPATIENT
Start: 2021-07-08 | End: 2021-07-12

## 2021-07-08 RX ORDER — HYDROMORPHONE HYDROCHLORIDE 2 MG/ML
1 INJECTION INTRAMUSCULAR; INTRAVENOUS; SUBCUTANEOUS
Qty: 60 | Refills: 0
Start: 2021-07-08 | End: 2021-08-06

## 2021-07-08 RX ORDER — METHADONE HYDROCHLORIDE 40 MG/1
10 TABLET ORAL
Refills: 0 | Status: DISCONTINUED | OUTPATIENT
Start: 2021-07-08 | End: 2021-07-12

## 2021-07-08 RX ADMIN — METHADONE HYDROCHLORIDE 10 MILLIGRAM(S): 40 TABLET ORAL at 13:19

## 2021-07-08 RX ADMIN — SENNA PLUS 2 TABLET(S): 8.6 TABLET ORAL at 21:59

## 2021-07-08 RX ADMIN — METHADONE HYDROCHLORIDE 10 MILLIGRAM(S): 40 TABLET ORAL at 21:59

## 2021-07-08 RX ADMIN — ESCITALOPRAM OXALATE 5 MILLIGRAM(S): 10 TABLET, FILM COATED ORAL at 12:36

## 2021-07-08 RX ADMIN — ONDANSETRON 8 MILLIGRAM(S): 8 TABLET, FILM COATED ORAL at 21:59

## 2021-07-08 RX ADMIN — HYDROMORPHONE HYDROCHLORIDE 30 MILLILITER(S): 2 INJECTION INTRAMUSCULAR; INTRAVENOUS; SUBCUTANEOUS at 18:57

## 2021-07-08 RX ADMIN — Medication 0.25 MILLIGRAM(S): at 21:59

## 2021-07-08 RX ADMIN — ONDANSETRON 8 MILLIGRAM(S): 8 TABLET, FILM COATED ORAL at 05:59

## 2021-07-08 RX ADMIN — HEPARIN SODIUM 5000 UNIT(S): 5000 INJECTION INTRAVENOUS; SUBCUTANEOUS at 17:13

## 2021-07-08 RX ADMIN — Medication 50 MILLIGRAM(S): at 17:13

## 2021-07-08 RX ADMIN — POLYETHYLENE GLYCOL 3350 17 GRAM(S): 17 POWDER, FOR SOLUTION ORAL at 12:37

## 2021-07-08 RX ADMIN — Medication 50 MILLIGRAM(S): at 05:59

## 2021-07-08 RX ADMIN — Medication 0.25 MILLIGRAM(S): at 05:59

## 2021-07-08 RX ADMIN — CHLORHEXIDINE GLUCONATE 1 APPLICATION(S): 213 SOLUTION TOPICAL at 22:00

## 2021-07-08 RX ADMIN — Medication 0.25 MILLIGRAM(S): at 13:18

## 2021-07-08 RX ADMIN — Medication 2.5 MILLIGRAM(S): at 17:13

## 2021-07-08 RX ADMIN — PANTOPRAZOLE SODIUM 40 MILLIGRAM(S): 20 TABLET, DELAYED RELEASE ORAL at 06:01

## 2021-07-08 RX ADMIN — ONDANSETRON 8 MILLIGRAM(S): 8 TABLET, FILM COATED ORAL at 13:19

## 2021-07-08 RX ADMIN — HYDROMORPHONE HYDROCHLORIDE 30 MILLILITER(S): 2 INJECTION INTRAMUSCULAR; INTRAVENOUS; SUBCUTANEOUS at 07:13

## 2021-07-08 RX ADMIN — HYDROMORPHONE HYDROCHLORIDE 1.5 MILLIGRAM(S): 2 INJECTION INTRAMUSCULAR; INTRAVENOUS; SUBCUTANEOUS at 03:31

## 2021-07-08 RX ADMIN — METHADONE HYDROCHLORIDE 5 MILLIGRAM(S): 40 TABLET ORAL at 05:59

## 2021-07-08 RX ADMIN — HYDROMORPHONE HYDROCHLORIDE 30 MILLILITER(S): 2 INJECTION INTRAMUSCULAR; INTRAVENOUS; SUBCUTANEOUS at 05:52

## 2021-07-08 RX ADMIN — Medication 2.5 MILLIGRAM(S): at 05:59

## 2021-07-08 RX ADMIN — HEPARIN SODIUM 5000 UNIT(S): 5000 INJECTION INTRAVENOUS; SUBCUTANEOUS at 05:58

## 2021-07-08 NOTE — PROGRESS NOTE ADULT - PROBLEM SELECTOR PLAN 1
c/w dilaudid PCA  0.2/1/30//1.5  Utilized 15 attempts, 12 injections over last 24 hours which is less injections compared to yesterday's 24-hr review but about the same total mg including addition of continuous rate  Increased methadone to 5/5/10 on 7/5, Qtc 441ms on 7/5. Will increase methadone to10 tid to start afternoon 7/8, QTc 428ms on 7/8.  bowel regimen with senna, miralax; lactulose 10mg PO qd PRN

## 2021-07-08 NOTE — PROGRESS NOTE ADULT - PROBLEM SELECTOR PLAN 6
Onc inputs noted. As per the patient and her daughter current plan is for focusing on Symptoms Rx only and trying to go home with hospice.

## 2021-07-08 NOTE — PROGRESS NOTE ADULT - ASSESSMENT
72 yo F with PMH of anxiety, IBS, GERD, Cholangiocarcinoma with metastases, presents here with abdominal pain.    Abdominal pain.  - Patient with abdominal pain, likely 2/2 to underlying metastatic cholangiocarcinoma and peritoneal carcinomatosis; Also with new moderate amount ascites, no signs of SBP  - s/p therapeutic paracentesis  - pain control.     Cholangiocarcinoma.   - patient with cholangiocarcinoma, currently not on chemo  - oncology f/u.     Atrial fibrillation.    - Patient with new onset Afib, unclear etiology  - metoprolol 25mg bid for rate control  - CHADSVASC 2-3 (age, female, ?HTN)  - hold off AC for now    Respiratory distress.   - Patient with orthopnea, GANNON, and now tachypneic  - no hypoxia  - due to pressure from ascites    Chronic pain.   - patient on methadone 5mg tid and hydromorphone 2mg qid   - pain management evaluation    Anxiety.   - xanax 1mg qid prn.    Elevated blood pressure reading.    - elevated BP, possibly 2/2 to pain and anxiety    Prophylactic measure.   - no AC given bleeding risk     Palliative care    DNR    PCU care    DCP with home hospice in progress    d/w patient and     Arden Benitez MD pager [phone]

## 2021-07-08 NOTE — PROGRESS NOTE ADULT - SUBJECTIVE AND OBJECTIVE BOX
GAP TEAM PALLIATIVE CARE UNIT PROGRESS NOTE:      [  ] Patient on hospice program.    INDICATION FOR PALLIATIVE CARE UNIT SERVICES: pain and symptom management    INTERVAL HPI/OVERNIGHT EVENTS: Started continuous rate of dilaudid 0.2mg/hr last night. 8a-8a usage: 15 attempts, 12 injections, 1 CAB. Total 16.3mg. Pain controlled this AM and denies acute issues.    DNR on chart: Yes      Allergies    codeine (Short breath)  contrast media (iodine-based) (Anaphylaxis)    Intolerances    MEDICATIONS  (STANDING):  ALPRAZolam 0.25 milliGRAM(s) Oral three times a day  chlorhexidine 4% Liquid 1 Application(s) Topical daily  dronabinol 2.5 milliGRAM(s) Oral two times a day  escitalopram 5 milliGRAM(s) Oral daily  heparin   Injectable 5000 Unit(s) SubCutaneous every 12 hours  HYDROmorphone PCA (1 mG/mL) 30 milliLiter(s) PCA Continuous PCA Continuous  methadone    Tablet 10 milliGRAM(s) Oral <User Schedule>  metoprolol tartrate 50 milliGRAM(s) Oral two times a day  ondansetron    Tablet 8 milliGRAM(s) Oral three times a day  pantoprazole    Tablet 40 milliGRAM(s) Oral before breakfast  polyethylene glycol 3350 17 Gram(s) Oral daily  senna 2 Tablet(s) Oral at bedtime  sodium chloride 0.9%. 1000 milliLiter(s) (30 mL/Hr) IV Continuous <Continuous>    MEDICATIONS  (PRN):  acetaminophen   Tablet .. 650 milliGRAM(s) Oral every 6 hours PRN Temp greater or equal to 38C (100.4F), Mild Pain (1 - 3)  HYDROmorphone PCA (1 mG/mL) Rescue Clinician Bolus 1.5 milliGRAM(s) IV Push every 1 hour PRN Severe Pain (7 - 10)  lactulose Syrup 10 Gram(s) Oral daily PRN constipation  prochlorperazine   Injectable 5 milliGRAM(s) IV Push every 6 hours PRN Nauease or vomiting    ITEMS UNCHECKED ARE NOT PRESENT    PRESENT SYMPTOMS: [ ]Unable to obtain due to poor mentation   Source if other than patient:  [ ]Family   [ ]Team     Pain: [ x] yes [ ] no  QOL impact - abd discomfort, interrupted sleep  Location -   general abd                 Aggravating factors -  none reported  Quality -   cramping  Radiation -  none  Timing-   constant without analgesia  Severity (0-10 scale):  up to 7/10 in past 24 hrs  Minimal acceptable level (0-10 scale): 3    Dyspnea:                           [ ]Mild [ ]Moderate [ ]Severe  Anxiety:                             [ ]Mild [ ]Moderate [ ]Severe  Fatigue:                             [ ]Mild [ ]Moderate [ ]Severe  Nausea:                             [ ]Mild [ ]Moderate [ ]Severe  Loss of appetite:              [ ]Mild [ ]Moderate [ ]Severe  Constipation:                    [ ]Mild [ ]Moderate [ ]Severe    PAINAD Score:    [email or web address] (Ctrl +  left click to view)  		  Other Symptoms:  [x]All other review of systems negative     Palliative Performance Status Version 2:    60     %         [email or web address]  PHYSICAL EXAM:  Vital Signs Last 24 Hrs  T(C): 37 (08 Jul 2021 06:06), Max: 37.1 (07 Jul 2021 15:52)  T(F): 98.6 (08 Jul 2021 06:06), Max: 98.8 (07 Jul 2021 15:52)  HR: 70 (08 Jul 2021 06:06) (63 - 70)  BP: 128/63 (08 Jul 2021 06:06) (125/74 - 128/63)  BP(mean): --  RR: 18 (08 Jul 2021 06:06) (18 - 18)  SpO2: 95% (08 Jul 2021 06:06) (95% - 96%) I&O's Summary  GENERAL:  [x ]Alert  [x ]Oriented x  3   [ ]Lethargic  [ ]Cachexia  [ ]Unarousable  [ ]Verbal  [ ]Non-Verbal  Behavioral:   [ ] Anxiety  [ ] Delirium [ ] Agitation [ ] Other  HEENT:  [ x]Normal   [ ]Dry mouth   [ ]ET Tube/Trach  [ ]Oral lesions  PULMONARY:   [ x]Clear [ ]Tachypnea  [ ]Audible excessive secretions   [ ]Rhonchi        [ ]Right [ ]Left [ ]Bilateral  [ ]Crackles        [ ]Right [ ]Left [ ]Bilateral  [ ]Wheezing     [ ]Right [ ]Left [ ]Bilateral  [ ]Diminshed BS [ ]Right [ ]Left [ ]Bilateral    CARDIOVASCULAR:    [x ]Regular [ ]Irregular [ ]Tachy  [ ][place] [ ]Murmur [ ]Other  GASTROINTESTINAL:  [x ]Soft  [ ]Distended   [ ]+BS  [x ]Non tender [ ]Tender  [ ]PEG [ ]OGT/ NGT   Last BM:   7/7    GENITOURINARY:  [x ]Normal [ ] Incontinent   [ ]Oliguria/Anuria   [ ]Doran  MUSCULOSKELETAL:   [x ]Normal   [ ]Weakness  [ ]Bed/Wheelchair bound [ ]Edema  NEUROLOGIC:   [x ]No focal deficits  [ ] Cognitive impairment  [ ] Dysphagia [ ]Dysarthria [ ] Paresis [ ]Other   SKIN:   [x ]Normal  [ ]Rash     [ ]Pressure ulcer(s)  [ ]y [ ]n  Present on admission      CRITICAL CARE:  [ ] Shock Present  [ ]Septic [ ]Cardiogenic [ ]Neurologic [ ]Hypovolemic  [ ]  Vasopressors [ ]  Inotropes   [ ] Respiratory failure present [ ] Mechanical Ventilation [ ] Non-invasive ventilatory support [ ] High-Flow  [ ] Acute  [ ] Chronic [ ] Hypoxic  [ ] Hypercarbic [ ] Other  [ ] Other organ failure     LABS: none new         RADIOLOGY & ADDITIONAL STUDIES:  none new     PROTEIN CALORIE MALNUTRITION: [ ] mild [ ] moderate [ ] severe  [ ] underweight [ ] morbid obesity    https://www.andeal.org/vault/2440/web/files/ONC/Table_Clinical%20Characteristics%20to%20Document%20Malnutrition-White%20JV%20et%20al%586056.pdf    Height (cm): 160 (07-01-21 @ 06:36), 161.3 (04-30-21 @ 19:21), 161.3 (09-04-20 @ 10:29)  Weight (kg): 99.4 (07-01-21 @ 09:17), 95.6 (09-04-20 @ 10:29)  BMI (kg/m2): 38.8 (07-01-21 @ 09:17), 37.3 (07-01-21 @ 06:36), 36.7 (04-30-21 @ 19:21)    [ ] PPSV2 < or = 30% [ ] significant weight loss [ ] poor nutritional intake [ ] anasarca Albumin, Serum: 3.8 g/dL (07-01-21 @ 05:24)    Artificial Nutrition [ ]     REFERRALS:   [ ]Chaplaincy  [ ] Hospice  [ ]Child Life  [x ]Social Work  [ ]Case management [ ]Holistic Therapy [ ] Physical Therapy [ ] Dietary   Goals of Care Document:    GAP TEAM PALLIATIVE CARE UNIT PROGRESS NOTE:      [  ] Patient on hospice program.    INDICATION FOR PALLIATIVE CARE UNIT SERVICES: pain and symptom management in the setting of metastatic cholangiocarcinoma    INTERVAL HPI/OVERNIGHT EVENTS: Started continuous rate of dilaudid 0.2mg/hr last night. 8a-8a usage: 15 attempts, 12 injections, 1 CAB. Total 16.3mg. Pain controlled this AM and denies acute issues.    DNR on chart: Yes      Allergies    codeine (Short breath)  contrast media (iodine-based) (Anaphylaxis)    Intolerances    MEDICATIONS  (STANDING):  ALPRAZolam 0.25 milliGRAM(s) Oral three times a day  chlorhexidine 4% Liquid 1 Application(s) Topical daily  dronabinol 2.5 milliGRAM(s) Oral two times a day  escitalopram 5 milliGRAM(s) Oral daily  heparin   Injectable 5000 Unit(s) SubCutaneous every 12 hours  HYDROmorphone PCA (1 mG/mL) 30 milliLiter(s) PCA Continuous PCA Continuous  methadone    Tablet 10 milliGRAM(s) Oral <User Schedule>  metoprolol tartrate 50 milliGRAM(s) Oral two times a day  ondansetron    Tablet 8 milliGRAM(s) Oral three times a day  pantoprazole    Tablet 40 milliGRAM(s) Oral before breakfast  polyethylene glycol 3350 17 Gram(s) Oral daily  senna 2 Tablet(s) Oral at bedtime  sodium chloride 0.9%. 1000 milliLiter(s) (30 mL/Hr) IV Continuous <Continuous>    MEDICATIONS  (PRN):  acetaminophen   Tablet .. 650 milliGRAM(s) Oral every 6 hours PRN Temp greater or equal to 38C (100.4F), Mild Pain (1 - 3)  HYDROmorphone PCA (1 mG/mL) Rescue Clinician Bolus 1.5 milliGRAM(s) IV Push every 1 hour PRN Severe Pain (7 - 10)  lactulose Syrup 10 Gram(s) Oral daily PRN constipation  prochlorperazine   Injectable 5 milliGRAM(s) IV Push every 6 hours PRN Nauease or vomiting    ITEMS UNCHECKED ARE NOT PRESENT    PRESENT SYMPTOMS: [ ]Unable to obtain due to poor mentation   Source if other than patient:  [ ]Family   [ ]Team     Pain: [ x] yes [ ] no  QOL impact - abd discomfort, interrupted sleep  Location -   general abd                 Aggravating factors -  none reported  Quality -   cramping  Radiation -  none  Timing-   constant without analgesia  Severity (0-10 scale):  up to 7/10 in past 24 hrs  Minimal acceptable level (0-10 scale): 3    Dyspnea:                           [ ]Mild [ ]Moderate [ ]Severe  Anxiety:                             [ ]Mild [ ]Moderate [ ]Severe  Fatigue:                             [ ]Mild [ ]Moderate [ ]Severe  Nausea:                             [ ]Mild [ ]Moderate [ ]Severe  Loss of appetite:              [ ]Mild [ ]Moderate [ ]Severe  Constipation:                    [ ]Mild [ ]Moderate [ ]Severe    PAINAD Score:    [email or web address] (Ctrl +  left click to view)  		  Other Symptoms:  [x]All other review of systems negative     Palliative Performance Status Version 2:    60     %         http://npcrc.org/files/news/palliative_performance_scale_[email or web address].pdf  PHYSICAL EXAM:  Vital Signs Last 24 Hrs  T(C): 37 (08 Jul 2021 06:06), Max: 37.1 (07 Jul 2021 15:52)  T(F): 98.6 (08 Jul 2021 06:06), Max: 98.8 (07 Jul 2021 15:52)  HR: 70 (08 Jul 2021 06:06) (63 - 70)  BP: 128/63 (08 Jul 2021 06:06) (125/74 - 128/63)  BP(mean): --  RR: 18 (08 Jul 2021 06:06) (18 - 18)  SpO2: 95% (08 Jul 2021 06:06) (95% - 96%) I&O's Summary  GENERAL:  [x ]Alert  [x ]Oriented x  3   [ ]Lethargic  [ ]Cachexia  [ ]Unarousable  [ ]Verbal  [ ]Non-Verbal  Behavioral:   [ ] Anxiety  [ ] Delirium [ ] Agitation [ ] Other  HEENT:  [ x]Normal   [ ]Dry mouth   [ ]ET Tube/Trach  [ ]Oral lesions  PULMONARY:   [ x]Clear [ ]Tachypnea  [ ]Audible excessive secretions   [ ]Rhonchi        [ ]Right [ ]Left [ ]Bilateral  [ ]Crackles        [ ]Right [ ]Left [ ]Bilateral  [ ]Wheezing     [ ]Right [ ]Left [ ]Bilateral  [ ]Diminshed BS [ ]Right [ ]Left [ ]Bilateral    CARDIOVASCULAR:    [x ]Regular [ ]Irregular [ ]Tachy  [ ][place] [ ]Murmur [ ]Other  GASTROINTESTINAL:  [x ]Soft  [ ]Distended   [ ]+BS  [x ]Non tender [ ]Tender  [ ]PEG [ ]OGT/ NGT   Last BM:   7/7    GENITOURINARY:  [x ]Normal [ ] Incontinent   [ ]Oliguria/Anuria   [ ]Doran  MUSCULOSKELETAL:   [x ]Normal   [ ]Weakness  [ ]Bed/Wheelchair bound [ ]Edema  NEUROLOGIC:   [x ]No focal deficits  [ ] Cognitive impairment  [ ] Dysphagia [ ]Dysarthria [ ] Paresis [ ]Other   SKIN:   [x ]Normal  [ ]Rash     [ ]Pressure ulcer(s)  [ ]y [ ]n  Present on admission      CRITICAL CARE:  [ ] Shock Present  [ ]Septic [ ]Cardiogenic [ ]Neurologic [ ]Hypovolemic  [ ]  Vasopressors [ ]  Inotropes   [ ] Respiratory failure present [ ] Mechanical Ventilation [ ] Non-invasive ventilatory support [ ] High-Flow  [ ] Acute  [ ] Chronic [ ] Hypoxic  [ ] Hypercarbic [ ] Other  [ ] Other organ failure     LABS: none new         RADIOLOGY & ADDITIONAL STUDIES:  none new     PROTEIN CALORIE MALNUTRITION: [ ] mild [ ] moderate [ ] severe  [ ] underweight [ ] morbid obesity    https://www.andeal.org/vault/2440/web/files/ONC/Table_Clinical%20Characteristics%20to%20Document%20Malnutrition-White%20JV%20et%20al%878930.pdf    Height (cm): 160 (07-01-21 @ 06:36), 161.3 (04-30-21 @ 19:21), 161.3 (09-04-20 @ 10:29)  Weight (kg): 99.4 (07-01-21 @ 09:17), 95.6 (09-04-20 @ 10:29)  BMI (kg/m2): 38.8 (07-01-21 @ 09:17), 37.3 (07-01-21 @ 06:36), 36.7 (04-30-21 @ 19:21)    [ ] PPSV2 < or = 30% [ ] significant weight loss [ ] poor nutritional intake [ ] anasarca Albumin, Serum: 3.8 g/dL (07-01-21 @ 05:24)    Artificial Nutrition [ ]     REFERRALS:   [ ]Chaplaincy  [ ] Hospice  [ ]Child Life  [x ]Social Work  [ ]Case management [ ]Holistic Therapy [ ] Physical Therapy [ ] Dietary   Goals of Care Document:

## 2021-07-08 NOTE — PROGRESS NOTE ADULT - NSPROGADDITIONALINFOA_GEN_ALL_CORE
72 yo F with PMH of anxiety, IBS, GERD, cholangiocarcinoma with metastases presented to PCU with uncontrolled abdominal pain.  Patient on methadone and PCA, attempting to transition to methadone and oral regimen.  Patient is willing to go home with PCA/methadone and continue transition as an outpatient.  Hospice is working with patient and family with a plan for dc approx 7/9/2021
72 yo F with PMH of anxiety, IBS, GERD, cholangiocarcinoma with metastases presented to PCU with uncontrolled abdominal pain.  Patient on methadone and PCA, attempting to transition to methadone and oral regimen.  Patient is willing to go home with PCA/methadone and continue transition as an outpatient.  Hospice evaluation.

## 2021-07-08 NOTE — PROGRESS NOTE ADULT - SUBJECTIVE AND OBJECTIVE BOX
Patient is a 71y old  Female who presents with a chief complaint of abdominal pain (08 Jul 2021 11:13)      SUBJECTIVE / OVERNIGHT EVENTS: Comfortable without new complaints.   Review of Systems  chest pain no  palpitations no  sob no  nausea no  headache no    MEDICATIONS  (STANDING):  ALPRAZolam 0.25 milliGRAM(s) Oral three times a day  chlorhexidine 4% Liquid 1 Application(s) Topical daily  dronabinol 2.5 milliGRAM(s) Oral two times a day  escitalopram 5 milliGRAM(s) Oral daily  heparin   Injectable 5000 Unit(s) SubCutaneous every 12 hours  HYDROmorphone PCA (1 mG/mL) 30 milliLiter(s) PCA Continuous PCA Continuous  methadone    Tablet 10 milliGRAM(s) Oral <User Schedule>  metoprolol tartrate 50 milliGRAM(s) Oral two times a day  ondansetron    Tablet 8 milliGRAM(s) Oral three times a day  pantoprazole    Tablet 40 milliGRAM(s) Oral before breakfast  polyethylene glycol 3350 17 Gram(s) Oral daily  senna 2 Tablet(s) Oral at bedtime  sodium chloride 0.9%. 1000 milliLiter(s) (30 mL/Hr) IV Continuous <Continuous>    MEDICATIONS  (PRN):  acetaminophen   Tablet .. 650 milliGRAM(s) Oral every 6 hours PRN Temp greater or equal to 38C (100.4F), Mild Pain (1 - 3)  HYDROmorphone PCA (1 mG/mL) Rescue Clinician Bolus 1.5 milliGRAM(s) IV Push every 1 hour PRN Severe Pain (7 - 10)  lactulose Syrup 10 Gram(s) Oral daily PRN constipation  prochlorperazine   Injectable 5 milliGRAM(s) IV Push every 6 hours PRN Nauease or vomiting      Vital Signs Last 24 Hrs  T(C): 37 (08 Jul 2021 16:10), Max: 37 (08 Jul 2021 06:06)  T(F): 98.6 (08 Jul 2021 16:10), Max: 98.6 (08 Jul 2021 06:06)  HR: 64 (08 Jul 2021 16:10) (64 - 70)  BP: 108/70 (08 Jul 2021 16:10) (91/57 - 128/63)  BP(mean): --  RR: 18 (08 Jul 2021 16:10) (18 - 18)  SpO2: 94% (08 Jul 2021 16:10) (93% - 95%)    PHYSICAL EXAM:  GENERAL: NAD, well-developed  HEAD:  Atraumatic, Normocephalic  EYES: EOMI, PERRLA, conjunctiva and sclera clear  NECK: Supple, No JVD  CHEST/LUNG: Clear to auscultation bilaterally; No wheeze  HEART: Regular rate and rhythm; No murmurs, rubs, or gallops  ABDOMEN: Soft, Nontender, distended; Bowel sounds present  EXTREMITIES:  2+ Peripheral Pulses, No clubbing, cyanosis, or edema  PSYCH: AAOx3  NEUROLOGY: non-focal  SKIN: No rashes or lesions    LABS:                      RADIOLOGY & ADDITIONAL TESTS:    Imaging Personally Reviewed:    Consultant(s) Notes Reviewed:      Care Discussed with Consultants/Other Providers:

## 2021-07-08 NOTE — HOSPICE CARE NOTE - DME DETAILS
DME discussed, and requested: Bed, IRENE, T W/C. Oxygen.
DME requested, and ordered: Bed, IRENE, T W/C, Oxygen.  Delivery scheduled for Friday, 7/9/2021. Spouse is in agreement.

## 2021-07-08 NOTE — PROGRESS NOTE ADULT - PROBLEM SELECTOR PLAN 7
DNR/DNI. Patient's priority is now symptom-directed care. Patient agreeable to home hospice. Awaiting approval and will need DME.

## 2021-07-09 ENCOUNTER — APPOINTMENT (OUTPATIENT)
Dept: HEMATOLOGY ONCOLOGY | Facility: CLINIC | Age: 71
End: 2021-07-09

## 2021-07-09 PROCEDURE — 99232 SBSQ HOSP IP/OBS MODERATE 35: CPT

## 2021-07-09 RX ORDER — ALPRAZOLAM 0.25 MG
0.25 TABLET ORAL
Refills: 0 | Status: DISCONTINUED | OUTPATIENT
Start: 2021-07-09 | End: 2021-07-12

## 2021-07-09 RX ORDER — HYDROMORPHONE HYDROCHLORIDE 2 MG/ML
1 INJECTION INTRAMUSCULAR; INTRAVENOUS; SUBCUTANEOUS
Qty: 60 | Refills: 0
Start: 2021-07-09 | End: 2021-08-07

## 2021-07-09 RX ORDER — ALPRAZOLAM 0.25 MG
0.5 TABLET ORAL
Refills: 0 | Status: DISCONTINUED | OUTPATIENT
Start: 2021-07-09 | End: 2021-07-12

## 2021-07-09 RX ORDER — HYDROMORPHONE HYDROCHLORIDE 2 MG/ML
30 INJECTION INTRAMUSCULAR; INTRAVENOUS; SUBCUTANEOUS
Refills: 0 | Status: DISCONTINUED | OUTPATIENT
Start: 2021-07-09 | End: 2021-07-09

## 2021-07-09 RX ORDER — HYDROMORPHONE HYDROCHLORIDE 2 MG/ML
1.5 INJECTION INTRAMUSCULAR; INTRAVENOUS; SUBCUTANEOUS
Refills: 0 | Status: DISCONTINUED | OUTPATIENT
Start: 2021-07-09 | End: 2021-07-12

## 2021-07-09 RX ORDER — NALOXONE HYDROCHLORIDE 4 MG/.1ML
0.1 SPRAY NASAL
Refills: 0 | Status: DISCONTINUED | OUTPATIENT
Start: 2021-07-09 | End: 2021-07-12

## 2021-07-09 RX ORDER — ZALEPLON 10 MG
5 CAPSULE ORAL AT BEDTIME
Refills: 0 | Status: DISCONTINUED | OUTPATIENT
Start: 2021-07-09 | End: 2021-07-09

## 2021-07-09 RX ORDER — HYDROMORPHONE HYDROCHLORIDE 2 MG/ML
30 INJECTION INTRAMUSCULAR; INTRAVENOUS; SUBCUTANEOUS
Refills: 0 | Status: DISCONTINUED | OUTPATIENT
Start: 2021-07-09 | End: 2021-07-10

## 2021-07-09 RX ADMIN — ONDANSETRON 8 MILLIGRAM(S): 8 TABLET, FILM COATED ORAL at 21:48

## 2021-07-09 RX ADMIN — SODIUM CHLORIDE 30 MILLILITER(S): 9 INJECTION INTRAMUSCULAR; INTRAVENOUS; SUBCUTANEOUS at 05:26

## 2021-07-09 RX ADMIN — ESCITALOPRAM OXALATE 5 MILLIGRAM(S): 10 TABLET, FILM COATED ORAL at 12:26

## 2021-07-09 RX ADMIN — METHADONE HYDROCHLORIDE 10 MILLIGRAM(S): 40 TABLET ORAL at 15:01

## 2021-07-09 RX ADMIN — Medication 50 MILLIGRAM(S): at 05:27

## 2021-07-09 RX ADMIN — Medication 2.5 MILLIGRAM(S): at 17:50

## 2021-07-09 RX ADMIN — HEPARIN SODIUM 5000 UNIT(S): 5000 INJECTION INTRAVENOUS; SUBCUTANEOUS at 05:27

## 2021-07-09 RX ADMIN — HYDROMORPHONE HYDROCHLORIDE 30 MILLILITER(S): 2 INJECTION INTRAMUSCULAR; INTRAVENOUS; SUBCUTANEOUS at 10:47

## 2021-07-09 RX ADMIN — SENNA PLUS 2 TABLET(S): 8.6 TABLET ORAL at 21:47

## 2021-07-09 RX ADMIN — Medication 0.25 MILLIGRAM(S): at 05:26

## 2021-07-09 RX ADMIN — METHADONE HYDROCHLORIDE 10 MILLIGRAM(S): 40 TABLET ORAL at 05:27

## 2021-07-09 RX ADMIN — ONDANSETRON 8 MILLIGRAM(S): 8 TABLET, FILM COATED ORAL at 05:27

## 2021-07-09 RX ADMIN — HYDROMORPHONE HYDROCHLORIDE 30 MILLILITER(S): 2 INJECTION INTRAMUSCULAR; INTRAVENOUS; SUBCUTANEOUS at 19:28

## 2021-07-09 RX ADMIN — Medication 2.5 MILLIGRAM(S): at 05:27

## 2021-07-09 RX ADMIN — Medication 50 MILLIGRAM(S): at 17:47

## 2021-07-09 RX ADMIN — SODIUM CHLORIDE 30 MILLILITER(S): 9 INJECTION INTRAMUSCULAR; INTRAVENOUS; SUBCUTANEOUS at 07:03

## 2021-07-09 RX ADMIN — HYDROMORPHONE HYDROCHLORIDE 1.5 MILLIGRAM(S): 2 INJECTION INTRAMUSCULAR; INTRAVENOUS; SUBCUTANEOUS at 09:04

## 2021-07-09 RX ADMIN — HYDROMORPHONE HYDROCHLORIDE 1.5 MILLIGRAM(S): 2 INJECTION INTRAMUSCULAR; INTRAVENOUS; SUBCUTANEOUS at 01:46

## 2021-07-09 RX ADMIN — HYDROMORPHONE HYDROCHLORIDE 30 MILLILITER(S): 2 INJECTION INTRAMUSCULAR; INTRAVENOUS; SUBCUTANEOUS at 16:29

## 2021-07-09 RX ADMIN — PANTOPRAZOLE SODIUM 40 MILLIGRAM(S): 20 TABLET, DELAYED RELEASE ORAL at 06:02

## 2021-07-09 RX ADMIN — HEPARIN SODIUM 5000 UNIT(S): 5000 INJECTION INTRAVENOUS; SUBCUTANEOUS at 17:47

## 2021-07-09 RX ADMIN — ONDANSETRON 8 MILLIGRAM(S): 8 TABLET, FILM COATED ORAL at 15:00

## 2021-07-09 RX ADMIN — Medication 0.25 MILLIGRAM(S): at 15:00

## 2021-07-09 RX ADMIN — CHLORHEXIDINE GLUCONATE 1 APPLICATION(S): 213 SOLUTION TOPICAL at 12:26

## 2021-07-09 RX ADMIN — POLYETHYLENE GLYCOL 3350 17 GRAM(S): 17 POWDER, FOR SOLUTION ORAL at 12:26

## 2021-07-09 RX ADMIN — HYDROMORPHONE HYDROCHLORIDE 30 MILLILITER(S): 2 INJECTION INTRAMUSCULAR; INTRAVENOUS; SUBCUTANEOUS at 07:03

## 2021-07-09 RX ADMIN — Medication 0.5 MILLIGRAM(S): at 21:48

## 2021-07-09 RX ADMIN — SODIUM CHLORIDE 20 MILLILITER(S): 9 INJECTION INTRAMUSCULAR; INTRAVENOUS; SUBCUTANEOUS at 10:36

## 2021-07-09 RX ADMIN — HYDROMORPHONE HYDROCHLORIDE 1.5 MILLIGRAM(S): 2 INJECTION INTRAMUSCULAR; INTRAVENOUS; SUBCUTANEOUS at 16:49

## 2021-07-09 RX ADMIN — METHADONE HYDROCHLORIDE 10 MILLIGRAM(S): 40 TABLET ORAL at 21:48

## 2021-07-09 NOTE — PROGRESS NOTE ADULT - ASSESSMENT
72 yo F with PMH of anxiety, IBS, GERD, Cholangiocarcinoma with metastases, presents here with abdominal pain.    Abdominal pain.  - Patient with abdominal pain, likely 2/2 to underlying metastatic cholangiocarcinoma and peritoneal carcinomatosis; Also with new moderate amount ascites, no signs of SBP  - s/p therapeutic paracentesis  - pain control.     Cholangiocarcinoma.   - patient with cholangiocarcinoma, currently not on chemo  - oncology f/u.     Atrial fibrillation.    - Patient with new onset Afib, unclear etiology  - metoprolol 25mg bid for rate control  - CHADSVASC 2-3 (age, female, ?HTN)  - hold off AC for now    Respiratory distress.   - Patient with orthopnea, GANNON, and now tachypneic  - no hypoxia  - due to pressure from ascites    Chronic pain.   - patient on methadone 5mg tid and hydromorphone 2mg qid   - pain management evaluation    Anxiety.   - xanax 1mg qid prn.    Elevated blood pressure reading.    - elevated BP, possibly 2/2 to pain and anxiety    Prophylactic measure.   - no AC given bleeding risk     Palliative care    DNR    PCU care    DCP with home hospice in progress    d/w patient    Arden Benitez MD pager [phone]

## 2021-07-09 NOTE — PROGRESS NOTE ADULT - PROBLEM SELECTOR PLAN 7
DNR/DNI. Patient's priority is symptom-directed care. Patient accepted to home hospice. DME delivered 7/9. Patient, , and HCN in agreement with continued monitoring of symptoms (pain, interrupted sleep) over the weekend and plan for discharge on Monday 7/12. DNR/DNI. Patient's priority is symptom-directed care. Patient accepted to home hospice. DME delivered 7/9. Patient, , and HCN in agreement with continued monitoring of symptoms (pain, interrupted sleep) over the weekend with titration of meds as needed. Tentative plan for discharge on Monday 7/12.

## 2021-07-09 NOTE — HOSPICE CARE NOTE - CONVESATION DETAILS
Pt to remain in the PCU over the w/e for continued pain mgmt.    The current plan is for d/c on Monday, 7/12/2021.     - [facility] has been provided with all of the necessary documents, information for start of service on 7/12/2021.    Palliative Dr. Musa Rodriguez, and Pt/Spouse, have been apprised.    HCN RN will continue to follow.  Sarita Perkins RN  [phone]      
Pt has been approved for home hospice care.    TC w/Pt - informed Pt of hospice approval.    - Brief discussion of HCN services/POC. Pt endorsed agreement.   - F/U tomorrow w/Pt and Spouse regarding HCN Consents.    HCN RN will continue to follow.  Sarita Perkins RN  [phone]    
ETHAN muller/the Pt's spouse Jose Brown, for review/completion of Consents.    - Reviewed HCN services/POC. Spouse is in agrement.    - Discussed DME.  Completed Consents have been received.    Pt information provided to [facility], for start of service, ASAP.    - ETHAN muller/Blanca Osorio - Pending response regarding set-up date, time.    HCN RN will continue to follow.  Sarita Perkins RN  [phone]

## 2021-07-09 NOTE — PROGRESS NOTE ADULT - PROBLEM SELECTOR PLAN 1
c/w dilaudid PCA  0.2/1/30//1.5  Utilized 24 attempts, 18 injections over last 24 hours which is increased usage compared to yesterday's 24-hr review  Increased methadone to 10 tid to start with the afternoon dose of 7/8, QTc 428ms on 7/8. Previously increased methadone to 5/5/10 on 7/5 to assist with sleep, Qtc 441ms on 7/5.  Bowel regimen with senna, miralax; lactulose 10mg PO qd PRN c/w dilaudid PCA  0.2/1/30//1.5  Utilized 24 attempts, 18 injections over last 24 hours which is increased usage compared to yesterday's 24-hr review  Increased methadone to 10 tid to start with the afternoon dose of 7/8, QTc 428ms on 7/8. Previously increased methadone to 5/5/10 on 7/5 to assist with pain so as not to interrupt sleep, Qtc 441ms on 7/5.  Bowel regimen with senna, miralax; lactulose 10mg PO qd PRN c/w dilaudid PCA 0.2/1/30//1.5  Utilized 24 attempts, 18 injections over last 24 hours which is increased usage compared to yesterday's 24-hr review  Increased methadone from 5 tid to 5/5/10 on 7/5 to assist with pain so as not to interrupt sleep, Qtc 441ms on 7/5, then to 10 tid on 7/8, QTc 428ms on 7/8.  Narcan 0.1mg PRN  Bowel regimen with senna, miralax; lactulose 10mg PO qd PRN  Addendum: increased PCA continuous rate to 0.4 in the afternoon given recurrent breakthrough severe pain requiring CABs on above regimen

## 2021-07-09 NOTE — PROGRESS NOTE ADULT - SUBJECTIVE AND OBJECTIVE BOX
GAP TEAM PALLIATIVE CARE UNIT PROGRESS NOTE:      [  ] Patient on hospice program.    INDICATION FOR PALLIATIVE CARE UNIT SERVICES: pain and symptom management    INTERVAL HPI/OVERNIGHT EVENTS: on dilaudid PCA pump 8a-8a usage: 24 attempts, 18 injections. Denies acute issues this AM.    DNR on chart: Yes      Allergies    codeine (Short breath)  contrast media (iodine-based) (Anaphylaxis)    Intolerances    MEDICATIONS  (STANDING):  ALPRAZolam 0.25 milliGRAM(s) Oral three times a day  chlorhexidine 4% Liquid 1 Application(s) Topical daily  dronabinol 2.5 milliGRAM(s) Oral two times a day  escitalopram 5 milliGRAM(s) Oral daily  heparin   Injectable 5000 Unit(s) SubCutaneous every 12 hours  HYDROmorphone PCA (1 mG/mL) 30 milliLiter(s) PCA Continuous PCA Continuous  methadone    Tablet 10 milliGRAM(s) Oral <User Schedule>  metoprolol tartrate 50 milliGRAM(s) Oral two times a day  ondansetron    Tablet 8 milliGRAM(s) Oral three times a day  pantoprazole    Tablet 40 milliGRAM(s) Oral before breakfast  polyethylene glycol 3350 17 Gram(s) Oral daily  senna 2 Tablet(s) Oral at bedtime  sodium chloride 0.9%. 1000 milliLiter(s) (30 mL/Hr) IV Continuous <Continuous>    MEDICATIONS  (PRN):  acetaminophen   Tablet .. 650 milliGRAM(s) Oral every 6 hours PRN Temp greater or equal to 38C (100.4F), Mild Pain (1 - 3)  lactulose Syrup 10 Gram(s) Oral daily PRN constipation  prochlorperazine   Injectable 5 milliGRAM(s) IV Push every 6 hours PRN Nauease or vomiting  zaleplon 5 milliGRAM(s) Oral at bedtime PRN Insomnia    ITEMS UNCHECKED ARE NOT PRESENT    PRESENT SYMPTOMS: [ ]Unable to obtain due to poor mentation   Source if other than patient:  [ ]Family   [ ]Team     Pain: [ ] yes [ ] no  QOL impact -   Location -                    Aggravating factors -  Quality -  Radiation -  Timing-  Severity (0-10 scale):  Minimal acceptable level (0-10 scale):     Dyspnea:                           [ ]Mild [ ]Moderate [ ]Severe  Anxiety:                             [ ]Mild [ ]Moderate [ ]Severe  Fatigue:                             [ ]Mild [ ]Moderate [ ]Severe  Nausea:                             [ ]Mild [ ]Moderate [ ]Severe  Loss of appetite:              [ ]Mild [ ]Moderate [ ]Severe  Constipation:                    [ ]Mild [ ]Moderate [ ]Severe    PAINAD Score:    [email or web address] (Ctrl +  left click to view)  		  Other Symptoms:  [x]All other review of systems negative     Palliative Performance Status Version 2:         %         [email or web address]  PHYSICAL EXAM:  Vital Signs Last 24 Hrs  T(C): 37.1 (09 Jul 2021 08:14), Max: 37.1 (09 Jul 2021 05:47)  T(F): 98.8 (09 Jul 2021 08:14), Max: 98.8 (09 Jul 2021 08:14)  HR: 63 (09 Jul 2021 05:47) (63 - 66)  BP: 118/76 (09 Jul 2021 08:14) (91/57 - 119/78)  BP(mean): --  RR: 18 (09 Jul 2021 08:14) (18 - 18)  SpO2: 95% (09 Jul 2021 08:14) (93% - 95%) I&O's Summary  GENERAL:  [ ]Alert  [ ]Oriented x   [ ]Lethargic  [ ]Cachexia  [ ]Unarousable  [ ]Verbal  [ ]Non-Verbal  Behavioral:   [ ] Anxiety  [ ] Delirium [ ] Agitation [ ] Other  HEENT:  [ ]Normal   [ ]Dry mouth   [ ]ET Tube/Trach  [ ]Oral lesions  PULMONARY:   [ ]Clear [ ]Tachypnea  [ ]Audible excessive secretions   [ ]Rhonchi        [ ]Right [ ]Left [ ]Bilateral  [ ]Crackles        [ ]Right [ ]Left [ ]Bilateral  [ ]Wheezing     [ ]Right [ ]Left [ ]Bilateral  [ ]Diminshed BS [ ]Right [ ]Left [ ]Bilateral    CARDIOVASCULAR:    [ ]Regular [ ]Irregular [ ]Tachy  [ ][place] [ ]Murmur [ ]Other  GASTROINTESTINAL:  [ ]Soft  [ ]Distended   [ ]+BS  [ ]Non tender [ ]Tender  [ ]PEG [ ]OGT/ NGT   Last BM:       GENITOURINARY:  [ ]Normal [ ] Incontinent   [ ]Oliguria/Anuria   [ ]Doran  MUSCULOSKELETAL:   [ ]Normal   [ ]Weakness  [ ]Bed/Wheelchair bound [ ]Edema  NEUROLOGIC:   [ ]No focal deficits  [ ] Cognitive impairment  [ ] Dysphagia [ ]Dysarthria [ ] Paresis [ ]Other   SKIN:   [ ]Normal  [ ]Rash     [ ]Pressure ulcer(s)  [ ]y [ ]n  Present on admission      CRITICAL CARE:  [ ] Shock Present  [ ]Septic [ ]Cardiogenic [ ]Neurologic [ ]Hypovolemic  [ ]  Vasopressors [ ]  Inotropes   [ ] Respiratory failure present [ ] Mechanical Ventilation [ ] Non-invasive ventilatory support [ ] High-Flow  [ ] Acute  [ ] Chronic [ ] Hypoxic  [ ] Hypercarbic [ ] Other  [ ] Other organ failure     LABS:            RADIOLOGY & ADDITIONAL STUDIES:    PROTEIN CALORIE MALNUTRITION: [ ] mild [ ] moderate [ ] severe  [ ] underweight [ ] morbid obesity    https://www.andeal.org/vault/2440/web/files/ONC/Table_Clinical%20Characteristics%20to%20Document%20Malnutrition-White%20JV%20et%20al%273182.pdf    Height (cm): 160 (07-01-21 @ 06:36), 161.3 (04-30-21 @ 19:21), 161.3 (09-04-20 @ 10:29)  Weight (kg): 99.4 (07-01-21 @ 09:17), 95.6 (09-04-20 @ 10:29)  BMI (kg/m2): 38.8 (07-01-21 @ 09:17), 37.3 (07-01-21 @ 06:36), 36.7 (04-30-21 @ 19:21)    [ ] PPSV2 < or = 30% [ ] significant weight loss [ ] poor nutritional intake [ ] anasarca Albumin, Serum: 3.8 g/dL (07-01-21 @ 05:24)    Artificial Nutrition [ ]     REFERRALS:   [ ]Chaplaincy  [ ] Hospice  [ ]Child Life  [x ]Social Work  [ ]Case management [ ]Holistic Therapy [ ] Physical Therapy [ ] Dietary   Goals of Care Document:    GAP TEAM PALLIATIVE CARE UNIT PROGRESS NOTE:      [  ] Patient on hospice program.    INDICATION FOR PALLIATIVE CARE UNIT SERVICES: pain and symptom management    INTERVAL HPI/OVERNIGHT EVENTS: On dilaudid PCA pump 8a-8a usage: 24 attempts, 18 injections. Denies acute issues this AM.     DNR on chart: Yes      Allergies    codeine (Short breath)  contrast media (iodine-based) (Anaphylaxis)    Intolerances    MEDICATIONS  (STANDING):  ALPRAZolam 0.25 milliGRAM(s) Oral three times a day  chlorhexidine 4% Liquid 1 Application(s) Topical daily  dronabinol 2.5 milliGRAM(s) Oral two times a day  escitalopram 5 milliGRAM(s) Oral daily  heparin   Injectable 5000 Unit(s) SubCutaneous every 12 hours  HYDROmorphone PCA (1 mG/mL) 30 milliLiter(s) PCA Continuous PCA Continuous  methadone    Tablet 10 milliGRAM(s) Oral <User Schedule>  metoprolol tartrate 50 milliGRAM(s) Oral two times a day  ondansetron    Tablet 8 milliGRAM(s) Oral three times a day  pantoprazole    Tablet 40 milliGRAM(s) Oral before breakfast  polyethylene glycol 3350 17 Gram(s) Oral daily  senna 2 Tablet(s) Oral at bedtime  sodium chloride 0.9%. 1000 milliLiter(s) (30 mL/Hr) IV Continuous <Continuous>    MEDICATIONS  (PRN):  acetaminophen   Tablet .. 650 milliGRAM(s) Oral every 6 hours PRN Temp greater or equal to 38C (100.4F), Mild Pain (1 - 3)  lactulose Syrup 10 Gram(s) Oral daily PRN constipation  prochlorperazine   Injectable 5 milliGRAM(s) IV Push every 6 hours PRN Nauease or vomiting  zaleplon 5 milliGRAM(s) Oral at bedtime PRN Insomnia    ITEMS UNCHECKED ARE NOT PRESENT    PRESENT SYMPTOMS: [ ]Unable to obtain due to poor mentation   Source if other than patient:  [ ]Family   [ ]Team     Pain: [ x] yes [ ] no  QOL impact - abdominal discomfort, interrupted sleep  Location -   generalized abd                 Aggravating factors - none reported  Quality - cramping  Radiation -  none reported  Timing-  constant without analgesia  Severity (0-10 scale):  Up to 8/10 without analgesia  Minimal acceptable level (0-10 scale): 3    Dyspnea:                           [ ]Mild [ ]Moderate [ ]Severe  Anxiety:                             [ ]Mild [ ]Moderate [ ]Severe  Fatigue:                             [ ]Mild [ ]Moderate [ ]Severe  Nausea:                             [ ]Mild [ ]Moderate [ ]Severe  Loss of appetite:              [ ]Mild [ ]Moderate [ ]Severe  Constipation:                    [ ]Mild [ ]Moderate [ ]Severe    PAINAD Score:    [email or web address] (Ctrl +  left click to view)  		  Other Symptoms:  [x]All other review of systems negative     Palliative Performance Status Version 2:     60    %         http://[email or web address]/files/[email or web address]_[email or web address]  PHYSICAL EXAM:  Vital Signs Last 24 Hrs  T(C): 37.1 (09 Jul 2021 08:14), Max: 37.1 (09 Jul 2021 05:47)  T(F): 98.8 (09 Jul 2021 08:14), Max: 98.8 (09 Jul 2021 08:14)  HR: 63 (09 Jul 2021 05:47) (63 - 66)  BP: 118/76 (09 Jul 2021 08:14) (91/57 - 119/78)  BP(mean): --  RR: 18 (09 Jul 2021 08:14) (18 - 18)  SpO2: 95% (09 Jul 2021 08:14) (93% - 95%) I&O's Summary  GENERAL:  [x ]Alert  [x ]Oriented x 3  [ ]Lethargic  [ ]Cachexia  [ ]Unarousable  [ ]Verbal  [ ]Non-Verbal  Behavioral:   [ ] Anxiety  [ ] Delirium [ ] Agitation [ ] Other  HEENT:  [x ]Normal   [ ]Dry mouth   [ ]ET Tube/Trach  [ ]Oral lesions  PULMONARY:   [x ]Clear [ ]Tachypnea  [ ]Audible excessive secretions   [ ]Rhonchi        [ ]Right [ ]Left [ ]Bilateral  [ ]Crackles        [ ]Right [ ]Left [ ]Bilateral  [ ]Wheezing     [ ]Right [ ]Left [ ]Bilateral  [ ]Diminshed BS [ ]Right [ ]Left [ ]Bilateral    CARDIOVASCULAR:    [x ]Regular [ ]Irregular [ ]Tachy  [ ][place] [ ]Murmur [ ]Other  GASTROINTESTINAL:  [x ]Soft  [ ]Distended   [ ]+BS  [x ]Non tender [ ]Tender  [ ]PEG [ ]OGT/ NGT   Last BM:   7/8    GENITOURINARY:  [x ]Normal [ ] Incontinent   [ ]Oliguria/Anuria   [ ]Doran  MUSCULOSKELETAL:   [x ]Normal   [ ]Weakness  [ ]Bed/Wheelchair bound [ ]Edema  NEUROLOGIC:   [x ]No focal deficits  [ ] Cognitive impairment  [ ] Dysphagia [ ]Dysarthria [ ] Paresis [ ]Other   SKIN:   [x ]Normal  [ ]Rash     [ ]Pressure ulcer(s)  [ ]y [ ]n  Present on admission      CRITICAL CARE:  [ ] Shock Present  [ ]Septic [ ]Cardiogenic [ ]Neurologic [ ]Hypovolemic  [ ]  Vasopressors [ ]  Inotropes   [ ] Respiratory failure present [ ] Mechanical Ventilation [ ] Non-invasive ventilatory support [ ] High-Flow  [ ] Acute  [ ] Chronic [ ] Hypoxic  [ ] Hypercarbic [ ] Other  [ ] Other organ failure     LABS: none new         RADIOLOGY & ADDITIONAL STUDIES: none new     PROTEIN CALORIE MALNUTRITION: [ ] mild [ ] moderate [ ] severe  [ ] underweight [ ] morbid obesity    https://www.andeal.org/vault/2440/web/files/ONC/Table_Clinical%20Characteristics%20to%20Document%20Malnutrition-White%20JV%20et%20al%519543.pdf    Height (cm): 160 (07-01-21 @ 06:36), 161.3 (04-30-21 @ 19:21), 161.3 (09-04-20 @ 10:29)  Weight (kg): 99.4 (07-01-21 @ 09:17), 95.6 (09-04-20 @ 10:29)  BMI (kg/m2): 38.8 (07-01-21 @ 09:17), 37.3 (07-01-21 @ 06:36), 36.7 (04-30-21 @ 19:21)    [ ] PPSV2 < or = 30% [ ] significant weight loss [ ] poor nutritional intake [ ] anasarca Albumin, Serum: 3.8 g/dL (07-01-21 @ 05:24)    Artificial Nutrition [ ]     REFERRALS:   [ ]Chaplaincy  [ ] Hospice  [ ]Child Life  [x ]Social Work  [ ]Case management [ ]Holistic Therapy [ ] Physical Therapy [ ] Dietary   Goals of Care Document:

## 2021-07-09 NOTE — PROGRESS NOTE ADULT - SUBJECTIVE AND OBJECTIVE BOX
Patient is a 71y old  Female who presents with a chief complaint of abdominal pain (09 Jul 2021 09:31)      SUBJECTIVE / OVERNIGHT EVENTS: No new complaints. + abdominal pain  Review of Systems  chest pain no  palpitations no  sob no  nausea no  headache no    MEDICATIONS  (STANDING):  ALPRAZolam 0.25 milliGRAM(s) Oral <User Schedule>  ALPRAZolam 0.5 milliGRAM(s) Oral <User Schedule>  chlorhexidine 4% Liquid 1 Application(s) Topical daily  dronabinol 2.5 milliGRAM(s) Oral two times a day  escitalopram 5 milliGRAM(s) Oral daily  heparin   Injectable 5000 Unit(s) SubCutaneous every 12 hours  HYDROmorphone PCA (1 mG/mL) 30 milliLiter(s) PCA Continuous PCA Continuous  methadone    Tablet 10 milliGRAM(s) Oral <User Schedule>  metoprolol tartrate 50 milliGRAM(s) Oral two times a day  ondansetron    Tablet 8 milliGRAM(s) Oral three times a day  pantoprazole    Tablet 40 milliGRAM(s) Oral before breakfast  polyethylene glycol 3350 17 Gram(s) Oral daily  senna 2 Tablet(s) Oral at bedtime  sodium chloride 0.9%. 1000 milliLiter(s) (20 mL/Hr) IV Continuous <Continuous>    MEDICATIONS  (PRN):  acetaminophen   Tablet .. 650 milliGRAM(s) Oral every 6 hours PRN Temp greater or equal to 38C (100.4F), Mild Pain (1 - 3)  HYDROmorphone PCA (1 mG/mL) Rescue Clinician Bolus 1.5 milliGRAM(s) IV Push every 1 hour PRN Severe Pain (7 - 10)  lactulose Syrup 10 Gram(s) Oral daily PRN constipation  naloxone Injectable 0.1 milliGRAM(s) IV Push every 3 minutes PRN For ANY of the following changes in patient status:  A. RR LESS THAN 10 breaths per minute, B. Oxygen saturation LESS THAN 90%, C. Sedation score of 6  prochlorperazine   Injectable 5 milliGRAM(s) IV Push every 6 hours PRN Nauease or vomiting      Vital Signs Last 24 Hrs  T(C): 36.6 (09 Jul 2021 16:01), Max: 37.1 (09 Jul 2021 05:47)  T(F): 97.9 (09 Jul 2021 16:01), Max: 98.8 (09 Jul 2021 08:14)  HR: 63 (09 Jul 2021 16:01) (63 - 63)  BP: 122/77 (09 Jul 2021 16:01) (118/76 - 122/77)  BP(mean): --  RR: 18 (09 Jul 2021 16:01) (18 - 18)  SpO2: 98% (09 Jul 2021 16:01) (95% - 98%)    PHYSICAL EXAM:  GENERAL: NAD  HEAD:  Atraumatic, Normocephalic  EYES: EOMI, PERRLA, conjunctiva and sclera clear  NECK: Supple, No JVD  CHEST/LUNG: Clear to auscultation bilaterally; No wheeze  HEART: Regular rate and rhythm; No murmurs, rubs, or gallops  ABDOMEN: Soft, Nontender, distended; Bowel sounds present  EXTREMITIES:  2+ Peripheral Pulses, No clubbing, cyanosis, or edema  PSYCH: AAOx3, anxious  NEUROLOGY: non-focal  SKIN: No rashes or lesions    LABS:                      RADIOLOGY & ADDITIONAL TESTS:    Imaging Personally Reviewed:    Consultant(s) Notes Reviewed:      Care Discussed with Consultants/Other Providers:

## 2021-07-10 PROCEDURE — 99233 SBSQ HOSP IP/OBS HIGH 50: CPT

## 2021-07-10 RX ORDER — HYDROMORPHONE HYDROCHLORIDE 2 MG/ML
30 INJECTION INTRAMUSCULAR; INTRAVENOUS; SUBCUTANEOUS
Refills: 0 | Status: DISCONTINUED | OUTPATIENT
Start: 2021-07-10 | End: 2021-07-12

## 2021-07-10 RX ORDER — ONDANSETRON 8 MG/1
8 TABLET, FILM COATED ORAL EVERY 6 HOURS
Refills: 0 | Status: DISCONTINUED | OUTPATIENT
Start: 2021-07-10 | End: 2021-07-10

## 2021-07-10 RX ORDER — ONDANSETRON 8 MG/1
8 TABLET, FILM COATED ORAL EVERY 6 HOURS
Refills: 0 | Status: DISCONTINUED | OUTPATIENT
Start: 2021-07-10 | End: 2021-07-12

## 2021-07-10 RX ADMIN — Medication 0.5 MILLIGRAM(S): at 21:44

## 2021-07-10 RX ADMIN — HEPARIN SODIUM 5000 UNIT(S): 5000 INJECTION INTRAVENOUS; SUBCUTANEOUS at 06:17

## 2021-07-10 RX ADMIN — LACTULOSE 10 GRAM(S): 10 SOLUTION ORAL at 06:26

## 2021-07-10 RX ADMIN — Medication 0.25 MILLIGRAM(S): at 06:16

## 2021-07-10 RX ADMIN — HYDROMORPHONE HYDROCHLORIDE 1.5 MILLIGRAM(S): 2 INJECTION INTRAMUSCULAR; INTRAVENOUS; SUBCUTANEOUS at 07:43

## 2021-07-10 RX ADMIN — HYDROMORPHONE HYDROCHLORIDE 30 MILLILITER(S): 2 INJECTION INTRAMUSCULAR; INTRAVENOUS; SUBCUTANEOUS at 19:14

## 2021-07-10 RX ADMIN — Medication 5 MILLIGRAM(S): at 15:09

## 2021-07-10 RX ADMIN — Medication 5 MILLIGRAM(S): at 07:39

## 2021-07-10 RX ADMIN — METHADONE HYDROCHLORIDE 10 MILLIGRAM(S): 40 TABLET ORAL at 13:20

## 2021-07-10 RX ADMIN — HYDROMORPHONE HYDROCHLORIDE 30 MILLILITER(S): 2 INJECTION INTRAMUSCULAR; INTRAVENOUS; SUBCUTANEOUS at 11:56

## 2021-07-10 RX ADMIN — PANTOPRAZOLE SODIUM 40 MILLIGRAM(S): 20 TABLET, DELAYED RELEASE ORAL at 06:17

## 2021-07-10 RX ADMIN — SENNA PLUS 2 TABLET(S): 8.6 TABLET ORAL at 21:44

## 2021-07-10 RX ADMIN — Medication 2.5 MILLIGRAM(S): at 06:17

## 2021-07-10 RX ADMIN — POLYETHYLENE GLYCOL 3350 17 GRAM(S): 17 POWDER, FOR SOLUTION ORAL at 11:55

## 2021-07-10 RX ADMIN — ONDANSETRON 8 MILLIGRAM(S): 8 TABLET, FILM COATED ORAL at 23:19

## 2021-07-10 RX ADMIN — METHADONE HYDROCHLORIDE 10 MILLIGRAM(S): 40 TABLET ORAL at 06:17

## 2021-07-10 RX ADMIN — Medication 50 MILLIGRAM(S): at 06:17

## 2021-07-10 RX ADMIN — HYDROMORPHONE HYDROCHLORIDE 30 MILLILITER(S): 2 INJECTION INTRAMUSCULAR; INTRAVENOUS; SUBCUTANEOUS at 07:12

## 2021-07-10 RX ADMIN — ONDANSETRON 8 MILLIGRAM(S): 8 TABLET, FILM COATED ORAL at 18:24

## 2021-07-10 RX ADMIN — Medication 2.5 MILLIGRAM(S): at 18:25

## 2021-07-10 RX ADMIN — CHLORHEXIDINE GLUCONATE 1 APPLICATION(S): 213 SOLUTION TOPICAL at 12:50

## 2021-07-10 RX ADMIN — ONDANSETRON 8 MILLIGRAM(S): 8 TABLET, FILM COATED ORAL at 05:51

## 2021-07-10 RX ADMIN — HEPARIN SODIUM 5000 UNIT(S): 5000 INJECTION INTRAVENOUS; SUBCUTANEOUS at 18:25

## 2021-07-10 RX ADMIN — SODIUM CHLORIDE 20 MILLILITER(S): 9 INJECTION INTRAMUSCULAR; INTRAVENOUS; SUBCUTANEOUS at 08:14

## 2021-07-10 RX ADMIN — Medication 0.25 MILLIGRAM(S): at 13:25

## 2021-07-10 RX ADMIN — Medication 50 MILLIGRAM(S): at 18:25

## 2021-07-10 RX ADMIN — ESCITALOPRAM OXALATE 5 MILLIGRAM(S): 10 TABLET, FILM COATED ORAL at 11:55

## 2021-07-10 RX ADMIN — METHADONE HYDROCHLORIDE 10 MILLIGRAM(S): 40 TABLET ORAL at 21:44

## 2021-07-10 NOTE — PROGRESS NOTE ADULT - PROBLEM SELECTOR PROBLEM 6
Cholangiocarcinoma

## 2021-07-10 NOTE — PROGRESS NOTE ADULT - SUBJECTIVE AND OBJECTIVE BOX
GAP TEAM PALLIATIVE CARE UNIT PROGRESS NOTE:      [  ] Patient on hospice program.    INDICATION FOR PALLIATIVE CARE UNIT SERVICES: pain and symptom management    7/10/21  - Chart reviewed. Events noted.  - Discussed with patient and partner at bedside  - Nauseated earlier- resolved with Compazine  - Pain improving- discussed increases in PCA    DNR on chart: Yes      Allergies    codeine (Short breath)  contrast media (iodine-based) (Anaphylaxis)    Intolerances    MEDICATIONS  (STANDING):  ALPRAZolam 0.25 milliGRAM(s) Oral <User Schedule>  ALPRAZolam 0.5 milliGRAM(s) Oral <User Schedule>  chlorhexidine 4% Liquid 1 Application(s) Topical daily  dronabinol 2.5 milliGRAM(s) Oral two times a day  escitalopram 5 milliGRAM(s) Oral daily  heparin   Injectable 5000 Unit(s) SubCutaneous every 12 hours  HYDROmorphone PCA (1 mG/mL) 30 milliLiter(s) PCA Continuous PCA Continuous  methadone    Tablet 10 milliGRAM(s) Oral <User Schedule>  metoprolol tartrate 50 milliGRAM(s) Oral two times a day  ondansetron    Tablet 8 milliGRAM(s) Oral three times a day  pantoprazole    Tablet 40 milliGRAM(s) Oral before breakfast  polyethylene glycol 3350 17 Gram(s) Oral daily  senna 2 Tablet(s) Oral at bedtime  sodium chloride 0.9%. 1000 milliLiter(s) (20 mL/Hr) IV Continuous <Continuous>    MEDICATIONS  (PRN):  acetaminophen   Tablet .. 650 milliGRAM(s) Oral every 6 hours PRN Temp greater or equal to 38C (100.4F), Mild Pain (1 - 3)  HYDROmorphone PCA (1 mG/mL) Rescue Clinician Bolus 1.5 milliGRAM(s) IV Push every 1 hour PRN Severe Pain (7 - 10)  lactulose Syrup 10 Gram(s) Oral daily PRN constipation  naloxone Injectable 0.1 milliGRAM(s) IV Push every 3 minutes PRN For ANY of the following changes in patient status:  A. RR LESS THAN 10 breaths per minute, B. Oxygen saturation LESS THAN 90%, C. Sedation score of 6  prochlorperazine   Injectable 5 milliGRAM(s) IV Push every 6 hours PRN Nauease or vomiting      ITEMS UNCHECKED ARE NOT PRESENT    PRESENT SYMPTOMS: [ ]Unable to obtain due to poor mentation   Source if other than patient:  [ ]Family   [ ]Team     Pain: [ x] yes [ ] no  QOL impact - abdominal discomfort, interrupted sleep  Location -   generalized abd                 Aggravating factors - none reported  Quality - cramping  Radiation -  none reported  Timing-  constant without analgesia  Severity (0-10 scale):  Up to 8/10 without analgesia  Minimal acceptable level (0-10 scale): 3    Dyspnea:                           [ ]Mild [ ]Moderate [ ]Severe  Anxiety:                             [ ]Mild [ ]Moderate [ ]Severe  Fatigue:                             [ ]Mild [ ]Moderate [ ]Severe  Nausea:                             [ ]Mild [ ]Moderate [ ]Severe  Loss of appetite:              [ ]Mild [ ]Moderate [ ]Severe  Constipation:                    [ ]Mild [ ]Moderate [ ]Severe    PAINAD Score:    		  Other Symptoms:  [x]All other review of systems negative     Palliative Performance Status Version 2:     60    %           PHYSICAL EXAM:  Vital Signs Last 24 Hrs  T(C): 36.6 (10 Jul 2021 06:08), Max: 36.8 (10 Jul 2021 00:05)  T(F): 97.9 (10 Jul 2021 06:08), Max: 98.2 (10 Jul 2021 00:05)  HR: 69 (10 Jul 2021 06:08) (63 - 69)  BP: 143/84 (10 Jul 2021 06:08) (122/77 - 143/84)  BP(mean): --  RR: 20 (10 Jul 2021 06:08) (18 - 20)  SpO2: 95% (10 Jul 2021 06:08) (95% - 98%)    GENERAL:  [x ]Alert  [x ]Oriented x 3  [ ]Lethargic  [ ]Cachexia  [ ]Unarousable  [ ]Verbal  [ ]Non-Verbal  Behavioral:   [ ] Anxiety  [ ] Delirium [ ] Agitation [ ] Other  HEENT:  [x ]Normal   [ ]Dry mouth   [ ]ET Tube/Trach  [ ]Oral lesions  PULMONARY:   [x ]Clear [ ]Tachypnea  [ ]Audible excessive secretions   [ ]Rhonchi        [ ]Right [ ]Left [ ]Bilateral  [ ]Crackles        [ ]Right [ ]Left [ ]Bilateral  [ ]Wheezing     [ ]Right [ ]Left [ ]Bilateral  [ ]Diminshed BS [ ]Right [ ]Left [ ]Bilateral    CARDIOVASCULAR:    [x ]Regular [ ]Irregular [ ]Tachy  [ ][place] [ ]Murmur [ ]Other  GASTROINTESTINAL:  [x ]Soft  [ ]Distended   [ ]+BS  [x ]Non tender [ ]Tender  [ ]PEG [ ]OGT/ NGT   Last BM:   7/8    GENITOURINARY:  [x ]Normal [ ] Incontinent   [ ]Oliguria/Anuria   [ ]Doran  MUSCULOSKELETAL:   [x ]Normal   [ ]Weakness  [ ]Bed/Wheelchair bound [ ]Edema  NEUROLOGIC:   [x ]No focal deficits  [ ] Cognitive impairment  [ ] Dysphagia [ ]Dysarthria [ ] Paresis [ ]Other   SKIN:   [x ]Normal  [ ]Rash     [ ]Pressure ulcer(s)  [ ]y [ ]n  Present on admission      CRITICAL CARE:  [ ] Shock Present  [ ]Septic [ ]Cardiogenic [ ]Neurologic [ ]Hypovolemic  [ ]  Vasopressors [ ]  Inotropes   [ ] Respiratory failure present [ ] Mechanical Ventilation [ ] Non-invasive ventilatory support [ ] High-Flow  [ ] Acute  [ ] Chronic [ ] Hypoxic  [ ] Hypercarbic [ ] Other  [ ] Other organ failure     LABS: none new         RADIOLOGY & ADDITIONAL STUDIES: none new     PROTEIN CALORIE MALNUTRITION: [ ] mild [ ] moderate [ ] severe  [ ] underweight [ ] morbid obesity    https://www.andeal.org/vault/2440/web/files/ONC/Table_Clinical%20Characteristics%20to%20Document%20Malnutrition-White%20JV%20et%20al%123427.pdf    Height (cm): 160 (07-01-21 @ 06:36), 161.3 (04-30-21 @ 19:21), 161.3 (09-04-20 @ 10:29)  Weight (kg): 99.4 (07-01-21 @ 09:17), 95.6 (09-04-20 @ 10:29)  BMI (kg/m2): 38.8 (07-01-21 @ 09:17), 37.3 (07-01-21 @ 06:36), 36.7 (04-30-21 @ 19:21)    [ ] PPSV2 < or = 30% [ ] significant weight loss [ ] poor nutritional intake [ ] anasarca Albumin, Serum: 3.8 g/dL (07-01-21 @ 05:24)    Artificial Nutrition [ ]     REFERRALS:   [ ]Chaplaincy  [ ] Hospice  [ ]Child Life  [x ]Social Work  [ ]Case management [ ]Holistic Therapy [ ] Physical Therapy [ ] Dietary   Goals of Care Document:

## 2021-07-10 NOTE — PROGRESS NOTE ADULT - SUBJECTIVE AND OBJECTIVE BOX
Patient is a 71y old  Female who presents with a chief complaint of abdominal pain (10 Jul 2021 12:06)      SUBJECTIVE / OVERNIGHT EVENTS: Comfortable without new complaints.  at bedside.  Review of Systems  chest pain no  palpitations no  sob no  nausea yes  headache no    MEDICATIONS  (STANDING):  ALPRAZolam 0.25 milliGRAM(s) Oral <User Schedule>  ALPRAZolam 0.5 milliGRAM(s) Oral <User Schedule>  chlorhexidine 4% Liquid 1 Application(s) Topical daily  dronabinol 2.5 milliGRAM(s) Oral two times a day  escitalopram 5 milliGRAM(s) Oral daily  heparin   Injectable 5000 Unit(s) SubCutaneous every 12 hours  HYDROmorphone PCA (1 mG/mL) 30 milliLiter(s) PCA Continuous PCA Continuous  methadone    Tablet 10 milliGRAM(s) Oral <User Schedule>  metoprolol tartrate 50 milliGRAM(s) Oral two times a day  ondansetron    Tablet 8 milliGRAM(s) Oral every 6 hours  pantoprazole    Tablet 40 milliGRAM(s) Oral before breakfast  polyethylene glycol 3350 17 Gram(s) Oral daily  senna 2 Tablet(s) Oral at bedtime  sodium chloride 0.9%. 1000 milliLiter(s) (20 mL/Hr) IV Continuous <Continuous>    MEDICATIONS  (PRN):  acetaminophen   Tablet .. 650 milliGRAM(s) Oral every 6 hours PRN Temp greater or equal to 38C (100.4F), Mild Pain (1 - 3)  HYDROmorphone PCA (1 mG/mL) Rescue Clinician Bolus 1.5 milliGRAM(s) IV Push every 1 hour PRN Severe Pain (7 - 10)  lactulose Syrup 10 Gram(s) Oral daily PRN constipation  naloxone Injectable 0.1 milliGRAM(s) IV Push every 3 minutes PRN For ANY of the following changes in patient status:  A. RR LESS THAN 10 breaths per minute, B. Oxygen saturation LESS THAN 90%, C. Sedation score of 6  prochlorperazine   Injectable 5 milliGRAM(s) IV Push every 6 hours PRN Nauease or vomiting      Vital Signs Last 24 Hrs  T(C): 36.6 (10 Jul 2021 16:11), Max: 36.8 (10 Jul 2021 00:05)  T(F): 97.9 (10 Jul 2021 16:11), Max: 98.2 (10 Jul 2021 00:05)  HR: 73 (10 Jul 2021 16:11) (67 - 73)  BP: 117/70 (10 Jul 2021 16:11) (117/70 - 143/84)  BP(mean): --  RR: 18 (10 Jul 2021 16:11) (18 - 20)  SpO2: 96% (10 Jul 2021 16:11) (95% - 96%)    PHYSICAL EXAM:  GENERAL: NAD, well-developed  HEAD:  Atraumatic, Normocephalic  EYES: EOMI, PERRLA, conjunctiva and sclera clear  NECK: Supple, No JVD  CHEST/LUNG: Clear to auscultation bilaterally; No wheeze  HEART: Regular rate and rhythm; No murmurs, rubs, or gallops  ABDOMEN: Soft, Nontender, distended; Bowel sounds present  EXTREMITIES:  2+ Peripheral Pulses, No clubbing, cyanosis, or edema  PSYCH: AAOx3, anxious  NEUROLOGY: non-focal  SKIN: No rashes or lesions    LABS:                      RADIOLOGY & ADDITIONAL TESTS:    Imaging Personally Reviewed:    Consultant(s) Notes Reviewed:      Care Discussed with Consultants/Other Providers:

## 2021-07-10 NOTE — PROGRESS NOTE ADULT - PROBLEM SELECTOR PLAN 5
Improved after paracentesis
Improved after paracentesis.
Improved after paracentesis
Improved after paracentesis.

## 2021-07-10 NOTE — PROGRESS NOTE ADULT - PROBLEM SELECTOR PLAN 7
DNR/DNI. Patient's priority is symptom-directed care. Patient accepted to home hospice. DME delivered 7/9. Patient, , and HCN in agreement with continued monitoring of symptoms (pain, interrupted sleep) over the weekend with titration of meds as needed. Tentative plan for discharge on Monday 7/12.

## 2021-07-10 NOTE — PROGRESS NOTE ADULT - ASSESSMENT
70 yo F with PMH of anxiety, IBS, GERD, Cholangiocarcinoma with metastases, presents here with abdominal pain.    Abdominal pain.  - Patient with abdominal pain, likely 2/2 to underlying metastatic cholangiocarcinoma and peritoneal carcinomatosis; Also with new moderate amount ascites, no signs of SBP  - s/p therapeutic paracentesis  - pain control.     Cholangiocarcinoma.   - patient with cholangiocarcinoma, currently not on chemo  - oncology f/u.     Atrial fibrillation.    - Patient with new onset Afib, unclear etiology  - metoprolol 25mg bid for rate control  - CHADSVASC 2-3 (age, female, ?HTN)  - hold off AC for now    Respiratory distress.   - Patient with orthopnea, GANNON, and now tachypneic  - no hypoxia  - due to pressure from ascites    Chronic pain.   - patient on methadone 5mg tid and hydromorphone 2mg qid   - pain management evaluation    Anxiety.   - xanax 1mg qid prn.    Elevated blood pressure reading.    - elevated BP, possibly 2/2 to pain and anxiety    Prophylactic measure.   - no AC given bleeding risk     Palliative care    DNR    PCU care    DCP with home hospice in progress    d/w patient and     Arden Benitez MD pager [phone]

## 2021-07-10 NOTE — PROGRESS NOTE ADULT - PROBLEM SELECTOR PROBLEM 7
Palliative care encounter

## 2021-07-10 NOTE — PROGRESS NOTE ADULT - PROBLEM SELECTOR PROBLEM 5
Dyspnea, unspecified type

## 2021-07-11 VITALS
HEART RATE: 71 BPM | SYSTOLIC BLOOD PRESSURE: 113 MMHG | RESPIRATION RATE: 18 BRPM | OXYGEN SATURATION: 96 % | TEMPERATURE: 98 F | DIASTOLIC BLOOD PRESSURE: 76 MMHG

## 2021-07-11 DIAGNOSIS — R10.84 GENERALIZED ABDOMINAL PAIN: ICD-10-CM

## 2021-07-11 PROCEDURE — 99233 SBSQ HOSP IP/OBS HIGH 50: CPT

## 2021-07-11 RX ADMIN — Medication 2.5 MILLIGRAM(S): at 06:16

## 2021-07-11 RX ADMIN — METHADONE HYDROCHLORIDE 10 MILLIGRAM(S): 40 TABLET ORAL at 23:02

## 2021-07-11 RX ADMIN — Medication 50 MILLIGRAM(S): at 06:16

## 2021-07-11 RX ADMIN — SENNA PLUS 2 TABLET(S): 8.6 TABLET ORAL at 23:01

## 2021-07-11 RX ADMIN — METHADONE HYDROCHLORIDE 10 MILLIGRAM(S): 40 TABLET ORAL at 06:16

## 2021-07-11 RX ADMIN — Medication 0.25 MILLIGRAM(S): at 06:16

## 2021-07-11 RX ADMIN — HYDROMORPHONE HYDROCHLORIDE 30 MILLILITER(S): 2 INJECTION INTRAMUSCULAR; INTRAVENOUS; SUBCUTANEOUS at 19:16

## 2021-07-11 RX ADMIN — POLYETHYLENE GLYCOL 3350 17 GRAM(S): 17 POWDER, FOR SOLUTION ORAL at 12:17

## 2021-07-11 RX ADMIN — ONDANSETRON 8 MILLIGRAM(S): 8 TABLET, FILM COATED ORAL at 23:01

## 2021-07-11 RX ADMIN — ESCITALOPRAM OXALATE 5 MILLIGRAM(S): 10 TABLET, FILM COATED ORAL at 12:17

## 2021-07-11 RX ADMIN — HEPARIN SODIUM 5000 UNIT(S): 5000 INJECTION INTRAVENOUS; SUBCUTANEOUS at 06:16

## 2021-07-11 RX ADMIN — HEPARIN SODIUM 5000 UNIT(S): 5000 INJECTION INTRAVENOUS; SUBCUTANEOUS at 18:10

## 2021-07-11 RX ADMIN — CHLORHEXIDINE GLUCONATE 1 APPLICATION(S): 213 SOLUTION TOPICAL at 12:16

## 2021-07-11 RX ADMIN — METHADONE HYDROCHLORIDE 10 MILLIGRAM(S): 40 TABLET ORAL at 13:41

## 2021-07-11 RX ADMIN — HYDROMORPHONE HYDROCHLORIDE 30 MILLILITER(S): 2 INJECTION INTRAMUSCULAR; INTRAVENOUS; SUBCUTANEOUS at 12:24

## 2021-07-11 RX ADMIN — Medication 0.5 MILLIGRAM(S): at 23:01

## 2021-07-11 RX ADMIN — ONDANSETRON 8 MILLIGRAM(S): 8 TABLET, FILM COATED ORAL at 06:16

## 2021-07-11 RX ADMIN — Medication 50 MILLIGRAM(S): at 18:10

## 2021-07-11 RX ADMIN — PANTOPRAZOLE SODIUM 40 MILLIGRAM(S): 20 TABLET, DELAYED RELEASE ORAL at 06:16

## 2021-07-11 RX ADMIN — ONDANSETRON 8 MILLIGRAM(S): 8 TABLET, FILM COATED ORAL at 13:41

## 2021-07-11 RX ADMIN — Medication 2.5 MILLIGRAM(S): at 18:11

## 2021-07-11 RX ADMIN — ONDANSETRON 8 MILLIGRAM(S): 8 TABLET, FILM COATED ORAL at 18:11

## 2021-07-11 RX ADMIN — Medication 0.25 MILLIGRAM(S): at 13:40

## 2021-07-11 RX ADMIN — HYDROMORPHONE HYDROCHLORIDE 30 MILLILITER(S): 2 INJECTION INTRAMUSCULAR; INTRAVENOUS; SUBCUTANEOUS at 07:08

## 2021-07-11 NOTE — PROGRESS NOTE ADULT - SUBJECTIVE AND OBJECTIVE BOX
Patient is a 71y old  Female who presents with a chief complaint of abdominal pain (11 Jul 2021 11:37)      SUBJECTIVE / OVERNIGHT EVENTS: Comfortable without new complaints. Family at bedside.   Review of Systems  chest pain no  palpitations no  sob no  nausea no  headache no    MEDICATIONS  (STANDING):  ALPRAZolam 0.25 milliGRAM(s) Oral <User Schedule>  ALPRAZolam 0.5 milliGRAM(s) Oral <User Schedule>  chlorhexidine 4% Liquid 1 Application(s) Topical daily  dronabinol 2.5 milliGRAM(s) Oral two times a day  escitalopram 5 milliGRAM(s) Oral daily  heparin   Injectable 5000 Unit(s) SubCutaneous every 12 hours  HYDROmorphone PCA (1 mG/mL) 30 milliLiter(s) PCA Continuous PCA Continuous  methadone    Tablet 10 milliGRAM(s) Oral <User Schedule>  metoprolol tartrate 50 milliGRAM(s) Oral two times a day  ondansetron    Tablet 8 milliGRAM(s) Oral every 6 hours  pantoprazole    Tablet 40 milliGRAM(s) Oral before breakfast  polyethylene glycol 3350 17 Gram(s) Oral daily  senna 2 Tablet(s) Oral at bedtime  sodium chloride 0.9%. 1000 milliLiter(s) (20 mL/Hr) IV Continuous <Continuous>    MEDICATIONS  (PRN):  acetaminophen   Tablet .. 650 milliGRAM(s) Oral every 6 hours PRN Temp greater or equal to 38C (100.4F), Mild Pain (1 - 3)  HYDROmorphone PCA (1 mG/mL) Rescue Clinician Bolus 1.5 milliGRAM(s) IV Push every 1 hour PRN Severe Pain (7 - 10)  lactulose Syrup 10 Gram(s) Oral daily PRN constipation  naloxone Injectable 0.1 milliGRAM(s) IV Push every 3 minutes PRN For ANY of the following changes in patient status:  A. RR LESS THAN 10 breaths per minute, B. Oxygen saturation LESS THAN 90%, C. Sedation score of 6  prochlorperazine   Injectable 5 milliGRAM(s) IV Push every 6 hours PRN Nauease or vomiting      Vital Signs Last 24 Hrs  T(C): 36.6 (11 Jul 2021 13:47), Max: 36.7 (11 Jul 2021 05:38)  T(F): 97.9 (11 Jul 2021 13:47), Max: 98 (11 Jul 2021 05:38)  HR: 63 (11 Jul 2021 13:47) (63 - 71)  BP: 106/71 (11 Jul 2021 13:47) (106/71 - 127/74)  BP(mean): --  RR: 18 (11 Jul 2021 13:47) (16 - 18)  SpO2: 95% (11 Jul 2021 13:47) (95% - 97%)    PHYSICAL EXAM:  GENERAL: NAD, well-developed  HEAD:  Atraumatic, Normocephalic  EYES: EOMI, PERRLA, conjunctiva and sclera clear  NECK: Supple, No JVD  CHEST/LUNG: Clear to auscultation bilaterally; No wheeze  HEART: Regular rate and rhythm; No murmurs, rubs, or gallops  ABDOMEN: Soft, Nontender, distended; Bowel sounds present  EXTREMITIES:  2+ Peripheral Pulses, No clubbing, cyanosis, or edema  PSYCH: AAOx3  NEUROLOGY: non-focal  SKIN: No rashes or lesions    LABS:                      RADIOLOGY & ADDITIONAL TESTS:    Imaging Personally Reviewed:    Consultant(s) Notes Reviewed:      Care Discussed with Consultants/Other Providers:

## 2021-07-11 NOTE — PROGRESS NOTE ADULT - PROBLEM SELECTOR PLAN 1
- not on chemotherapy  - pain managed with PCA pump at this time with dose 0.5/1/15/1.5  - continue methadone 10 mg TID

## 2021-07-11 NOTE — PROGRESS NOTE ADULT - ASSESSMENT
72 yo F with PMH of anxiety, IBS, GERD, Cholangiocarcinoma with metastases, presents here with abdominal pain.    Abdominal pain.  - Patient with abdominal pain, likely 2/2 to underlying metastatic cholangiocarcinoma and peritoneal carcinomatosis; Also with new moderate amount ascites, no signs of SBP  - s/p therapeutic paracentesis  - pain control.     Cholangiocarcinoma.   - patient with cholangiocarcinoma, currently not on chemo  - oncology f/u.     Atrial fibrillation.    - Patient with new onset Afib, unclear etiology  - metoprolol 25mg bid for rate control  - CHADSVASC 2-3 (age, female, ?HTN)  - hold off AC for now    Respiratory distress.   - Patient with orthopnea, GANNON, and now tachypneic  - no hypoxia  - due to pressure from ascites    Chronic pain.   - patient on methadone 5mg tid and hydromorphone 2mg qid   - pain management evaluation    Anxiety.   - xanax 1mg qid prn.    Elevated blood pressure reading.    - elevated BP, possibly 2/2 to pain and anxiety    Prophylactic measure.   - no AC given bleeding risk     Palliative care    DNR    PCU care    DCP with home hospice in progress    d/w patient, daughter and     Arden Benitez MD pager [phone]

## 2021-07-11 NOTE — PROGRESS NOTE ADULT - ASSESSMENT
70 yo F with metastatic cholangiocarcinoma admitted for symptomatic ascites. Transferred to PCU for symptom management.

## 2021-07-12 ENCOUNTER — TRANSCRIPTION ENCOUNTER (OUTPATIENT)
Age: 71
End: 2021-07-12

## 2021-07-12 PROCEDURE — 83615 LACTATE (LD) (LDH) ENZYME: CPT

## 2021-07-12 PROCEDURE — 87075 CULTR BACTERIA EXCEPT BLOOD: CPT

## 2021-07-12 PROCEDURE — A9540: CPT

## 2021-07-12 PROCEDURE — 87070 CULTURE OTHR SPECIMN AEROBIC: CPT

## 2021-07-12 PROCEDURE — 87102 FUNGUS ISOLATION CULTURE: CPT

## 2021-07-12 PROCEDURE — A9567: CPT

## 2021-07-12 PROCEDURE — 99233 SBSQ HOSP IP/OBS HIGH 50: CPT | Mod: GC

## 2021-07-12 PROCEDURE — 88305 TISSUE EXAM BY PATHOLOGIST: CPT

## 2021-07-12 PROCEDURE — 83735 ASSAY OF MAGNESIUM: CPT

## 2021-07-12 PROCEDURE — 89051 BODY FLUID CELL COUNT: CPT

## 2021-07-12 PROCEDURE — 86769 SARS-COV-2 COVID-19 ANTIBODY: CPT

## 2021-07-12 PROCEDURE — 74176 CT ABD & PELVIS W/O CONTRAST: CPT

## 2021-07-12 PROCEDURE — 85025 COMPLETE CBC W/AUTO DIFF WBC: CPT

## 2021-07-12 PROCEDURE — 84157 ASSAY OF PROTEIN OTHER: CPT

## 2021-07-12 PROCEDURE — 83690 ASSAY OF LIPASE: CPT

## 2021-07-12 PROCEDURE — 87205 SMEAR GRAM STAIN: CPT

## 2021-07-12 PROCEDURE — 80053 COMPREHEN METABOLIC PANEL: CPT

## 2021-07-12 PROCEDURE — 71045 X-RAY EXAM CHEST 1 VIEW: CPT

## 2021-07-12 PROCEDURE — 96375 TX/PRO/DX INJ NEW DRUG ADDON: CPT

## 2021-07-12 PROCEDURE — 99285 EMERGENCY DEPT VISIT HI MDM: CPT | Mod: 25

## 2021-07-12 PROCEDURE — 82042 OTHER SOURCE ALBUMIN QUAN EA: CPT

## 2021-07-12 PROCEDURE — 87635 SARS-COV-2 COVID-19 AMP PRB: CPT

## 2021-07-12 PROCEDURE — 96374 THER/PROPH/DIAG INJ IV PUSH: CPT

## 2021-07-12 PROCEDURE — 93005 ELECTROCARDIOGRAM TRACING: CPT

## 2021-07-12 PROCEDURE — 82945 GLUCOSE OTHER FLUID: CPT

## 2021-07-12 PROCEDURE — 88341 IMHCHEM/IMCYTCHM EA ADD ANTB: CPT

## 2021-07-12 PROCEDURE — 78582 LUNG VENTILAT&PERFUS IMAGING: CPT

## 2021-07-12 PROCEDURE — 85610 PROTHROMBIN TIME: CPT

## 2021-07-12 PROCEDURE — C1729: CPT

## 2021-07-12 PROCEDURE — 36415 COLL VENOUS BLD VENIPUNCTURE: CPT

## 2021-07-12 PROCEDURE — 84100 ASSAY OF PHOSPHORUS: CPT

## 2021-07-12 PROCEDURE — 80048 BASIC METABOLIC PNL TOTAL CA: CPT

## 2021-07-12 PROCEDURE — 88342 IMHCHEM/IMCYTCHM 1ST ANTB: CPT

## 2021-07-12 PROCEDURE — 88112 CYTOPATH CELL ENHANCE TECH: CPT

## 2021-07-12 PROCEDURE — 49083 ABD PARACENTESIS W/IMAGING: CPT

## 2021-07-12 RX ORDER — METOPROLOL TARTRATE 50 MG
1 TABLET ORAL
Qty: 60 | Refills: 1
Start: 2021-07-12 | End: 2021-09-09

## 2021-07-12 RX ORDER — LACTULOSE 10 G/15ML
15 SOLUTION ORAL
Qty: 450 | Refills: 0
Start: 2021-07-12 | End: 2021-08-10

## 2021-07-12 RX ORDER — PANTOPRAZOLE SODIUM 20 MG/1
1 TABLET, DELAYED RELEASE ORAL
Qty: 0 | Refills: 0 | DISCHARGE
Start: 2021-07-12

## 2021-07-12 RX ORDER — METHADONE HYDROCHLORIDE 40 MG/1
1 TABLET ORAL
Qty: 90 | Refills: 0
Start: 2021-07-12 | End: 2021-08-10

## 2021-07-12 RX ORDER — PROCHLORPERAZINE MALEATE 5 MG
1 TABLET ORAL
Qty: 120 | Refills: 0
Start: 2021-07-12 | End: 2021-08-10

## 2021-07-12 RX ORDER — SENNA PLUS 8.6 MG/1
2 TABLET ORAL
Qty: 60 | Refills: 0
Start: 2021-07-12 | End: 2021-08-10

## 2021-07-12 RX ORDER — ESCITALOPRAM OXALATE 10 MG/1
1 TABLET, FILM COATED ORAL
Qty: 0 | Refills: 0 | DISCHARGE

## 2021-07-12 RX ORDER — METHADONE HYDROCHLORIDE 40 MG/1
1 TABLET ORAL
Qty: 0 | Refills: 0 | DISCHARGE
Start: 2021-07-12

## 2021-07-12 RX ORDER — ALPRAZOLAM 0.25 MG
0.25 TABLET ORAL
Refills: 0 | Status: DISCONTINUED | OUTPATIENT
Start: 2021-07-12 | End: 2021-07-12

## 2021-07-12 RX ORDER — HYDROMORPHONE HYDROCHLORIDE 2 MG/ML
0.5 INJECTION INTRAMUSCULAR; INTRAVENOUS; SUBCUTANEOUS
Qty: 990 | Refills: 0
Start: 2021-07-12

## 2021-07-12 RX ORDER — ALPRAZOLAM 0.25 MG
1 TABLET ORAL
Qty: 0 | Refills: 0 | DISCHARGE
Start: 2021-07-12

## 2021-07-12 RX ORDER — ONDANSETRON 8 MG/1
1 TABLET, FILM COATED ORAL
Qty: 0 | Refills: 0 | DISCHARGE

## 2021-07-12 RX ORDER — ONDANSETRON 8 MG/1
1 TABLET, FILM COATED ORAL
Qty: 120 | Refills: 0
Start: 2021-07-12 | End: 2021-08-10

## 2021-07-12 RX ORDER — LACTULOSE 10 G/15ML
15 SOLUTION ORAL
Qty: 0 | Refills: 0 | DISCHARGE
Start: 2021-07-12

## 2021-07-12 RX ORDER — HYDROMORPHONE HYDROCHLORIDE 2 MG/ML
1 INJECTION INTRAMUSCULAR; INTRAVENOUS; SUBCUTANEOUS
Qty: 0 | Refills: 0 | DISCHARGE

## 2021-07-12 RX ORDER — POLYETHYLENE GLYCOL 3350 17 G/17G
17 POWDER, FOR SOLUTION ORAL
Qty: 510 | Refills: 0
Start: 2021-07-12 | End: 2021-08-10

## 2021-07-12 RX ORDER — ALPRAZOLAM 0.25 MG
1 TABLET ORAL
Qty: 90 | Refills: 0
Start: 2021-07-12 | End: 2021-08-10

## 2021-07-12 RX ORDER — LACTULOSE 10 G/15ML
15 SOLUTION ORAL
Qty: 0 | Refills: 0 | DISCHARGE

## 2021-07-12 RX ORDER — PANTOPRAZOLE SODIUM 20 MG/1
1 TABLET, DELAYED RELEASE ORAL
Qty: 30 | Refills: 1
Start: 2021-07-12 | End: 2021-09-09

## 2021-07-12 RX ORDER — ACETAMINOPHEN 500 MG
2 TABLET ORAL
Qty: 0 | Refills: 0 | DISCHARGE
Start: 2021-07-12

## 2021-07-12 RX ORDER — METHADONE HYDROCHLORIDE 40 MG/1
1 TABLET ORAL
Qty: 0 | Refills: 0 | DISCHARGE

## 2021-07-12 RX ORDER — ESCITALOPRAM OXALATE 10 MG/1
1 TABLET, FILM COATED ORAL
Qty: 0 | Refills: 0 | DISCHARGE
Start: 2021-07-12

## 2021-07-12 RX ORDER — DRONABINOL 2.5 MG
1 CAPSULE ORAL
Qty: 60 | Refills: 0
Start: 2021-07-12 | End: 2021-08-10

## 2021-07-12 RX ORDER — HYDROMORPHONE HYDROCHLORIDE 2 MG/ML
1 INJECTION INTRAMUSCULAR; INTRAVENOUS; SUBCUTANEOUS
Qty: 800 | Refills: 0
Start: 2021-07-12 | End: 2021-08-10

## 2021-07-12 RX ORDER — ONDANSETRON 8 MG/1
1 TABLET, FILM COATED ORAL
Qty: 0 | Refills: 0 | DISCHARGE
Start: 2021-07-12

## 2021-07-12 RX ORDER — ESCITALOPRAM OXALATE 10 MG/1
1 TABLET, FILM COATED ORAL
Qty: 30 | Refills: 0
Start: 2021-07-12 | End: 2021-08-10

## 2021-07-12 RX ORDER — POLYETHYLENE GLYCOL 3350 17 G/17G
17 POWDER, FOR SOLUTION ORAL
Qty: 0 | Refills: 0 | DISCHARGE
Start: 2021-07-12

## 2021-07-12 RX ORDER — DRONABINOL 2.5 MG
1 CAPSULE ORAL
Qty: 0 | Refills: 0 | DISCHARGE
Start: 2021-07-12

## 2021-07-12 RX ORDER — METOPROLOL TARTRATE 50 MG
1 TABLET ORAL
Qty: 0 | Refills: 0 | DISCHARGE
Start: 2021-07-12

## 2021-07-12 RX ORDER — SENNA PLUS 8.6 MG/1
2 TABLET ORAL
Qty: 0 | Refills: 0 | DISCHARGE
Start: 2021-07-12

## 2021-07-12 RX ADMIN — HEPARIN SODIUM 5000 UNIT(S): 5000 INJECTION INTRAVENOUS; SUBCUTANEOUS at 06:16

## 2021-07-12 RX ADMIN — Medication 0.25 MILLIGRAM(S): at 06:15

## 2021-07-12 RX ADMIN — ONDANSETRON 8 MILLIGRAM(S): 8 TABLET, FILM COATED ORAL at 06:15

## 2021-07-12 RX ADMIN — ONDANSETRON 8 MILLIGRAM(S): 8 TABLET, FILM COATED ORAL at 12:33

## 2021-07-12 RX ADMIN — Medication 50 MILLIGRAM(S): at 06:15

## 2021-07-12 RX ADMIN — SODIUM CHLORIDE 20 MILLILITER(S): 9 INJECTION INTRAMUSCULAR; INTRAVENOUS; SUBCUTANEOUS at 07:11

## 2021-07-12 RX ADMIN — ESCITALOPRAM OXALATE 5 MILLIGRAM(S): 10 TABLET, FILM COATED ORAL at 12:33

## 2021-07-12 RX ADMIN — HYDROMORPHONE HYDROCHLORIDE 30 MILLILITER(S): 2 INJECTION INTRAMUSCULAR; INTRAVENOUS; SUBCUTANEOUS at 07:14

## 2021-07-12 RX ADMIN — Medication 0.25 MILLIGRAM(S): at 14:43

## 2021-07-12 RX ADMIN — CHLORHEXIDINE GLUCONATE 1 APPLICATION(S): 213 SOLUTION TOPICAL at 12:33

## 2021-07-12 RX ADMIN — Medication 2.5 MILLIGRAM(S): at 06:15

## 2021-07-12 RX ADMIN — METHADONE HYDROCHLORIDE 10 MILLIGRAM(S): 40 TABLET ORAL at 06:15

## 2021-07-12 RX ADMIN — SODIUM CHLORIDE 20 MILLILITER(S): 9 INJECTION INTRAMUSCULAR; INTRAVENOUS; SUBCUTANEOUS at 06:14

## 2021-07-12 RX ADMIN — PANTOPRAZOLE SODIUM 40 MILLIGRAM(S): 20 TABLET, DELAYED RELEASE ORAL at 06:15

## 2021-07-12 RX ADMIN — METHADONE HYDROCHLORIDE 10 MILLIGRAM(S): 40 TABLET ORAL at 14:20

## 2021-07-12 NOTE — PROGRESS NOTE ADULT - ASSESSMENT
70 yo F with PMH of anxiety, IBS, GERD, Cholangiocarcinoma with metastases, presents here with abdominal pain.    Abdominal pain.  - Patient with abdominal pain, likely 2/2 to underlying metastatic cholangiocarcinoma and peritoneal carcinomatosis; Also with new moderate amount ascites, no signs of SBP  - s/p therapeutic paracentesis  - pain control.     Cholangiocarcinoma.   - patient with cholangiocarcinoma, currently not on chemo  - oncology f/u.     Atrial fibrillation.    - Patient with new onset Afib, unclear etiology  - metoprolol 25mg bid for rate control  - CHADSVASC 2-3 (age, female, ?HTN)  - hold off AC for now    Respiratory distress improving after paracenmtesis  - no hypoxia  - due to pressure from ascites    Chronic pain.   - patient on methadone 5mg tid and hydromorphone 2mg qid   - pain management follow    Anxiety.   - xanax 1mg qid prn.    Elevated blood pressure reading.    - elevated BP, possibly 2/2 to pain and anxiety    Prophylactic measure.   - no AC given bleeding risk     Palliative care    DNR    PCU care    DCP with home hospice in progress    d/w patient and     Arden Benitez MD pager [phone]

## 2021-07-12 NOTE — PROGRESS NOTE ADULT - ATTENDING COMMENTS
Pt present with her dtr and  for family meeting today. Sitting up in bed, NAD; feels abdomen tender over paracentesis site, normal respiratory effort, no rash. They are aware of her metastatic disease and side effects from prior chemotherapy. They understand that the risks of further chemotherapy outweigh benefits and that they want more comfort measures to control her pain, control of nausea, and repeat paracentesis if there is recurrent ascites. Family will continue to work with palliative care for pain control.
Patient medically cleared for d/c home today with home hospice.  [facility] care to set up PCA for home today as well. Family at bedside and on board with plan.  Rest of plan of care as documented above. Case discussed during IDT rounds.
- Chart reviewed. Events noted. Pt seen and examined.  - Pain adequately controlled with PCA changes done yesterday  - Nausea resolved  - Continue rest of excellent PCU care
70 yo F with PMH of anxiety, IBS, GERD, cholangiocarcinoma with metastases presented to PCU with uncontrolled abdominal pain.  Patient on methadone and PCA, attempting to transition to methadone and oral regimen.  Patient is willing to go home with PCA/methadone and continue transition as an outpatient.  Hospice evaluation.
70 yo F with PMH of anxiety, IBS, GERD, cholangiocarcinoma with metastases presented to PCU with uncontrolled abdominal pain.  Patient on methadone and PCA, attempting to transition to methadone and oral regimen.  Patient is willing to go home with PCA/methadone and continue transition as an outpatient.  Methadone increased to 10/10/10, no adjustments to PCA given this increase.  Increase xanax before bedtime for sleep.
patient in PCU for management of pain with discharge planning for home hospice. [facility] referral today.  continue current regimen of methadone 5/5/10 and PCA at 0/1/30//1.5. Plan to uptitrate methadone over coming days vs. discharge on PCA pending patient preference and medication needs. bowel regimen, last BM 7/3. rest of plan as above, discussed with IDT.
70 yo F with PMH of anxiety, IBS, GERD, cholangiocarcinoma with metastases presented to PCU with uncontrolled abdominal pain.  Patient on methadone and PCA, attempting to transition to methadone and oral regimen.  Patient is willing to go home with PCA/methadone and continue transition as an outpatient.  Hospice evaluation.

## 2021-07-12 NOTE — DISCHARGE NOTE PROVIDER - NSDCCPCAREPLAN_GEN_ALL_CORE_FT
PRINCIPAL DISCHARGE DIAGNOSIS  Diagnosis: Cholangiocarcinoma  Assessment and Plan of Treatment: Your abdominal pain was evaluated as secondary to your known metastatic cholangiocarcinoma. You had improvement of your symptoms with increasing your methadone dose to 10mg three times a day in addition to a dilaudid PCA pump. Continue your medications as directed. Please call [facility] with any questions or concerns regarding your pain management including but not limited to any uncontrolled pain or medication side effects.      SECONDARY DISCHARGE DIAGNOSES  Diagnosis: Nausea and/or vomiting  Assessment and Plan of Treatment: You may continue to take zofran 8mg three times a day as needed for nausea. Please call [facility] with any questions or concerns regarding your symptom management including but not limited to any uncontrolled nausea, vomiting, or medication side effects.    Diagnosis: Constipation  Assessment and Plan of Treatment: Continue senna every night to help with constipation, which is a common and chronic side effect with opioid medications. You may take miralax and/or lactulose daily as needed for constipation. Please call [facility] with any questions or concerns regarding your symptom management including but not limited to any uncontrolled constipation or medication side effects.    Diagnosis: Anxiety  Assessment and Plan of Treatment: Continue xanax 0.25mg three times a day for anxiety. Please call [facility] with any questions or concerns regarding your symptom management including but not limited to any uncontrolled anxiety or medication side effects.     PRINCIPAL DISCHARGE DIAGNOSIS  Diagnosis: Cholangiocarcinoma  Assessment and Plan of Treatment: Your abdominal pain was evaluated as secondary to your known metastatic cholangiocarcinoma. You had improvement of your symptoms with increasing your methadone dose to 10mg three times a day in addition to a dilaudid PCA pump. Continue your medications as directed. Please call [facility] with any questions or concerns regarding your pain management including but not limited to any uncontrolled pain or medication side effects.      SECONDARY DISCHARGE DIAGNOSES  Diagnosis: Nausea and/or vomiting  Assessment and Plan of Treatment: You may continue to take ondansetron (also known as zofran) 8mg every 6 hours as needed for nausea. You may take prochlorperazine (also known as compazine) 5mg every 6 hours as needed for refractory nausea. Please call [facility] with any questions or concerns regarding your symptom management including but not limited to any uncontrolled nausea, vomiting, or medication side effects.    Diagnosis: Constipation  Assessment and Plan of Treatment: Continue senna every night to help with constipation, which is a common and chronic side effect with opioid medications. You may take miralax and/or lactulose daily as needed for constipation. Please call [facility] with any questions or concerns regarding your symptom management including but not limited to any uncontrolled constipation or medication side effects.    Diagnosis: Anxiety  Assessment and Plan of Treatment: Continue xanax 0.25mg three times a day for anxiety. Please call [facility] with any questions or concerns regarding your symptom management including but not limited to any uncontrolled anxiety or medication side effects.

## 2021-07-12 NOTE — PROGRESS NOTE ADULT - PROBLEM SELECTOR PLAN 1
- not on chemotherapy  - pain managed with PCA pump at this time with dose 0.5/1/15/1.5  - continue methadone 10 mg TID - not on chemotherapy  - pain managed with PCA pump at this time with dose 0.5/1/15//1.5  - continue methadone 10 mg TID

## 2021-07-12 NOTE — PROGRESS NOTE ADULT - SUBJECTIVE AND OBJECTIVE BOX
GAP TEAM PALLIATIVE CARE UNIT PROGRESS NOTE:      [  ] Patient on hospice program.    INDICATION FOR PALLIATIVE CARE UNIT SERVICES: pain and symptom management    INTERVAL HPI/OVERNIGHT EVENTS: On dilaudid PCA. Past 24 hrs usage: 9 attempts, 8 injections, no CBs. Slept "like a log". Patient and  at bedside in agreement for dc today.    DNR on chart: Yes      Allergies    codeine (Short breath)  contrast media (iodine-based) (Anaphylaxis)    Intolerances    MEDICATIONS  (STANDING):  ALPRAZolam 0.25 milliGRAM(s) Oral <User Schedule>  ALPRAZolam 0.5 milliGRAM(s) Oral <User Schedule>  chlorhexidine 4% Liquid 1 Application(s) Topical daily  dronabinol 2.5 milliGRAM(s) Oral two times a day  escitalopram 5 milliGRAM(s) Oral daily  heparin   Injectable 5000 Unit(s) SubCutaneous every 12 hours  HYDROmorphone PCA (1 mG/mL) 30 milliLiter(s) PCA Continuous PCA Continuous  methadone    Tablet 10 milliGRAM(s) Oral <User Schedule>  metoprolol tartrate 50 milliGRAM(s) Oral two times a day  ondansetron    Tablet 8 milliGRAM(s) Oral every 6 hours  pantoprazole    Tablet 40 milliGRAM(s) Oral before breakfast  polyethylene glycol 3350 17 Gram(s) Oral daily  senna 2 Tablet(s) Oral at bedtime  sodium chloride 0.9%. 1000 milliLiter(s) (20 mL/Hr) IV Continuous <Continuous>    MEDICATIONS  (PRN):  acetaminophen   Tablet .. 650 milliGRAM(s) Oral every 6 hours PRN Temp greater or equal to 38C (100.4F), Mild Pain (1 - 3)  HYDROmorphone PCA (1 mG/mL) Rescue Clinician Bolus 1.5 milliGRAM(s) IV Push every 1 hour PRN Severe Pain (7 - 10)  lactulose Syrup 10 Gram(s) Oral daily PRN constipation  naloxone Injectable 0.1 milliGRAM(s) IV Push every 3 minutes PRN For ANY of the following changes in patient status:  A. RR LESS THAN 10 breaths per minute, B. Oxygen saturation LESS THAN 90%, C. Sedation score of 6  prochlorperazine   Injectable 5 milliGRAM(s) IV Push every 6 hours PRN Nauease or vomiting    ITEMS UNCHECKED ARE NOT PRESENT    PRESENT SYMPTOMS: [ ]Unable to obtain due to poor mentation   Source if other than patient:  [ ]Family   [ ]Team     Pain: [x ] yes [ ] no  QOL impact - abdominal discomfort, interrupted sleep  Location -   generalized abdomen                 Aggravating factors -  none reported  Quality -  cramping  Radiation -  none reported  Timing-  constant without analgesia  Severity (0-10 scale): 10/10 without analgesia  Minimal acceptable level (0-10 scale): 3 or 4    Dyspnea:                           [ ]Mild [ ]Moderate [ ]Severe  Anxiety:                             [ ]Mild [ ]Moderate [ ]Severe  Fatigue:                             [ ]Mild [ ]Moderate [ ]Severe  Nausea:                             [ ]Mild [ ]Moderate [ ]Severe  Loss of appetite:              [ ]Mild [ ]Moderate [ ]Severe  Constipation:                    [ ]Mild [ ]Moderate [ ]Severe    PAINAD Score:    [email or web address] (Ctrl +  left click to view)  		  Other Symptoms:  [x]All other review of systems negative     Palliative Performance Status Version 2:    60     %         http://npcrc.org/files/news/palliative_performance_scale_ppsv2.pdf  PHYSICAL EXAM:  Vital Signs Last 24 Hrs  T(C): 36.8 (11 Jul 2021 23:44), Max: 36.8 (11 Jul 2021 23:44)  T(F): 98.2 (11 Jul 2021 23:44), Max: 98.2 (11 Jul 2021 23:44)  HR: 71 (11 Jul 2021 23:44) (61 - 71)  BP: 113/76 (11 Jul 2021 23:44) (106/71 - 128/80)  BP(mean): --  RR: 18 (11 Jul 2021 23:44) (18 - 18)  SpO2: 96% (11 Jul 2021 23:44) (94% - 96%) I&O's Summary  GENERAL:  [x ]Alert  [ x]Oriented x  3 [ ]Lethargic  [ ]Cachexia  [ ]Unarousable  [ ]Verbal  [ ]Non-Verbal  Behavioral:   [ ] Anxiety  [ ] Delirium [ ] Agitation [ ] Other  HEENT:  [x ]Normal   [ ]Dry mouth   [ ]ET Tube/Trach  [ ]Oral lesions  PULMONARY:   [ x]Clear [ ]Tachypnea  [ ]Audible excessive secretions   [ ]Rhonchi        [ ]Right [ ]Left [ ]Bilateral  [ ]Crackles        [ ]Right [ ]Left [ ]Bilateral  [ ]Wheezing     [ ]Right [ ]Left [ ]Bilateral  [ ]Diminshed BS [ ]Right [ ]Left [ ]Bilateral    CARDIOVASCULAR:    [x ]Regular [ ]Irregular [ ]Tachy  [ ][place] [ ]Murmur [ ]Other  GASTROINTESTINAL:  [ x]Soft  [ ]Distended   [x ]+BS  [x ]Non tender [ ]Tender  [ ]PEG [ ]OGT/ NGT   Last BM:   7/12    GENITOURINARY:  [x ]Normal [ ] Incontinent   [ ]Oliguria/Anuria   [ ][place]  MUSCULOSKELETAL:   [x ]Normal   [ ]Weakness  [ ]Bed/Wheelchair bound [ ]Edema  NEUROLOGIC:   [x ]No focal deficits  [ ] Cognitive impairment  [ ] Dysphagia [ ]Dysarthria [ ] Paresis [ ]Other   SKIN:   [x ]Normal  [ ]Rash     [ ]Pressure ulcer(s)  [ ]y [ ]n  Present on admission      CRITICAL CARE:  [ ] Shock Present  [ ]Septic [ ]Cardiogenic [ ]Neurologic [ ]Hypovolemic  [ ]  Vasopressors [ ]  Inotropes   [ ] Respiratory failure present [ ] Mechanical Ventilation [ ] Non-invasive ventilatory support [ ] High-Flow  [ ] Acute  [ ] Chronic [ ] Hypoxic  [ ] Hypercarbic [ ] Other  [ ] Other organ failure     LABS: none new       RADIOLOGY & ADDITIONAL STUDIES: none new     PROTEIN CALORIE MALNUTRITION: [ ] mild [ ] moderate [ ] severe  [ ] underweight [ ] morbid obesity    https://www.andeal.org/vault/2440/web/files/ONC/Table_Clinical%20Characteristics%20to%20Document%20Malnutrition-White%20JV%20et%20al%512222.pdf    Height (cm): 160 (07-01-21 @ 06:36), 161.3 (04-30-21 @ 19:21), 161.3 (09-04-20 @ 10:29)  Weight (kg): 99.4 (07-01-21 @ 09:17), 95.6 (09-04-20 @ 10:29)  BMI (kg/m2): 38.8 (07-01-21 @ 09:17), 37.3 (07-01-21 @ 06:36), 36.7 (04-30-21 @ 19:21)    [ ] PPSV2 < or = 30% [ ] significant weight loss [ ] poor nutritional intake [ ] anasarca Albumin, Serum: 3.8 g/dL (07-01-21 @ 05:24)    Artificial Nutrition [ ]     REFERRALS:   [ ]Chaplaincy  [ ] Hospice  [ ]Child Life  [x ]Social Work  [ ]Case management [ ]Holistic Therapy [ ] Physical Therapy [ ] Dietary   Goals of Care Document:

## 2021-07-12 NOTE — PROGRESS NOTE ADULT - NSICDXPILOT_GEN_ALL_CORE
[place]

## 2021-07-12 NOTE — PROGRESS NOTE ADULT - PROBLEM SELECTOR PROBLEM 4
Anxiety
Palliative care encounter
Anxiety
Palliative care encounter
Anxiety

## 2021-07-12 NOTE — PROGRESS NOTE ADULT - SUBJECTIVE AND OBJECTIVE BOX
Patient is a 71y old  Female who presents with a chief complaint of abdominal pain (12 Jul 2021 11:25)      SUBJECTIVE / OVERNIGHT EVENTS: Comfortable without new complaints.  at bedside.  Review of Systems  chest pain no  palpitations no  sob no  nausea no  headache no    MEDICATIONS  (STANDING):  ALPRAZolam 0.25 milliGRAM(s) Oral <User Schedule>  chlorhexidine 4% Liquid 1 Application(s) Topical daily  dronabinol 2.5 milliGRAM(s) Oral two times a day  escitalopram 5 milliGRAM(s) Oral daily  heparin   Injectable 5000 Unit(s) SubCutaneous every 12 hours  HYDROmorphone PCA (1 mG/mL) 30 milliLiter(s) PCA Continuous PCA Continuous  methadone    Tablet 10 milliGRAM(s) Oral <User Schedule>  metoprolol tartrate 50 milliGRAM(s) Oral two times a day  ondansetron    Tablet 8 milliGRAM(s) Oral every 6 hours  pantoprazole    Tablet 40 milliGRAM(s) Oral before breakfast  polyethylene glycol 3350 17 Gram(s) Oral daily  senna 2 Tablet(s) Oral at bedtime  sodium chloride 0.9%. 1000 milliLiter(s) (20 mL/Hr) IV Continuous <Continuous>    MEDICATIONS  (PRN):  acetaminophen   Tablet .. 650 milliGRAM(s) Oral every 6 hours PRN Temp greater or equal to 38C (100.4F), Mild Pain (1 - 3)  HYDROmorphone PCA (1 mG/mL) Rescue Clinician Bolus 1.5 milliGRAM(s) IV Push every 1 hour PRN Severe Pain (7 - 10)  lactulose Syrup 10 Gram(s) Oral daily PRN constipation  naloxone Injectable 0.1 milliGRAM(s) IV Push every 3 minutes PRN For ANY of the following changes in patient status:  A. RR LESS THAN 10 breaths per minute, B. Oxygen saturation LESS THAN 90%, C. Sedation score of 6  prochlorperazine   Injectable 5 milliGRAM(s) IV Push every 6 hours PRN Nauease or vomiting      Vital Signs Last 24 Hrs  T(C): 36.8 (11 Jul 2021 23:44), Max: 36.8 (11 Jul 2021 23:44)  T(F): 98.2 (11 Jul 2021 23:44), Max: 98.2 (11 Jul 2021 23:44)  HR: 71 (11 Jul 2021 23:44) (71 - 71)  BP: 113/76 (11 Jul 2021 23:44) (113/76 - 113/76)  BP(mean): --  RR: 18 (11 Jul 2021 23:44) (18 - 18)  SpO2: 96% (11 Jul 2021 23:44) (96% - 96%)    PHYSICAL EXAM:  GENERAL: NAD, well-developed  HEAD:  Atraumatic, Normocephalic  EYES: EOMI, PERRLA, conjunctiva and sclera clear  NECK: Supple, No JVD  CHEST/LUNG: Clear to auscultation bilaterally; No wheeze  HEART: Regular rate and rhythm; No murmurs, rubs, or gallops  ABDOMEN: Soft, Nontender, distended; Bowel sounds present  EXTREMITIES:  2+ Peripheral Pulses, No clubbing, cyanosis, or edema  PSYCH: AAOx3  NEUROLOGY: non-focal  SKIN: No rashes or lesions    LABS:                      RADIOLOGY & ADDITIONAL TESTS:    Imaging Personally Reviewed:    Consultant(s) Notes Reviewed:      Care Discussed with Consultants/Other Providers:

## 2021-07-12 NOTE — PROGRESS NOTE ADULT - PROBLEM SELECTOR PROBLEM 2
Malignant ascites
Generalized abdominal pain
Malignant ascites
Generalized abdominal pain
Malignant ascites
You can access the FollowMyHealth Patient Portal offered by [facility] by registering at the following website: [email or web address]. By joining [facility]’s FollowMyHealth portal, you will also be able to view your health information using other applications (apps) compatible with our system.

## 2021-07-12 NOTE — DISCHARGE NOTE NURSING/CASE MANAGEMENT/SOCIAL WORK - PATIENT PORTAL LINK FT
You can access the FollowMyHealth Patient Portal offered by [facility] by registering at the following website: [email or web address]. By joining [employer]’s FollowMyHealth portal, you will also be able to view your health information using other applications (apps) compatible with our system.

## 2021-07-12 NOTE — DISCHARGE NOTE PROVIDER - NSFTFHOMEHTHYNRD_GEN_ALL_CORE
WHEN TO EXPECT YOUR LAB/TEST RESULTS    * You will be contacted by letter or phone regarding your lab results after ALL lab results are back-within 2 weeks of your appointment. All Normal results will be notified by letter; all abnormal results will be called.    * Please call if you have not heard within 2 weeks: [phone]    * Quickest way to review your lab results is through [employer].  If you do not have an active account Please register on [email or web address] or verify e-mail address with  and a link will be sent to your e-mail address.    *Physical Exams are done annually (every year). Pap tests are done every 3-5 years; unless otherwise specified.      
Yes

## 2021-07-12 NOTE — DISCHARGE NOTE PROVIDER - NSDCFUSCHEDAPPT_GEN_ALL_CORE_FT
STEFAN TYLER ; 07/19/2021 ; [facility]  STEFAN TYLER ; 07/28/2021 ; [facility] [address] [facility] [address]  STEFAN TYLER ; 08/30/2021 ; [facility]

## 2021-07-12 NOTE — DISCHARGE NOTE PROVIDER - HOSPITAL COURSE
71F metastatic cholangiocarcinoma now off chemo, GERD, IBS, anxiety who presented with acute on chronic abdominal pain. s/p 4.8L para on 7/2 for malignant ascites. Additional issue of new afib, started lopressor. CHADSVASc of 2, held off on AC. Patient was transferred to the PCU for pain and symptom management where her symptoms were managed with a dilaudid PCA pump, increase of her home methadone, ATC zofran for nausea, and ATC low-dose xanax for anxiety. Patient accepted to home hospice. Plan for discharge on PCA pump to be administered by [facility] in coordination with management by hospice. 71F metastatic cholangiocarcinoma now off chemo, GERD, IBS, anxiety who presented with acute on chronic abdominal pain. s/p 4.8L para on 7/2 for malignant ascites. Additional issue of new afib, started lopressor. CHADSVASc of 2, held off on AC. Patient was transferred to the PCU for pain and symptom management where her symptoms were managed with a dilaudid PCA pump, increase of her home methadone, ATC zofran for nausea, and ATC low-dose xanax for anxiety. Patient accepted to home hospice. Plan for discharge on PCA pump to be administered by [facility] in coordination with management by hospice.

## 2021-07-12 NOTE — DISCHARGE NOTE PROVIDER - NSDCMRMEDTOKEN_GEN_ALL_CORE_FT
Dilaudid 1 mg/mL injectable solution: Dilaudid PCA - Concentration 1mg/ml, basal 0.5mg IV/hr, bolus 1mg IV q15min, MDD:108mg, #10 bags of 800ml  HYDROmorphone 0.5 mg/mL-NaCl 0.9% injectable solution: Dilaudid PCA - concentration 0.5mg/ml, basal 0.5mg/hr, bolus 1mg q15min, MDD 108mg, #10 bags of 990ml. MDD:108mg  HYDROmorphone 2 mg oral tablet: 1 tab(s) orally 4 times a day, As Needed  lactulose 10 g/15 mL oral syrup: 15 milliliter(s) orally once a day  Lexapro 5 mg oral tablet: 1 tab(s) orally once a day  methadone 5 mg oral tablet: 1 tab(s) orally every 8 hours  Tylenol 325 mg oral tablet:   Xanax 0.25 mg oral tablet: 1 tab(s) orally 4 times a day -day supply dispensed 12/12/2019  Zofran 8 mg oral tablet: 1 tab(s) orally 4 times a day, As Needed   acetaminophen 325 mg oral tablet: 2 tab(s) orally every 6 hours, As needed, Temp greater or equal to 38C (100.4F), Mild Pain (1 - 3)  ALPRAZolam 0.25 mg oral tablet: Take 1 tablet 3 times a day for anxiety  Dilaudid 1 mg/mL injectable solution: Dilaudid PCA - Concentration 1mg/ml, basal 0.5mg IV/hr, bolus 1mg IV q15min, MDD:108mg, #10 bags of 800ml  dronabinol 2.5 mg oral capsule: 1 cap(s) orally 2 times a day  escitalopram 5 mg oral tablet: 1 tab(s) orally once a day  HYDROmorphone 0.5 mg/mL-NaCl 0.9% injectable solution: Dilaudid PCA - concentration 0.5mg/ml, basal 0.5mg/hr, bolus 1mg q15min, MDD 108mg, #10 bags of 990ml. MDD:108mg  HYDROmorphone 2 mg oral tablet: 1 tab(s) orally 4 times a day, As Needed  lactulose 10 g/15 mL oral syrup: 15 milliliter(s) orally once a day, As needed, constipation  methadone 10 mg oral tablet: 1 tab(s) orally   metoprolol tartrate 50 mg oral tablet: 1 tab(s) orally 2 times a day  ondansetron 8 mg oral tablet: 1 tab(s) orally every 6 hours  pantoprazole 40 mg oral delayed release tablet: 1 tab(s) orally once a day (before a meal)  polyethylene glycol 3350 oral powder for reconstitution: 17 gram(s) orally once a day  senna oral tablet: 2 tab(s) orally once a day (at bedtime)  Xanax 0.25 mg oral tablet: 1 tab(s) orally 4 times a day -day supply dispensed 12/12/2019   acetaminophen 325 mg oral tablet: 2 tab(s) orally every 6 hours, As needed, Temp greater or equal to 38C (100.4F), Mild Pain (1 - 3)  ALPRAZolam 0.25 mg oral tablet: Take 1 tablet 3 times a day for anxiety MDD:0.75mg  Dilaudid 1 mg/mL injectable solution: Dilaudid PCA - Concentration 1mg/ml, basal 0.5mg IV/hr, bolus 1mg IV q15min, MDD:108mg, #10 bags of 800ml  dronabinol 2.5 mg oral capsule: 1 cap(s) orally 2 times a day  dronabinol 2.5 mg oral capsule: 1 cap(s) orally 2 times a day MDD:5mg  escitalopram 5 mg oral tablet: 1 tab(s) orally once a day  lactulose 10 g/15 mL oral syrup: 15 milliliter(s) orally once a day, As needed, constipation  methadone 10 mg oral tablet: 1 tab(s) orally 3 times a day MDD:30mg  metoprolol tartrate 50 mg oral tablet: 1 tab(s) orally 2 times a day  ondansetron 8 mg oral tablet: 1 tab(s) orally every 6 hours  pantoprazole 40 mg oral delayed release tablet: 1 tab(s) orally once a day (before a meal)  polyethylene glycol 3350 oral powder for reconstitution: 17 gram(s) orally once a day, As Needed -for constipation   prochlorperazine 5 mg oral tablet: 1 tab(s) orally every 6 hours, As Needed for nausea  senna oral tablet: 2 tab(s) orally once a day (at bedtime)   acetaminophen 325 mg oral tablet: 2 tab(s) orally every 6 hours, As needed, Temp greater or equal to 38C (100.4F), Mild Pain (1 - 3)  ALPRAZolam 0.25 mg oral tablet: Take 1 tablet 3 times a day for anxiety MDD:0.75mg  Dilaudid 1 mg/mL injectable solution: Dilaudid PCA - Concentration 1mg/ml, basal 0.5mg IV/hr, bolus 1mg IV q15min, MDD:108mg, #10 bags of 800ml  dronabinol 2.5 mg oral capsule: 1 cap(s) orally 2 times a day MDD:5mg  escitalopram 5 mg oral tablet: 1 tab(s) orally once a day  lactulose 10 g/15 mL oral syrup: 15 milliliter(s) orally once a day, As needed, constipation  methadone 10 mg oral tablet: 1 tab(s) orally 3 times a day MDD:30mg  ondansetron 8 mg oral tablet: 1 tab(s) orally every 6 hours  polyethylene glycol 3350 oral powder for reconstitution: 17 gram(s) orally once a day, As Needed -for constipation   prochlorperazine 5 mg oral tablet: 1 tab(s) orally every 6 hours, As Needed for nausea  senna oral tablet: 2 tab(s) orally once a day (at bedtime)

## 2021-07-12 NOTE — PROGRESS NOTE ADULT - PROBLEM SELECTOR PROBLEM 1
Cholangiocarcinoma
Pain due to neoplasm
Cholangiocarcinoma
Pain due to neoplasm

## 2021-07-12 NOTE — PROGRESS NOTE ADULT - PROBLEM SELECTOR PLAN 3
- improved since yesterday  - continue on prochlorperazine 5 mg IV q6  - continue zofran PO 8mg every 6 hours as per pt request
.  Patient wants PO Zofran ATC- will change  Continue Compazine 5mg IV q6 PRN.  Marinol 2.5 bid that appears to also be helping with appetite.
Will increase Zofran to 8mg IV q 8ATC and will add Compazine 5mg IV q 6PRN.   Marinol 2.5 bid that appears to also be helping with appetite.
c/w Zofran 8mg IV q8 ATC, Compazine 5mg IV q6 PRN.  Marinol 2.5 bid that appears to also be helping with appetite.
- improved since yesterday  - continue on prochlorperazine 5 mg IV q6  - continue zofran PO 8mg every 6 hours as per pt request
Will c/w Zofran to 8mg IV q 8ATC and will add Compazine 5mg IV q 6PRN.   Marinol 2.5 bid that appears to also be helping with appetite.
Will c/w Zofran to 8mg IV q 8ATC and will add Compazine 5mg IV q 6PRN.   Marinol 2.5 bid that appears to also be helping with appetite.
Will increase Zofran to 8mg IV q 8ATC and will add Compazine 5mg IV q 6PRN.   Marinol 2.5 bid that appears to also be helping with appetite.
c/w Zofran 8mg IV q8 ATC, Compazine 5mg IV q6 PRN.  Marinol 2.5 bid that appears to also be helping with appetite.

## 2021-07-12 NOTE — PROGRESS NOTE ADULT - PROVIDER SPECIALTY LIST ADULT
Internal Medicine
Internal Medicine
Palliative Care
Palliative Care
Internal Medicine
Palliative Care
Palliative Care
Internal Medicine
Internal Medicine
Palliative Care
Palliative Care
Heme/Onc
Internal Medicine
Palliative Care

## 2021-07-12 NOTE — PROGRESS NOTE ADULT - PROBLEM SELECTOR PLAN 2
s/p 4.8L paracentesis 7/2
- continue current pain regimen
s/p paracentesis
- continue current pain regimen
s/p paracentesis
s/p 4.8L paracentesis 7/2
s/p paracentesis
s/p paracentesis
s/p 4.8L paracentesis 7/2
s/p 4.8L paracentesis 7/2
s/p paracentesis

## 2021-07-14 LAB — NON-GYNECOLOGICAL CYTOLOGY STUDY: SIGNIFICANT CHANGE UP

## 2021-07-19 ENCOUNTER — APPOINTMENT (OUTPATIENT)
Dept: INFUSION THERAPY | Facility: HOSPITAL | Age: 71
End: 2021-07-19

## 2021-07-28 ENCOUNTER — APPOINTMENT (OUTPATIENT)
Dept: GASTROENTEROLOGY | Facility: CLINIC | Age: 71
End: 2021-07-28

## 2021-08-30 ENCOUNTER — APPOINTMENT (OUTPATIENT)
Dept: INFUSION THERAPY | Facility: HOSPITAL | Age: 71
End: 2021-08-30

## 2022-01-01 NOTE — PATIENT PROFILE ADULT - PRO INTERPRETER NEED 2
Chief Complaint     [place] Circumcision        HPI     Cinthia Charles is a male whose family requests elective  circumcision. There are no complaints at this time. The  H&P from the hospital admission is reviewed today and available in the electronic medical record.  Confirmed--Vitamin K given.  Negative family history of bleeding disorder.      Procedure     [place] Circumcision Procedure Note:    After risks and benefits were discussed informed consent was obtained.  The patient was taken to the procedure room and placed in the supine position and strapped to a papoose board.  He was prepped and draped in sterile fashion.  Physical exam revealed physiologic phimosis, no hypospadias, penile torsion, bilaterally descended testis palpable within the scrotum with no masses or lesions.  0.5cc of 1% lidocaine was injected locally in the suprapubic area bilaterally to achieve a dorsal nerve block.  Hemostats where then placed at the 3 and 9 o'clock positions to retract the foreskin, being careful to avoid the urethral meatus and frenulum.  A hemostat was then placed at the 12 o'clock position and bluntly spread to dissect any glandular adhesions.  The 12 o'clock hemostat was then clamped to demarcate the line of the dorsal slit.  Next a dorsal slit was made with small sharp scissors at the 12 o'clock position.  The foreskin was then reduced and glandular adhesions bluntly dissected.  A Gomco clamp bell was then placed over the glans and the foreskin retracted over the bell.  A hemostat was placed at the 12 o'clock position to approximate the two lateral edges of the dorsal slit.  The bell clamp complex was then configured careful to avoid using excess ventral or dorsal penile shaft skin as well as create any penile torsion.  The bell clamp was then tightened for approximately 5 minutes to achieve hemostasis.  Next a #15 blade was used to resect along the cleft of the bell clamp complex and excise the  foreskin.  The bell clamp was then disassembled and the penile shaft skin was reduced proximal to the corona.  Vaseline applied with gauze.  Blood loss was less than 5cc.  The patient tolerated the procedure well.  Mother was given instructions on wound care.    Assessment     circumcision    Plan     Problem List Items Addressed This Visit    None  Visit Diagnoses       Jaundice    -  Primary    Single liveborn infant                Circumcision  - Procedure done w/o complications  -Apply vaseline with each diaper change.             English

## 2022-03-18 NOTE — H&P ADULT - ASSESSMENT
Yes 69F with PMHx of anxiety, recently diagnosed with cholangioCA on chemo (last on 1/17/2020) and recent discharge from the hospital for cellulitis 3 days prior presenting with nausea, vomiting, fevers, weakness x 2 days 69F with PMHx of anxiety, recently diagnosed with cholangioCA on chemo (last on 1/17/2020) and recent discharge from the hospital for cellulitis 3 days prior presenting with nausea, vomiting, fevers, weakness x 2 days, found to be Flu A+, less likely superimposed bacterial pneumonia at this time

## 2022-03-28 NOTE — PROGRESS NOTE ADULT - SUBJECTIVE AND OBJECTIVE BOX
Bill For Dosimetry/Render Decay And Dose Adjustment Calculation In Note: No GAP TEAM PALLIATIVE CARE UNIT PROGRESS NOTE:      [  ] Patient on hospice program.    INDICATION FOR PALLIATIVE CARE UNIT SERVICES: symptom management    INTERVAL HPI/OVERNIGHT EVENTS: maintained on dilaudid PCA pump, 1 rescue dose needed in the past 24 hours    DNR on chart: Yes      Allergies    codeine (Short breath)  contrast media (iodine-based) (Anaphylaxis)    Intolerances    MEDICATIONS  (STANDING):  ALPRAZolam 0.25 milliGRAM(s) Oral <User Schedule>  ALPRAZolam 0.5 milliGRAM(s) Oral <User Schedule>  chlorhexidine 4% Liquid 1 Application(s) Topical daily  dronabinol 2.5 milliGRAM(s) Oral two times a day  escitalopram 5 milliGRAM(s) Oral daily  heparin   Injectable 5000 Unit(s) SubCutaneous every 12 hours  HYDROmorphone PCA (1 mG/mL) 30 milliLiter(s) PCA Continuous PCA Continuous  methadone    Tablet 10 milliGRAM(s) Oral <User Schedule>  metoprolol tartrate 50 milliGRAM(s) Oral two times a day  ondansetron    Tablet 8 milliGRAM(s) Oral every 6 hours  pantoprazole    Tablet 40 milliGRAM(s) Oral before breakfast  polyethylene glycol 3350 17 Gram(s) Oral daily  senna 2 Tablet(s) Oral at bedtime  sodium chloride 0.9%. 1000 milliLiter(s) (20 mL/Hr) IV Continuous <Continuous>    MEDICATIONS  (PRN):  acetaminophen   Tablet .. 650 milliGRAM(s) Oral every 6 hours PRN Temp greater or equal to 38C (100.4F), Mild Pain (1 - 3)  HYDROmorphone PCA (1 mG/mL) Rescue Clinician Bolus 1.5 milliGRAM(s) IV Push every 1 hour PRN Severe Pain (7 - 10)  lactulose Syrup 10 Gram(s) Oral daily PRN constipation  naloxone Injectable 0.1 milliGRAM(s) IV Push every 3 minutes PRN For ANY of the following changes in patient status:  A. RR LESS THAN 10 breaths per minute, B. Oxygen saturation LESS THAN 90%, C. Sedation score of 6  prochlorperazine   Injectable 5 milliGRAM(s) IV Push every 6 hours PRN Nauease or vomiting    ITEMS UNCHECKED ARE NOT PRESENT    PRESENT SYMPTOMS: [ ]Unable to obtain due to poor mentation   Source if other than patient:  [ ]Family   [ ]Team     Pain: [x ] yes [ ] no  QOL impact - better today  Location -       abdomen             Aggravating factors - none  Quality -  Radiation -  Timing-  Severity (0-10 scale): 5/10  Minimal acceptable level (0-10 scale): 5/10    Dyspnea:                           [ ]Mild [ ]Moderate [ ]Severe  Anxiety:                             [ ]Mild [ ]Moderate [ ]Severe  Fatigue:                             [ ]Mild [ ]Moderate [ ]Severe  Nausea:                             [ ]Mild [ ]Moderate [ ]Severe  Loss of appetite:              [ ]Mild [ ]Moderate [ ]Severe  Constipation:                    [ ]Mild [ ]Moderate [ ]Severe    PAINAD Score:    [email or web address]/[email or web address] (Ctrl +  left click to view)  		  Other Symptoms:  [ ]All other review of systems negative     Palliative Performance Status Version 2:    70     %         http://npcrc.org/files/news/palliative_performance_scale_ppsv2.pdf  PHYSICAL EXAM:  Vital Signs Last 24 Hrs  T(C): 36.7 (11 Jul 2021 05:38), Max: 36.7 (11 Jul 2021 05:38)  T(F): 98 (11 Jul 2021 05:38), Max: 98 (11 Jul 2021 05:38)  HR: 68 (11 Jul 2021 05:38) (68 - 73)  BP: 125/79 (11 Jul 2021 05:38) (117/70 - 127/74)  BP(mean): --  RR: 17 (11 Jul 2021 05:38) (16 - 18)  SpO2: 95% (11 Jul 2021 05:38) (95% - 97%) I&O's Summary  GENERAL:  [x ]Alert  [x ]Oriented x4   [ ]Lethargic  [ ]Cachexia  [ ]Unarousable  [ ]Verbal  [ ]Non-Verbal  Behavioral:   [ ] Anxiety  [ ] Delirium [ ] Agitation [ ] Other  HEENT:  [x ]Normal   [ ]Dry mouth   [ ]ET Tube/Trach  [ ]Oral lesions  PULMONARY:   [x ]Clear [ ]Tachypnea  [ ]Audible excessive secretions   [ ]Rhonchi        [ ]Right [ ]Left [ ]Bilateral  [ ]Crackles        [ ]Right [ ]Left [ ]Bilateral  [ ]Wheezing     [ ]Right [ ]Left [ ]Bilateral  [ ]Diminished BS [ ]Right [ ]Left [ ]Bilateral    CARDIOVASCULAR:    [x ]Regular [ ]Irregular [ ]Tachy  [ ][place] [ ]Murmur [ ]Other  GASTROINTESTINAL:  [x ]Soft  [ ]Distended   [ ]+BS  [ ]Non tender [ ]Tender  [ ]PEG [ ]OGT/ NGT   Last BM:       GENITOURINARY:  [x ]Normal [ ] Incontinent   [ ]Oliguria/Anuria   [ ]Doran  MUSCULOSKELETAL:   [ ]Normal   [x ]Weakness  [ ]Bed/Wheelchair bound [ ]Edema  NEUROLOGIC:   [ x]No focal deficits  [ ] Cognitive impairment  [ ] Dysphagia [ ]Dysarthria [ ] Paresis [ ]Other   SKIN:   [ x]Normal  [ ]Rash     [ ]Pressure ulcer(s)  [ ]y [ ]n  Present on admission      CRITICAL CARE:  [ ] Shock Present  [ ]Septic [ ]Cardiogenic [ ]Neurologic [ ]Hypovolemic  [ ]  Vasopressors [ ]  Inotropes   [ ] Respiratory failure present [ ] Mechanical Ventilation [ ] Non-invasive ventilatory support [ ] High-Flow  [ ] Acute  [ ] Chronic [ ] Hypoxic  [ ] Hypercarbic [ ] Other  [ ] Other organ failure     LABS:            RADIOLOGY & ADDITIONAL STUDIES:    PROTEIN CALORIE MALNUTRITION: [ ] mild [ ] moderate [ ] severe  [ ] underweight [ ] morbid obesity    https://www.andeal.org/vault/2440/web/files/ONC/Table_Clinical%20Characteristics%20to%20Document%20Malnutrition-White%20JV%20et%20al%532072.pdf    Height (cm): 160 (07-01-21 @ 06:36), 161.3 (04-30-21 @ 19:21), 161.3 (09-04-20 @ 10:29)  Weight (kg): 99.4 (07-01-21 @ 09:17), 95.6 (09-04-20 @ 10:29)  BMI (kg/m2): 38.8 (07-01-21 @ 09:17), 37.3 (07-01-21 @ 06:36), 36.7 (04-30-21 @ 19:21)    [ ] PPSV2 < or = 30% [ ] significant weight loss [ ] poor nutritional intake [ ] anasarca Albumin, Serum: 3.8 g/dL (07-01-21 @ 05:24)    Artificial Nutrition [ ]     REFERRALS:   [ ]Chaplaincy  [ ] Hospice  [ ]Child Life  [ ]Social Work  [ ]Case management [ ]Holistic Therapy [ ] Physical Therapy [ ] Dietary   Goals of Care Document:

## 2022-08-16 NOTE — PATIENT PROFILE ADULT - HOME ACCESSIBILITY CONCERNS
On 8/10/22, the patient was taken to the Operating Room for the above stated procedure. Please see the previously dictated operative report for complete details. Postoperatively, the patient was taken from the  Operating Room to the ICU where the vital signs were monitored and pain was kept under control. The patient was weaned from the drips and extubated in the ICU per protocol. Once hemodynamically stable, the patient was transferred to the Cardiac Step-Down floor for continued strengthening and ambulation. On postoperative day ***, the patient was ready for discharge to home. At the time of discharge, the patient was ambulating unassisted. Pain was well controlled with oral analgesics and the patient was tolerating the diet.     MOBILITY AND ACTIVITY: As tolerated. Patient may shower. No heavy lifting of greater than 5 pounds and no driving.     DIET: An 1800-calorie ADA with a 1500 mL fluid restriction.     WOUND CARE INSTRUCTIONS: Check for redness, swelling and drainage around the  incision or wound. Patient is to call for any obvious bleeding, drainage, pus from the wound, unusual problems or difficulties or temperature of greater than 101   degrees.     FOLLOWUP: Follow up with Dr. Morris in approximately 3 weeks. Prior to this  appointment, the patient will have a chest x-ray and EKG.     Patient not placed on Ace-Inhibitor at the time of discharge due to potential for hypotension   Patient not placed on Beta-Blocker at the time of discharge due to potential for bradycardia         DISCHARGE CONDITION: At the time of discharge, the patient was in sinus rhythm and afebrile with stable vital signs.     none

## 2022-09-13 NOTE — ED PROVIDER NOTE - NS ED ATTENDING STATEMENT MOD
I have personally seen and examined this patient.  I have fully participated in the care of this patient. I have reviewed all pertinent clinical information, including history, physical exam, plan and the Resident’s note and agree except as noted. 14-Sep-2022 01:12

## 2022-10-27 NOTE — DISCHARGE NOTE PROVIDER - YES NO FOR MLM POSITIVE OR NEGATIVE COVID RESULT
,
This patient has been assessed with a concern for Malnutrition and was treated during this hospitalization for the following Nutrition diagnosis/diagnoses:     -  10/26/2022: Moderate protein-calorie malnutrition

## 2023-12-21 NOTE — H&P ADULT - SKIN
LPN Care Coordination Encounter Details:    Outreach Performed: NO         Health Maintenance Screening(s) Due:      Additional Pop Health Notes:                  Updates Requested / Reviewed:        Updated Care Coordination Note, , Care Team Updated, and Immunizations Reconciliation Completed or Queried: [place]         Health Maintenance Topics Overdue:      VBHM Score: 5     Colon Cancer Screening  Urine Screening  Eye Exam  Hemoglobin A1c  Foot Exam                Health Maintenance Topic(s) Outreach Outcomes & Actions Taken:    Osteoporosis Screening - Outreach Outcomes & Actions Taken  : External Records Uploaded, Care Team Updated, & History Updated if Applicable        Chronic Disease Management:     Diabetes Measures        Lab Results   Component Value Date    HGBA1C 7.0 (A) 10/13/2022           [x]  Reviewed chart for active Diabetes diagnosis     []  Scheduled necessary follow up appointments if needed         Additional Notes:             Hypertension Measures        BP Readings from Last 1 Encounters:   12/04/23 126/82           [x]  Reviewed chart for active Hypertension diagnosis     []  Reviewed & documented Home BP Cuff     []  Documented a Remote BP if needed & applicable     []  Scheduled necessary follow up appointments with Primary Care if needed         Additional Notes:             
No lesions; no rash

## 2024-03-17 NOTE — REASON FOR VISIT
[Follow-Up Visit] : a follow-up [Spouse] : spouse [FreeTextEntry2] : Cholangiocarcinoma  stanislav all pertinent systems negative

## 2024-07-24 NOTE — PATIENT PROFILE ADULT - HARM RISK FACTORS
SURVEY:    You may be receiving a survey from [facility] regarding your visit today.    You may get this in the mail, through your MyChart, or in your email.     Please complete the survey to enable us to provide the highest quality of care to you and your family.    If you cannot score us a very good (5 Stars) on any question, please call the office to discuss how we could of made your experience exceptional.    Thank you!    MD Alee Barba, APRN-KEMAR Wilde, JEAN Asencio LPN Brenda Boehler, LPN Jena Adams, MA    Phone: [phone]  Fax: [phone]    Office Hours:   M-TH 8-5, F: 8-12   
no

## 2024-11-29 NOTE — PATIENT PROFILE ADULT - LIVES WITH
Covering provider reviewed. Should not need a refill based on last prescription.  
Medication:     Disp Refills Start End     NIFEdipine XL (PROCARDIA XL) 30 MG 24 hr tablet 90 tablet 3 9/23/2024 --    Sig - Route: Take 1 tablet by mouth daily. - Oral    Last office visit date: 9/23/24  Medication Refill Protocol Failed.  Failed criteria: Failed Last BP was equal to or less than 150/100 -- IF CRITERIA FAILED REFER TO PROTOCOL DETAILS. Sent to clinician to review.   
significant other

## 2024-12-19 NOTE — DISCHARGE NOTE PROVIDER - NSDCHC_MEDRECSTATUS_GEN_ALL_CORE
Per josé: pt called her cell this am when she was taking her son to school. She wanted me to call and see what he needed assistance with.     First attempt to reach pt. Left a voicemail for pt to return my call at [phone].     RELAY      Please inform pt that José was unavailable when he called her. Ask what we can assist him with?    Admission Reconciliation is Completed  Discharge Reconciliation is Not Complete Admission Reconciliation is Completed  Discharge Reconciliation is Completed
